# Patient Record
Sex: FEMALE | Race: WHITE | Employment: FULL TIME | URBAN - METROPOLITAN AREA
[De-identification: names, ages, dates, MRNs, and addresses within clinical notes are randomized per-mention and may not be internally consistent; named-entity substitution may affect disease eponyms.]

---

## 2022-05-20 ENCOUNTER — HOSPITAL ENCOUNTER (OUTPATIENT)
Dept: MAMMOGRAPHY | Age: 52
Discharge: HOME | End: 2022-05-20
Attending: FAMILY MEDICINE
Payer: COMMERCIAL

## 2022-05-20 DIAGNOSIS — Z12.31 ENCOUNTER FOR SCREENING MAMMOGRAM FOR BREAST CANCER: ICD-10-CM

## 2022-05-20 PROCEDURE — 77063 BREAST TOMOSYNTHESIS BI: CPT

## 2022-05-23 ENCOUNTER — NURSE TRIAGE (OUTPATIENT)
Dept: OTHER | Facility: CLINIC | Age: 52
End: 2022-05-23

## 2022-05-23 ENCOUNTER — TELEPHONE (OUTPATIENT)
Dept: FAMILY MEDICINE CLINIC | Facility: CLINIC | Age: 52
End: 2022-05-23

## 2022-05-23 NOTE — TELEPHONE ENCOUNTER
Received call from Elvira Gutierres at [facility] with [employer]. Subjective: Caller states \"Swollen lymph nodes\"     Current Symptoms:   Neck pain with swollen lymph nodes; L>R  Lymph nodes are the size of a bean  \"A little\" tender to the touch  Denies cold symptoms    Intermittent, mild left lower abdominal pain x \"months\"  Left flank pain  \"I may have a UTI or kidney stone\"  \"I think I may have seen a stone floating this morning\"  Hx kidney stones    Patient questioning if Olmesartan or Chlorthalidone is upsetting her stomach because PCP mentioned it prior at an OV. Onset: 1 week ago; worsening    Pain Severity: 2/10; dull; waxing and waning- neck pain    Temperature: Denies    Recommended disposition: See PCP within 3 Days    Care advice provided, patient verbalizes understanding; denies any other questions or concerns; instructed to call back for any new or worsening symptoms. Patient/Caller agrees with recommended disposition; writer provided warm transfer to [facility] for appointment scheduling     Attention Provider: Thank you for allowing me to participate in the care of your patient. The patient was connected to triage in response to information provided to the ECC/PSC. Please do not respond through this encounter as the response is not directed to a shared pool.       Reason for Disposition   Normal-sized lymph node (i.e., < 1 cm, < 1/2 inch)   MILD pain (i.e., scale 1-3; does not interfere with normal activities) and present > 3 days    Protocols used: LYMPH NODES - SWOLLEN-ADULT-OH, FLANK PAIN-ADULT-OH

## 2022-05-23 NOTE — TELEPHONE ENCOUNTER
Pt called states that she has had a kidney stone before and is concerned she may have another kidney stone, pt is experiencing pain in her lower left side as well as bilateral flank  lympnode in her neckpain. Pt states that she has swollen lymphoids in both sides of her neck. Pt is having burning pains in her abdomen. Instructed the patient she needs to be evaluated at a urgent care due to no availability in our schedules and needing to be seen in a timely manner. Pt states she would like to be treated over the phone and get an antibiotic, informed the patient she needed to be seen in person by medical personnel. Instructed the patient to seek treatment at urgent care and informed her if she is concerned of an infection and kidney stones she does not need to leave those symptoms untreated. Pt voiced seemed agitated but did voice understanding and disconnected the call.

## 2022-06-03 ENCOUNTER — OFFICE VISIT (OUTPATIENT)
Dept: FAMILY MEDICINE CLINIC | Facility: CLINIC | Age: 52
End: 2022-06-03
Payer: COMMERCIAL

## 2022-06-03 VITALS
BODY MASS INDEX: 28.79 KG/M2 | OXYGEN SATURATION: 99 % | DIASTOLIC BLOOD PRESSURE: 84 MMHG | HEIGHT: 64 IN | SYSTOLIC BLOOD PRESSURE: 142 MMHG | RESPIRATION RATE: 16 BRPM | WEIGHT: 168.6 LBS | TEMPERATURE: 98.6 F

## 2022-06-03 DIAGNOSIS — I10 PRIMARY HYPERTENSION: ICD-10-CM

## 2022-06-03 DIAGNOSIS — R10.32 LEFT LOWER QUADRANT ABDOMINAL PAIN: Primary | ICD-10-CM

## 2022-06-03 DIAGNOSIS — Z00.00 PHYSICAL EXAM: ICD-10-CM

## 2022-06-03 LAB
BILIRUBIN, URINE, POC: NEGATIVE
BLOOD URINE, POC: NEGATIVE
GLUCOSE URINE, POC: NEGATIVE
KETONES, URINE, POC: NEGATIVE
LEUKOCYTE ESTERASE, URINE, POC: NEGATIVE
NITRITE, URINE, POC: NEGATIVE
PH, URINE, POC: 6.5 (ref 4.6–8)
PROTEIN,URINE, POC: NORMAL
SPECIFIC GRAVITY, URINE, POC: 1.01 (ref 1–1.03)
URINALYSIS CLARITY, POC: CLEAR
URINALYSIS COLOR, POC: YELLOW
UROBILINOGEN, POC: 0.2

## 2022-06-03 PROCEDURE — 81003 URINALYSIS AUTO W/O SCOPE: CPT | Performed by: FAMILY MEDICINE

## 2022-06-03 PROCEDURE — 99212 OFFICE O/P EST SF 10 MIN: CPT | Performed by: FAMILY MEDICINE

## 2022-06-03 RX ORDER — ONDANSETRON 4 MG/1
4 TABLET, ORALLY DISINTEGRATING ORAL 3 TIMES DAILY PRN
COMMUNITY
Start: 2022-05-29 | End: 2022-06-28

## 2022-06-03 RX ORDER — TRETINOIN 1 MG/G
CREAM TOPICAL
COMMUNITY
Start: 2022-05-07 | End: 2022-08-23 | Stop reason: ALTCHOICE

## 2022-06-03 RX ORDER — DICLOFENAC POTASSIUM 50 MG/1
TABLET, FILM COATED ORAL
COMMUNITY
Start: 2022-05-24 | End: 2022-06-09

## 2022-06-03 RX ORDER — AMOXICILLIN AND CLAVULANATE POTASSIUM 875; 125 MG/1; MG/1
1 TABLET, FILM COATED ORAL 3 TIMES DAILY
COMMUNITY
Start: 2022-05-29 | End: 2022-06-03

## 2022-06-03 ASSESSMENT — PATIENT HEALTH QUESTIONNAIRE - PHQ9
1. LITTLE INTEREST OR PLEASURE IN DOING THINGS: 0
SUM OF ALL RESPONSES TO PHQ QUESTIONS 1-9: 0
SUM OF ALL RESPONSES TO PHQ9 QUESTIONS 1 & 2: 0
SUM OF ALL RESPONSES TO PHQ QUESTIONS 1-9: 0
2. FEELING DOWN, DEPRESSED OR HOPELESS: 0

## 2022-06-03 NOTE — PROGRESS NOTES
HISTORY OF PRESENT ILLNESS  Chief Complaint   Patient presents with    Abdominal Pain     lower left abdomen; was seen in the ER for this on 04/29 still will have a burning sensation     Discuss Labs     mammogram results on 05/20     lower left abdomen; was seen in the ER for this on 05/29 still will have a burning sensation     Has lessened a little but still there. Today was the last day of her abx. Tried the fodmat diet for awhile. Still feels it when she crosses her legs and feels pressure in that area with a BM. Initially was watery and now more thin/ narrow. Has tried adding back the yougurt because she thinks she needs it. Has not enjoyed the gluten free bread. Has a colonoscopy scheduled for the 26th of July. Has had that side pain for awhile. Has been dxed with h pylori in the past.     Kidney stones in 2019. tought that this could have been that because UA was all negative. Had ovaries evaluated around 2019 as well at that time. Had an U/s at that that time that was ok. Previously said, \" complaint of left lower quadrant pain. The patient initially felt some of the pain in her lower back, but over the last week or so it has been getting more \"angry\" in her left lower quadrant. She initially thought it may have been kidney stones since she has a history of this, so at the behest of her primary care physician she went to an urgent care. She states there her urine was noted as being normal no concern for kidney stone or urinary tract infection. She was given a pain medication from the urgent care and that upset her stomach somewhat and gave her some nausea though she never threw up. She has been having some diarrhea over the last couple of days with no blood in her stool. She denies any fevers but has been having intermittent hot flashes secondary to what she describes as menopause symptoms.  She acknowledges that she had diverticulitis around this time of year last year, and she also admits that she has been eating a lot of \"everything bagels\" that have a lot of seeds in them. I am concerned for diverticulitis as well, though the patient is currently afebrile and overall appears well, she is requesting conservative treatment with antibiotics. She has an appointment with her primary care physician 5 days from now. We will obtain basic laboratory studies for evaluation of an elevated white blood cell count, and I will re-evaluate the patient after her labs. \"    mammogram results on 05/20  Impression:     Left mass and calcifications. Further evaluation is recommended with   ML and compression magnification views, then ultrasound to follow. Previously said, \" Abdominal Pain  Patient complains of abdominal pain. The pain is described as dull and aching and sometimes it can be a little sharper occasionally  almost like a glowing  That gets brighter and less bright , and is 1-2 to more noticable/10 in intensity. Pain is located in the LLQ without radiation but has felt it in other places - may have some kidney or back aches. . Onset was 1 year ago. Symptoms have been unchanged since. Aggravating factors: eating and unknown. Alleviating factors: eating healthy, tried bone brothe, and salda for lunch. Associated symptoms: constipation had 2-3 weeks of constipation The patient denies anorexia, chills, diarrhea, dysuria, fever, melena, muscle pain, nausea, sweats, urinary frequency, urinary urgency and vomiting.           Notices more at night when is bed relaxing. Has at times has noticed it in the daytime. Sometimes always a little dull pain     has been happpening on and off for a year\"    Does have some lower left back lower pain. Abdominal Pain  This is a chronic problem. The pain is located in the LLQ. The abdominal pain radiates to the left flank. Associated symptoms include constipation.  Pertinent negatives include no anorexia, arthralgias, belching, diarrhea, dysuria, fever, frequency, headaches, hematochezia, hematuria, myalgias, nausea, vomiting or weight loss. The pain is aggravated by movement. The pain is relieved by certain positions. She has tried acetaminophen for the symptoms. The treatment provided no relief. Review of Systems   Constitutional: Negative for activity change, appetite change, chills, fatigue, fever and weight loss. HENT: Negative for congestion and rhinorrhea. Respiratory: Negative for cough, shortness of breath and wheezing. Gastrointestinal: Positive for abdominal pain and constipation. Negative for anorexia, diarrhea, hematochezia, nausea and vomiting. Genitourinary: Negative for dysuria, frequency and hematuria. Musculoskeletal: Negative for arthralgias and myalgias. Neurological: Negative for dizziness and headaches. Psychiatric/Behavioral: Negative for dysphoric mood. The patient is not nervous/anxious. Patient Active Problem List   Diagnosis    Hypertension    Kidney stone    Cyst, kidney, acquired         Blood pressure (!) 142/84, temperature 98.6 °F (37 °C), temperature source Temporal, resp. rate 16, height 5' 3.6\" (1.615 m), weight 168 lb 9.6 oz (76.5 kg), SpO2 99 %, not currently breastfeeding. Pain Scale: 2/10 Pain Location:   Body mass index is 29.31 kg/m². Physical Exam  Constitutional:       General: She is not in acute distress. Appearance: Normal appearance. She is normal weight. She is not toxic-appearing. HENT:      Head: Normocephalic and atraumatic. Eyes:      Extraocular Movements: Extraocular movements intact. Conjunctiva/sclera: Conjunctivae normal.      Pupils: Pupils are equal, round, and reactive to light. Cardiovascular:      Heart sounds: Normal heart sounds. No murmur heard. No friction rub. No gallop. Pulmonary:      Effort: Pulmonary effort is normal. No respiratory distress. Breath sounds: Normal breath sounds. No wheezing or rales.    Abdominal:      General: Abdomen is flat. Bowel sounds are normal. There is no distension or abdominal bruit. There are no signs of injury. Palpations: Abdomen is soft. There is no hepatomegaly or mass. Tenderness: There is no abdominal tenderness. Skin:     General: Skin is warm and dry. Neurological:      Mental Status: She is alert. Psychiatric:         Mood and Affect: Mood normal.         Behavior: Behavior normal.         Thought Content: Thought content normal.         Judgment: Judgment normal.          Outside records were obtained and reviewed, including notes and any lab or x-ray reports. ASSESSMENT and PLAN   Diagnosis Orders   1. Left lower quadrant abdominal pain     2. Physical exam  AMB POC URINALYSIS DIP STICK AUTO W/O MICRO   3. Primary hypertension         Reviewed medications and side effects in detail    Will follow up with gastroenterology and if needed we can have them schedule a CT scan. Will call for follow up of mammo. The patient's condition was discussed at length with the patient. The expected course and possible complications were reviewed. All questions were answered. Her other chronic conditions are stable at this time. She is stable on the current treatment and tolerating it well. No significant sides effects. Will not adjust therapy at this time, unless noted above. We will continue to monitor for any problems. Medications refilled and lab work has been ordered where needed. Reviewed medications are explained including any potential interactions or side effects in detail. The patient's questions were answered. She  understands the above and has no further questions. Further workup and treatment should be done if symptoms persist, worsen or new symptoms occur. She  will call to notify us of any problems, complications or worsening symptoms. There are no Patient Instructions on file for this visit.     (Some details in this note may have been created with speech-recognition software. Some errors in speech recognition may have occurred.    Most of those have been corrected but some may have been missed.)         Benjy Vu MD  [address] [facility]  Phone [phone]

## 2022-06-04 ENCOUNTER — TELEPHONE (OUTPATIENT)
Dept: FAMILY MEDICINE CLINIC | Facility: CLINIC | Age: 52
End: 2022-06-04

## 2022-06-04 DIAGNOSIS — N63.20 LEFT BREAST MASS: Primary | ICD-10-CM

## 2022-06-04 DIAGNOSIS — R92.1 CALCIFICATION OF LEFT BREAST: ICD-10-CM

## 2022-06-04 NOTE — TELEPHONE ENCOUNTER
The radiologist who read your mammogram would like some additional images. Have they called you to schedule your follow-up images? Do we need to set those up for you?   (Please send these answers to me)

## 2022-06-06 NOTE — TELEPHONE ENCOUNTER
Spoke to the patient, they have not contacted her for additional imaging.  Pt is requesting we do this for her

## 2022-06-06 NOTE — TELEPHONE ENCOUNTER
Orders Placed This Encounter   Procedures    US BREAST LIMITED LEFT     Standing Status:   Future     Standing Expiration Date:   6/6/2023     Order Specific Question:   Reason for exam:     Answer:   Left mass and calcification on mammogram from 6.2.22    LAUREN ZACHARY DIGITAL CONED DOWN UNILATERAL LEFT     Standing Status:   Future     Standing Expiration Date:   8/6/2023     Order Specific Question:   Reason for exam:     Answer:   Left mass and calcification on mammogram from 6.2.22     Order Specific Question:   Reason for exam:     Answer:   ML and compression magnification views, then ultrasound to follow.

## 2022-06-08 ENCOUNTER — TELEPHONE (OUTPATIENT)
Dept: FAMILY MEDICINE CLINIC | Facility: CLINIC | Age: 52
End: 2022-06-08

## 2022-06-08 NOTE — TELEPHONE ENCOUNTER
Date of service: 



03/01/2019



PROCEDURE:  Radiographs of the right tibia and fibula.



HISTORY:

rigt mid fibular pain



COMPARISON:

None available



TECHNIQUE:

Frontal and lateral views obtained.



FINDINGS:



BONES:

No fracture identified.



JOINT SPACES:

No dislocation seen. Bony articulations appear maintained. 



OTHER FINDINGS:

None.



IMPRESSION:

No fracture or dislocation identified. Pt called stating that she went to the ER for LLQ pain. They did a CT and everything was normal. States that she is still in a lot of pain. Asking about a MRI. Thinks that she may have a Hernia.

## 2022-06-09 ENCOUNTER — OFFICE VISIT (OUTPATIENT)
Dept: FAMILY MEDICINE CLINIC | Facility: CLINIC | Age: 52
End: 2022-06-09
Payer: COMMERCIAL

## 2022-06-09 VITALS
TEMPERATURE: 99 F | BODY MASS INDEX: 29.23 KG/M2 | DIASTOLIC BLOOD PRESSURE: 86 MMHG | HEART RATE: 97 BPM | RESPIRATION RATE: 16 BRPM | HEIGHT: 64 IN | SYSTOLIC BLOOD PRESSURE: 139 MMHG | OXYGEN SATURATION: 99 % | WEIGHT: 171.2 LBS

## 2022-06-09 DIAGNOSIS — R10.32 ABDOMINAL WALL PAIN IN LEFT LOWER QUADRANT: Primary | ICD-10-CM

## 2022-06-09 DIAGNOSIS — M79.89 LEFT AXILLARY SWELLING: ICD-10-CM

## 2022-06-09 DIAGNOSIS — I10 PRIMARY HYPERTENSION: ICD-10-CM

## 2022-06-09 DIAGNOSIS — J02.0 ACUTE STREPTOCOCCAL PHARYNGITIS: ICD-10-CM

## 2022-06-09 PROCEDURE — 99214 OFFICE O/P EST MOD 30 MIN: CPT | Performed by: FAMILY MEDICINE

## 2022-06-09 ASSESSMENT — ENCOUNTER SYMPTOMS
RHINORRHEA: 0
SHORTNESS OF BREATH: 0
VOMITING: 0
NAUSEA: 0
BELCHING: 0
WHEEZING: 0
COUGH: 0
FLATUS: 0
DIARRHEA: 0
HEMATOCHEZIA: 0
CONSTIPATION: 0
ABDOMINAL PAIN: 1

## 2022-06-09 ASSESSMENT — PATIENT HEALTH QUESTIONNAIRE - PHQ9
SUM OF ALL RESPONSES TO PHQ QUESTIONS 1-9: 0
SUM OF ALL RESPONSES TO PHQ9 QUESTIONS 1 & 2: 0
SUM OF ALL RESPONSES TO PHQ QUESTIONS 1-9: 0
1. LITTLE INTEREST OR PLEASURE IN DOING THINGS: 0
2. FEELING DOWN, DEPRESSED OR HOPELESS: 0

## 2022-06-09 NOTE — PROGRESS NOTES
HISTORY OF PRESENT ILLNESS  Chief Complaint   Patient presents with    Abdominal Pain     has been going on for a while; has worsened in the last two weeks; lower left abdomen and into groin     Nausea    Pharyngitis     feels lympnodes in her neck are causing a scratchy throat;last night felt her lymphnodes in her left arm pit is swollen; pt recently had an abnormal mammo on her left breast     Burning - almost a scrape or abrasion. It is every day - always there in the LLQ. Sometimes will have to reposition her sitting position. Walking slow helps but doing th elipitical and when she got off got the heavy feeling in the LLQ. Tired ice and tylenol      has been going on for a while; has worsened in the last two weeks; lower left abdomen and into groin     feels lympnodes in her neck are causing a scratchy throat; - comes and goes. last night felt her lymphnodes in her left arm pit is swollen and tender. pt recently had an abnormal mammo on her left breast    Has just gotten her bowels back to normal - had quit coffee and then started it back and it fixed the constipation. Has appt for additional images this week. Previously said, \" 6/6/2022  [facility]  Presents with Left lower abdominal pain, intermittent chest discomfort. Patient past medical history of diverticulitis, reflux, hypertension, kidney stone presents with complaint of left lower abdominal pain ongoing for the past several days to weeks. She states that she actually even had some discomfort over the past several months. She has an appointment in approximately 4 weeks with gastroenterology. She states that she has been seen multiple times by different providers and has been given antibiotics for questionable diverticulitis. She did indicate that she had some hard stool yesterday and subsequently did have a bowel movement. She denies any diarrhea with this. She is not having urinary complaints.  No fevers or chills. She was concerned that this may be related to something with a breast mass that was detected on mammography. He does not smoke, drinks wine, no illicit drug use  Patient seen on arrival, following detailed history and physical exam, patient's clinical findings are most consistent with abdominal pain with CT scan that showed renal cyst and some hepatic steatosis. Patient did not want to have any laboratory testing performed. \"      Abdominal Pain  The pain is located in the LLQ. The abdominal pain does not radiate. Pertinent negatives include no anorexia, arthralgias, belching, constipation, diarrhea, dysuria, fever, flatus, frequency, headaches, hematochezia, hematuria, melena, myalgias, nausea, vomiting or weight loss. Pharyngitis  Associated symptoms include abdominal pain. Pertinent negatives include no anorexia, arthralgias, chills, congestion, coughing, fatigue, fever, headaches, myalgias, nausea or vomiting. Review of Systems   Constitutional: Negative for activity change, appetite change, chills, fatigue, fever and weight loss. HENT: Negative for congestion and rhinorrhea. Respiratory: Negative for cough, shortness of breath and wheezing. Gastrointestinal: Positive for abdominal pain. Negative for anorexia, constipation, diarrhea, flatus, hematochezia, melena, nausea and vomiting. Genitourinary: Negative for dysuria, frequency and hematuria. Musculoskeletal: Negative for arthralgias and myalgias. Neurological: Negative for dizziness and headaches. Psychiatric/Behavioral: Negative for dysphoric mood. The patient is not nervous/anxious. Patient Active Problem List   Diagnosis    Hypertension    Kidney stone    Cyst, kidney, acquired         Blood pressure 139/86, pulse 97, temperature 99 °F (37.2 °C), temperature source Temporal, resp. rate 16, height 5' 3.6\" (1.615 m), weight 171 lb 3.2 oz (77.7 kg), SpO2 99 %, not currently breastfeeding.  Pain Scale: 4/10 Pain Location: Abdomen  Body mass index is 29.76 kg/m². Physical Exam  Vitals and nursing note reviewed. Constitutional:       General: She is not in acute distress. Appearance: Normal appearance. She is normal weight. She is not toxic-appearing. HENT:      Head: Normocephalic and atraumatic. Nose: No congestion or rhinorrhea. Right Turbinates: Swollen. Left Turbinates: Swollen. Mouth/Throat:      Mouth: Mucous membranes are moist. No injury or oral lesions. Tongue: No lesions. Pharynx: Oropharynx is clear. Uvula midline. No pharyngeal swelling, oropharyngeal exudate or posterior oropharyngeal erythema. Eyes:      General: Lids are normal.      Extraocular Movements: Extraocular movements intact. Conjunctiva/sclera: Conjunctivae normal.      Pupils: Pupils are equal, round, and reactive to light. Pupils are equal.   Cardiovascular:      Heart sounds: Normal heart sounds. No murmur heard. No friction rub. No gallop. Pulmonary:      Effort: Pulmonary effort is normal. No respiratory distress. Breath sounds: Normal breath sounds. No wheezing or rales. Chest:   Breasts:      Right: No axillary adenopathy or supraclavicular adenopathy. Left: No axillary adenopathy or supraclavicular adenopathy. Abdominal:      General: Abdomen is flat. Bowel sounds are normal. There is no distension or abdominal bruit. There are no signs of injury. Palpations: Abdomen is soft. There is no hepatomegaly or mass. Tenderness: There is abdominal tenderness. Hernia: No hernia is present. Comments: Muscular tenderness in the LLQ   Lymphadenopathy:      Head:      Right side of head: No submental, submandibular, tonsillar, preauricular, posterior auricular or occipital adenopathy. Left side of head: No submental, submandibular, tonsillar, preauricular, posterior auricular or occipital adenopathy. Cervical: No cervical adenopathy.       Right cervical: No superficial, deep or posterior cervical adenopathy. Left cervical: No superficial, deep or posterior cervical adenopathy. Upper Body:      Right upper body: No supraclavicular, axillary or epitrochlear adenopathy. Left upper body: No supraclavicular, axillary or epitrochlear adenopathy. Lower Body: No right inguinal adenopathy. No left inguinal adenopathy. Skin:     General: Skin is warm and dry. Neurological:      Mental Status: She is alert. Psychiatric:         Mood and Affect: Mood normal.         Behavior: Behavior normal.         Thought Content: Thought content normal.         Judgment: Judgment normal.            ASSESSMENT and PLAN   Diagnosis Orders   1. Abdominal wall pain in left lower quadrant     2. Left axillary swelling     3. Primary hypertension         Reviewed medications and side effects in detail    follow up for trigger point injections into the LLQ          The current HTN  medical regimen  is  effective. She will continue continue present plan and medications   Requested Prescriptions      No prescriptions requested or ordered in this encounter           Her other chronic conditions are stable at this time. She is stable on the current treatment and tolerating it well. No significant sides effects. Will not adjust therapy at this time, unless noted above. We will continue to monitor for any problems. Medications refilled and lab work has been ordered where needed. Reviewed medications are explained including any potential interactions or side effects in detail. The patient's questions were answered. She  understands the above and has no further questions. Further workup and treatment should be done if symptoms persist, worsen or new symptoms occur. She  will call to notify us of any problems, complications or worsening symptoms. There are no Patient Instructions on file for this visit.     (Some details in this note may have been created with speech-recognition software. Some errors in speech recognition may have occurred.    Most of those have been corrected but some may have been missed.)         René Campbell MD  [address] [facility]  Phone [phone]

## 2022-06-10 ASSESSMENT — ENCOUNTER SYMPTOMS
DIARRHEA: 0
ABDOMINAL PAIN: 1
CONSTIPATION: 1
WHEEZING: 0
RHINORRHEA: 0
HEMATOCHEZIA: 0
BELCHING: 0
NAUSEA: 0
VOMITING: 0
COUGH: 0
SHORTNESS OF BREATH: 0

## 2022-06-14 ENCOUNTER — TELEPHONE (OUTPATIENT)
Dept: FAMILY MEDICINE CLINIC | Facility: CLINIC | Age: 52
End: 2022-06-14

## 2022-06-14 ENCOUNTER — HOSPITAL ENCOUNTER (OUTPATIENT)
Dept: MAMMOGRAPHY | Age: 52
Discharge: HOME | End: 2022-06-17
Payer: COMMERCIAL

## 2022-06-14 ENCOUNTER — APPOINTMENT (OUTPATIENT)
Dept: MAMMOGRAPHY | Age: 52
End: 2022-06-14
Payer: COMMERCIAL

## 2022-06-14 VITALS — SYSTOLIC BLOOD PRESSURE: 145 MMHG | HEART RATE: 104 BPM | DIASTOLIC BLOOD PRESSURE: 93 MMHG

## 2022-06-14 DIAGNOSIS — R92.8 ABNORMAL MAMMOGRAM OF LEFT BREAST: ICD-10-CM

## 2022-06-14 DIAGNOSIS — N63.0 BREAST MASS: ICD-10-CM

## 2022-06-14 DIAGNOSIS — F41.1 GAD (GENERALIZED ANXIETY DISORDER): Primary | ICD-10-CM

## 2022-06-14 DIAGNOSIS — N63.20 LEFT BREAST MASS: ICD-10-CM

## 2022-06-14 DIAGNOSIS — R92.1 CALCIFICATION OF LEFT BREAST: ICD-10-CM

## 2022-06-14 PROCEDURE — G0279 TOMOSYNTHESIS, MAMMO: HCPCS

## 2022-06-14 PROCEDURE — 88305 TISSUE EXAM BY PATHOLOGIST: CPT

## 2022-06-14 PROCEDURE — 77065 DX MAMMO INCL CAD UNI: CPT

## 2022-06-14 PROCEDURE — 76642 ULTRASOUND BREAST LIMITED: CPT

## 2022-06-14 PROCEDURE — 38505 NEEDLE BIOPSY LYMPH NODES: CPT

## 2022-06-14 PROCEDURE — A4648 IMPLANTABLE TISSUE MARKER: HCPCS

## 2022-06-14 PROCEDURE — 2500000003 HC RX 250 WO HCPCS: Performed by: FAMILY MEDICINE

## 2022-06-14 RX ORDER — LIDOCAINE HYDROCHLORIDE 10 MG/ML
15 INJECTION, SOLUTION INFILTRATION; PERINEURAL ONCE
Status: COMPLETED | OUTPATIENT
Start: 2022-06-14 | End: 2022-06-14

## 2022-06-14 RX ADMIN — LIDOCAINE HYDROCHLORIDE 15 ML: 10 INJECTION, SOLUTION INFILTRATION; PERINEURAL at 12:22

## 2022-06-14 ASSESSMENT — PAIN SCALES - GENERAL: PAINLEVEL_OUTOF10: 4

## 2022-06-14 NOTE — TELEPHONE ENCOUNTER
Received a call from the the [facility] breast center states the patient is there for a diagnostic mammogram states the patient has a large cancerous mass in her breast that has spread to her lymphnodes . Requested verbal orders to go ahead and do a Left Breast ultrasound,Left axillary and breast biopsy and then will proceed with an MRI. Verbal given. States they will be sending an inbasket message to Dr. Anni Genao as well

## 2022-06-15 RX ORDER — CLONAZEPAM 0.5 MG/1
0.5 TABLET ORAL 3 TIMES DAILY PRN
Qty: 30 TABLET | Refills: 0 | Status: SHIPPED | OUTPATIENT
Start: 2022-06-15 | End: 2022-07-13 | Stop reason: SDUPTHER

## 2022-06-15 NOTE — TELEPHONE ENCOUNTER
Received a call from Yoshi Chi with [facility] states that she attempted to tell the patient her results of her ultrasound and biopsy by phone earlier today but the patient was too upset to receive the results. Pt requested Maame contact us and have something sent in to [facility] in [place] to help calm the patients nerves. Pt states she did not want to wait until her MRI tomorrow to receive her results. Pt informed Yoshi Chi she has previously taken Valium and it upset her stomach.

## 2022-06-15 NOTE — TELEPHONE ENCOUNTER
Prescription(s) refilled  It (they) has been escribed to the pharmacy. Requested Prescriptions     Signed Prescriptions Disp Refills    clonazePAM (KLONOPIN) 0.5 MG tablet 30 tablet 0     Sig: Take 1 tablet by mouth 3 times daily as needed for Anxiety for up to 30 doses. Authorizing Provider: ANT MELENDEZ     No orders of the defined types were placed in this encounter.

## 2022-06-16 ENCOUNTER — TELEPHONE (OUTPATIENT)
Dept: CASE MANAGEMENT | Age: 52
End: 2022-06-16

## 2022-06-16 ENCOUNTER — HOSPITAL ENCOUNTER (OUTPATIENT)
Dept: MRI IMAGING | Age: 52
Discharge: HOME | End: 2022-06-19
Payer: COMMERCIAL

## 2022-06-16 ENCOUNTER — TELEPHONE (OUTPATIENT)
Dept: MAMMOGRAPHY | Age: 52
End: 2022-06-16

## 2022-06-16 DIAGNOSIS — R92.8 ABNORMAL MAMMOGRAM OF LEFT BREAST: ICD-10-CM

## 2022-06-16 PROCEDURE — 6360000004 HC RX CONTRAST MEDICATION: Performed by: FAMILY MEDICINE

## 2022-06-16 PROCEDURE — C8908 MRI W/O FOL W/CONT, BREAST,: HCPCS

## 2022-06-16 PROCEDURE — A9579 GAD-BASE MR CONTRAST NOS,1ML: HCPCS | Performed by: FAMILY MEDICINE

## 2022-06-16 PROCEDURE — 2580000003 HC RX 258: Performed by: FAMILY MEDICINE

## 2022-06-16 RX ORDER — SODIUM CHLORIDE 0.9 % (FLUSH) 0.9 %
30 SYRINGE (ML) INJECTION AS NEEDED
Status: DISCONTINUED | OUTPATIENT
Start: 2022-06-16 | End: 2022-06-20 | Stop reason: HOSPADM

## 2022-06-16 RX ADMIN — SODIUM CHLORIDE, PRESERVATIVE FREE 30 ML: 5 INJECTION INTRAVENOUS at 14:34

## 2022-06-16 RX ADMIN — GADOTERIDOL 16 ML: 279.3 INJECTION, SOLUTION INTRAVENOUS at 14:34

## 2022-06-16 SDOH — SOCIAL STABILITY: SOCIAL INSECURITY
WITHIN THE LAST YEAR, HAVE YOU BEEN KICKED, HIT, SLAPPED, OR OTHERWISE PHYSICALLY HURT BY YOUR PARTNER OR EX-PARTNER?: NO

## 2022-06-16 SDOH — SOCIAL STABILITY: SOCIAL NETWORK: HOW OFTEN DO YOU GET TOGETHER WITH FRIENDS OR RELATIVES?: MORE THAN THREE TIMES A WEEK

## 2022-06-16 SDOH — HEALTH STABILITY: MENTAL HEALTH: HOW OFTEN DO YOU HAVE A DRINK CONTAINING ALCOHOL?: 2-4 TIMES A MONTH

## 2022-06-16 SDOH — ECONOMIC STABILITY: INCOME INSECURITY: HOW HARD IS IT FOR YOU TO PAY FOR THE VERY BASICS LIKE FOOD, HOUSING, MEDICAL CARE, AND HEATING?: NOT HARD AT ALL

## 2022-06-16 SDOH — ECONOMIC STABILITY: TRANSPORTATION INSECURITY
IN THE PAST 12 MONTHS, HAS LACK OF TRANSPORTATION KEPT YOU FROM MEETINGS, WORK, OR FROM GETTING THINGS NEEDED FOR DAILY LIVING?: NO

## 2022-06-16 SDOH — HEALTH STABILITY: MENTAL HEALTH
STRESS IS WHEN SOMEONE FEELS TENSE, NERVOUS, ANXIOUS, OR CAN'T SLEEP AT NIGHT BECAUSE THEIR MIND IS TROUBLED. HOW STRESSED ARE YOU?: TO SOME EXTENT

## 2022-06-16 SDOH — ECONOMIC STABILITY: HOUSING INSECURITY: IN THE LAST 12 MONTHS, HOW MANY PLACES HAVE YOU LIVED?: 1

## 2022-06-16 SDOH — ECONOMIC STABILITY: TRANSPORTATION INSECURITY
IN THE PAST 12 MONTHS, HAS THE LACK OF TRANSPORTATION KEPT YOU FROM MEDICAL APPOINTMENTS OR FROM GETTING MEDICATIONS?: NO

## 2022-06-16 SDOH — HEALTH STABILITY: PHYSICAL HEALTH: ON AVERAGE, HOW MANY MINUTES DO YOU ENGAGE IN EXERCISE AT THIS LEVEL?: 30 MIN

## 2022-06-16 SDOH — ECONOMIC STABILITY: HOUSING INSECURITY
IN THE LAST 12 MONTHS, WAS THERE A TIME WHEN YOU DID NOT HAVE A STEADY PLACE TO SLEEP OR SLEPT IN A SHELTER (INCLUDING NOW)?: NO

## 2022-06-16 SDOH — SOCIAL STABILITY: SOCIAL NETWORK: ARE YOU MARRIED, WIDOWED, DIVORCED, SEPARATED, NEVER MARRIED, OR LIVING WITH A PARTNER?: MARRIED

## 2022-06-16 SDOH — HEALTH STABILITY: PHYSICAL HEALTH: ON AVERAGE, HOW MANY DAYS PER WEEK DO YOU ENGAGE IN MODERATE TO STRENUOUS EXERCISE (LIKE A BRISK WALK)?: 7 DAYS

## 2022-06-16 SDOH — SOCIAL STABILITY: SOCIAL INSECURITY: WITHIN THE LAST YEAR, HAVE YOU BEEN HUMILIATED OR EMOTIONALLY ABUSED IN OTHER WAYS BY YOUR PARTNER OR EX-PARTNER?: NO

## 2022-06-16 SDOH — SOCIAL STABILITY: SOCIAL INSECURITY: WITHIN THE LAST YEAR, HAVE YOU BEEN AFRAID OF YOUR PARTNER OR EX-PARTNER?: NO

## 2022-06-16 SDOH — SOCIAL STABILITY: SOCIAL INSECURITY
WITHIN THE LAST YEAR, HAVE TO BEEN RAPED OR FORCED TO HAVE ANY KIND OF SEXUAL ACTIVITY BY YOUR PARTNER OR EX-PARTNER?: NO

## 2022-06-16 SDOH — ECONOMIC STABILITY: INCOME INSECURITY: IN THE LAST 12 MONTHS, WAS THERE A TIME WHEN YOU WERE NOT ABLE TO PAY THE MORTGAGE OR RENT ON TIME?: NO

## 2022-06-16 SDOH — ECONOMIC STABILITY: FOOD INSECURITY: WITHIN THE PAST 12 MONTHS, YOU WORRIED THAT YOUR FOOD WOULD RUN OUT BEFORE YOU GOT MONEY TO BUY MORE.: NEVER TRUE

## 2022-06-16 SDOH — SOCIAL STABILITY: SOCIAL NETWORK
IN A TYPICAL WEEK, HOW MANY TIMES DO YOU TALK ON THE PHONE WITH FAMILY, FRIENDS, OR NEIGHBORS?: MORE THAN THREE TIMES A WEEK

## 2022-06-16 ASSESSMENT — PATIENT HEALTH QUESTIONNAIRE - PHQ9
SUM OF ALL RESPONSES TO PHQ QUESTIONS 1-9: 0
SUM OF ALL RESPONSES TO PHQ QUESTIONS 1-9: 0
2. FEELING DOWN, DEPRESSED OR HOPELESS: 0
SUM OF ALL RESPONSES TO PHQ QUESTIONS 1-9: 0
SUM OF ALL RESPONSES TO PHQ QUESTIONS 1-9: 0
SUM OF ALL RESPONSES TO PHQ9 QUESTIONS 1 & 2: 0
1. LITTLE INTEREST OR PLEASURE IN DOING THINGS: 0

## 2022-06-16 NOTE — TELEPHONE ENCOUNTER
6/16/2022  Breast Navigation  intake complete for New Patient Breast Cancer. Reviewed role of navigation, gave contact information for navigators, discussed pathology report and pending  receptor status, reviewed upcoming appointments. 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 They live in [address] [address] [facility] and have relocated to the [place] a year ago from [place]. She does have good extended family support with her mom who lives in [place] and a daughter who is a senior at [employer]. Independent in self care. Barriers:  No financial, psychosocial,or transportation barriers noted. Increased anxiety noted related to diagnosis, states no prior issues. Lives with supportive spouse Spring Monae who is her primary support person and 2 dogs Lawanda Adair and Diego Resides. Verbal permission given to speak with spouse. States spouse may at times call for her when needed. 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 MRI pending for today. Social determinants of health and Depression screen complete. No prior issues with depression or anxiety, has situation anxiety at this time, managed by PCP, related to diagnosis. Confirmed PCP:  Evelin Has MD.  Added MD and Navigator to treatment team.  Appointment with Oncology: Dr. Suresh Field 6-27-22 at 1:00pm.  Breast Multidisciplinary Conference presentation date:  pending for 6-30-22. My Chart message to patient with navigation contact information and upcoming appointments. Routed note to PCP regarding intake and upcoming appointments.

## 2022-06-17 ENCOUNTER — TELEPHONE (OUTPATIENT)
Dept: CASE MANAGEMENT | Age: 52
End: 2022-06-17

## 2022-06-17 NOTE — TELEPHONE ENCOUNTER
Patient called noting concerns for left groin pain x several months with CT done at [facility] on 6-6, and is fearful her cancer has spread. Reviewed MRI results with her. She states Mellissa Dwyer navigator had mentioned a PET scan and she is tearful wanting to know if she needs additional imaging such as a PET scan. Will discuss with Dr. Joyce Perales and follow up with patient.

## 2022-06-17 NOTE — TELEPHONE ENCOUNTER
Call to patient and spouse Lima answered patients phone and states patient was resting . Patient was clear that could speak to spouse regarding all results and plan. Discussed MRI results with spouse. Receptors are pending still. Encouraged to call for questions/concerns.

## 2022-06-18 ENCOUNTER — TELEPHONE (OUTPATIENT)
Dept: CASE MANAGEMENT | Age: 52
End: 2022-06-18

## 2022-06-19 DIAGNOSIS — C77.3 BREAST CANCER METASTASIZED TO AXILLARY LYMPH NODE, LEFT (HCC): ICD-10-CM

## 2022-06-19 DIAGNOSIS — C50.912 BREAST CANCER METASTASIZED TO AXILLARY LYMPH NODE, LEFT (HCC): ICD-10-CM

## 2022-06-19 DIAGNOSIS — C50.912 MALIGNANT NEOPLASM OF LEFT FEMALE BREAST, UNSPECIFIED ESTROGEN RECEPTOR STATUS, UNSPECIFIED SITE OF BREAST (HCC): Primary | ICD-10-CM

## 2022-06-20 ENCOUNTER — PATIENT MESSAGE (OUTPATIENT)
Dept: ONCOLOGY | Age: 52
End: 2022-06-20

## 2022-06-20 ENCOUNTER — TELEPHONE (OUTPATIENT)
Dept: CASE MANAGEMENT | Age: 52
End: 2022-06-20

## 2022-06-22 ENCOUNTER — HOSPITAL ENCOUNTER (OUTPATIENT)
Dept: PET IMAGING | Age: 52
Discharge: HOME | End: 2022-06-25
Payer: COMMERCIAL

## 2022-06-22 DIAGNOSIS — C50.912 MALIGNANT NEOPLASM OF LEFT FEMALE BREAST, UNSPECIFIED ESTROGEN RECEPTOR STATUS, UNSPECIFIED SITE OF BREAST (HCC): ICD-10-CM

## 2022-06-22 DIAGNOSIS — C50.912 BREAST CANCER METASTASIZED TO AXILLARY LYMPH NODE, LEFT (HCC): ICD-10-CM

## 2022-06-22 DIAGNOSIS — C77.3 BREAST CANCER METASTASIZED TO AXILLARY LYMPH NODE, LEFT (HCC): ICD-10-CM

## 2022-06-22 LAB
GLUCOSE BLD STRIP.AUTO-MCNC: 123 MG/DL (ref 65–100)
SERVICE CMNT-IMP: ABNORMAL

## 2022-06-22 PROCEDURE — 2580000003 HC RX 258: Performed by: INTERNAL MEDICINE

## 2022-06-22 PROCEDURE — 3430000000 HC RX DIAGNOSTIC RADIOPHARMACEUTICAL: Performed by: INTERNAL MEDICINE

## 2022-06-22 PROCEDURE — 78815 PET IMAGE W/CT SKULL-THIGH: CPT

## 2022-06-22 PROCEDURE — 82962 GLUCOSE BLOOD TEST: CPT

## 2022-06-22 PROCEDURE — A9552 F18 FDG: HCPCS | Performed by: INTERNAL MEDICINE

## 2022-06-22 PROCEDURE — 6360000004 HC RX CONTRAST MEDICATION: Performed by: INTERNAL MEDICINE

## 2022-06-22 RX ORDER — FLUDEOXYGLUCOSE F 18 200 MCI/ML
13.42 INJECTION, SOLUTION INTRAVENOUS
Status: COMPLETED | OUTPATIENT
Start: 2022-06-22 | End: 2022-06-22

## 2022-06-22 RX ORDER — SODIUM CHLORIDE 0.9 % (FLUSH) 0.9 %
20 SYRINGE (ML) INJECTION AS NEEDED
Status: DISCONTINUED | OUTPATIENT
Start: 2022-06-22 | End: 2022-06-26 | Stop reason: HOSPADM

## 2022-06-22 RX ADMIN — SODIUM CHLORIDE, PRESERVATIVE FREE 20 ML: 5 INJECTION INTRAVENOUS at 10:25

## 2022-06-22 RX ADMIN — FLUDEOXYGLUCOSE F 18 13.42 MILLICURIE: 200 INJECTION, SOLUTION INTRAVENOUS at 10:25

## 2022-06-22 RX ADMIN — DIATRIZOATE MEGLUMINE AND DIATRIZOATE SODIUM 10 ML: 660; 100 LIQUID ORAL; RECTAL at 10:25

## 2022-06-22 NOTE — PATIENT INSTRUCTIONS
Patient Instructions from Today's Visit    Reason for Visit:  New Patient Visit - [facility]    Diagnosis Information:  [email or web address]-us/[email or web address]-answers-[email or web address]-education-materials/akwl-[email or web address]-kuxx-sheets  ASCO ANSWERS is a collection of oncologist-approved patient education materials developed by the [employer] of Clinical Oncology (ASCO) for people with cancer and their caregivers. Breast Cancer    What is breast cancer? Breast cancer begins when healthy breast cells change and grow out of control, usually forming a mass called a tumor. Breast cancer is the most common type of cancer diagnosed in women in the [facility] (excluding skin cancer). What are the parts of the breast?   Most of the breast is fatty tissue. However, it also contains a network of lobes that are made up of tiny, tube-like structures called lobules that contain milk glands. Tiny ducts connect the glands, lobules, and lobes, and carry milk from the lobes to the nipple. Most breast cancers begin in the cells lining the milk ducts and are called ductal carcinomas. The second most common type starts in the lobules and is called lobular carcinoma. What does stage mean? The stage is a way of describing where the cancer is located, how much the cancer has grown, and if or where it has spread. There are 5 stages for breast cancer: stage 0 (zero), which is called noninvasive cancer or ductal carcinoma in situ (DCIS), and stages I through IV (1 through 4). Descriptions and illustrations of these stages are available at www.cancer. net/breast.     How is breast cancer treated? The biology and behavior of a breast cancer affect the treatment plan, and every persons cancer is different.  Doctors consider many factors when recommending a treatment plan, including the cancers stage; the tumors human epidermal growth factor receptor 2 (HER2) status and the hormone receptor status, which includes estrogen receptors (ER) and progesterone receptors (FL); the presence of known mutations (changes) in breast cancer genes; and the womans age, general health, and whether she has experienced menopause. For earlier stages of cancer, surgery to remove the tumor and nearby lymph nodes usually is the first treatment. Additional treatment with chemotherapy, radiation therapy, hormonal therapy, or targeted therapy is usually given after surgery to lower the risk of the cancer returning. These treatments may also be given before surgery to shrink the size of the tumor. The treatment of cancer that has spread or come back after treatment depends on many factors. It can include the therapies listed above used in a different combination or at a different pace. When making treatment decisions, women may also consider a clinical trial; talk with your doctor about all treatment options. The side effects of breast cancer treatment can be reduced or managed with a variety of medications and the help of your health care team. This is called palliative care and is an important part of the overall treatment plan. How can I cope with breast cancer? Absorbing the news of a cancer diagnosis and communicating with your health care team are key parts of the coping process. Seeking support, organizing your health information, making sure all of your questions are answered, and participating in the decision-making process are other steps. Talk with your health care team about any concerns. Understanding your emotions and those of people close to you can be helpful in managing the diagnosis, treatment, and healing process. [address]. © 2004 AMERICAN SOCIETY OF CLINICAL ONCOLOGY. MADE AVAILABLE THROUGH   Questions to ask the health care team   Regular communication is important in making informed decisions about your health care.  Consider asking the following questions of your health care team:    What type of breast cancer do I have? Can you explain my pathology report (laboratory test results) to me? What stage is the breast cancer? What does this mean? What is the ER/TX status of the tumor? The HER2 status? What does this                       mean? Would you explain my treatment options? What clinical trials are available for me? Where are they located, and how do I                 find out more about them? What treatment plan do you recommend? Why? Should treatment before surgery be considered? What is the goal of each treatment? Is it to eliminate the cancer, help me feel                   better, or both? Who will be part of my treatment team, and what does each member do? How will this treatment affect my daily life? Will I be able to work, exercise, and                perform my usual activities? Will this treatment affect my ability to become pregnant or have children? What                can be done to preserve my fertility? What long-term side effects are associated with my cancer treatment? If Im worried about managing the costs of cancer care, who can help me? Where can I find emotional support for me and my family? Whom should I call with questions or problems? Is there anything else I should be asking? Find more questions to ask the health care team at [address] net/breast and www.[email or web address]. net/metastaticbreast. For a digital list of questions, download Cancer. Nets free mobile leesa at www.cancer. net/leesa. The ideas and opinions expressed here do not necessarily reflect the opinions of the American Society of Clinical Oncology (ASCO) or [employer]. The information in this fact sheet is not intended as medical or legal advice, or as a substitute for consultation with a physician or other licensed health care provider.  Patients with health care-related questions should call or see their physician or other health care provider promptly and should not disregard professional medical advice, or delay seeking it, because of information encountered here. The mention of any product, service, or treatment in this fact sheet should not be construed as an ASCO endorsement. ASCO is not responsible for any injury or damage to persons or property arising out of or related to any use of ASCOs patient education materials, or to any errors or omissions. To order more printed copies, please call [phone] or visit www.cancer. net/estore. TERMS TO KNOW     Benign: A growth that is not cancerous     Biopsy: Removal of a small tissue sample that is examined under a microscope to check for cancer cells     Chemotherapy: The use of drugs to destroy cancer cells     DCIS: Ductal carcinoma in situ; cancer that has not spread past the ducts and is not invasive     Lymph node: A tiny, bean-shaped organ that fights infection     Lumpectomy: The surgical removal of the tumor and an area of healthy tissue around the tumor     Malignant: A cancerous growth or mass     Mastectomy: Surgical removal of the entire breast     Metastasis: The spread of cancer to another part of the body, usually to another organ     Oncologist: A doctor who specializes in treating cancer     Radiation therapy: The use of high-energy x-rays to destroy cancer cells     Tumor: An abnormal growth of body tissue     [address] [facility] [place], [address] [place] [address], [address] [place] [address]  Toll Free: [phone]  Phone: [phone] www. [employer]. [employer]  [email or web address] org © 2017 American Society of Clinical Oncology. For permissions information, contact [email or web address] ([email or web address]   [email or web address]     Plan:  - Review of scan and noted bone areas in the thoracic spine and tail bone. We recommend that you have a bone biopsy of the area likely the tail bone area to confirm diagnosis.    - likely treatment is a combination treatment with hormone suppression and bone strengthening agent that are given by injection and oral treatment. - Glendia Matt and femara are the oral treatments that we are considering, and xgeva is the shot that we would plan to use to build bone health. Letrozole        (LET peng zole)    Trade Name: Ivelisse Wilhelm is the trade name for the generic drug letrozole. In some cases, health care professionals may use the trade name  Femara ® when referring to the generic drug name letrozole. Drug Type:    Letrozole is a hormone therapy. Letrozole is classified as an aromatase inhibitor. (For more detail, see \"How Letrozole Works\" section below). What Letrozole Is Used For:  Letrozole is used to treat breast cancer in postmenopausal women. Note: If a drug has been approved for one use, physicians may elect to use this same drug for other problems if they believe it may be helpful. How Letrozole Is Given:  Letrozole is a pill, taken by mouth. You should take Letrozole at about the same time each day. You may take Letrozole with or without food. If you miss a dose, do not take a double dose the next day. You should not stop taking Letrozole without discussing with your physician. The amount of Letrozole that you will receive depends on many factors, including your general health or other health problems, and the type of cancer or condition being treated. Your doctor will determine your dose and how long you will be taking Letrozole. Side Effects:  Important things to remember about the side effects of Letrozole:    Most people do not experience all of the side effects listed. Side effects are often predictable in terms of their onset and duration. Side effects are almost always reversible and will go away after treatment is complete. There are many options to help minimize or prevent side effects. There is no relationship between the presence or severity of side effects and the effectiveness of the medication.   The following side effects are common (occurring in generalized aches and pains. However, be sure to talk with your doctor before taking it. Letrozole causes little nausea. But if you should experience nausea, take anti-nausea medications as prescribed by your doctor, and eat small frequent meals. Sucking on lozenges and chewing gum may also help. Avoid sun exposure. Wear SPF 13 (or higher) sunblock and protective clothing. In general, drinking alcoholic beverages should be kept to a minimum or avoided completely. You should discuss this with your doctor. Get plenty of rest.   Maintain good nutrition. If you experience symptoms or side effects, be sure to discuss them with your health care team.  They can prescribe medications and/or offer other suggestions that are effective in managing such problems. Monitoring and Testing: You will be monitored regularly by your doctor while you are taking letrozole, but no special tests or blood tests are required. How Letrozole Works:  Hormones are chemical substances that are produced by glands in the body, which enter the bloodstream and cause effects in other tissues. For example, the hormone testosterone made in the testicles and is responsible for male characteristics such as deepening voice and increased body hair. The use of hormone therapy to treat cancer is based on the observation that receptors for specific hormones that are needed for cell growth are on the surface of some tumor cells. Hormone therapies work by stopping the production of a certain hormone, blocking hormone receptors, or substituting chemically similar agents for the active hormone, which cannot be used by the tumor cell. The different types of hormone therapies are categorized by their function and/or the type of hormone that is affected. Letrozole is an aromatase inhibitor.   This means it blocks the enzyme aromatase (found in the body's muscle, skin, breast and fat), which is used to convert androgens (hormones produced by the adrenal glands) into estrogen. In the absence of estrogen, tumors dependent on this hormone for growth will shrink. Note:  We strongly encourage you to talk with your health care professional about your specific medical condition and treatments. The information contained in this website is meant to be helpful and educational, but is not a substitute for medical advice. Update 8/29/2012      Ribociclib        (rye-perry-SYE-klib)    Trade Name: Pamela Duncan is the generic name for the trade name drug Charashley Handing. In some cases, health care professionals may use the trade name Charna Handing when referring to the generic drug name ribociclib. Drug Type: Ribociclib is an anti-cancer kinase inhibitor. This medication is classified as a \"cyclin-dependent kinase inhibitor. \" (For more detail, see \"How Ribociclib Works\" below)    What Ribociclib Is Used For  Ribociclib is used in combination with an aromatase inhibitor to treat pre/perimenopausal or postmenopausal women with breast cancer  Ribociclib is used in combination with fulvestrant to treat postmenopausal women with breast cancer  Note: If a drug has been approved for one use, physicians may elect to use this same drug for other problems if they believe it may be helpful. How Ribociclib Is Given  Ribociclib is a tablet given by mouth. Tablets should be swallowed whole. Do not crush, chew, or split tablets. It may be taken with or without food  Do not eat pomegranate, grapefruit or drink pomegranate or grapefruit juice while on ribociclib. Ribociclib should be taken at the same time each day with letrozole (or other aromatase inhibitors)  If you miss a dose of ribociclib, take it as soon as you remember that day. If you miss taking your dose for the entire day, go back to taking your regular dose the next day. Do not take 2 doses at the same time.   The amount of ribociclib that you will receive depends on many factors, including your general health or other health problems, and the type of cancer or condition you have. Your doctor will determine your exact dosage and schedule. Side Effects  Important things to remember about the side effects of ribociclib:    Most people will not experience all of the ribociclib side effects listed. Ribociclib side effects are often predictable in terms of their onset, duration, and severity. Ribociclib side effects will improve after therapy is complete. Ribociclib side effects may be quite manageable. There are many options to minimize or prevent the side effects of ribociclib. The following side effects are common (occurring in greater than 30%) for patients taking ribociclib:    Low blood counts. Your white and red blood cells and platelets may temporarily decreased. This can put you at increased risk for infection, anemia and/or bleeding. Onset: 16 days  Recovery: 15 days  Nausea and vomiting  Increased liver enzymes  Fatigue  Diarrhea  Hair loss  These are less common side effects (occurring in 10-29% for patients taking ribociclib:    Constipation  Headache  Back pain  Reduced kidney function  Loss of appetite  Rash  Fever  Itching  Swelling of the hands and feet (Edema)  Insomnia  Mouth sores  Urinary tract infection  A rare, but serious side effect of ribociclib is QT prolongation. You should seek emergency help and notify your health care provider immediately if you develop abnormal heart beats, feel faint, or have shortness of breath. Note all side effects are listed above. Side effects that are very rare - occurring in less than about 10 percent of patients - are not listed here. But you should always inform your health care provider if you experience any unusual symptoms.     When To Contact Your Doctor or [facility] Provider  Contact your health care provider immediately, day or night, if you should experience any of the following symptoms:    Fever of 100.4º F (38º C) or higher, chills (possible signs of infection)  The following symptoms require medical attention, but are not an emergency. Contact your health care provider within 24 hours of noticing any of the following:    Nausea (interferes with ability to eat and unrelieved with prescribed medication)  Vomiting (vomiting more than 4-5 times in a 24-hour period)  Diarrhea (4-6 episodes in a 24-hour period)  Unusual bleeding or bruising  Black or tarry stools, or blood in your stools  Blood in the urine  Pain or burning with urination  Extreme fatigue (unable to carry on self-care activities)  Mouth sores (painful redness, swelling or ulcers)  Always inform your health care provider if you experience any unusual symptoms. Precautions  Before starting ribociclib treatment, make sure you tell your doctor about any other medications you are taking (including prescription, over-the-counter, vitamins, herbal remedies, etc.). Do not take aspirin, products containing aspirin unless your doctor specifically permits this. Do not eat pomegranates or grapefruit and do not drink pomegranate or grapefruit juice while taking this medication. Do not receive any kind of immunization or vaccination without your doctor's approval while taking ribociclib. Inform your health care professional if you are pregnant or may be pregnant prior to starting this treatment. Ribociclib may cause fetal harm when given to a pregnant woman. This drug must not be given to a pregnant woman or a woman who intends to become pregnant. If a woman becomes pregnant while taking ribociclib, the medication must be stopped immediately and the woman given appropriate counseling. For both men and women: Use contraceptives, and do not conceive a child (get pregnant) while taking ribociclib. Barrier methods of contraception, such as condoms, are recommended. Do not breast feed while taking ribociclib.   Self-Care Tips  You may be at risk of infection so try to avoid crowds or people with colds, and report fever or any other signs of infection immediately to your health care provider. Wash your hands often. To help treat/prevent mouth sores, use a soft toothbrush, and rinse three time a day with 1 teaspoon of baking soda mixed with 8 ounces of water. Use an electric razor and a soft toothbrush to minimize bleeding. Avoid contact sports or activities that could cause injury. To reduce nausea, take anti-nausea medications as prescribed by your doctor, and eat small, frequent meals. Follow regimen of anti-diarrhea medication as prescribed by your health care professional.  Eat foods that may help diarrhea (see managing side effects - diarrhea)  In general, drinking alcoholic beverages should be kept to a minimum or avoided completely. You should discuss this with your doctor. Get plenty of rest.  Maintain good nutrition. Remain active as you are able. Gentle exercise is encouraged such as a daily walk. If you experience symptoms or side effects, be sure to discuss them with your health care team. They can prescribe medications and/or offer other suggestions that are effective in managing such problems. Monitoring and Testing While Taking Ribociclib  You will be checked regularly by your doctor while you are taking ribociclib, to monitor side effects and check your response to therapy. Periodic blood work will be obtained to monitor your complete blood count (CBC) as well as the function of other organs (such as your kidneys and liver) will also be ordered by your doctor. How Ribociclib Works  Targeted therapy is the result of about 100 years of research dedicated to understanding the differences between cancer cells and normal cells. To date, cancer treatment has focused primarily on killing rapidly dividing cells because one feature of cancer cells is that they divide rapidly. Unfortunately, some of our normal cells divide rapidly too, causing multiple side effects.     Targeted therapy is about identifying other features of cancer cells. Scientists look for specific differences in the cancer cells and the normal cells. This information is used to create a targeted therapy to attack the cancer cells without damaging the normal cells, thus leading to fewer side effects. Each type of targeted therapy works a little bit differently but all interfere with the ability of the cancer cell to grow, divide, repair and/or communicate with other cells. There are different types of targeted therapies, defined in three broad categories. Some targeted therapies focus on the internal components and function of the cancer cell. The targeted therapies use small molecules that can get into the cell and disrupt the function of the cells, causing them to die. There are several types of targeted therapy that focus on the inner parts of the cells. Other targeted therapies target receptors that are on the outside of the cell. Therapies that target receptors are also known as monoclonal antibodies. Antiangiogenesis inhibitors target the blood vessels that supply oxygen to the cells, ultimately causing the cells to starve. Researchers agree that targeted therapies are not a replacement for traditional therapies. They may best be used in combination with traditional therapies. More research is needed to identify which cancers may be best treated with targeted therapies and to identify additional targets for more types of cancer. Ribociclib (Hawa Promise) is a drug that can be used along with an aromatase inhibitor to treat women with advanced hormone receptor-positive breast cancer. Ribociclib is a small molecule cyclin-dependent kinase (CDK) inhibitor. The drug blocks proteins in the cell called cyclin-dependent kinase (CDK) 4 and CDK 6. In hormone positive breast cancer cells, blocking these proteins helps stop the cells from dividing to make new cells.  It helps prevent the cells from moving from G1 to S cell cycle phase in the division process. This slows cancer growth. The combination of ribociclib and an aromatase inhibitor (e.g. letrozole) is more effective compared with each agent alone. Note: We strongly encourage you to talk with your health care professional about your specific medical condition and treatments. The information contained in this website is meant to be helpful and educational, but is not a substitute for medical advice. Guillermo Boyd        Generic name: Denosumab    Trade names:  Angle Feeling    Denosumab is the generic name for the trade name drugs Prolia® or Laneta Mitts. In some cases, health care professionals may use the trade names Prolia® or Laneta Mitts when referring to the generic drug name denosumab. Prolia® and Xgeva® are the same generic drug (denosumab). They were given distinct trade names in order to differentiate between their unique dosing schedules and indications for use. How they are used is different, but strictly speaking, the two are the same drug. Drug type:     Guillermo Boyd is a monoclonal antibody that works as a RANK ligand (RANKL) inhibitor. This medication is classified as a \"bone-modifying agent\". (For more detail see \"How denosumab works\" section below). What Xgeva Is Used For:  Prevention of skeletal-related events (need for radiation, fracture due to cancer in the bone, surgery to the bone, or compression of the spinal cord) in patients with multiple myeloma and bone metastases from solid tumors. Treatment of giant cell tumor of the bone. Note: If a drug has been approved for one use, physicians sometimes elect to use this same drug for other problems if they believe it might be helpful. How Guillermo Boyd Is Given:  As a subcutaneous injection in the upper arm, upper thigh, or abdomen. A subcutaneous injection is a shot into the layer of skin directly below the outer skin layer. There is no pill form of Xgeva.   The amount of Guillermo Boyd you will receive depends on many factors, including your general health or other health problems, and the type of cancer or condition you have. Your doctor will determine your dose and schedule. Side Effects:  Important things to remember about the side effects of Xgeva:    Most people will not experience all of the Xgeva side effects listed. Xgeva side effects are often predictable in terms of their onset, duration and severity. Xgeva side effects will likely improve after therapy is complete. Xgeva side effects may be quite manageable. There are many options to minimize of prevent the side effects of Xgeva. The following are common (occurring in greater than 30%) side effects for patients taking Xgeva: Fatigue  Muscle weakness  Decreased levels of phosphorus in your blood (hypophosphatemia)  Nausea  These are less common side effects (occurring in 10-29%) for patients receiving Xgeva:    Diarrhea  Shortness of breath  Low levels of calcium in your blood (hypocalcemia). Cough  Joint pain  Headache  Limb pain  Back pain  Eczema (a skin condition that may include any of the following: redness, skin swelling, itching and dryness, crusting, blistering or bleeding). Rash  Osteonecrosis of the jaw has been reported rarely in patients with cancer receiving treatment regimens that include bone modifying agents. Many of the reported cases were associated with dental procedures, such as removal of a tooth. A dental examination with appropriate preventative dentistry should be considered prior to treatment with denosumab, particularly in patients with additional risk factors (ie cancer, chemotherapy, corticoseroids, poor oral hygiene). Invasive dental procedures should be avoided during treatment. Not all side effects are listed above, some that are rare (occurring in less than 10% of patients) are not listed here. However, you should always inform your health care provider if you experience any unusual symptoms.     When to contact your doctor or health care provider:  Contact your health care provider immediately, day or night, if you should experience the following:    Difficulty breathing  Chest pain  Swelling of the face/throat  Confusion  The following symptoms require medical attention, but are not an emergency. Contact your health care provider within 24 hours after noticing any of the following:     Muscle stiffness, twitching, spasms, or cramps (signs of low blood calcium)  Pain, numbness, swelling of or drainage from the jaw, mouth or teeth. Any signs or symptoms of infection, especially involving the skin (redness, drainage, pain)  Fever of 100.4° F (38° C)  Fatigue and extreme tiredness (unable to perform self-care activities)  Nausea that interferes with eating and is not relieved by medications prescribed by your doctor. Vomiting (more than 4-5 episodes within a 24-hour period). Always inform your health care provider if you experience any unusual symptoms. Precautions:  Do not use both Xgeva and Prolia at the same time. If you are receiving one, you should not be receiving the other, too. Before starting Xgeva treatment, make sure you tell your doctor about any other medications you are taking (including over-the-counter drugs, vitamins, or herbal remedies). Do not take aspirin or products containing aspirin unless your doctor permits this. Xgeva may enhance the negative effects of immuno-suppressants. Specifically, the risk for serious infections may be increased. Notify your doctor of any immuno-suppressive medication you are taking. Do not receive any kind of vaccination without your doctor's approval while taking Xgeva. Inform your health care professional if you are pregnant or may be pregnant prior to starting this treatment. Pregnancy category C (use in pregnancy only if benefit to mother outweighs risk to fetus). For both men and women: Do not conceive a child (get pregnant) while taking Xgeva.  Barrier methods of contraception, such as condoms, are recommended. It is not known whether Nicky  is excreted into human milk. Because of the potential for serious adverse reactions in nursing infants from Nicky , a decision should be made whether to discontinue nursing or discontinue the drug taking into account the importance of the drug to the mother. Self-Care Tips:  Take a calcium and vitamin D supplement as necessary to treat and/or prevent low blood calcium levels. Go for blood tests as ordered by your provider. Perform proper, thorough oral hygiene and routine dental care. Inform your dentist that you are being treated with denosumab. Avoid invasive dental procedures. Inform your physician or dentist if you experience persistent pain and/or slow healing of the mouth or jaw following invasive dental procedures. Drink at least 2 to 3 quarts of fluid every 24 hours, unless you are instructed otherwise, as it is important to avoid becoming dehydrated. Acetaminophen may help relieve discomfort from fever, headache and generalized aches and pains, however talk with your provider prior to taking it. To reduce nausea, take anti-nausea medications as prescribed by your doctor, and eat small, frequent meals. Eat foods that may help reduce diarrhea (see managing side effects - diarrhea) and if necessary, follow the regimen of anti-diarrhea medication as prescribed by your health care professional.  Use mild, unscented soaps, laundry detergents, and lotions to avoid irritating your skin. Use lotion liberally to keep skin moisturized and prevent cracking. (see managing side effects - dry skin)  Get plenty of rest.  Maintain good nutrition. If you experience symptoms or side effects, be sure to discuss them with your health care team. They can prescribe medications and/or offer other suggestions that are effective in managing such problems. Monitoring and Testing:   You will be checked regularly by your doctor while you are taking Xgeva, to monitor side effects and check your response to therapy. Periodic blood work will be obtained to monitor the function of your organs (such as your kidneys and liver), as deemed necessary by your doctor. How Светлана White Works:  Cancer cells that spread to the bone can secrete substances that can cause cells in the bone called osteoclasts to dissolve or \"eat away\" a portion of the bone. The process, during which osteoclasts break down bone and then release those minerals such as calcium from bone fluid into the blood, is called \"bone resorption\". These tumors or lesions in the bone weaken the bone and can lead to complications, referred to as \"skeletal related events. \" Some of the complications (skeletal related events) that can result from this bone breakdown are bone pain, fractures, and the need for additional procedures such as radiation therapy to reduce pain or surgery to fix or stabilize an affected bone. Bone resorption also releases growth factors that may cause growth of tumors. Receptor activator of nuclear factor kappa-B ligand (RANKL) is a type of protein that is important in bone metabolism. This natural and necessary protein is found on oteoblasts (cells that are responsible for bone formation) and serves to activate osteoclasts (cells involved in bone resorption - as described above). By inhibiting RANKL we may be able to decrease bone resorption and therefore decrease bone loss and hypercalcemia. Xgeva binds to RANKL, a protein that is essential for the formation, function and survival of osteoclasts, the cells responsible for bone resorption. Xgeva inhibits osteoclast formation, function and survival thereby, decreasing bone resorption and increasing bone mass and strength of the bone. Note: We strongly encourage you to talk with your health care professional about your specific medical condition and treatments.  The information contained in this website is meant to be helpful and educational, but is not a substitute for medical advice. Follow Up:  - 7-10 days after bone biopsy        Treatment Summary has been discussed and given to patient: n/a        -------------------------------------------------------------------------------------------------------------------  Please call our office at [phone] if you have any  of the following symptoms:   · Fever of 100.5 or greater  · Chills  · Shortness of breath  · Swelling or pain in one leg    After office hours an answering service is available and will contact a provider for emergencies or if you are experiencing any of the above symptoms.  Patient has My Chart. My Chart log in information explained on the after visit summary printout at the [facility] [address] desk.     Liseth Adams RN

## 2022-06-23 ENCOUNTER — PATIENT MESSAGE (OUTPATIENT)
Dept: FAMILY MEDICINE CLINIC | Facility: CLINIC | Age: 52
End: 2022-06-23

## 2022-06-23 ENCOUNTER — TELEPHONE (OUTPATIENT)
Dept: CASE MANAGEMENT | Age: 52
End: 2022-06-23

## 2022-06-23 NOTE — TELEPHONE ENCOUNTER
Called patient to check in with her to see how she is doing. She states she is overall doing ok, she has anxious moments but doing ok. Reminded of appt with Dr. Candie Kiser on Monday. Patient states she has not seen PET scan results and does not want to look. Explained to patient that Dr. Candie Kiser will go over the results of all of her imaging and labs with her and her  on Monday and he would have a plan for moving forward. Emotional support given, general questions answered about appt length on Monday. Encouraged to call for questions or concerns. Patient states appreciates call.

## 2022-06-27 ENCOUNTER — HOSPITAL ENCOUNTER (OUTPATIENT)
Dept: LAB | Age: 52
Discharge: HOME | End: 2022-06-30
Payer: COMMERCIAL

## 2022-06-27 ENCOUNTER — OFFICE VISIT (OUTPATIENT)
Dept: ONCOLOGY | Age: 52
End: 2022-06-27
Payer: COMMERCIAL

## 2022-06-27 VITALS
WEIGHT: 166.7 LBS | SYSTOLIC BLOOD PRESSURE: 127 MMHG | DIASTOLIC BLOOD PRESSURE: 68 MMHG | OXYGEN SATURATION: 99 % | HEIGHT: 64 IN | TEMPERATURE: 98.4 F | HEART RATE: 87 BPM | BODY MASS INDEX: 28.46 KG/M2 | RESPIRATION RATE: 16 BRPM

## 2022-06-27 DIAGNOSIS — Z17.0 MALIGNANT NEOPLASM OF LEFT BREAST IN FEMALE, ESTROGEN RECEPTOR POSITIVE, UNSPECIFIED SITE OF BREAST (HCC): Primary | ICD-10-CM

## 2022-06-27 DIAGNOSIS — Z13.9 SCREENING PROCEDURE: ICD-10-CM

## 2022-06-27 DIAGNOSIS — C50.912 MALIGNANT NEOPLASM OF LEFT BREAST IN FEMALE, ESTROGEN RECEPTOR POSITIVE, UNSPECIFIED SITE OF BREAST (HCC): ICD-10-CM

## 2022-06-27 DIAGNOSIS — R94.8 ABNORMAL POSITRON EMISSION TOMOGRAPHY (PET) SCAN: ICD-10-CM

## 2022-06-27 DIAGNOSIS — C50.912 MALIGNANT NEOPLASM OF LEFT BREAST IN FEMALE, ESTROGEN RECEPTOR POSITIVE, UNSPECIFIED SITE OF BREAST (HCC): Primary | ICD-10-CM

## 2022-06-27 DIAGNOSIS — Z17.0 MALIGNANT NEOPLASM OF LEFT BREAST IN FEMALE, ESTROGEN RECEPTOR POSITIVE, UNSPECIFIED SITE OF BREAST (HCC): ICD-10-CM

## 2022-06-27 DIAGNOSIS — Z11.59 ENCOUNTER FOR SCREENING FOR VIRAL DISEASE: ICD-10-CM

## 2022-06-27 DIAGNOSIS — C79.51 METASTASIS TO BONE (HCC): ICD-10-CM

## 2022-06-27 LAB
ALBUMIN SERPL-MCNC: 4.1 G/DL (ref 3.5–5)
ALBUMIN/GLOB SERPL: 1.1 {RATIO} (ref 1.2–3.5)
ALP SERPL-CCNC: 86 U/L (ref 50–136)
ALT SERPL-CCNC: 55 U/L (ref 12–65)
ANION GAP SERPL CALC-SCNC: 8 MMOL/L (ref 7–16)
AST SERPL-CCNC: 26 U/L (ref 15–37)
BASOPHILS # BLD: 0 K/UL (ref 0–0.2)
BASOPHILS NFR BLD: 1 % (ref 0–2)
BILIRUB SERPL-MCNC: 0.2 MG/DL (ref 0.2–1.1)
BUN SERPL-MCNC: 18 MG/DL (ref 6–23)
CALCIUM SERPL-MCNC: 9.6 MG/DL (ref 8.3–10.4)
CANCER AG15-3 SERPL-ACNC: 21 U/ML (ref 1–35)
CHLORIDE SERPL-SCNC: 106 MMOL/L (ref 98–107)
CO2 SERPL-SCNC: 29 MMOL/L (ref 21–32)
CREAT SERPL-MCNC: 1 MG/DL (ref 0.6–1)
DIFFERENTIAL METHOD BLD: NORMAL
EOSINOPHIL # BLD: 0.1 K/UL (ref 0–0.8)
EOSINOPHIL NFR BLD: 1 % (ref 0.5–7.8)
ERYTHROCYTE [DISTWIDTH] IN BLOOD BY AUTOMATED COUNT: 13.2 % (ref 11.9–14.6)
GLOBULIN SER CALC-MCNC: 3.9 G/DL (ref 2.3–3.5)
GLUCOSE SERPL-MCNC: 89 MG/DL (ref 65–100)
HAV IGM SER QL: NONREACTIVE
HBV CORE IGM SER QL: NONREACTIVE
HBV SURFACE AB SERPL IA-ACNC: >1000 MIU/ML
HBV SURFACE AG SER QL: NONREACTIVE
HCT VFR BLD AUTO: 42.1 % (ref 35.8–46.3)
HCV AB SER QL: NONREACTIVE
HGB BLD-MCNC: 14 G/DL (ref 11.7–15.4)
IMM GRANULOCYTES # BLD AUTO: 0 K/UL (ref 0–0.5)
IMM GRANULOCYTES NFR BLD AUTO: 1 % (ref 0–5)
INR PPP: 0.9
LYMPHOCYTES # BLD: 2.1 K/UL (ref 0.5–4.6)
LYMPHOCYTES NFR BLD: 24 % (ref 13–44)
MCH RBC QN AUTO: 30.1 PG (ref 26.1–32.9)
MCHC RBC AUTO-ENTMCNC: 33.3 G/DL (ref 31.4–35)
MCV RBC AUTO: 90.5 FL (ref 79.6–97.8)
MONOCYTES # BLD: 0.6 K/UL (ref 0.1–1.3)
MONOCYTES NFR BLD: 7 % (ref 4–12)
NEUTS SEG # BLD: 5.7 K/UL (ref 1.7–8.2)
NEUTS SEG NFR BLD: 66 % (ref 43–78)
NRBC # BLD: 0 K/UL (ref 0–0.2)
PLATELET # BLD AUTO: 290 K/UL (ref 150–450)
PMV BLD AUTO: 10.7 FL (ref 9.4–12.3)
POTASSIUM SERPL-SCNC: 3.2 MMOL/L (ref 3.5–5.1)
PROT SERPL-MCNC: 8 G/DL (ref 6.3–8.2)
PROTHROMBIN TIME: 12.6 SEC (ref 12.6–14.5)
RBC # BLD AUTO: 4.65 M/UL (ref 4.05–5.2)
SODIUM SERPL-SCNC: 143 MMOL/L (ref 136–145)
WBC # BLD AUTO: 8.5 K/UL (ref 4.3–11.1)

## 2022-06-27 PROCEDURE — 86706 HEP B SURFACE ANTIBODY: CPT

## 2022-06-27 PROCEDURE — 86300 IMMUNOASSAY TUMOR CA 15-3: CPT

## 2022-06-27 PROCEDURE — 80074 ACUTE HEPATITIS PANEL: CPT

## 2022-06-27 PROCEDURE — 80053 COMPREHEN METABOLIC PANEL: CPT

## 2022-06-27 PROCEDURE — 99205 OFFICE O/P NEW HI 60 MIN: CPT | Performed by: INTERNAL MEDICINE

## 2022-06-27 PROCEDURE — 86704 HEP B CORE ANTIBODY TOTAL: CPT

## 2022-06-27 PROCEDURE — 36415 COLL VENOUS BLD VENIPUNCTURE: CPT

## 2022-06-27 PROCEDURE — 85025 COMPLETE CBC W/AUTO DIFF WBC: CPT

## 2022-06-27 PROCEDURE — 85610 PROTHROMBIN TIME: CPT

## 2022-06-27 RX ORDER — DOCUSATE SODIUM 100 MG/1
100 CAPSULE, LIQUID FILLED ORAL 2 TIMES DAILY
COMMUNITY
End: 2022-08-23 | Stop reason: ALTCHOICE

## 2022-06-27 ASSESSMENT — PATIENT HEALTH QUESTIONNAIRE - PHQ9
SUM OF ALL RESPONSES TO PHQ QUESTIONS 1-9: 1
1. LITTLE INTEREST OR PLEASURE IN DOING THINGS: 0
SUM OF ALL RESPONSES TO PHQ QUESTIONS 1-9: 1
SUM OF ALL RESPONSES TO PHQ QUESTIONS 1-9: 1
SUM OF ALL RESPONSES TO PHQ9 QUESTIONS 1 & 2: 1
2. FEELING DOWN, DEPRESSED OR HOPELESS: 1
SUM OF ALL RESPONSES TO PHQ QUESTIONS 1-9: 1

## 2022-06-27 NOTE — PROGRESS NOTES
[facility] Hematology and Oncology: Office Visit New Patient H & P    Chief Complaint:    Chief Complaint   Patient presents with    New Patient         History of Present Illness:  Ms. Theo Pruitt is a 46 y.o. female who presents today for evaluation regarding breast cancer. On 6/2/22, Ms. Alcocer presented for her routine screening mammogram. The imaging reported a left breast mass with associated calcifications, worrisome for malignancy. On 6/14/22 she underwent a left breast diagnostic mammogram and ultrasound with biopsy of the breast mass and a dysmorphic left axillary lymph node. The left breast and axillary node pathology showed infiltrating ductal carcinoma with lobular features, low grade (well differentiated), ER 93%, OK 0%, and HER-2 0. On 6/16/22 she had a bilateral breast MRI that reported the known left breast cancer measuring up to 3.7 cm, with associated left axillary lymphadenopathy however no suspicious findings in the right breast. On 6/22/22 she had a PET/CT scan that showed the breast and axillary node, but also showed hypermetabolic osseous metastatic disease in the sacrum and T10 vertebral body. A referral was placed to medical oncology for next steps in plan of care for her newly diagnosed metastatic breast cancer. She has a surgical consult with Dr Sarai Hewitt on 7/7/22. She is doing OK, she has some symptoms including some rash over her neck and upper chest, some swelling in the neck, and some low back pain that is radiating to the groin. She also notes a lot of anxiety regarding her diagnosis and plan of care. Review of Systems:  Constitutional: Negative. HENT: Negative. Eyes: Negative. Respiratory: Negative. Cardiovascular: Negative. Gastrointestinal: Negative. Genitourinary: Negative. Musculoskeletal: Negative. Skin: Negative. Neurological: Negative. Endo/Heme/Allergies: Negative. Psychiatric/Behavioral: Negative. All other systems reviewed and are negative. No Known Allergies  Past Medical History:   Diagnosis Date    Cyst, kidney, acquired     found on previous scan    Hypertension     Kidney stone      Past Surgical History:   Procedure Laterality Date    APPENDECTOMY      US BREAST NEEDLE BIOPSY LEFT Left 6/14/2022    US BREAST NEEDLE BIOPSY LEFT 6/14/2022 MD ARABELLA Galvan RADIOLOGY [facility]AMMO    US LYMPH NODE BIOPSY  6/14/2022    US LYMPH NODE BIOPSY 6/14/2022 MD ARABELLA Galvan RADIOLOGY MAMMO    WISDOM TOOTH EXTRACTION       Family History   Problem Relation Age of Onset    Hypertension Mother     Alcohol Abuse Brother     Alcohol Abuse Father     Dementia Father     Hypertension Brother     Diabetes Father      Social History     Socioeconomic History    Marital status:      Spouse name: Not on file    Number of children: Not on file    Years of education: Not on file    Highest education level: Not on file   Occupational History    Not on file   Tobacco Use    Smoking status: Never Smoker    Smokeless tobacco: Never Used   Vaping Use    Vaping Use: Never used   Substance and Sexual Activity    Alcohol use: Yes     Alcohol/week: 2.0 standard drinks    Drug use: Never    Sexual activity: Not on file   Other Topics Concern    Not on file   Social History Narrative    Not on file     Social Determinants of Health     Financial Resource Strain: Low Risk     Difficulty of Paying Living Expenses: Not hard at all   Food Insecurity: Unknown    Worried About [facility] in the Last Year: Never true    Teagan of Food in the Last Year: Not on file   Transportation Needs: No Transportation Needs    Lack of Transportation (Medical): No    Lack of Transportation (Non-Medical): No   Physical Activity: Sufficiently Active    Days of Exercise per Week: 7 days    Minutes of Exercise per Session: 30 min   Stress: Stress Concern Present    Feeling of Stress :  To some extent   Social Connections: Unknown    palpable bilaterally. No axillary nodes palpable. Skin Warm and dry. No bruising noted. Some melasma-like changes over the anterior neck and upper chest.  No erythema. No pallor. Respiratory Lungs are clear to auscultation bilaterally without wheezes, rales or rhonchi, normal air exchange without accessory muscle use. CVS Normal rate, regular rhythm and normal S1 and S2. No murmurs, gallops, or rubs. Neuro Grossly nonfocal with no obvious sensory or motor deficits. MSK Normal range of motion in general.  No edema and no tenderness. Psych Appropriate mood and affect. Labs:  No results found for this or any previous visit (from the past 24 hour(s)). Imaging:  MRI BREAST BILATERAL W WO CONTRAST    Result Date: 6/16/2022  MRI of the Breasts with and without contrast CLINICAL INDICATION:  New diagnosis of left 10:00 breast cancer and left axillary metastatic lymphadenopathy undergoing evaluation for extent of disease, staging, therapy planning. No prior breast surgery or personal malignancy otherwise. COMPARISON: Mammography and ultrasound 5/20/2022, 6/14/2022 TECHNIQUE: Standard MRI sequences were obtained through the breasts in multiple planes. Images were obtained before and after intravenous infusion of 16 mL of Prohance contrast. Images were reviewed with PACS and with [employer] CAD software. FINDINGS: The breasts demonstrate moderate glandularity and mild background enhancement. Left 10:00 irregular heterogeneously enhancing malignant mass measures up to 3.3 x 2.6 x 3.0 cm. Extending anteriorly from the mass is about 1.2 cm of clumped and reticular nonmass enhancement. Overall the maximal dimension of this malignancy is 3.7 cm. Mild overlying skin thickening and edema are nonspecific and could be reactive to the biopsy unless there is clinical suspicion for malignancy involvement.  There is no other evidence of suspicious enhancing mass, and no dominant or unique nonmass enhancement, to suggest additional malignancy in either breast. Left axilla demonstrates 4 asymmetrically enlarged and dysmorphic lymph nodes, one of which underwent prior biopsy demonstrating metastasis. The largest measures up to 2.0 x 1.2 cm. There is no evidence of right axillary lymphadenopathy or internal mammary lymphadenopathy. Elsewhere, limited visualization of the partially included thorax and upper abdomen shows no acute abnormality. 1. Left 10:00 breast cancer measures up to 3.7 cm. 2. Left axillary metastatic lymphadenopathy. 3. No suspicious right breast finding. Recommend continued management as directed clinically. BI-RADS Assessment Category 6: Known Biopsy Proven Malignancy     US BREAST LIMITED LEFT    Result Date: 6/14/2022  DIAGNOSTIC MAMMOGRAM LEFT BREAST WITH TOMOSYNTHESIS, LEFT BREAST ULTRASOUND HISTORY: Further evaluation of left breast mass and calcifications. Spot compression  tomosynthesis views in the CC and MLO projections of the left breast was performed in addition to a full 90 degree mediolateral tomosynthesis view. Additional magnification views were also submitted. Review of mammographic images of the left breast prior examinations dated 05/20/2022, 07/20/2014 was performed. FINDING: Confirmed is the spiculated mass at approximately 10:00 position left breast posterior depth. The mass measures approximately 4 cm in size. There are pleomorphic calcifications extending anteriorly from the mass towards the nipple. Including the mass and calcifications, the abnormality measures approximately 7 cm in size. A left breast ultrasound is recommended. Left breast ultrasound: There is a hypoechoic mass with ill-defined angular margins at the 10:00 position left breast 12 cm from nipple. This is estimated to measure approximately 5.0 x 3.2 x 4.5 cm. There is posterior acoustic shadowing. Evaluation of left axilla reveals a dysmorphic lymph node measuring 2.6 cm in maximal dimension. IMPRESSION: 1. Spiculated left breast mass with associated microcalcifications measuring at least 7 cm in total size at the 10:00 position. Tissue sampling is recommended. 2. Dysmorphic left axillary lymph node. Tissue sampling is recommended. BI-RADS Assessment Category 5: Highly suggestive of malignancy- Appropriate action should be taken. Results were discussed with the patient prior to leaving the department. The patient is scheduled for biopsy today. This study was reviewed with the aid of the [employer]  device. MAMMOGRAM POST BX CLIP PLACEMENT LEFT    Result Date: 6/14/2022  Ultrasound-guided left breast biopsy, left axillary lymph node biopsy and postbiopsy mammogram: 06/14/2022 HISTORY: Left breast mass and dysmorphic left axillary lymph node. Ultrasound guided left breast biopsy: The patient was explained the procedure informed consent was obtained. Using sonographic guidance and sterile technique, a 14-gauge biopsy device was used to obtain 3 core samples from the hypoechoic mass at the 10:00 position. The samples were placed into a formalin container and sent to the lab for analysis. A biopsy clip was placed upon completion of sampling. The patient tolerated the procedure well. There were no immediate complications. Ultrasound guided left axillary lymph node biopsy: The patient was explained the procedure informed consent was obtained. Using sonographic guidance and sterile technique, a 14-gauge biopsy device was used to obtain 2 core samples from the dysmorphic lymph node within the left axilla. . The samples were placed into a formalin container and sent to the lab for analysis. A biopsy clip was not placed. The patient tolerated the procedure well. There were no immediate complications. Postbiopsy mammogram. CC and ML views of the left breast were obtained. The biopsy clip is confirmed within the mass at the 10:00 position.      1. Status post ultrasound guided biopsy of the hypoechoic mass at the 10:00 position left breast. 2. Status post ultrasound guided biopsy of the dysmorphic lymph node within the left axilla. Doddridge Hilt PET CT SKULL BASE TO MID THIGH    Result Date: 6/22/2022  PET/CT  INDICATION: Malignant neoplasm of unspecified site of left female breast; Malignant neoplasm of unspecified site of left female breast; Secondary and unspecified malignant neoplasm of axilla and upper limb lymph nodes RADIOPHARMACEUTICAL: 13.42 mCi F18-FDG, intravenously. TECHNIQUE: Imaging was performed from the skull through the proximal thighs using routine PET/CT acquisition protocol. Imaging was performed approximately 60 minutes post injection. Oral contrast was administered. Radiation dose reduction techniques were used for this study:  Our CT scanners use one or all of the following: Automated exposure control, adjustment of the mA and/or kVp according to patient's size, iterative reconstruction. SERUM GLUCOSE: 123 mg/dL prior to injection. COMPARISON: Breast MRI June 16, 2022 FINDINGS: HEAD/NECK: Symmetric uptake within the imaged brain parenchyma. Scattered areas of mild uptake within the occipital and cervical soft tissues without convincing CT correlate with additional areas of uptake, and along the bilateral paraspinals soft tissues. No discrete enlarged/hypermetabolic cervical lymph nodes. CHEST: FDG uptake with a known left breast malignancy with SUV of 9.0. Prominent FDG avid left axillary lymph nodes largest measuring 1.3 x 2.6 cm (SUV max 5.8, image number 66). Scattered areas of FDG uptake without corresponding CT correlate throughout the superior lateral and posterior medial chest wall. No enlarged or hypermetabolic mediastinal or hilar adenopathy. No suspicious pulmonary nodules or focal parenchymal FDG uptake. ABDOMEN/PELVIS: No enlarged or hypermetabolic adenopathy. No focal uptake within the visceral organs.  There is a 1.8 cm left hyperdense exophytic renal cyst without associated FDG uptake compatible with a hemorrhagic/proteinaceous cyst with additional punctate upper pole hyperdense cyst and indeterminate photopenic hypodense cyst. Physiologic uptake within the gastrointestinal tract with normal excretion of radiotracer within the renal collecting system and urinary bladder. MUSCULOSKELETAL: Hypermetabolic lytic lesion involving the T10 vertebral body (SUV max 14.0, image number 103) with sclerotic hypermetabolic lesion centered within the sacrum (SUV max 13.0, image number 189). No abnormal focal uptake within the soft tissues. 1.  Hypermetabolic soft tissue left breast mass with prominent FDG avid left axillary lymph nodes. 2.  Hypermetabolic osseous metastatic disease involving the sacrum and T10 vertebral body. 3.  Patchy areas of mild uptake throughout the cervical and paraspinal soft tissues, as well as, along the superior lateral and posterior medial chest wall favoring brown fat and less likely metastatic disease. US BIOPSY LYMPH NODE    Result Date: 6/14/2022  Ultrasound-guided left breast biopsy, left axillary lymph node biopsy and postbiopsy mammogram: 06/14/2022 HISTORY: Left breast mass and dysmorphic left axillary lymph node. Ultrasound guided left breast biopsy: The patient was explained the procedure informed consent was obtained. Using sonographic guidance and sterile technique, a 14-gauge biopsy device was used to obtain 3 core samples from the hypoechoic mass at the 10:00 position. The samples were placed into a formalin container and sent to the lab for analysis. A biopsy clip was placed upon completion of sampling. The patient tolerated the procedure well. There were no immediate complications. Ultrasound guided left axillary lymph node biopsy: The patient was explained the procedure informed consent was obtained. Using sonographic guidance and sterile technique, a 14-gauge biopsy device was used to obtain 2 core samples from the dysmorphic lymph node within the left axilla. . The samples were placed into a formalin container and sent to the lab for analysis. A biopsy clip was not placed. The patient tolerated the procedure well. There were no immediate complications. Postbiopsy mammogram. CC and ML views of the left breast were obtained. The biopsy clip is confirmed within the mass at the 10:00 position. 1. Status post ultrasound guided biopsy of the hypoechoic mass at the 10:00 position left breast. 2. Status post ultrasound guided biopsy of the dysmorphic lymph node within the left axilla. .     US BREAST BIOPSY W LOC DEVICE 1ST LESION LEFT    Result Date: 6/14/2022  Ultrasound-guided left breast biopsy, left axillary lymph node biopsy and postbiopsy mammogram: 06/14/2022 HISTORY: Left breast mass and dysmorphic left axillary lymph node. Ultrasound guided left breast biopsy: The patient was explained the procedure informed consent was obtained. Using sonographic guidance and sterile technique, a 14-gauge biopsy device was used to obtain 3 core samples from the hypoechoic mass at the 10:00 position. The samples were placed into a formalin container and sent to the lab for analysis. A biopsy clip was placed upon completion of sampling. The patient tolerated the procedure well. There were no immediate complications. Ultrasound guided left axillary lymph node biopsy: The patient was explained the procedure informed consent was obtained. Using sonographic guidance and sterile technique, a 14-gauge biopsy device was used to obtain 2 core samples from the dysmorphic lymph node within the left axilla. . The samples were placed into a formalin container and sent to the lab for analysis. A biopsy clip was not placed. The patient tolerated the procedure well. There were no immediate complications. Postbiopsy mammogram. CC and ML views of the left breast were obtained. The biopsy clip is confirmed within the mass at the 10:00 position.      1. Status post ultrasound guided biopsy of the hypoechoic mass at the 10:00 position left breast. 2. Status post ultrasound guided biopsy of the dysmorphic lymph node within the left axilla. Aicha AGUILAR ZACHARY DIGITAL DIAGNOSTIC UNILATERAL LEFT    Result Date: 6/14/2022  DIAGNOSTIC MAMMOGRAM LEFT BREAST WITH TOMOSYNTHESIS, LEFT BREAST ULTRASOUND HISTORY: Further evaluation of left breast mass and calcifications. Spot compression  tomosynthesis views in the CC and MLO projections of the left breast was performed in addition to a full 90 degree mediolateral tomosynthesis view. Additional magnification views were also submitted. Review of mammographic images of the left breast prior examinations dated 05/20/2022, 07/20/2014 was performed. FINDING: Confirmed is the spiculated mass at approximately 10:00 position left breast posterior depth. The mass measures approximately 4 cm in size. There are pleomorphic calcifications extending anteriorly from the mass towards the nipple. Including the mass and calcifications, the abnormality measures approximately 7 cm in size. A left breast ultrasound is recommended. Left breast ultrasound: There is a hypoechoic mass with ill-defined angular margins at the 10:00 position left breast 12 cm from nipple. This is estimated to measure approximately 5.0 x 3.2 x 4.5 cm. There is posterior acoustic shadowing. Evaluation of left axilla reveals a dysmorphic lymph node measuring 2.6 cm in maximal dimension. IMPRESSION: 1. Spiculated left breast mass with associated microcalcifications measuring at least 7 cm in total size at the 10:00 position. Tissue sampling is recommended. 2. Dysmorphic left axillary lymph node. Tissue sampling is recommended. BI-RADS Assessment Category 5: Highly suggestive of malignancy- Appropriate action should be taken. Results were discussed with the patient prior to leaving the department. The patient is scheduled for biopsy today. This study was reviewed with the aid of the [employer]  device.       Pathology:  DIAGNOSIS        A:  \"LEFT BREAST, 10:00 POSITION, 12 CM FROM NIPPLE, CORE BIOPSY\":        INFILTRATING DUCTAL CARCINOMA WITH LOBULAR FEATURES, LOW GRADE (WELL   DIFFERENTIATED). DEFINITE IN SITU COMPONENT AND LYMPHOVASCULAR INVASION   ARE NOT IDENTIFIED          B:  \"LEFT AXILLA, CORE BIOPSY\":        MACRO METASTATIC CARCINOMA SIMILAR TO A INVOLVING LYMPH NODE. WHILE   DEFINITE EXTRACAPSULAR EXTENSION IS NOT IDENTIFIED IN THIS MATERIAL, IT   COULD BE PRESENT IN UNSAMPLED LYMPH NODE         Procedures/Addenda                     STF- ER/ND/FPH0LUU BY IHC                                                                                                                                         Status:  Signed Out   Sowmya Coreas MD on 2022                                 Interpretation                       [email or web address] IHC Quantitative Breast Panel     TEST NAME:                         RESULTS:               INTERNAL   CONTROLS:     ESTROGEN RECEPTOR:               Positive (93%)               Absent   PROGESTERONE RECEPTOR:          Negative (0.0%)          Absent   HER-2/ARYA:                         Negative (0)               Percentage   of Cells with Uniform        Intense Complete Membrane   Stainin%           ASSESSMENT:   Diagnosis Orders   1. Malignant neoplasm of left breast in female, estrogen receptor positive, unspecified site of breast ([address]  CT BIOPSY SUPERFICIAL BONE PERCUTANEOUS    CBC with Auto Differential    Comprehensive Metabolic Panel    Protime-INR    Cancer Antigen 15-3    Hepatitis B Core Antibody, Total    Hepatitis B Surface Antibody    Hepatitis B Surface Antigen    Hepatitis C Antibody    Hepatitis Panel, Acute    [facility]   2. Metastasis to bone (HCC)  CT BIOPSY SUPERFICIAL BONE PERCUTANEOUS   3. Abnormal positron emission tomography (PET) scan  CT BIOPSY SUPERFICIAL BONE PERCUTANEOUS   4.  Encounter for screening for viral disease  Hepatitis B Core Antibody, Total    Hepatitis B Surface Antibody    Hepatitis B Surface Antigen    Hepatitis C Antibody    Hepatitis Panel, Acute   5. Screening procedure  Protime-INR     Patient Active Problem List   Diagnosis    Hypertension    Kidney stone    Cyst, kidney, acquired           PLAN:  Lab studies and imaging studies (PET/CT) were personally reviewed. Pertinent old records were reviewed. Breast cancer: left breast, low grade IDC with lobular features, 3.7 cm with biopsy proven axillary lymphadenopathy, two osseous lesions on PET (T10 and sacrum) worrisome for metastatic disease. ER positive, WA negative, HER2 0. I discussed the pathophysiology and natural history of breast cancer with Ms. Alcocer. Given the suspicious osseous lesions, I would recommend biopsy of the sacral lesion which is more accessible. If this is positive for metastatic breast cancer, she will not be a candidate for curative therapy and should start palliative systemic therapy, I would recommend letrozole and ribociclib. She is postmenopausal so she will not need ovarian function suppression. She will also require denosumab or zoledronic acid if bone biopsy is positive for carcinoma. All questions were asked and answered to the best of my ability. In all, I spent 60 minutes in the care of Ms. Alcocer today, over 50% of which was in direct counseling and coordination of care. We will see her back after the biopsy to discuss plan of care.             Lisa Antoine MD, MD  [facility] Hematology and Oncology  [address] [place] [address]  Office : [phone]  Fax : [phone]

## 2022-06-28 ENCOUNTER — TELEPHONE (OUTPATIENT)
Dept: CASE MANAGEMENT | Age: 52
End: 2022-06-28

## 2022-06-28 LAB — HBV CORE AB SERPL QL IA: NEGATIVE

## 2022-06-28 NOTE — TELEPHONE ENCOUNTER
Call to patient to follow up after consult with Dr. Jose Campos yesterday. Patient states doing ok. She states she will be having a bone biopsy and then will return to Dr. Jose Campos to discuss treatment plan. Discussed that the IR department will call her to go over her biopsy instructions with her. She states emotionally she is doing ok, still with some expected anxiety. States  supportive and will be with her for upcoming biopsy. Discussed will cancel surgeon appt that is scheduled as her bone biopsy results will likely not be back by that appt. Will reschedule after follow up with Dr. Jose Campos as appropriate. Support given. Encouraged to call for questions/concerns.

## 2022-06-30 ENCOUNTER — TELEMEDICINE (OUTPATIENT)
Dept: ONCOLOGY | Age: 52
End: 2022-06-30
Payer: COMMERCIAL

## 2022-06-30 DIAGNOSIS — C79.51 METASTASIS TO BONE (HCC): ICD-10-CM

## 2022-06-30 DIAGNOSIS — C50.912 MALIGNANT NEOPLASM OF LEFT BREAST IN FEMALE, ESTROGEN RECEPTOR POSITIVE, UNSPECIFIED SITE OF BREAST (HCC): ICD-10-CM

## 2022-06-30 DIAGNOSIS — F41.1 ANXIETY ASSOCIATED WITH CANCER DIAGNOSIS (HCC): Primary | ICD-10-CM

## 2022-06-30 DIAGNOSIS — C80.1 ANXIETY ASSOCIATED WITH CANCER DIAGNOSIS (HCC): Primary | ICD-10-CM

## 2022-06-30 DIAGNOSIS — Z17.0 MALIGNANT NEOPLASM OF LEFT BREAST IN FEMALE, ESTROGEN RECEPTOR POSITIVE, UNSPECIFIED SITE OF BREAST (HCC): ICD-10-CM

## 2022-06-30 PROCEDURE — 90791 PSYCH DIAGNOSTIC EVALUATION: CPT | Performed by: SOCIAL WORKER

## 2022-07-01 ENCOUNTER — HOSPITAL ENCOUNTER (OUTPATIENT)
Dept: CT IMAGING | Age: 52
Discharge: HOME | End: 2022-07-04
Payer: COMMERCIAL

## 2022-07-01 VITALS
BODY MASS INDEX: 28.34 KG/M2 | SYSTOLIC BLOOD PRESSURE: 97 MMHG | TEMPERATURE: 97.6 F | RESPIRATION RATE: 16 BRPM | DIASTOLIC BLOOD PRESSURE: 66 MMHG | WEIGHT: 166 LBS | HEIGHT: 64 IN | HEART RATE: 70 BPM | OXYGEN SATURATION: 93 %

## 2022-07-01 DIAGNOSIS — C50.912 MALIGNANT NEOPLASM OF LEFT BREAST IN FEMALE, ESTROGEN RECEPTOR POSITIVE, UNSPECIFIED SITE OF BREAST (HCC): ICD-10-CM

## 2022-07-01 DIAGNOSIS — C79.51 METASTASIS TO BONE (HCC): ICD-10-CM

## 2022-07-01 DIAGNOSIS — R94.8 ABNORMAL POSITRON EMISSION TOMOGRAPHY (PET) SCAN: ICD-10-CM

## 2022-07-01 DIAGNOSIS — Z17.0 MALIGNANT NEOPLASM OF LEFT BREAST IN FEMALE, ESTROGEN RECEPTOR POSITIVE, UNSPECIFIED SITE OF BREAST (HCC): ICD-10-CM

## 2022-07-01 PROCEDURE — 77012 CT SCAN FOR NEEDLE BIOPSY: CPT

## 2022-07-01 PROCEDURE — 6370000000 HC RX 637 (ALT 250 FOR IP): Performed by: RADIOLOGY

## 2022-07-01 PROCEDURE — 88307 TISSUE EXAM BY PATHOLOGIST: CPT

## 2022-07-01 PROCEDURE — 2500000003 HC RX 250 WO HCPCS: Performed by: RADIOLOGY

## 2022-07-01 PROCEDURE — 6360000002 HC RX W HCPCS: Performed by: RADIOLOGY

## 2022-07-01 PROCEDURE — 88311 DECALCIFY TISSUE: CPT

## 2022-07-01 RX ORDER — LIDOCAINE HYDROCHLORIDE 10 MG/ML
INJECTION, SOLUTION EPIDURAL; INFILTRATION; INTRACAUDAL; PERINEURAL
Status: COMPLETED | OUTPATIENT
Start: 2022-07-01 | End: 2022-07-01

## 2022-07-01 RX ORDER — OXYCODONE HYDROCHLORIDE 5 MG/1
10 TABLET ORAL
Status: COMPLETED | OUTPATIENT
Start: 2022-07-01 | End: 2022-07-01

## 2022-07-01 RX ORDER — MIDAZOLAM HYDROCHLORIDE 2 MG/2ML
INJECTION, SOLUTION INTRAMUSCULAR; INTRAVENOUS
Status: COMPLETED | OUTPATIENT
Start: 2022-07-01 | End: 2022-07-01

## 2022-07-01 RX ORDER — FENTANYL CITRATE 50 UG/ML
INJECTION, SOLUTION INTRAMUSCULAR; INTRAVENOUS
Status: COMPLETED | OUTPATIENT
Start: 2022-07-01 | End: 2022-07-01

## 2022-07-01 RX ORDER — ONDANSETRON 4 MG/1
4 TABLET, ORALLY DISINTEGRATING ORAL EVERY 8 HOURS PRN
Status: DISCONTINUED | OUTPATIENT
Start: 2022-07-01 | End: 2022-07-05 | Stop reason: HOSPADM

## 2022-07-01 RX ADMIN — LIDOCAINE HYDROCHLORIDE 8 ML: 10 INJECTION, SOLUTION EPIDURAL; INFILTRATION; INTRACAUDAL; PERINEURAL at 10:40

## 2022-07-01 RX ADMIN — OXYCODONE 10 MG: 5 TABLET ORAL at 11:33

## 2022-07-01 RX ADMIN — MIDAZOLAM HYDROCHLORIDE 1 MG: 1 INJECTION, SOLUTION INTRAMUSCULAR; INTRAVENOUS at 10:26

## 2022-07-01 RX ADMIN — FENTANYL CITRATE 50 MCG: 50 INJECTION INTRAMUSCULAR; INTRAVENOUS at 10:58

## 2022-07-01 RX ADMIN — ONDANSETRON 4 MG: 4 TABLET, ORALLY DISINTEGRATING ORAL at 13:20

## 2022-07-01 RX ADMIN — MIDAZOLAM HYDROCHLORIDE 1 MG: 1 INJECTION, SOLUTION INTRAMUSCULAR; INTRAVENOUS at 10:22

## 2022-07-01 RX ADMIN — FENTANYL CITRATE 25 MCG: 50 INJECTION INTRAMUSCULAR; INTRAVENOUS at 10:37

## 2022-07-01 RX ADMIN — FENTANYL CITRATE 50 MCG: 50 INJECTION INTRAMUSCULAR; INTRAVENOUS at 10:22

## 2022-07-01 RX ADMIN — FENTANYL CITRATE 25 MCG: 50 INJECTION INTRAMUSCULAR; INTRAVENOUS at 10:48

## 2022-07-01 RX ADMIN — MIDAZOLAM HYDROCHLORIDE 1 MG: 1 INJECTION, SOLUTION INTRAMUSCULAR; INTRAVENOUS at 10:48

## 2022-07-01 RX ADMIN — FENTANYL CITRATE 50 MCG: 50 INJECTION INTRAMUSCULAR; INTRAVENOUS at 10:26

## 2022-07-01 RX ADMIN — MIDAZOLAM HYDROCHLORIDE 1 MG: 1 INJECTION, SOLUTION INTRAMUSCULAR; INTRAVENOUS at 10:37

## 2022-07-01 ASSESSMENT — PAIN DESCRIPTION - LOCATION: LOCATION: BACK

## 2022-07-01 ASSESSMENT — PAIN DESCRIPTION - ORIENTATION: ORIENTATION: LEFT;UPPER

## 2022-07-01 ASSESSMENT — PAIN SCALES - GENERAL
PAINLEVEL_OUTOF10: 3
PAINLEVEL_OUTOF10: 6

## 2022-07-01 ASSESSMENT — PAIN - FUNCTIONAL ASSESSMENT: PAIN_FUNCTIONAL_ASSESSMENT: 0-10

## 2022-07-01 NOTE — PROGRESS NOTES
Recovery period without difficulty. Pt alert and oriented and denies pain. Dressing is clean, dry, and intact. Reviewed discharge instructions with patient and spouse, both verbalized understanding. Pt escorted to lobby discharge area via wheelchair. Vital signs and Marquita score completed.

## 2022-07-01 NOTE — PRE SEDATION
Sedation Pre-Procedure Note    Patient Name: Rafal Allen   YOB: 1970  Room/Bed: Room/bed info not found  Medical Record Number: [id number]  Date: 7/1/2022   Time: 9:28 AM       Indication:  Breast cancer, bone mets    Consent: I have discussed with the patient and/or the patient representative the indication, alternatives, and the possible risks and/or complications of the planned procedure and the anesthesia methods. The patient and/or patient representative appear to understand and agree to proceed. Vital Signs:   Vitals:    07/01/22 0907   BP: 125/83   Pulse: 77   Resp: 16   Temp: 97.6 °F (36.4 °C)   SpO2: 98%       Past Medical History:   has a past medical history of Cyst, kidney, acquired, Hypertension, and Kidney stone. Past Surgical History:   has a past surgical history that includes Appendectomy; [place] tooth extraction; US BREAST BIOPSY W LOC DEVICE 1ST LESION LEFT (Left, 6/14/2022); and US BIOPSY LYMPH NODE (6/14/2022). Medications:   Scheduled Meds:   Continuous Infusions:   PRN Meds:   Home Meds:   Prior to Admission medications    Medication Sig Start Date End Date Taking? Authorizing Provider   docusate sodium (COLACE) 100 MG capsule Take 100 mg by mouth 2 times daily    Historical Provider, MD   clonazePAM (KLONOPIN) 0.5 MG tablet Take 1 tablet by mouth 3 times daily as needed for Anxiety for up to 30 doses. 6/15/22 8/15/22  Placido Sanchez MD   tretinoin (RETIN-A) 0.1 % cream APPLY TO AFFECTED AREA(S) NIGHTLY TO FACE 5/7/22   Historical Provider, MD   chlorthalidone (HYGROTON) 25 MG tablet 1 po qam for bloos pressure 4/13/22   Ar Automatic Reconciliation   olmesartan (BENICAR) 40 MG tablet Take 40 mg by mouth daily 4/13/22   Ar Automatic Reconciliation         Pre-Sedation Documentation and Exam:   I have personally completed a history, physical exam & review of systems for this patient (see notes).     Mallampati Airway Assessment:  normal, dentition not prohibitive, normal neck range of motion, Mallampati Class I - (soft palate, fauces, uvula & anterior/posterior tonsillar pillars are visible)    Prior History of Anesthesia Complications:   none    ASA Classification:  Class 2 - A normal healthy patient with mild systemic disease    Sedation/ Anesthesia Plan:   intravenous sedation    Medications Planned:   midazolam (Versed)  intravenously and fentanyl intravenously    Patient is an appropriate candidate for plan of sedation: yes    Electronically signed by Teddy Kent PA-C on 7/1/2022 at 9:28 AM

## 2022-07-01 NOTE — H&P
Department of Interventional Radiology  [phone]    History and Physical    Patient:  Hira Sigala MRN:  [id number]  SSN:  [id number]    YOB: 1970  Age:  46 y.o. Sex:  female      Primary Care Provider:  Venu Rodriguez MD  Referring Physician:  Cris Horne MD    Subjective:     Chief Complaint: bone mets    History of the Present Illness: The patient is a 46 y.o. female with breast cancer, bone lesions who presents for biopsy. PET + sacral lesion which may be inaccessible as well as a T10 lesion. NPO. Past Medical History:   Diagnosis Date    Cyst, kidney, acquired     found on previous scan    Hypertension     Kidney stone      Past Surgical History:   Procedure Laterality Date    APPENDECTOMY      US BREAST NEEDLE BIOPSY LEFT Left 6/14/2022    US BREAST NEEDLE BIOPSY LEFT 6/14/2022 MD [facility]    US LYMPH NODE BIOPSY  6/14/2022    US LYMPH NODE BIOPSY 6/14/2022 MD [facility]    WISDOM TOOTH EXTRACTION          Review of Systems:    Pertinent items are noted in HPI. Prior to Admission medications    Medication Sig Start Date End Date Taking? Authorizing Provider   docusate sodium (COLACE) 100 MG capsule Take 100 mg by mouth 2 times daily    Historical Provider, MD   clonazePAM (KLONOPIN) 0.5 MG tablet Take 1 tablet by mouth 3 times daily as needed for Anxiety for up to 30 doses.  6/15/22 8/15/22  Venu Rodriguez MD   tretinoin (RETIN-A) 0.1 % cream APPLY TO AFFECTED AREA(S) NIGHTLY TO FACE 5/7/22   Historical Provider, MD   chlorthalidone (HYGROTON) 25 MG tablet 1 po qam for bloos pressure 4/13/22   Ar Automatic Reconciliation   olmesartan (BENICAR) 40 MG tablet Take 40 mg by mouth daily 4/13/22   Ar Automatic Reconciliation        No Known Allergies    Family History   Problem Relation Age of Onset    Hypertension Mother     Alcohol Abuse Brother     Alcohol Abuse Father     Dementia Father    Tasha Paniagua Hypertension Brother     Diabetes Father      Social History     Tobacco Use    Smoking status: Never Smoker    Smokeless tobacco: Never Used   Substance Use Topics    Alcohol use: Yes     Alcohol/week: 2.0 standard drinks        Not in a hospital admission. Objective:       Physical Examination:    Vitals:    07/01/22 0907   BP: 125/83   Pulse: 77   Resp: 16   Temp: 97.6 °F (36.4 °C)   TempSrc: Temporal   SpO2: 98%   Weight: 166 lb (75.3 kg)   Height: 5' 3.6\" (1.615 m)       Pain Assessment  Pain Level: 2  Patient's Stated Pain Goal: 0 - No pain  Pain Location: Flank  Pain Orientation: Left          Pain Level: 2   Patient's Stated Pain Goal: 0 - No pain   Pain Location: Flank   Pain Orientation: Left                           HEART: regular rate and rhythm  LUNG: clear to auscultation bilaterally  ABDOMEN: normal findings: soft, non-tender  EXTREMITIES: warm, no edema    Laboratory:     Lab Results   Component Value Date/Time     06/27/2022 02:28 PM     06/24/2021 11:08 AM    K 3.2 06/27/2022 02:28 PM    K 4.4 06/24/2021 11:08 AM     06/27/2022 02:28 PM     06/24/2021 11:08 AM    CO2 29 06/27/2022 02:28 PM    CO2 25 06/24/2021 11:08 AM    BUN 18 06/27/2022 02:28 PM    BUN 12 06/24/2021 11:08 AM    GFRAA >60 06/27/2022 02:28 PM    GFRAA 119 06/24/2021 11:08 AM    GLOB 3.9 06/27/2022 02:28 PM    ALT 55 06/27/2022 02:28 PM     Lab Results   Component Value Date/Time    WBC 8.5 06/27/2022 02:28 PM    HGB 14.0 06/27/2022 02:28 PM    HCT 42.1 06/27/2022 02:28 PM     06/27/2022 02:28 PM     Lab Results   Component Value Date/Time    INR 0.9 06/27/2022 02:28 PM       Assessment:     Breast cancer, bone mets. @[facility] [place]@    Plan:     Planned Procedure:  Bone lesion biopsy    Risks, benefits, and alternatives reviewed with patient and she agrees to proceed with the procedure.       Signed By: Sherlyn Alvarez PA-C     July 1, 2022

## 2022-07-01 NOTE — BRIEF OP NOTE
Brief Postoperative Note      Patient: Kavya Ragsdale  YOB: 1970  MRN: [id number]    Date of Procedure: 7/1/2022    Pre-Op Diagnosis: Thoracic spine bone lesion    Post-Op Diagnosis: Same       CT guided core biopsy of a osteolytic lesion in the T10 vertebral body. Multiple 18G core samples were obtained as well as an 11G core sample. Left extra-pedicular approach.      Anesthesia: Moderate sedation    Estimated Blood Loss (mL): Minimal    Complications: None    Specimens:   As above    Implants:  * No implants in log *      Drains: * No LDAs found *    Findings: As above    Electronically signed by Herbert Jensen MD on 7/1/2022 at 11:07 AM

## 2022-07-11 ENCOUNTER — OFFICE VISIT (OUTPATIENT)
Dept: ONCOLOGY | Age: 52
End: 2022-07-11
Payer: COMMERCIAL

## 2022-07-11 VITALS
HEART RATE: 102 BPM | DIASTOLIC BLOOD PRESSURE: 86 MMHG | RESPIRATION RATE: 16 BRPM | HEIGHT: 64 IN | TEMPERATURE: 98.1 F | BODY MASS INDEX: 29.19 KG/M2 | SYSTOLIC BLOOD PRESSURE: 131 MMHG | OXYGEN SATURATION: 99 % | WEIGHT: 171 LBS

## 2022-07-11 DIAGNOSIS — Z51.81 ENCOUNTER FOR MONITORING CARDIOTOXIC DRUG THERAPY: ICD-10-CM

## 2022-07-11 DIAGNOSIS — Z17.0 MALIGNANT NEOPLASM OF LEFT BREAST IN FEMALE, ESTROGEN RECEPTOR POSITIVE, UNSPECIFIED SITE OF BREAST (HCC): Primary | ICD-10-CM

## 2022-07-11 DIAGNOSIS — Z79.899 ENCOUNTER FOR MONITORING CARDIOTOXIC DRUG THERAPY: ICD-10-CM

## 2022-07-11 DIAGNOSIS — C50.912 MALIGNANT NEOPLASM OF LEFT BREAST IN FEMALE, ESTROGEN RECEPTOR POSITIVE, UNSPECIFIED SITE OF BREAST (HCC): Primary | ICD-10-CM

## 2022-07-11 DIAGNOSIS — C79.51 METASTASIS TO BONE (HCC): ICD-10-CM

## 2022-07-11 PROCEDURE — 99214 OFFICE O/P EST MOD 30 MIN: CPT | Performed by: INTERNAL MEDICINE

## 2022-07-11 RX ORDER — LETROZOLE 2.5 MG/1
2.5 TABLET, FILM COATED ORAL DAILY
Qty: 30 TABLET | Refills: 3 | Status: SHIPPED | OUTPATIENT
Start: 2022-07-11 | End: 2022-10-19 | Stop reason: SDUPTHER

## 2022-07-11 RX ORDER — EPINEPHRINE 1 MG/ML
0.3 INJECTION, SOLUTION, CONCENTRATE INTRAVENOUS PRN
Status: CANCELLED | OUTPATIENT
Start: 2022-07-18

## 2022-07-11 RX ORDER — SODIUM CHLORIDE 9 MG/ML
INJECTION, SOLUTION INTRAVENOUS CONTINUOUS
Status: CANCELLED | OUTPATIENT
Start: 2022-07-18

## 2022-07-11 RX ORDER — FAMOTIDINE 10 MG/ML
20 INJECTION, SOLUTION INTRAVENOUS
Status: CANCELLED | OUTPATIENT
Start: 2022-07-18

## 2022-07-11 RX ORDER — ACETAMINOPHEN 325 MG/1
650 TABLET ORAL
Status: CANCELLED | OUTPATIENT
Start: 2022-07-18

## 2022-07-11 RX ORDER — DIPHENHYDRAMINE HYDROCHLORIDE 50 MG/ML
50 INJECTION INTRAMUSCULAR; INTRAVENOUS
Status: CANCELLED | OUTPATIENT
Start: 2022-07-18

## 2022-07-11 RX ORDER — ONDANSETRON 2 MG/ML
8 INJECTION INTRAMUSCULAR; INTRAVENOUS
Status: CANCELLED | OUTPATIENT
Start: 2022-07-18

## 2022-07-11 RX ORDER — ALBUTEROL SULFATE 90 UG/1
4 AEROSOL, METERED RESPIRATORY (INHALATION) PRN
Status: CANCELLED | OUTPATIENT
Start: 2022-07-18

## 2022-07-11 NOTE — PATIENT INSTRUCTIONS
Ribociclib: Patient drug information      Brand Names: [place]  · Kisqali (200 MG Dose);  · Kisqali (400 MG Dose);  · Kisqali (600 MG Dose)    Brand Names: [place] ([place])  · Ginger Brock    What is this drug used for? It is used to treat a type of breast cancer. What do I need to tell my doctor BEFORE I take this drug? If you are allergic to this drug; any part of this drug; or any other drugs, foods, or substances. Tell your doctor about the allergy and what signs you had. If you have any of these health problems: Electrolyte problems (like calcium, magnesium, phosphorous, or potassium), heart problems like heart failure or a certain type of chest pain (unstable angina), long QT on ECG, or slow or abnormal heartbeat. If you have had a recent heart attack. If you are taking any drugs that can cause a certain type of heartbeat that is not normal (prolonged QT interval). There are many drugs that can do this. Ask your doctor or pharmacist if you are not sure. If you take any drugs (prescription or OTC, natural products, vitamins) that must not be taken with this drug, like certain drugs that are used for HIV, infections, or seizures. There are many drugs that must not be taken with this drug. If you are breast-feeding. Do not breast-feed while you take this drug and for 3 weeks after your last dose. This is not a list of all drugs or health problems that interact with this drug. Tell your doctor and pharmacist about all of your drugs (prescription or OTC, natural products, vitamins) and health problems. You must check to make sure that it is safe for you to take this drug with all of your drugs and health problems. Do not start, stop, or change the dose of any drug without checking with your doctor. What are some things I need to know or do while I take this drug? Tell all of your health care providers that you take this drug. This includes your doctors, nurses, pharmacists, and dentists.     An unsafe heartbeat that is not normal (long QT on ECG) has happened with this drug. This may raise the chance of sudden death. Talk with the doctor. You will need an ECG before starting this drug and during treatment. Talk with your doctor. If you have upset stomach, throwing up, diarrhea, or are not hungry, talk with your doctor. There may be ways to lower these side effects. Have blood work checked as you have been told by the doctor. Talk with the doctor. You may have more chance of getting an infection. Wash hands often. Stay away from people with infections, colds, or flu. Avoid grapefruit and grapefruit juice. A severe and sometimes deadly reaction has happened. Most of the time, this reaction has signs like fever, rash, or swollen glands with problems in body organs like the liver, kidney, blood, heart, muscles and joints, or lungs. If you have questions, talk with the doctor. This drug may affect being able to father a child. Talk with the doctor. This drug may cause harm to an unborn baby. A pregnancy test will be done before you start this drug to show that you are NOT pregnant. If you may become pregnant, you must use birth control while taking this drug and for some time after the last dose. Ask your doctor how long to use birth control. If you get pregnant, call your doctor right away. What are some side effects that I need to call my doctor about right away? WARNING/CAUTION: Even though it may be rare, some people may have very bad and sometimes deadly side effects when taking a drug.  Tell your doctor or get medical help right away if you have any of the following signs or symptoms that may be related to a very bad side effect:    Signs of an allergic reaction, like rash; hives; itching; red, swollen, blistered, or peeling skin with or without fever; wheezing; tightness in the chest or throat; trouble breathing, swallowing, or talking; unusual hoarseness; or swelling of the mouth, face, lips, tongue, or throat. Signs of infection like fever, chills, very bad sore throat, ear or sinus pain, cough, more sputum or change in color of sputum, pain with passing urine, mouth sores, or wound that will not heal.    Signs of a urinary tract infection (UTI) like blood in the urine, burning or pain when passing urine, feeling the need to pass urine often or right away, fever, lower stomach pain, or pelvic pain. Signs of liver problems like dark urine, feeling tired, not hungry, upset stomach or stomach pain, light-colored stools, throwing up, or yellow skin or eyes. Signs of electrolyte problems like mood changes; confusion; muscle pain, cramps, or spasms; weakness; shakiness; change in balance; an abnormal heartbeat; seizures; loss of appetite; or severe upset stomach or throwing up. Dizziness or passing out. Fast or abnormal heartbeat. Any unexplained bruising or bleeding. Swelling in the arms or legs. Flu-like signs. Some people have had lung problems with this drug. Sometimes, this has been deadly. Call your doctor right away if you have signs of lung problems like shortness of breath or other trouble breathing, cough that is new or worse, or fever. A severe skin reaction (Vidal-Leoncio syndrome/toxic epidermal necrolysis) may happen. It can cause severe health problems that may not go away, and sometimes death. Get medical help right away if you have signs like red, swollen, blistered, or peeling skin (with or without fever); red or irritated eyes; or sores in your mouth, throat, nose, or eyes. What are some other side effects of this drug? All drugs may cause side effects. However, many people have no side effects or only have minor side effects.  Call your doctor or get medical help if any of these side effects or any other side effects bother you or do not go away:    Constipation, diarrhea, stomach pain, upset stomach, throwing up, or feeling less provider. If you think there has been an overdose, call your poison control center or get medical care right away. Be ready to tell or show what was taken, how much, and when it happened. Letrozole: Patient drug information      Brand Names: [place]  · Femara    Brand Names: [place] ([place])  · ACH-Letrozole;  · AG-Letrozole;  · APO-Letrozole;  · BIO-Letrozole;  · CCP-Letrozole;  · Femara;  · JAMP-Letrozole;  · Mar-Letrozole;  · MED-Letrozole [DSC];  · MINT-Letrozole;  · GWEN-Letrozole;  · NRA-Letrozole;  · PMS-Letrozole;  · RAN-Letrozole;  · FAN-Letrozole;  · SANDOZ Letrozole;  · TEVA-Letrozole;  · VAN-Letrozole [DSC];  · Zinda-Letrozole [DSC]    What is this drug used for? It is used to treat breast cancer after menopause. If you have been given this drug for some other reason, talk with your doctor for more information. What do I need to tell my doctor BEFORE I take this drug? If you are allergic to this drug; any part of this drug; or any other drugs, foods, or substances. Tell your doctor about the allergy and what signs you had. If you are pregnant or may be pregnant. Do not take this drug if you are pregnant. If you are breast-feeding. Do not breast-feed while you take this drug and for 3 weeks after your last dose. This drug may interact with other drugs or health problems. Tell your doctor and pharmacist about all of your drugs (prescription or OTC, natural products, vitamins) and health problems. You must check to make sure that it is safe for you to take this drug with all of your drugs and health problems. Do not start, stop, or change the dose of any drug without checking with your doctor. What are some things I need to know or do while I take this drug? Tell all of your health care providers that you take this drug. This includes your doctors, nurses, pharmacists, and dentists.     Avoid driving and doing other tasks or actions that call for you to be alert until you see how this drug affects you. This drug may cause weak bones. This may happen more often if used for a long time. This may raise the chance of broken bones. Call your doctor right away if you have bone pain. Have your blood work and bone density checked as you have been told by your doctor. Take calcium and vitamin D as you were told by your doctor. High cholesterol has happened with this drug. If you have questions, talk with the doctor. If you are 72 or older, use this drug with care. You could have more side effects. This drug may affect fertility. Fertility problems may lead to not being able to get pregnant or father a child. If you plan to get pregnant or father a child, talk with your doctor before taking this drug. This drug may cause harm to an unborn baby. A pregnancy test will be done before you start this drug to show that you are NOT pregnant. If you may become pregnant, you must use birth control while taking this drug and for some time after the last dose. Ask your doctor how long to use birth control. If you get pregnant, call your doctor right away. What are some side effects that I need to call my doctor about right away? WARNING/CAUTION: Even though it may be rare, some people may have very bad and sometimes deadly side effects when taking a drug. Tell your doctor or get medical help right away if you have any of the following signs or symptoms that may be related to a very bad side effect:    Signs of an allergic reaction, like rash; hives; itching; red, swollen, blistered, or peeling skin with or without fever; wheezing; tightness in the chest or throat; trouble breathing, swallowing, or talking; unusual hoarseness; or swelling of the mouth, face, lips, tongue, or throat. Signs of high blood pressure like very bad headache or dizziness, passing out, or change in eyesight.     Signs of a urinary tract infection (UTI) like blood in the urine, burning or pain when passing urine, feeling the need to pass urine often or right away, fever, lower stomach pain, or pelvic pain. Swollen gland. Chest pain or pressure. Shortness of breath. Vaginal bleeding that is not normal.    Vaginal dryness. Low mood (depression). Swelling. Not able to pass urine or change in how much urine is passed. What are some other side effects of this drug? All drugs may cause side effects. However, many people have no side effects or only have minor side effects. Call your doctor or get medical help if any of these side effects or any other side effects bother you or do not go away:    Feeling dizzy, tired, or weak. Hot flashes. Headache. Constipation, diarrhea, stomach pain, upset stomach, throwing up, or feeling less hungry. Cough. Back, muscle, or joint pain. Night sweats. Sweating a lot. Trouble sleeping. Weight gain or loss. Hair loss. These are not all of the side effects that may occur. If you have questions about side effects, call your doctor. Call your doctor for medical advice about side effects. You may report side effects to your national health agency. How is this drug best taken? Use this drug as ordered by your doctor. Read all information given to you. Follow all instructions closely. Take with or without food. Keep taking this drug as you have been told by your doctor or other health care provider, even if you feel well. Take this drug at the same time of day. What do I do if I miss a dose? Take a missed dose as soon as you think about it. If it is close to the time for your next dose, skip the missed dose and go back to your normal time. Do not take 2 doses at the same time or extra doses. How do I store and/or throw out this drug? Store at room temperature in a dry place. Do not store in a bathroom. Keep all drugs in a safe place. Keep all drugs out of the reach of children and pets.     Throw away unused or  drugs. Do not flush down a toilet or pour down a drain unless you are told to do so. Check with your pharmacist if you have questions about the best way to throw out drugs. There may be drug take-back programs in your area. General drug facts    If your symptoms or health problems do not get better or if they become worse, call your doctor. Do not share your drugs with others and do not take anyone else's drugs. Some drugs may have another patient information leaflet. If you have any questions about this drug, please talk with your doctor, nurse, pharmacist, or other health care provider. If you think there has been an overdose, call your poison control center or get medical care right away. Be ready to tell or show what was taken, how much, and when it happened.             Chemotherapy/Agent Information:     Diagnosis: Breast Caner    Goal of Therapy: _x_ Palliative      __Curative         Name of Chemotherapy medications: Jesús Gutierres    Number of Cycles Planned: Take daily for 21 days and then 7 days off                 Length of Cycle:  21 days      Chemotherapy plan is subject to change at your provider's discretion    A copy of this plan has been discussed and given to patient by Johnny Domínguez RN      Chemo Education:   Scheduled  yes  Previously reviewed no    Consent for therapy:   Completed on - to be done at chemo education

## 2022-07-11 NOTE — PROGRESS NOTES
[employer] [facility]: Office Visit Established Patient    Chief Complaint:    Chief Complaint   Patient presents with    Follow-up         History of Present Illness:  Ms. Yannick Guzmán is a 46 y.o. female who presents today for evaluation regarding breast cancer. On 6/2/22, Ms. Alcocer presented for her routine screening mammogram. The imaging reported a left breast mass with associated calcifications, worrisome for malignancy. On 6/14/22 she underwent a left breast diagnostic mammogram and ultrasound with biopsy of the breast mass and a dysmorphic left axillary lymph node. The left breast and axillary node pathology showed infiltrating ductal carcinoma with lobular features, low grade (well differentiated), ER 93%, VA 0%, and HER-2 0. On 6/16/22 she had a bilateral breast MRI that reported the known left breast cancer measuring up to 3.7 cm, with associated left axillary lymphadenopathy however no suspicious findings in the right breast. On 6/22/22 she had a PET/CT scan that showed the breast and axillary node, but also showed hypermetabolic osseous metastatic disease in the sacrum and T10 vertebral body. A referral was placed to medical oncology for next steps in plan of care for her newly diagnosed metastatic breast cancer. We recommended biopsy of a suspicious osseous lesion to confirm mBC, with treatment recommendations to follow. She returns for results. No new complaints, she is feeling anxious and having some \"all over\" pain but this has been consistent. Review of Systems:  Constitutional: Negative. HENT: Negative. Eyes: Negative. Respiratory: Negative. Cardiovascular: Negative. Gastrointestinal: Negative. Genitourinary: Negative. Musculoskeletal: Negative. Skin: Negative. Neurological: Negative. Endo/Heme/Allergies: Negative. Psychiatric/Behavioral: Negative. All other systems reviewed and are negative.      No Known Allergies  Past Medical History: Diagnosis Date    Cyst, kidney, acquired     found on previous scan    Hypertension     Kidney stone      Past Surgical History:   Procedure Laterality Date    APPENDECTOMY      CT BIOPSY PERCUTANEOUS SUPERFICIAL BONE  7/1/2022    CT BIOPSY PERCUTANEOUS SUPERFICIAL BONE 7/1/2022 [facility] CT SCAN    US BREAST NEEDLE BIOPSY LEFT Left 6/14/2022    US BREAST NEEDLE BIOPSY LEFT 6/14/2022 Ananda Small MD [facility]    US LYMPH NODE BIOPSY  6/14/2022    US LYMPH NODE BIOPSY 6/14/2022 Ananda Small MD [facility]    WISDOM TOOTH EXTRACTION       Family History   Problem Relation Age of Onset    Hypertension Mother     Alcohol Abuse Brother     Alcohol Abuse Father     Dementia Father     Hypertension Brother     Diabetes Father      Social History     Socioeconomic History    Marital status:      Spouse name: Not on file    Number of children: Not on file    Years of education: Not on file    Highest education level: Not on file   Occupational History    Not on file   Tobacco Use    Smoking status: Never Smoker    Smokeless tobacco: Never Used   Vaping Use    Vaping Use: Never used   Substance and Sexual Activity    Alcohol use: Yes     Alcohol/week: 2.0 standard drinks    Drug use: Never    Sexual activity: Not on file   Other Topics Concern    Not on file   Social History Narrative    Not on file     Social Determinants of Health     Financial Resource Strain: Low Risk     Difficulty of Paying Living Expenses: Not hard at all   Food Insecurity: Unknown    Worried About [facility] in the Last Year: Never true    Teagan of Food in the Last Year: Not on file   Transportation Needs: No Transportation Needs    Lack of Transportation (Medical): No    Lack of Transportation (Non-Medical):  No   Physical Activity: Sufficiently Active    Days of Exercise per Week: 7 days    Minutes of Exercise per Session: 30 min   Stress: Stress Concern Present    Feeling of Stress : To some extent   Social Connections: Unknown    Frequency of Communication with Friends and Family: More than three times a week    Frequency of Social Gatherings with Friends and Family: More than three times a week    Attends Christianity Services: Not on file    Active Member of Clubs or Organizations: Not on file    Attends Club or Organization Meetings: Not on file    Marital Status:    Intimate Partner Violence: Not At Risk    Fear of Current or Ex-Partner: No    Emotionally Abused: No    Physically Abused: No    Sexually Abused: No   Housing Stability: [address] Unable to Pay for Housing in the Last Year: No    Number of [place] in the Last Year: 1    Unstable Housing in the Last Year: No     Current Outpatient Medications   Medication Sig Dispense Refill    letrozole (FEMARA) 2.5 MG tablet Take 1 tablet by mouth daily 30 tablet 3    docusate sodium (COLACE) 100 MG capsule Take 100 mg by mouth 2 times daily      clonazePAM (KLONOPIN) 0.5 MG tablet Take 1 tablet by mouth 3 times daily as needed for Anxiety for up to 30 doses. 30 tablet 0    tretinoin (RETIN-A) 0.1 % cream APPLY TO AFFECTED AREA(S) NIGHTLY TO FACE      chlorthalidone (HYGROTON) 25 MG tablet 1 po qam for bloos pressure      olmesartan (BENICAR) 40 MG tablet Take 40 mg by mouth daily       No current facility-administered medications for this visit. OBJECTIVE:  /86 Comment: standing  Pulse (!) 102   Temp 98.1 °F (36.7 °C) (Oral)   Resp 16   Ht 5' 3.6\" (1.615 m)   Wt 171 lb (77.6 kg)   SpO2 99%   BMI 29.72 kg/m²     Physical Exam:  Constitutional: Well developed, well nourished female in no acute distress, sitting comfortably on the examination table. HEENT: Normocephalic and atraumatic. Sclerae anicteric. Skin Warm and dry. No bruising and no rash noted. No erythema. No pallor. Cardiopulmonary Deferred.    Neuro Grossly nonfocal with no obvious sensory or motor deficits. MSK Normal range of motion in general.  No edema and no tenderness. Psych Appropriate mood and affect. Labs:  No results found for this or any previous visit (from the past 24 hour(s)). Imaging:  MRI BREAST BILATERAL W WO CONTRAST    Result Date: 6/16/2022  MRI of the Breasts with and without contrast CLINICAL INDICATION:  New diagnosis of left 10:00 breast cancer and left axillary metastatic lymphadenopathy undergoing evaluation for extent of disease, staging, therapy planning. No prior breast surgery or personal malignancy otherwise. COMPARISON: Mammography and ultrasound 5/20/2022, 6/14/2022 TECHNIQUE: Standard MRI sequences were obtained through the breasts in multiple planes. Images were obtained before and after intravenous infusion of 16 mL of Prohance contrast. Images were reviewed with PACS and with Eko CAD software. FINDINGS: The breasts demonstrate moderate glandularity and mild background enhancement. Left 10:00 irregular heterogeneously enhancing malignant mass measures up to 3.3 x 2.6 x 3.0 cm. Extending anteriorly from the mass is about 1.2 cm of clumped and reticular nonmass enhancement. Overall the maximal dimension of this malignancy is 3.7 cm. Mild overlying skin thickening and edema are nonspecific and could be reactive to the biopsy unless there is clinical suspicion for malignancy involvement. There is no other evidence of suspicious enhancing mass, and no dominant or unique nonmass enhancement, to suggest additional malignancy in either breast. Left axilla demonstrates 4 asymmetrically enlarged and dysmorphic lymph nodes, one of which underwent prior biopsy demonstrating metastasis. The largest measures up to 2.0 x 1.2 cm. There is no evidence of right axillary lymphadenopathy or internal mammary lymphadenopathy. Elsewhere, limited visualization of the partially included thorax and upper abdomen shows no acute abnormality.      1. Left 10:00 breast cancer measures up to 3.7 cm. 2. Left axillary metastatic lymphadenopathy. 3. No suspicious right breast finding. Recommend continued management as directed clinically. BI-RADS Assessment Category 6: Known Biopsy Proven Malignancy     US BREAST LIMITED LEFT    Result Date: 6/14/2022  DIAGNOSTIC MAMMOGRAM LEFT BREAST WITH TOMOSYNTHESIS, LEFT BREAST ULTRASOUND HISTORY: Further evaluation of left breast mass and calcifications. Spot compression  tomosynthesis views in the CC and MLO projections of the left breast was performed in addition to a full 90 degree mediolateral tomosynthesis view. Additional magnification views were also submitted. Review of mammographic images of the left breast prior examinations dated 05/20/2022, 07/20/2014 was performed. FINDING: Confirmed is the spiculated mass at approximately 10:00 position left breast posterior depth. The mass measures approximately 4 cm in size. There are pleomorphic calcifications extending anteriorly from the mass towards the nipple. Including the mass and calcifications, the abnormality measures approximately 7 cm in size. A left breast ultrasound is recommended. Left breast ultrasound: There is a hypoechoic mass with ill-defined angular margins at the 10:00 position left breast 12 cm from nipple. This is estimated to measure approximately 5.0 x 3.2 x 4.5 cm. There is posterior acoustic shadowing. Evaluation of left axilla reveals a dysmorphic lymph node measuring 2.6 cm in maximal dimension. IMPRESSION: 1. Spiculated left breast mass with associated microcalcifications measuring at least 7 cm in total size at the 10:00 position. Tissue sampling is recommended. 2. Dysmorphic left axillary lymph node. Tissue sampling is recommended. BI-RADS Assessment Category 5: Highly suggestive of malignancy- Appropriate action should be taken. Results were discussed with the patient prior to leaving the department. The patient is scheduled for biopsy today.   This study was reviewed with the aid of the iCAD  device. MAMMOGRAM POST BX CLIP PLACEMENT LEFT    Result Date: 6/14/2022  Ultrasound-guided left breast biopsy, left axillary lymph node biopsy and postbiopsy mammogram: 06/14/2022 HISTORY: Left breast mass and dysmorphic left axillary lymph node. Ultrasound guided left breast biopsy: The patient was explained the procedure informed consent was obtained. Using sonographic guidance and sterile technique, a 14-gauge biopsy device was used to obtain 3 core samples from the hypoechoic mass at the 10:00 position. The samples were placed into a formalin container and sent to the lab for analysis. A biopsy clip was placed upon completion of sampling. The patient tolerated the procedure well. There were no immediate complications. Ultrasound guided left axillary lymph node biopsy: The patient was explained the procedure informed consent was obtained. Using sonographic guidance and sterile technique, a 14-gauge biopsy device was used to obtain 2 core samples from the dysmorphic lymph node within the left axilla. . The samples were placed into a formalin container and sent to the lab for analysis. A biopsy clip was not placed. The patient tolerated the procedure well. There were no immediate complications. Postbiopsy mammogram. CC and ML views of the left breast were obtained. The biopsy clip is confirmed within the mass at the 10:00 position. 1. Status post ultrasound guided biopsy of the hypoechoic mass at the 10:00 position left breast. 2. Status post ultrasound guided biopsy of the dysmorphic lymph node within the left axilla. [facility] TO MID THIGH    Result Date: 6/22/2022  PET/CT  INDICATION: Malignant neoplasm of unspecified site of left female breast; Malignant neoplasm of unspecified site of left female breast; Secondary and unspecified malignant neoplasm of axilla and upper limb lymph nodes RADIOPHARMACEUTICAL: 13.42 mCi F18-FDG, intravenously. TECHNIQUE: Imaging was performed from the skull through the proximal thighs using routine PET/CT acquisition protocol. Imaging was performed approximately 60 minutes post injection. Oral contrast was administered. Radiation dose reduction techniques were used for this study:  Our CT scanners use one or all of the following: Automated exposure control, adjustment of the mA and/or kVp according to patient's size, iterative reconstruction. SERUM GLUCOSE: 123 mg/dL prior to injection. COMPARISON: Breast MRI June 16, 2022 FINDINGS: HEAD/NECK: Symmetric uptake within the imaged brain parenchyma. Scattered areas of mild uptake within the occipital and cervical soft tissues without convincing CT correlate with additional areas of uptake, and along the bilateral paraspinals soft tissues. No discrete enlarged/hypermetabolic cervical lymph nodes. CHEST: FDG uptake with a known left breast malignancy with SUV of 9.0. Prominent FDG avid left axillary lymph nodes largest measuring 1.3 x 2.6 cm (SUV max 5.8, image number 66). Scattered areas of FDG uptake without corresponding CT correlate throughout the superior lateral and posterior medial chest wall. No enlarged or hypermetabolic mediastinal or hilar adenopathy. No suspicious pulmonary nodules or focal parenchymal FDG uptake. ABDOMEN/PELVIS: No enlarged or hypermetabolic adenopathy. No focal uptake within the visceral organs. There is a 1.8 cm left hyperdense exophytic renal cyst without associated FDG uptake compatible with a hemorrhagic/proteinaceous cyst with additional punctate upper pole hyperdense cyst and indeterminate photopenic hypodense cyst. Physiologic uptake within the gastrointestinal tract with normal excretion of radiotracer within the renal collecting system and urinary bladder.  MUSCULOSKELETAL: Hypermetabolic lytic lesion involving the T10 vertebral body (SUV max 14.0, image number 103) with sclerotic hypermetabolic lesion centered within the sacrum (SUV max 13.0, image number 189). No abnormal focal uptake within the soft tissues. 1.  Hypermetabolic soft tissue left breast mass with prominent FDG avid left axillary lymph nodes. 2.  Hypermetabolic osseous metastatic disease involving the sacrum and T10 vertebral body. 3.  Patchy areas of mild uptake throughout the cervical and paraspinal soft tissues, as well as, along the superior lateral and posterior medial chest wall favoring brown fat and less likely metastatic disease. US BIOPSY LYMPH NODE    Result Date: 6/14/2022  Ultrasound-guided left breast biopsy, left axillary lymph node biopsy and postbiopsy mammogram: 06/14/2022 HISTORY: Left breast mass and dysmorphic left axillary lymph node. Ultrasound guided left breast biopsy: The patient was explained the procedure informed consent was obtained. Using sonographic guidance and sterile technique, a 14-gauge biopsy device was used to obtain 3 core samples from the hypoechoic mass at the 10:00 position. The samples were placed into a formalin container and sent to the lab for analysis. A biopsy clip was placed upon completion of sampling. The patient tolerated the procedure well. There were no immediate complications. Ultrasound guided left axillary lymph node biopsy: The patient was explained the procedure informed consent was obtained. Using sonographic guidance and sterile technique, a 14-gauge biopsy device was used to obtain 2 core samples from the dysmorphic lymph node within the left axilla. . The samples were placed into a formalin container and sent to the lab for analysis. A biopsy clip was not placed. The patient tolerated the procedure well. There were no immediate complications. Postbiopsy mammogram. CC and ML views of the left breast were obtained. The biopsy clip is confirmed within the mass at the 10:00 position.      1. Status post ultrasound guided biopsy of the hypoechoic mass at the 10:00 position left breast. 2. Status post ultrasound guided biopsy of the dysmorphic lymph node within the left axilla. .     US BREAST BIOPSY W LOC DEVICE 1ST LESION LEFT    Result Date: 6/14/2022  Ultrasound-guided left breast biopsy, left axillary lymph node biopsy and postbiopsy mammogram: 06/14/2022 HISTORY: Left breast mass and dysmorphic left axillary lymph node. Ultrasound guided left breast biopsy: The patient was explained the procedure informed consent was obtained. Using sonographic guidance and sterile technique, a 14-gauge biopsy device was used to obtain 3 core samples from the hypoechoic mass at the 10:00 position. The samples were placed into a formalin container and sent to the lab for analysis. A biopsy clip was placed upon completion of sampling. The patient tolerated the procedure well. There were no immediate complications. Ultrasound guided left axillary lymph node biopsy: The patient was explained the procedure informed consent was obtained. Using sonographic guidance and sterile technique, a 14-gauge biopsy device was used to obtain 2 core samples from the dysmorphic lymph node within the left axilla. . The samples were placed into a formalin container and sent to the lab for analysis. A biopsy clip was not placed. The patient tolerated the procedure well. There were no immediate complications. Postbiopsy mammogram. CC and ML views of the left breast were obtained. The biopsy clip is confirmed within the mass at the 10:00 position. 1. Status post ultrasound guided biopsy of the hypoechoic mass at the 10:00 position left breast. 2. Status post ultrasound guided biopsy of the dysmorphic lymph node within the left axilla. [facility] [employer] LAUREN ZACHARY DIGITAL DIAGNOSTIC UNILATERAL LEFT    Result Date: 6/14/2022  DIAGNOSTIC MAMMOGRAM LEFT BREAST WITH TOMOSYNTHESIS, LEFT BREAST ULTRASOUND HISTORY: Further evaluation of left breast mass and calcifications.  Spot compression  tomosynthesis views in the CC and MLO projections of the left breast was performed in addition to a full 90 degree mediolateral tomosynthesis view. Additional magnification views were also submitted. Review of mammographic images of the left breast prior examinations dated 05/20/2022, 07/20/2014 was performed. FINDING: Confirmed is the spiculated mass at approximately 10:00 position left breast posterior depth. The mass measures approximately 4 cm in size. There are pleomorphic calcifications extending anteriorly from the mass towards the nipple. Including the mass and calcifications, the abnormality measures approximately 7 cm in size. A left breast ultrasound is recommended. Left breast ultrasound: There is a hypoechoic mass with ill-defined angular margins at the 10:00 position left breast 12 cm from nipple. This is estimated to measure approximately 5.0 x 3.2 x 4.5 cm. There is posterior acoustic shadowing. Evaluation of left axilla reveals a dysmorphic lymph node measuring 2.6 cm in maximal dimension. IMPRESSION: 1. Spiculated left breast mass with associated microcalcifications measuring at least 7 cm in total size at the 10:00 position. Tissue sampling is recommended. 2. Dysmorphic left axillary lymph node. Tissue sampling is recommended. BI-RADS Assessment Category 5: Highly suggestive of malignancy- Appropriate action should be taken. Results were discussed with the patient prior to leaving the department. The patient is scheduled for biopsy today. This study was reviewed with the aid of the Plyce  device. Pathology:  DIAGNOSIS        A:  \"LEFT BREAST, 10:00 POSITION, 12 CM FROM NIPPLE, CORE BIOPSY\":        INFILTRATING DUCTAL CARCINOMA WITH LOBULAR FEATURES, LOW GRADE (WELL   DIFFERENTIATED). DEFINITE IN SITU COMPONENT AND LYMPHOVASCULAR INVASION   ARE NOT IDENTIFIED          B:  \"LEFT AXILLA, CORE BIOPSY\":        MACRO METASTATIC CARCINOMA SIMILAR TO A INVOLVING LYMPH NODE.  WHILE   DEFINITE EXTRACAPSULAR EXTENSION IS NOT IDENTIFIED IN THIS MATERIAL, IT   COULD BE PRESENT IN UNSAMPLED LYMPH NODE         Procedures/Addenda                     STF- ER/IL/[id number] BY IHC                                                                                                                                         Status:  Signed Out   Shell Gómez MD on 2022                                 Interpretation                       [facility] IHC Quantitative Breast Panel     TEST NAME:                         RESULTS:               INTERNAL   CONTROLS:     ESTROGEN RECEPTOR:               Positive (93%)               Absent   PROGESTERONE RECEPTOR:          Negative (0.0%)          Absent   HER-2/ARYA:                         Negative (0)               Percentage   of Cells with Uniform        Intense Complete Membrane   Stainin%       DIAGNOSIS        \"BONE LESION\":  METASTATIC CARCINOMA, CONSISTENT WITH BREAST ORIGIN. Procedures/Addenda                     STF-IMMUNOHISTOCHEMISTRY                                                                                                                                         Status:  Signed Out    Charbel Root MD on 2022                                 Interpretation                       Immunohistochemical Stain Panel:          Interpretation:  Immunohistochemical findings consistent with   metastatic carcinoma of breast origin. Antibody/Test               Marker For                              Result   Cytokeratin 7               Lung, breast, upper GE, serous ovary                 Positive   Cytokeratin 20               Colon, mucinous ovary, urothelium                  Negative   MINO 3               Urothelial, breast carcinoma                         Positive   GCDFP-15               Breast, salivary gland, skin, adnexa                 Positive           ASSESSMENT:   Diagnosis Orders   1. Malignant neoplasm of left breast in female, estrogen receptor positive, unspecified site of breast ([address]     2.  Metastasis to bone [facility])       Patient Active Problem List   Diagnosis    Hypertension    Kidney stone    Cyst, kidney, acquired           PLAN:  Lab studies were personally reviewed. Pertinent old records were reviewed. Breast cancer: left breast, low grade IDC with lobular features, 3.7 cm with biopsy proven axillary lymphadenopathy, two osseous lesions on PET (T10 and sacrum) worrisome for metastatic disease, T10 now biopsy proven metastatic carcinoma consistent with breast origin. ER positive, [place] negative, HER2 0. I discussed the results with Ms. Alcocer. Unfortunately, she will not be a candidate for curative therapy and should start palliative systemic therapy, I would recommend letrozole and ribociclib. Side effects discussed including risk of QTc prolongation and neutropenia, as well as endocrine therapy effects. We will arrange EKG monitoring as per the ribociclib guidelines. She is postmenopausal so she will not need ovarian function suppression. She will also require denosumab or zoledronic acid depending on insurance approval.  We will attempt to perform NGS on the metastatic specimen to screen for biomarker expression including PI3KCA, BRCA, PDL-1 and others. All questions were asked and answered to the best of my ability. In all, I spent 30 minutes in the care of Ms. Alcocer today, over 50% of which was in direct counseling and coordination of care.               Haydee Wyatt MD, MD  [address] [place] [address] Hematology and Oncology  [address] [place] [address]  Office : [phone]  Fax : [phone]

## 2022-07-12 RX ORDER — RIBOCICLIB 200 MG/1
600 TABLET, FILM COATED ORAL DAILY
Qty: 63 EACH | Refills: 5 | Status: SHIPPED | OUTPATIENT
Start: 2022-07-12 | End: 2022-07-22 | Stop reason: RX

## 2022-07-12 NOTE — PROGRESS NOTES
Per [facility], , the Carolyne Oliva prescription is to go to the following speciality pharmacy:    [address], [facility]   Temitope Queen  Pharmacist, [id number] [facility], [facility] [address] [place], [place] [id number]   [phone]

## 2022-07-13 ENCOUNTER — TELEPHONE (OUTPATIENT)
Dept: FAMILY MEDICINE CLINIC | Facility: CLINIC | Age: 52
End: 2022-07-13

## 2022-07-13 DIAGNOSIS — F41.1 GAD (GENERALIZED ANXIETY DISORDER): ICD-10-CM

## 2022-07-13 RX ORDER — CLONAZEPAM 0.5 MG/1
0.5 TABLET ORAL 3 TIMES DAILY PRN
Qty: 30 TABLET | Refills: 2 | Status: SHIPPED | OUTPATIENT
Start: 2022-07-13 | End: 2022-08-23 | Stop reason: SINTOL

## 2022-07-13 NOTE — TELEPHONE ENCOUNTER
Prescription(s) refilled  It (they) has been escribed to the pharmacy. Requested Prescriptions     Signed Prescriptions Disp Refills    clonazePAM (KLONOPIN) 0.5 MG tablet 30 tablet 2     Sig: Take 1 tablet by mouth 3 times daily as needed for Anxiety for up to 30 doses. Authorizing Provider: ANT MELENDEZ     No orders of the defined types were placed in this encounter. ICD-10-CM    1.  ANNIE (generalized anxiety disorder)  F41.1 clonazePAM (KLONOPIN) 0.5 MG tablet

## 2022-07-13 NOTE — TELEPHONE ENCOUNTER
----- Message from Edna Hardwick RN sent at 7/13/2022  3:09 PM EDT -----  Regarding: refill for patient on Klonopin  Hi Dr. Fabrice Soto,  I spoke to this patient today and she is needing a refill on her Klonopin. Would you be able to send in some refills for her to the pharmacy on file? She does have metastatic breast cancer with a proven T10 metastatic lesion unfortunately. She is following with Dr. Angie Mercedes for her treatment and will be starting letrozole and ribociclib. She is doing fairly well but does still have some anxiety and states the Klonopin does help her at night get sleep. Thanks so much!   Paloma Kim RN,BSN  Breast Navigator  Cell [phone]  [email or web address].

## 2022-07-14 ENCOUNTER — TELEPHONE (OUTPATIENT)
Dept: ONCOLOGY | Age: 52
End: 2022-07-14

## 2022-07-14 NOTE — TELEPHONE ENCOUNTER
[facility] stating: Gamaliel Martinez 1970 Raffaele Oliveira from Dr. Gera Johnson- sent this in yesterday, but it has to go through [facility],\" \"I'll go ahead and fax it over to them, but it doesn't hurt to re-send on your end either if you don't hear from them I suppose, but i'll pay attn to PA first!.\"

## 2022-07-19 ENCOUNTER — NURSE ONLY (OUTPATIENT)
Dept: CARDIOLOGY CLINIC | Age: 52
End: 2022-07-19
Payer: COMMERCIAL

## 2022-07-19 VITALS — HEART RATE: 67 BPM | DIASTOLIC BLOOD PRESSURE: 88 MMHG | SYSTOLIC BLOOD PRESSURE: 136 MMHG

## 2022-07-19 DIAGNOSIS — Z17.0 MALIGNANT NEOPLASM OF LEFT BREAST IN FEMALE, ESTROGEN RECEPTOR POSITIVE (HCC): Primary | ICD-10-CM

## 2022-07-19 DIAGNOSIS — C50.912 MALIGNANT NEOPLASM OF LEFT BREAST IN FEMALE, ESTROGEN RECEPTOR POSITIVE (HCC): Primary | ICD-10-CM

## 2022-07-19 PROCEDURE — 93000 ELECTROCARDIOGRAM COMPLETE: CPT | Performed by: INTERNAL MEDICINE

## 2022-07-20 NOTE — PROGRESS NOTES
Urgent appeal for Adeel Galo submitted to [facility] of [address] and confirmation received.

## 2022-07-21 NOTE — PROGRESS NOTES
Urgent appeal for Walt Mathews has been denied. Letter of denial scanned into media and nurse/ notified.

## 2022-07-22 DIAGNOSIS — Z17.0 MALIGNANT NEOPLASM OF LEFT BREAST IN FEMALE, ESTROGEN RECEPTOR POSITIVE, UNSPECIFIED SITE OF BREAST (HCC): Primary | ICD-10-CM

## 2022-07-22 DIAGNOSIS — Z17.0 MALIGNANT NEOPLASM OF LEFT BREAST IN FEMALE, ESTROGEN RECEPTOR POSITIVE, UNSPECIFIED SITE OF BREAST (HCC): ICD-10-CM

## 2022-07-22 DIAGNOSIS — C50.912 MALIGNANT NEOPLASM OF LEFT BREAST IN FEMALE, ESTROGEN RECEPTOR POSITIVE, UNSPECIFIED SITE OF BREAST (HCC): ICD-10-CM

## 2022-07-22 DIAGNOSIS — C50.912 MALIGNANT NEOPLASM OF LEFT BREAST IN FEMALE, ESTROGEN RECEPTOR POSITIVE, UNSPECIFIED SITE OF BREAST (HCC): Primary | ICD-10-CM

## 2022-07-22 RX ORDER — PALBOCICLIB 125 MG/1
125 TABLET, FILM COATED ORAL DAILY
Qty: 21 TABLET | Refills: 5 | Status: SHIPPED | OUTPATIENT
Start: 2022-07-22 | End: 2022-07-22 | Stop reason: SDUPTHER

## 2022-07-22 RX ORDER — PALBOCICLIB 125 MG/1
125 TABLET, FILM COATED ORAL DAILY
Qty: 21 TABLET | Refills: 5 | Status: SHIPPED | OUTPATIENT
Start: 2022-07-22 | End: 2022-08-12

## 2022-07-22 NOTE — PROGRESS NOTES
Per Jennifer Erwine needs to be sent to a different pharmacy listed below:    [address] [facility] (Danita Forman)   [address] [facility] [address]   Contact: Tulio Alva and Coleman Manning            Prescription sent and message to [facility] to process.

## 2022-07-26 ENCOUNTER — OFFICE VISIT (OUTPATIENT)
Dept: ONCOLOGY | Age: 52
End: 2022-07-26
Payer: COMMERCIAL

## 2022-07-26 ENCOUNTER — CLINICAL DOCUMENTATION (OUTPATIENT)
Dept: ONCOLOGY | Age: 52
End: 2022-07-26

## 2022-07-26 VITALS
HEIGHT: 64 IN | RESPIRATION RATE: 21 BRPM | DIASTOLIC BLOOD PRESSURE: 83 MMHG | BODY MASS INDEX: 29.37 KG/M2 | WEIGHT: 172 LBS | HEART RATE: 78 BPM | SYSTOLIC BLOOD PRESSURE: 130 MMHG | TEMPERATURE: 97.8 F | OXYGEN SATURATION: 99 %

## 2022-07-26 DIAGNOSIS — C79.51 METASTASIS TO BONE (HCC): ICD-10-CM

## 2022-07-26 DIAGNOSIS — C50.912 MALIGNANT NEOPLASM OF LEFT BREAST IN FEMALE, ESTROGEN RECEPTOR POSITIVE, UNSPECIFIED SITE OF BREAST (HCC): Primary | ICD-10-CM

## 2022-07-26 DIAGNOSIS — Z17.0 MALIGNANT NEOPLASM OF LEFT BREAST IN FEMALE, ESTROGEN RECEPTOR POSITIVE, UNSPECIFIED SITE OF BREAST (HCC): Primary | ICD-10-CM

## 2022-07-26 DIAGNOSIS — Z71.9 ENCOUNTER FOR EDUCATION: ICD-10-CM

## 2022-07-26 PROCEDURE — 99215 OFFICE O/P EST HI 40 MIN: CPT | Performed by: NURSE PRACTITIONER

## 2022-07-26 RX ORDER — ONDANSETRON HYDROCHLORIDE 8 MG/1
8 TABLET, FILM COATED ORAL EVERY 8 HOURS PRN
Qty: 90 TABLET | Refills: 2 | Status: SHIPPED | OUTPATIENT
Start: 2022-07-26

## 2022-07-26 ASSESSMENT — PATIENT HEALTH QUESTIONNAIRE - PHQ9
SUM OF ALL RESPONSES TO PHQ QUESTIONS 1-9: 0
1. LITTLE INTEREST OR PLEASURE IN DOING THINGS: 0
SUM OF ALL RESPONSES TO PHQ QUESTIONS 1-9: 0

## 2022-07-26 NOTE — PROGRESS NOTES
I spoke with Chris Ricoing via the telephone regarding her current [employer], potential oral medication authorizations, enrollment in the [employer] and the [employer], and assistance organization resource sheet. Next, I spoke with Chris Fearing regarding her insurance questions. I answered questions about medical treatments and services. Next, I spoke with Chris Fearing about deductibles, copayments, and out of pocket maximums regarding her current [employer]. Next, I spoke with Chris Fearing regarding potential oral medication authorizations. I told her that if he ever had any problems getting her oral medications filled, to give the dedicated [facility]  a call. Most of the time, it is simply an authorization that needs to be done with the insurance company. I let her know April Joaquin is her direct contact. Lastly, I spoke with Chris Washington regarding a form with various resource organizations that could assist with specific needs (example:  transportation, lodging, preparing meals, home cleaning). I will mail patient a folder with the information and phone numbers we discussed today. Chris Washington expressed understanding of the information above and all questions were answered to her satisfaction.

## 2022-07-26 NOTE — PROGRESS NOTES
Montez Franco is a 46 y.o. with metastatic breast cancer who presents for chemotherapy education for the following medications:  Ibrance  Schedule and frequency of chemotherapy was discussed with patient. The following instructions regarding the handling and administration of oral chemotherapy were discussed. ...  - Wash hands before and after chemotherapy administration  - Store away from other medications and out of the reach of children or pets  - Never crush break or chew the medication  - Never \"double up on doses\" if a dose is missed  - It is okay to take a forgotten dose if it is within 6 hours of missed dose. - Return discontinued or unused medication to the clinic for proper disposal   - Report medications including herbal supplements to all providers      Education was then given to the patient regarding the mechanism by which chemotherapy exerts its effects. It was explained that because chemotherapy affects all rapidly dividing cells, it not only affects the intended cancers cells, but can also effect cells in the GI tract, hair, mucous membranes, nails, and bone marrow. It was then communicated to the patient he/she may experience some of the following side effects. .... Time was then allowed to accept patient questions. The patient was instructed to call the office at [phone] for the following symptoms. .. FEVER >100.4 or shaking chills    Nausea (interferes with ability to eat and unrelieved with prescribed medication)  Vomiting (vomiting more than 4-5 times in a 24 hour period)   Diarrhea (4-6 episodes in a 24-hour period) despite anti-diarrhea medication and diet alterations.    Unusual bleeding or bruising   Black or tarry stools, or blood in your stools or urine   Extreme fatigue (unable to carry on self-care activities)   Mouth sores (painful redness, swelling or ulcers)  Tingling, burning, redness on the palms of hands or soles of feet  Unable to eat or drink for 24 hours or have signs of dehydration: tiredness, thirst, dry mouth, dark and decreased amount of urine, or dizziness. Severe numbness, pain in your joints or muscles (arthralgias or myalgias). Signs of infection such as redness or swelling, pain on swallowing, coughing up mucous, or painful urination        Patient denies any needs, but does desire counseling. Patient was assessed for any issues that would make adherence to the planned therapy a concern (swallowing difficulties, dexterity issues that would affect medication ingestion if without caregiver, etc. )  Patient denies any barriers to learn. Preference in learning style assessed as visual, written. Patient acknowledges readiness to learn by responding appropriately to open ended questions about chemo education, disease process, treatment plan and possible side effects etc.  Patient and/or family/caregiver feedback on learning and education. Patient acknowledges the importance of and agrees to adhering to treatment plan regimen. Patient printed education materials were given to patient. Vitals:    Vitals:    07/26/22 1057   BP: 130/83   Pulse: 78   Resp: 21   Temp: 97.8 °F (36.6 °C)   SpO2: 99%       Total time spent counseling patients was 40, 100% in direct counseling.       Diana Jansen) 1975 [address] [place] [address]  [facility]  [address] [place] [address]  Office : [phone]  Fax : [phone]

## 2022-08-09 ENCOUNTER — PATIENT MESSAGE (OUTPATIENT)
Dept: FAMILY MEDICINE CLINIC | Facility: CLINIC | Age: 52
End: 2022-08-09

## 2022-08-09 ENCOUNTER — TELEPHONE (OUTPATIENT)
Dept: CASE MANAGEMENT | Age: 52
End: 2022-08-09

## 2022-08-09 ENCOUNTER — OFFICE VISIT (OUTPATIENT)
Dept: ONCOLOGY | Age: 52
End: 2022-08-09
Payer: COMMERCIAL

## 2022-08-09 ENCOUNTER — HOSPITAL ENCOUNTER (OUTPATIENT)
Dept: LAB | Age: 52
Discharge: HOME | End: 2022-08-12
Payer: COMMERCIAL

## 2022-08-09 DIAGNOSIS — Z17.0 MALIGNANT NEOPLASM OF LEFT BREAST IN FEMALE, ESTROGEN RECEPTOR POSITIVE, UNSPECIFIED SITE OF BREAST (HCC): ICD-10-CM

## 2022-08-09 DIAGNOSIS — C80.1 ANXIETY ASSOCIATED WITH CANCER DIAGNOSIS (HCC): Primary | ICD-10-CM

## 2022-08-09 DIAGNOSIS — C50.912 MALIGNANT NEOPLASM OF LEFT BREAST IN FEMALE, ESTROGEN RECEPTOR POSITIVE, UNSPECIFIED SITE OF BREAST (HCC): ICD-10-CM

## 2022-08-09 DIAGNOSIS — C79.51 METASTASIS TO BONE (HCC): ICD-10-CM

## 2022-08-09 DIAGNOSIS — C50.912 MALIGNANT NEOPLASM OF LEFT BREAST IN FEMALE, ESTROGEN RECEPTOR POSITIVE, UNSPECIFIED SITE OF BREAST (HCC): Primary | ICD-10-CM

## 2022-08-09 DIAGNOSIS — Z17.0 MALIGNANT NEOPLASM OF LEFT BREAST IN FEMALE, ESTROGEN RECEPTOR POSITIVE, UNSPECIFIED SITE OF BREAST (HCC): Primary | ICD-10-CM

## 2022-08-09 DIAGNOSIS — F41.1 ANXIETY ASSOCIATED WITH CANCER DIAGNOSIS (HCC): Primary | ICD-10-CM

## 2022-08-09 LAB
ALBUMIN SERPL-MCNC: 4.1 G/DL (ref 3.5–5)
ALBUMIN/GLOB SERPL: 1.1 {RATIO} (ref 1.2–3.5)
ALP SERPL-CCNC: 84 U/L (ref 50–136)
ALT SERPL-CCNC: 37 U/L (ref 12–65)
ANION GAP SERPL CALC-SCNC: 8 MMOL/L (ref 7–16)
AST SERPL-CCNC: 23 U/L (ref 15–37)
BASOPHILS # BLD: 0 K/UL (ref 0–0.2)
BASOPHILS NFR BLD: 1 % (ref 0–2)
BILIRUB SERPL-MCNC: 0.4 MG/DL (ref 0.2–1.1)
BUN SERPL-MCNC: 17 MG/DL (ref 6–23)
CALCIUM SERPL-MCNC: 9.3 MG/DL (ref 8.3–10.4)
CHLORIDE SERPL-SCNC: 105 MMOL/L (ref 98–107)
CO2 SERPL-SCNC: 28 MMOL/L (ref 21–32)
CREAT SERPL-MCNC: 1 MG/DL (ref 0.6–1)
DIFFERENTIAL METHOD BLD: ABNORMAL
EOSINOPHIL # BLD: 0 K/UL (ref 0–0.8)
EOSINOPHIL NFR BLD: 1 % (ref 0.5–7.8)
ERYTHROCYTE [DISTWIDTH] IN BLOOD BY AUTOMATED COUNT: 13 % (ref 11.9–14.6)
GLOBULIN SER CALC-MCNC: 3.9 G/DL (ref 2.3–3.5)
GLUCOSE SERPL-MCNC: 91 MG/DL (ref 65–100)
HCT VFR BLD AUTO: 37.8 % (ref 35.8–46.3)
HGB BLD-MCNC: 12.8 G/DL (ref 11.7–15.4)
IMM GRANULOCYTES # BLD AUTO: 0 K/UL (ref 0–0.5)
IMM GRANULOCYTES NFR BLD AUTO: 0 % (ref 0–5)
LYMPHOCYTES # BLD: 1.7 K/UL (ref 0.5–4.6)
LYMPHOCYTES NFR BLD: 54 % (ref 13–44)
MAGNESIUM SERPL-MCNC: 2.2 MG/DL (ref 1.8–2.4)
MCH RBC QN AUTO: 30.8 PG (ref 26.1–32.9)
MCHC RBC AUTO-ENTMCNC: 33.9 G/DL (ref 31.4–35)
MCV RBC AUTO: 90.9 FL (ref 79.6–97.8)
MONOCYTES # BLD: 0.1 K/UL (ref 0.1–1.3)
MONOCYTES NFR BLD: 5 % (ref 4–12)
NEUTS SEG # BLD: 1.1 K/UL (ref 1.7–8.2)
NEUTS SEG NFR BLD: 39 % (ref 43–78)
NRBC # BLD: 0 K/UL (ref 0–0.2)
PLATELET # BLD AUTO: 245 K/UL (ref 150–450)
PLATELET COMMENT: ADEQUATE
PMV BLD AUTO: 9.6 FL (ref 9.4–12.3)
POTASSIUM SERPL-SCNC: 2.9 MMOL/L (ref 3.5–5.1)
PROT SERPL-MCNC: 8 G/DL (ref 6.3–8.2)
RBC # BLD AUTO: 4.16 M/UL (ref 4.05–5.2)
RBC MORPH BLD: ABNORMAL
RBC MORPH BLD: ABNORMAL
SODIUM SERPL-SCNC: 141 MMOL/L (ref 136–145)
WBC # BLD AUTO: 2.9 K/UL (ref 4.3–11.1)
WBC MORPH BLD: ABNORMAL

## 2022-08-09 PROCEDURE — 90837 PSYTX W PT 60 MINUTES: CPT | Performed by: SOCIAL WORKER

## 2022-08-09 PROCEDURE — 85025 COMPLETE CBC W/AUTO DIFF WBC: CPT

## 2022-08-09 PROCEDURE — 36415 COLL VENOUS BLD VENIPUNCTURE: CPT

## 2022-08-09 PROCEDURE — 83735 ASSAY OF MAGNESIUM: CPT

## 2022-08-09 PROCEDURE — 80053 COMPREHEN METABOLIC PANEL: CPT

## 2022-08-09 RX ORDER — POTASSIUM CHLORIDE 20 MEQ/1
20 TABLET, EXTENDED RELEASE ORAL DAILY
Qty: 30 TABLET | Refills: 5 | Status: SHIPPED | OUTPATIENT
Start: 2022-08-09 | End: 2022-10-07 | Stop reason: SDUPTHER

## 2022-08-09 NOTE — PROGRESS NOTES
Behavioral Health - Progress Note        Name: Tameka Lowry  : 1970  MRN: [id number]  Date of Service: 2022  Location of Service: [facility] [place] for both provider and patient        Type of Service: Individual Therapy       Reason for Visit: Follow Up     Chief Complaint: Anxiety      Subjective:  Undersigned discussed distress ans coping skills. Patient verbalized understanding. LISW-CP used Dialectical Behavioral and Cognitive Behavioral Therapy, tailored to patient's need. Patient denied any plan or intent to hurt self or others. Patient was instructed, in case of emergency, to call 65 or Crisisline [phone] in [place], [place]. Patient verbalized agreement. CBT Material discussed and reviewed in Session:    2022   Parasympathetic and Sympathetic System  Window of Tolerance  Neuroplasticity, Mindfulness, and Neurotransmitters   The Effects of Stress In The Brain and Body  Daily Mindfulness  Daily Mindfulness # 4  Daily Mindfulness # 7  Assignment  Sleep Hygiene   Half-Smile, Willing Hands, and Yazidism Imagery  Body Scan  Progressive Muscle Relaxation    NEXT  Benefits of Mindfulness  Mindfulness Exercises  Anxiety, stress, worry and depression symptoms   Gut-Brain Axis  How to Naturally Activate the Vagus Nerve  Benefits of Diaphragmatic Breathing  Assignment  Diaphragmatic Breathing Exercises - [id number]    _________________________________________________    Clinical Impression: Tameka Lowry is a 46 y.o. female . Mental Status Exam, patient was dressed properly. No abnormal psychomotor movements observed. Intellectual functioning appeared to be intact. Mood and affect were congruent. Insight was adequate. Judgment was adequate. Speech was normal, clear. Thought process was coherent. Patient did not report suicidal or homicidal ideations, intent or plans. Patient denied self-injury behaviors. Patient denied alcohol and other substance abuse.  Patient was oriented to the four spheres: self, place, time and situation. Patient response to intervention was appropriate. Patient progress was adequate. Protective factor: self-determination. __________________________________________________    Session scheduled for: 10:00  pm/am, patient was on time     Session started:  10:00    Session ended:     11:00  _________________________________________________    Patient Diagnosis:         PHQ 9:  9   - to be scanned into EMR.  0-4 Suggests the patient may not need depression treatment  5-14 Mild major depressive disorder. 15-19 Moderate-major depressive disorder. 20+ Severe major depressive disorders.      ANNIE 7:  10  - to be scanned into EMR.  0-4: minimal anxiety   5-9: mild anxiety   10-14: moderate anxiety   15-21: severe anxiety   __________________________________________________    Frequency: Weekly or bi-weekly appointments as schedule permits    Patient's Primary Goal: Decrease anxiety   __________________________________________________    Plans:  Continue to use Cognitive Behavioral Approach  Continue to work with patient on primary goal.    Continue to see patient, weekly or bi-weekly  __________________________________________________    [facility]

## 2022-08-09 NOTE — TELEPHONE ENCOUNTER
8/9/2022 Reviewed the patient's labs with Tony Haddad NP. Her WBC is low but this is expected with day 15 of Ibrance. Her potassium is 2.9 and we will add oral potassium 20mEq bid for 7 days and then go to daily. I have sent the patient this notification in my chart.

## 2022-08-09 NOTE — TELEPHONE ENCOUNTER
From: Mary Saldana  To: Dr. Muller Givens: 8/9/2022 4:52 PM EDT  Subject: Blood pressure meds and potassium levels     Hi Dr. Sundar Manjarrez,  Below is a conversation I had with the nurse navigator from the cancer center she thought you might could advise me on whether the potassium pills will be okay to take with my blood pressure meds. Thanks, Yoav Mccloud    FROM NURSE:  Gilford Saba,   Your labs resulted and I discussed them with the nurse practitioner. You do have a low potassium and we will send potassium pills into your pharmacy. We would like you to take it twice a day for a week and then go to just once a day)     MY RESPONSE:  (I know that chlorothalidone lowers your potassium levels (which I took one this morning before lab work), and that [facility] increases potassium levels (Benicar (olmesartan) can raise the amount of potassium in your body, which can lead to heart problems. . quote from web search) So because I take my olmsarten at night, will taking potassium pills be too much potassium or is there a time of day I should take them to even out the potassium levels like should I take it in the mornings?)     FROM NURSE:  Gilford Saba,   I am unsure of the timing. Your potassium was pretty low and this can be dangerous. It would not be from a morning dose of medication. You may want to have the physician that prescribes those two medications weigh in on the timing of the medications or if anything needs to change with those medications.    Ann Huntley)

## 2022-08-09 NOTE — PATIENT INSTRUCTIONS
To reschedule your appointment, please call [phone].    In case of emergency, please, call [phone] or Crisisline [phone] in [place], [place].

## 2022-08-23 ENCOUNTER — HOSPITAL ENCOUNTER (OUTPATIENT)
Dept: LAB | Age: 52
Discharge: HOME | End: 2022-08-26
Payer: COMMERCIAL

## 2022-08-23 ENCOUNTER — OFFICE VISIT (OUTPATIENT)
Dept: ONCOLOGY | Age: 52
End: 2022-08-23
Payer: COMMERCIAL

## 2022-08-23 VITALS
DIASTOLIC BLOOD PRESSURE: 84 MMHG | TEMPERATURE: 98.1 F | BODY MASS INDEX: 30.1 KG/M2 | RESPIRATION RATE: 16 BRPM | WEIGHT: 176.3 LBS | SYSTOLIC BLOOD PRESSURE: 124 MMHG | OXYGEN SATURATION: 99 % | HEART RATE: 79 BPM | HEIGHT: 64 IN

## 2022-08-23 DIAGNOSIS — K13.79 MOUTH SORES: Primary | ICD-10-CM

## 2022-08-23 DIAGNOSIS — C50.912 MALIGNANT NEOPLASM OF LEFT BREAST IN FEMALE, ESTROGEN RECEPTOR POSITIVE, UNSPECIFIED SITE OF BREAST (HCC): ICD-10-CM

## 2022-08-23 DIAGNOSIS — Z17.0 MALIGNANT NEOPLASM OF LEFT BREAST IN FEMALE, ESTROGEN RECEPTOR POSITIVE, UNSPECIFIED SITE OF BREAST (HCC): ICD-10-CM

## 2022-08-23 LAB
ALBUMIN SERPL-MCNC: 4 G/DL (ref 3.5–5)
ALBUMIN/GLOB SERPL: 1.1 {RATIO} (ref 1.2–3.5)
ALP SERPL-CCNC: 86 U/L (ref 50–136)
ALT SERPL-CCNC: 34 U/L (ref 12–65)
ANION GAP SERPL CALC-SCNC: 5 MMOL/L (ref 7–16)
AST SERPL-CCNC: 19 U/L (ref 15–37)
BASOPHILS # BLD: 0 K/UL (ref 0–0.2)
BASOPHILS NFR BLD: 1 % (ref 0–2)
BILIRUB SERPL-MCNC: 0.2 MG/DL (ref 0.2–1.1)
BUN SERPL-MCNC: 24 MG/DL (ref 6–23)
CALCIUM SERPL-MCNC: 9 MG/DL (ref 8.3–10.4)
CANCER AG15-3 SERPL-ACNC: 39.6 U/ML (ref 1–35)
CHLORIDE SERPL-SCNC: 107 MMOL/L (ref 98–107)
CO2 SERPL-SCNC: 29 MMOL/L (ref 21–32)
CREAT SERPL-MCNC: 0.8 MG/DL (ref 0.6–1)
DIFFERENTIAL METHOD BLD: ABNORMAL
EOSINOPHIL # BLD: 0 K/UL (ref 0–0.8)
EOSINOPHIL NFR BLD: 1 % (ref 0.5–7.8)
ERYTHROCYTE [DISTWIDTH] IN BLOOD BY AUTOMATED COUNT: 14.6 % (ref 11.9–14.6)
GLOBULIN SER CALC-MCNC: 3.8 G/DL (ref 2.3–3.5)
GLUCOSE SERPL-MCNC: 109 MG/DL (ref 65–100)
HCT VFR BLD AUTO: 37.1 % (ref 35.8–46.3)
HGB BLD-MCNC: 12.8 G/DL (ref 11.7–15.4)
IMM GRANULOCYTES # BLD AUTO: 0 K/UL (ref 0–0.5)
IMM GRANULOCYTES NFR BLD AUTO: 0 % (ref 0–5)
LYMPHOCYTES # BLD: 2.2 K/UL (ref 0.5–4.6)
LYMPHOCYTES NFR BLD: 56 % (ref 13–44)
MAGNESIUM SERPL-MCNC: 2.1 MG/DL (ref 1.8–2.4)
MCH RBC QN AUTO: 31.7 PG (ref 26.1–32.9)
MCHC RBC AUTO-ENTMCNC: 34.5 G/DL (ref 31.4–35)
MCV RBC AUTO: 91.8 FL (ref 79.6–97.8)
MONOCYTES # BLD: 0.4 K/UL (ref 0.1–1.3)
MONOCYTES NFR BLD: 12 % (ref 4–12)
NEUTS SEG # BLD: 1.1 K/UL (ref 1.7–8.2)
NEUTS SEG NFR BLD: 30 % (ref 43–78)
NRBC # BLD: 0 K/UL (ref 0–0.2)
PLATELET # BLD AUTO: 236 K/UL (ref 150–450)
PLATELET COMMENT: ADEQUATE
PMV BLD AUTO: 9.6 FL (ref 9.4–12.3)
POTASSIUM SERPL-SCNC: 3.6 MMOL/L (ref 3.5–5.1)
PROT SERPL-MCNC: 7.8 G/DL (ref 6.3–8.2)
RBC # BLD AUTO: 4.04 M/UL (ref 4.05–5.2)
RBC MORPH BLD: ABNORMAL
SODIUM SERPL-SCNC: 141 MMOL/L (ref 136–145)
WBC # BLD AUTO: 3.7 K/UL (ref 4.3–11.1)
WBC MORPH BLD: ABNORMAL

## 2022-08-23 PROCEDURE — 36415 COLL VENOUS BLD VENIPUNCTURE: CPT

## 2022-08-23 PROCEDURE — 85025 COMPLETE CBC W/AUTO DIFF WBC: CPT

## 2022-08-23 PROCEDURE — 80053 COMPREHEN METABOLIC PANEL: CPT

## 2022-08-23 PROCEDURE — 99214 OFFICE O/P EST MOD 30 MIN: CPT | Performed by: INTERNAL MEDICINE

## 2022-08-23 PROCEDURE — 86300 IMMUNOASSAY TUMOR CA 15-3: CPT

## 2022-08-23 PROCEDURE — 83735 ASSAY OF MAGNESIUM: CPT

## 2022-08-23 RX ORDER — PALBOCICLIB 125 MG/1
TABLET, FILM COATED ORAL
COMMUNITY
Start: 2022-08-12

## 2022-08-23 ASSESSMENT — PATIENT HEALTH QUESTIONNAIRE - PHQ9
SUM OF ALL RESPONSES TO PHQ QUESTIONS 1-9: 0
SUM OF ALL RESPONSES TO PHQ QUESTIONS 1-9: 0
1. LITTLE INTEREST OR PLEASURE IN DOING THINGS: 0
2. FEELING DOWN, DEPRESSED OR HOPELESS: 0
SUM OF ALL RESPONSES TO PHQ QUESTIONS 1-9: 0
SUM OF ALL RESPONSES TO PHQ QUESTIONS 1-9: 0
SUM OF ALL RESPONSES TO PHQ9 QUESTIONS 1 & 2: 0

## 2022-08-23 NOTE — PROGRESS NOTES
[facility] Hematology and Oncology: Office Visit Established Patient    Chief Complaint:    Chief Complaint   Patient presents with    Follow-up         History of Present Illness:  Ms. Ximena High is a 46 y.o. female who presents today for evaluation regarding breast cancer. On 6/2/22, Ms. Alcocer presented for her routine screening mammogram. The imaging reported a left breast mass with associated calcifications, worrisome for malignancy. On 6/14/22 she underwent a left breast diagnostic mammogram and ultrasound with biopsy of the breast mass and a dysmorphic left axillary lymph node. The left breast and axillary node pathology showed infiltrating ductal carcinoma with lobular features, low grade (well differentiated), ER 93%, WA 0%, and HER-2 0. On 6/16/22 she had a bilateral breast MRI that reported the known left breast cancer measuring up to 3.7 cm, with associated left axillary lymphadenopathy however no suspicious findings in the right breast. On 6/22/22 she had a PET/CT scan that showed the breast and axillary node, but also showed hypermetabolic osseous metastatic disease in the sacrum and T10 vertebral body. We recommended biopsy of a suspicious osseous lesion which confirmed mBC. Unfortunately, she will not be a candidate for curative therapy and should start palliative systemic therapy, I would recommend letrozole and ribociclib. She is postmenopausal so she will not need ovarian function suppression. She will also require denosumab or zoledronic acid depending on insurance approval.  We will attempt to perform NGS on the metastatic specimen to screen for biomarker expression including PI3KCA, BRCA, PDL-1 and others. Here for follow-up. Her insurance would not pay for ribociclib so we started palbociclib instead. She is tolerating therapy well so far. She notes some mouth sores for which she is using baking soda rinses, not affecting appetite or PO intake.   In fact, she notes some weight gain since starting treatment. No other GI symptoms. She still has some vague pain in the lower back/sacral region, but does not require any pain medication. Energy level is good, ECOG 0. No fevers or infectious symptoms. Review of Systems:  Constitutional: Negative. HENT: Negative. Eyes: Negative. Respiratory: Negative. Cardiovascular: Negative. Gastrointestinal: Negative. Genitourinary: Negative. Musculoskeletal: Negative. Skin: Negative. Neurological: Negative. Endo/Heme/Allergies: Negative. Psychiatric/Behavioral: Negative. All other systems reviewed and are negative. No Known Allergies  Past Medical History:   Diagnosis Date    Cyst, kidney, acquired     found on previous scan    Hypertension     Kidney stone      Past Surgical History:   Procedure Laterality Date    APPENDECTOMY      CT BIOPSY PERCUTANEOUS SUPERFICIAL BONE  7/1/2022    CT BIOPSY PERCUTANEOUS SUPERFICIAL BONE 7/1/2022 [facility] RADIOLOGY CT SCAN    US BREAST NEEDLE BIOPSY LEFT Left 6/14/2022    US BREAST NEEDLE BIOPSY LEFT 6/14/2022 Giana Wiley MD [facility] RADIOLOGY MAMMO    US LYMPH NODE BIOPSY  6/14/2022    US LYMPH NODE BIOPSY 6/14/2022 Giana Wiley MD [facility] RADIOLOGY MAMMO    WISDOM TOOTH EXTRACTION       Family History   Problem Relation Age of Onset    Hypertension Mother     Alcohol Abuse Brother     Alcohol Abuse Father     Dementia Father     Hypertension Brother     Diabetes Father      Social History     Socioeconomic History    Marital status:      Spouse name: Not on file    Number of children: Not on file    Years of education: Not on file    Highest education level: Not on file   Occupational History    Not on file   Tobacco Use    Smoking status: Never    Smokeless tobacco: Never   Vaping Use    Vaping Use: Never used   Substance and Sexual Activity    Alcohol use:  Yes     Alcohol/week: 2.0 standard drinks    Drug use: Never    Sexual activity: Not on file   Other Topics Concern Not on file   Social History Narrative    Not on file     Social Determinants of Health     Financial Resource Strain: Low Risk     Difficulty of Paying Living Expenses: Not hard at all   Food Insecurity: Unknown    Worried About [address] in the Last Year: Never true    [address] in the Last Year: Not on file   Transportation Needs: No Transportation Needs    Lack of Transportation (Medical): No    Lack of Transportation (Non-Medical): No   Physical Activity: Sufficiently Active    Days of Exercise per Week: 7 days    Minutes of Exercise per Session: 30 min   Stress: Stress Concern Present    Feeling of Stress : To some extent   Social Connections: Unknown    Frequency of Communication with Friends and Family: More than three times a week    Frequency of Social Gatherings with Friends and Family: More than three times a week    Attends [employer] Services: Not on file    Active Member of Clubs or Organizations: Not on file    Attends Club or Organization Meetings: Not on file    Marital Status:    Intimate Partner Violence: Not At Risk    Fear of Current or Ex-Partner: No    Emotionally Abused: No    Physically Abused: No    Sexually Abused: No   Housing Stability: Low Risk     Unable to Pay for Housing in the Last Year: No    Number of [place] in the Last Year: 1    Unstable Housing in the Last Year: No     Current Outpatient Medications   Medication Sig Dispense Refill    IBRANCE 125 MG tablet       Magic Mouthwash (MIRACLE MOUTHWASH) Swish and spit 5 mLs 4 times daily as needed for Irritation 480 mL 1    potassium chloride (KLOR-CON M) 20 MEQ extended release tablet Take 1 tablet by mouth in the morning. Take twice a day for 7 days and then decrease to one a day.  30 tablet 5    ondansetron (ZOFRAN) 8 MG tablet Take 1 tablet by mouth every 8 hours as needed for Nausea or Vomiting 90 tablet 2    letrozole (FEMARA) 2.5 MG tablet Take 1 tablet by mouth daily 30 tablet 3 chlorthalidone (HYGROTON) 25 MG tablet 1 po qam for bloos pressure      olmesartan (BENICAR) 40 MG tablet Take 40 mg by mouth daily      clonazePAM (KLONOPIN) 0.5 MG tablet Take 1 tablet by mouth 3 times daily as needed for Anxiety for up to 30 doses. (Patient not taking: Reported on 8/23/2022) 30 tablet 2    docusate sodium (COLACE) 100 MG capsule Take 100 mg by mouth 2 times daily (Patient not taking: Reported on 8/23/2022)      tretinoin (RETIN-A) 0.1 % cream APPLY TO AFFECTED AREA(S) NIGHTLY TO FACE (Patient not taking: Reported on 8/23/2022)       No current facility-administered medications for this visit. OBJECTIVE:  /84 (Site: Right Upper Arm, Position: Standing, Cuff Size: Medium Adult)   Pulse 79   Temp 98.1 °F (36.7 °C) (Oral)   Resp 16   Ht 5' 3.5\" (1.613 m)   Wt 176 lb 4.8 oz (80 kg)   SpO2 99%   BMI 30.74 kg/m²     Physical Exam:  Constitutional: Well developed, well nourished female in no acute distress, sitting comfortably in the exam room chair. HEENT: Normocephalic and atraumatic. Oropharynx is clear, mucous membranes are moist.  Sclerae anicteric. Neck supple without JVD. No thyromegaly present. Lymph node   No palpable submandibular, cervical, supraclavicular, axillary lymph nodes. Skin Warm and dry. No bruising and no rash noted. No erythema. No pallor. Respiratory Lungs are clear to auscultation bilaterally without wheezes, rales or rhonchi, normal air exchange without accessory muscle use. CVS Normal rate, regular rhythm and normal S1 and S2. No murmurs, gallops, or rubs. Abdomen Soft, nontender and nondistended, normoactive bowel sounds. No palpable mass. No hepatosplenomegaly. Neuro Grossly nonfocal with no obvious sensory or motor deficits. MSK Normal range of motion in general.  No edema and no tenderness. Psych Appropriate mood and affect.           Labs:  Recent Results (from the past 24 hour(s))   CBC with Auto Differential    Collection Time: 08/23/22  8:11 AM   Result Value Ref Range    WBC 3.7 (L) 4.3 - 11.1 K/uL    RBC 4.04 (L) 4.05 - 5.2 M/uL    Hemoglobin 12.8 11.7 - 15.4 g/dL    Hematocrit 37.1 35.8 - 46.3 %    MCV 91.8 79.6 - 97.8 FL    MCH 31.7 26.1 - 32.9 PG    MCHC 34.5 31.4 - 35.0 g/dL    RDW 14.6 11.9 - 14.6 %    Platelets 980 746 - 220 K/uL    MPV 9.6 9.4 - 12.3 FL    nRBC 0.00 0.0 - 0.2 K/uL    Seg Neutrophils 30 (L) 43 - 78 %    Lymphocytes 56 (H) 13 - 44 %    Monocytes 12 4.0 - 12.0 %    Eosinophils % 1 0.5 - 7.8 %    Basophils 1 0.0 - 2.0 %    Immature Granulocytes 0 0.0 - 5.0 %    Segs Absolute 1.1 (L) 1.7 - 8.2 K/UL    Absolute Lymph # 2.2 0.5 - 4.6 K/UL    Absolute Mono # 0.4 0.1 - 1.3 K/UL    Absolute Eos # 0.0 0.0 - 0.8 K/UL    Basophils Absolute 0.0 0.0 - 0.2 K/UL    Absolute Immature Granulocyte 0.0 0.0 - 0.5 K/UL    RBC Comment SLIGHT  ANISOCYTOSIS        WBC Comment Result Confirmed By Smear      Platelet Comment ADEQUATE      Differential Type AUTOMATED     Comprehensive Metabolic Panel    Collection Time: 08/23/22  8:11 AM   Result Value Ref Range    Sodium 141 136 - 145 mmol/L    Potassium 3.6 3.5 - 5.1 mmol/L    Chloride 107 98 - 107 mmol/L    CO2 29 21 - 32 mmol/L    Anion Gap 5 (L) 7 - 16 mmol/L    Glucose 109 (H) 65 - 100 mg/dL    BUN 24 (H) 6 - 23 MG/DL    Creatinine 0.80 0.6 - 1.0 MG/DL    GFR African American >60 >60 ml/min/1.73m2    GFR Non- >60 >60 ml/min/1.73m2    Calcium 9.0 8.3 - 10.4 MG/DL    Total Bilirubin 0.2 0.2 - 1.1 MG/DL    ALT 34 12 - 65 U/L    AST 19 15 - 37 U/L    Alk Phosphatase 86 50 - 136 U/L    Total Protein 7.8 6.3 - 8.2 g/dL    Albumin 4.0 3.5 - 5.0 g/dL    Globulin 3.8 (H) 2.3 - 3.5 g/dL    Albumin/Globulin Ratio 1.1 (L) 1.2 - 3.5     Magnesium    Collection Time: 08/23/22  8:11 AM   Result Value Ref Range    Magnesium 2.1 1.8 - 2.4 mg/dL       Imaging:  MRI BREAST BILATERAL W WO CONTRAST    Result Date: 6/16/2022  MRI of the Breasts with and without contrast CLINICAL INDICATION:  New diagnosis of left 10:00 breast cancer and left axillary metastatic lymphadenopathy undergoing evaluation for extent of disease, staging, therapy planning. No prior breast surgery or personal malignancy otherwise. COMPARISON: Mammography and ultrasound 5/20/2022, 6/14/2022 TECHNIQUE: Standard MRI sequences were obtained through the breasts in multiple planes. Images were obtained before and after intravenous infusion of 16 mL of Prohance contrast. Images were reviewed with PACS and with [employer] CAD software. FINDINGS: The breasts demonstrate moderate glandularity and mild background enhancement. Left 10:00 irregular heterogeneously enhancing malignant mass measures up to 3.3 x 2.6 x 3.0 cm. Extending anteriorly from the mass is about 1.2 cm of clumped and reticular nonmass enhancement. Overall the maximal dimension of this malignancy is 3.7 cm. Mild overlying skin thickening and edema are nonspecific and could be reactive to the biopsy unless there is clinical suspicion for malignancy involvement. There is no other evidence of suspicious enhancing mass, and no dominant or unique nonmass enhancement, to suggest additional malignancy in either breast. Left axilla demonstrates 4 asymmetrically enlarged and dysmorphic lymph nodes, one of which underwent prior biopsy demonstrating metastasis. The largest measures up to 2.0 x 1.2 cm. There is no evidence of right axillary lymphadenopathy or internal mammary lymphadenopathy. Elsewhere, limited visualization of the partially included thorax and upper abdomen shows no acute abnormality. 1. Left 10:00 breast cancer measures up to 3.7 cm. 2. Left axillary metastatic lymphadenopathy. 3. No suspicious right breast finding. Recommend continued management as directed clinically.  BI-RADS Assessment Category 6: Known Biopsy Proven Malignancy     US BREAST LIMITED LEFT    Result Date: 6/14/2022  DIAGNOSTIC MAMMOGRAM LEFT BREAST WITH TOMOSYNTHESIS, LEFT BREAST ULTRASOUND HISTORY: Further evaluation of left breast mass and calcifications. Spot compression  tomosynthesis views in the CC and MLO projections of the left breast was performed in addition to a full 90 degree mediolateral tomosynthesis view. Additional magnification views were also submitted. Review of mammographic images of the left breast prior examinations dated 05/20/2022, 07/20/2014 was performed. FINDING: Confirmed is the spiculated mass at approximately 10:00 position left breast posterior depth. The mass measures approximately 4 cm in size. There are pleomorphic calcifications extending anteriorly from the mass towards the nipple. Including the mass and calcifications, the abnormality measures approximately 7 cm in size. A left breast ultrasound is recommended. Left breast ultrasound: There is a hypoechoic mass with ill-defined angular margins at the 10:00 position left breast 12 cm from nipple. This is estimated to measure approximately 5.0 x 3.2 x 4.5 cm. There is posterior acoustic shadowing. Evaluation of left axilla reveals a dysmorphic lymph node measuring 2.6 cm in maximal dimension. IMPRESSION: 1. Spiculated left breast mass with associated microcalcifications measuring at least 7 cm in total size at the 10:00 position. Tissue sampling is recommended. 2. Dysmorphic left axillary lymph node. Tissue sampling is recommended. BI-RADS Assessment Category 5: Highly suggestive of malignancy- Appropriate action should be taken. Results were discussed with the patient prior to leaving the department. The patient is scheduled for biopsy today. This study was reviewed with the aid of the [employer]  device. MAMMOGRAM POST BX CLIP PLACEMENT LEFT    Result Date: 6/14/2022  Ultrasound-guided left breast biopsy, left axillary lymph node biopsy and postbiopsy mammogram: 06/14/2022 HISTORY: Left breast mass and dysmorphic left axillary lymph node.  Ultrasound guided left breast biopsy: The patient was explained the procedure informed consent was obtained. Using sonographic guidance and sterile technique, a 14-gauge biopsy device was used to obtain 3 core samples from the hypoechoic mass at the 10:00 position. The samples were placed into a formalin container and sent to the lab for analysis. A biopsy clip was placed upon completion of sampling. The patient tolerated the procedure well. There were no immediate complications. Ultrasound guided left axillary lymph node biopsy: The patient was explained the procedure informed consent was obtained. Using sonographic guidance and sterile technique, a 14-gauge biopsy device was used to obtain 2 core samples from the dysmorphic lymph node within the left axilla. . The samples were placed into a formalin container and sent to the lab for analysis. A biopsy clip was not placed. The patient tolerated the procedure well. There were no immediate complications. Postbiopsy mammogram. CC and ML views of the left breast were obtained. The biopsy clip is confirmed within the mass at the 10:00 position. 1. Status post ultrasound guided biopsy of the hypoechoic mass at the 10:00 position left breast. 2. Status post ultrasound guided biopsy of the dysmorphic lymph node within the left axilla. [facility] PET CT SKULL BASE TO MID THIGH    Result Date: 6/22/2022  PET/CT  INDICATION: Malignant neoplasm of unspecified site of left female breast; Malignant neoplasm of unspecified site of left female breast; Secondary and unspecified malignant neoplasm of axilla and upper limb lymph nodes RADIOPHARMACEUTICAL: 13.42 mCi F18-FDG, intravenously. TECHNIQUE: Imaging was performed from the skull through the proximal thighs using routine PET/CT acquisition protocol. Imaging was performed approximately 60 minutes post injection. Oral contrast was administered.  Radiation dose reduction techniques were used for this study:  Our CT scanners use one or all of the following: Automated exposure control, adjustment of the mA and/or kVp according to patient's size, iterative reconstruction. SERUM GLUCOSE: 123 mg/dL prior to injection. COMPARISON: Breast MRI June 16, 2022 FINDINGS: HEAD/NECK: Symmetric uptake within the imaged brain parenchyma. Scattered areas of mild uptake within the occipital and cervical soft tissues without convincing CT correlate with additional areas of uptake, and along the bilateral paraspinals soft tissues. No discrete enlarged/hypermetabolic cervical lymph nodes. CHEST: FDG uptake with a known left breast malignancy with SUV of 9.0. Prominent FDG avid left axillary lymph nodes largest measuring 1.3 x 2.6 cm (SUV max 5.8, image number 66). Scattered areas of FDG uptake without corresponding CT correlate throughout the superior lateral and posterior medial chest wall. No enlarged or hypermetabolic mediastinal or hilar adenopathy. No suspicious pulmonary nodules or focal parenchymal FDG uptake. ABDOMEN/PELVIS: No enlarged or hypermetabolic adenopathy. No focal uptake within the visceral organs. There is a 1.8 cm left hyperdense exophytic renal cyst without associated FDG uptake compatible with a hemorrhagic/proteinaceous cyst with additional punctate upper pole hyperdense cyst and indeterminate photopenic hypodense cyst. Physiologic uptake within the gastrointestinal tract with normal excretion of radiotracer within the renal collecting system and urinary bladder. MUSCULOSKELETAL: Hypermetabolic lytic lesion involving the T10 vertebral body (SUV max 14.0, image number 103) with sclerotic hypermetabolic lesion centered within the sacrum (SUV max 13.0, image number 189). No abnormal focal uptake within the soft tissues. 1.  Hypermetabolic soft tissue left breast mass with prominent FDG avid left axillary lymph nodes. 2.  Hypermetabolic osseous metastatic disease involving the sacrum and T10 vertebral body.  3.  Patchy areas of mild uptake throughout the cervical and paraspinal soft tissues, as well as, along the superior lateral and posterior medial chest wall favoring brown fat and less likely metastatic disease. US BIOPSY LYMPH NODE    Result Date: 6/14/2022  Ultrasound-guided left breast biopsy, left axillary lymph node biopsy and postbiopsy mammogram: 06/14/2022 HISTORY: Left breast mass and dysmorphic left axillary lymph node. Ultrasound guided left breast biopsy: The patient was explained the procedure informed consent was obtained. Using sonographic guidance and sterile technique, a 14-gauge biopsy device was used to obtain 3 core samples from the hypoechoic mass at the 10:00 position. The samples were placed into a formalin container and sent to the lab for analysis. A biopsy clip was placed upon completion of sampling. The patient tolerated the procedure well. There were no immediate complications. Ultrasound guided left axillary lymph node biopsy: The patient was explained the procedure informed consent was obtained. Using sonographic guidance and sterile technique, a 14-gauge biopsy device was used to obtain 2 core samples from the dysmorphic lymph node within the left axilla. . The samples were placed into a formalin container and sent to the lab for analysis. A biopsy clip was not placed. The patient tolerated the procedure well. There were no immediate complications. Postbiopsy mammogram. CC and ML views of the left breast were obtained. The biopsy clip is confirmed within the mass at the 10:00 position. 1. Status post ultrasound guided biopsy of the hypoechoic mass at the 10:00 position left breast. 2. Status post ultrasound guided biopsy of the dysmorphic lymph node within the left axilla. .     US BREAST BIOPSY W LOC DEVICE 1ST LESION LEFT    Result Date: 6/14/2022  Ultrasound-guided left breast biopsy, left axillary lymph node biopsy and postbiopsy mammogram: 06/14/2022 HISTORY: Left breast mass and dysmorphic left axillary lymph node. Ultrasound guided left breast biopsy:  The patient was explained the procedure informed consent was obtained. Using sonographic guidance and sterile technique, a 14-gauge biopsy device was used to obtain 3 core samples from the hypoechoic mass at the 10:00 position. The samples were placed into a formalin container and sent to the lab for analysis. A biopsy clip was placed upon completion of sampling. The patient tolerated the procedure well. There were no immediate complications. Ultrasound guided left axillary lymph node biopsy: The patient was explained the procedure informed consent was obtained. Using sonographic guidance and sterile technique, a 14-gauge biopsy device was used to obtain 2 core samples from the dysmorphic lymph node within the left axilla. . The samples were placed into a formalin container and sent to the lab for analysis. A biopsy clip was not placed. The patient tolerated the procedure well. There were no immediate complications. Postbiopsy mammogram. CC and ML views of the left breast were obtained. The biopsy clip is confirmed within the mass at the 10:00 position. 1. Status post ultrasound guided biopsy of the hypoechoic mass at the 10:00 position left breast. 2. Status post ultrasound guided biopsy of the dysmorphic lymph node within the left axilla. [facility] ZACHARY DIGITAL DIAGNOSTIC UNILATERAL LEFT    Result Date: 6/14/2022  DIAGNOSTIC MAMMOGRAM LEFT BREAST WITH TOMOSYNTHESIS, LEFT BREAST ULTRASOUND HISTORY: Further evaluation of left breast mass and calcifications. Spot compression  tomosynthesis views in the CC and MLO projections of the left breast was performed in addition to a full 90 degree mediolateral tomosynthesis view. Additional magnification views were also submitted. Review of mammographic images of the left breast prior examinations dated 05/20/2022, 07/20/2014 was performed. FINDING: Confirmed is the spiculated mass at approximately 10:00 position left breast posterior depth.  The mass measures approximately 4 cm in size. There are pleomorphic calcifications extending anteriorly from the mass towards the nipple. Including the mass and calcifications, the abnormality measures approximately 7 cm in size. A left breast ultrasound is recommended. Left breast ultrasound: There is a hypoechoic mass with ill-defined angular margins at the 10:00 position left breast 12 cm from nipple. This is estimated to measure approximately 5.0 x 3.2 x 4.5 cm. There is posterior acoustic shadowing. Evaluation of left axilla reveals a dysmorphic lymph node measuring 2.6 cm in maximal dimension. IMPRESSION: 1. Spiculated left breast mass with associated microcalcifications measuring at least 7 cm in total size at the 10:00 position. Tissue sampling is recommended. 2. Dysmorphic left axillary lymph node. Tissue sampling is recommended. BI-RADS Assessment Category 5: Highly suggestive of malignancy- Appropriate action should be taken. Results were discussed with the patient prior to leaving the department. The patient is scheduled for biopsy today. This study was reviewed with the aid of the [employer]  device. Pathology:  DIAGNOSIS        A:  \"LEFT BREAST, 10:00 POSITION, 12 CM FROM NIPPLE, CORE BIOPSY\":        INFILTRATING DUCTAL CARCINOMA WITH LOBULAR FEATURES, LOW GRADE (WELL   DIFFERENTIATED). DEFINITE IN SITU COMPONENT AND LYMPHOVASCULAR INVASION   ARE NOT IDENTIFIED          B:  \"LEFT AXILLA, CORE BIOPSY\":        MACRO METASTATIC CARCINOMA SIMILAR TO A INVOLVING LYMPH NODE.  WHILE   DEFINITE EXTRACAPSULAR EXTENSION IS NOT IDENTIFIED IN THIS MATERIAL, IT   COULD BE PRESENT IN UNSAMPLED LYMPH NODE         Procedures/Addenda                     [facility]- ER/WI/[id number] BY IHC                                                                                                                                         Status:  Signed Out    Irasema Canales MD on 6/20/2022                                 Interpretation [facility] IHC Quantitative Breast Panel     TEST NAME:                         RESULTS:               INTERNAL   CONTROLS:     ESTROGEN RECEPTOR:               Positive (93%)               Absent   PROGESTERONE RECEPTOR:          Negative (0.0%)          Absent   HER-2/ARYA:                         Negative (0)               Percentage   of Cells with Uniform        Intense Complete Membrane   Stainin%       DIAGNOSIS        \"BONE LESION\":  METASTATIC CARCINOMA, CONSISTENT WITH BREAST ORIGIN. Procedures/Addenda                     [facility]-IMMUNOHISTOCHEMISTRY                                                                                                                                         Status:  Signed Out    Charbel Russo MD on 2022                                 Interpretation                       Immunohistochemical Stain Panel:          Interpretation:  Immunohistochemical findings consistent with   metastatic carcinoma of breast origin. Antibody/Test               Marker For                              Result   Cytokeratin 7               Lung, breast, upper GE, serous ovary                 Positive   Cytokeratin 20               Colon, mucinous ovary, urothelium                  Negative   MINO 3               Urothelial, breast carcinoma                         Positive   [id number]               Breast, salivary gland, skin, adnexa                 Positive           ASSESSMENT:   Diagnosis Orders   1. Mouth sores  Magic Mouthwash (MIRACLE MOUTHWASH)      2.  Malignant neoplasm of left breast in female, estrogen receptor positive, unspecified site of breast ([address]  Magic Mouthwash (MIRACLE MOUTHWASH)    Cancer Antigen 15-3    PET CT SKULL BASE TO MID THIGH          Patient Active Problem List   Diagnosis    Hypertension    Kidney stone    Cyst, kidney, acquired    Malignant neoplasm of left breast in female, estrogen receptor positive ([address]    Metastasis to bone ([address]

## 2022-08-23 NOTE — PATIENT INSTRUCTIONS
Americans (GFRAA) and non- Americans (GFRNA), and normalized to 1.73m2 body surface area. The physician must decide which value applies to the patient. The MDRD study equation should only be used in individuals age 25 or older. It has not been validated for the following: pregnant women, patients with serious comorbid conditions,or on certain medications, or persons with extremes of body size, muscle mass, or nutritional status. Calcium 08/23/2022 9.0  8.3 - 10.4 MG/DL Final    Total Bilirubin 08/23/2022 0.2  0.2 - 1.1 MG/DL Final    ALT 08/23/2022 34  12 - 65 U/L Final    AST 08/23/2022 19  15 - 37 U/L Final    Alk Phosphatase 08/23/2022 86  50 - 136 U/L Final    Total Protein 08/23/2022 7.8  6.3 - 8.2 g/dL Final    Albumin 08/23/2022 4.0  3.5 - 5.0 g/dL Final    Globulin 08/23/2022 3.8 (A) 2.3 - 3.5 g/dL Final    Albumin/Globulin Ratio 08/23/2022 1.1 (A) 1.2 - 3.5   Final    Magnesium 08/23/2022 2.1  1.8 - 2.4 mg/dL Final         Treatment Summary has been discussed and given to patient: n/a        -------------------------------------------------------------------------------------------------------------------  Please call our office at [phone] if you have any  of the following symptoms:   Fever of 100.5 or greater  Chills  Shortness of breath  Swelling or pain in one leg    After office hours an answering service is available and will contact a provider for emergencies or if you are experiencing any of the above symptoms. Patient has My Chart. My Chart log in information explained on the after visit summary printout at the [facility] [address] desk.     Shikha Pabon RN

## 2022-09-06 ENCOUNTER — HOSPITAL ENCOUNTER (OUTPATIENT)
Dept: LAB | Age: 52
Discharge: HOME | End: 2022-09-09
Payer: COMMERCIAL

## 2022-09-06 ENCOUNTER — OFFICE VISIT (OUTPATIENT)
Dept: ONCOLOGY | Age: 52
End: 2022-09-06
Payer: COMMERCIAL

## 2022-09-06 DIAGNOSIS — Z17.0 MALIGNANT NEOPLASM OF LEFT BREAST IN FEMALE, ESTROGEN RECEPTOR POSITIVE, UNSPECIFIED SITE OF BREAST (HCC): ICD-10-CM

## 2022-09-06 DIAGNOSIS — C50.912 MALIGNANT NEOPLASM OF LEFT BREAST IN FEMALE, ESTROGEN RECEPTOR POSITIVE, UNSPECIFIED SITE OF BREAST (HCC): ICD-10-CM

## 2022-09-06 DIAGNOSIS — C79.51 METASTASIS TO BONE (HCC): ICD-10-CM

## 2022-09-06 DIAGNOSIS — C80.1 ANXIETY ASSOCIATED WITH CANCER DIAGNOSIS (HCC): Primary | ICD-10-CM

## 2022-09-06 DIAGNOSIS — F41.1 ANXIETY ASSOCIATED WITH CANCER DIAGNOSIS (HCC): Primary | ICD-10-CM

## 2022-09-06 LAB
ALBUMIN SERPL-MCNC: 3.9 G/DL (ref 3.5–5)
ALBUMIN/GLOB SERPL: 1 {RATIO} (ref 1.2–3.5)
ALP SERPL-CCNC: 93 U/L (ref 50–136)
ALT SERPL-CCNC: 43 U/L (ref 12–65)
ANION GAP SERPL CALC-SCNC: 5 MMOL/L (ref 4–13)
AST SERPL-CCNC: 23 U/L (ref 15–37)
BASOPHILS # BLD: 0.1 K/UL (ref 0–0.2)
BASOPHILS NFR BLD: 2 % (ref 0–2)
BILIRUB SERPL-MCNC: 0.4 MG/DL (ref 0.2–1.1)
BUN SERPL-MCNC: 16 MG/DL (ref 6–23)
CALCIUM SERPL-MCNC: 9.4 MG/DL (ref 8.3–10.4)
CHLORIDE SERPL-SCNC: 107 MMOL/L (ref 101–110)
CO2 SERPL-SCNC: 28 MMOL/L (ref 21–32)
CREAT SERPL-MCNC: 0.9 MG/DL (ref 0.6–1)
DIFFERENTIAL METHOD BLD: ABNORMAL
EOSINOPHIL # BLD: 0 K/UL (ref 0–0.8)
EOSINOPHIL NFR BLD: 1 % (ref 0.5–7.8)
ERYTHROCYTE [DISTWIDTH] IN BLOOD BY AUTOMATED COUNT: 15.7 % (ref 11.9–14.6)
GLOBULIN SER CALC-MCNC: 4.1 G/DL (ref 2.3–3.5)
GLUCOSE SERPL-MCNC: 107 MG/DL (ref 65–100)
HCT VFR BLD AUTO: 39 % (ref 35.8–46.3)
HGB BLD-MCNC: 13.5 G/DL (ref 11.7–15.4)
IMM GRANULOCYTES # BLD AUTO: 0 K/UL (ref 0–0.5)
IMM GRANULOCYTES NFR BLD AUTO: 1 % (ref 0–5)
LYMPHOCYTES # BLD: 1.5 K/UL (ref 0.5–4.6)
LYMPHOCYTES NFR BLD: 51 % (ref 13–44)
MAGNESIUM SERPL-MCNC: 2.1 MG/DL (ref 1.8–2.4)
MCH RBC QN AUTO: 32.3 PG (ref 26.1–32.9)
MCHC RBC AUTO-ENTMCNC: 34.6 G/DL (ref 31.4–35)
MCV RBC AUTO: 93.3 FL (ref 79.6–97.8)
MONOCYTES # BLD: 0.2 K/UL (ref 0.1–1.3)
MONOCYTES NFR BLD: 6 % (ref 4–12)
NEUTS SEG # BLD: 1.2 K/UL (ref 1.7–8.2)
NEUTS SEG NFR BLD: 39 % (ref 43–78)
NRBC # BLD: 0 K/UL (ref 0–0.2)
PLATELET # BLD AUTO: 337 K/UL (ref 150–450)
PLATELET COMMENT: ADEQUATE
PMV BLD AUTO: 9.5 FL (ref 9.4–12.3)
POTASSIUM SERPL-SCNC: 3.4 MMOL/L (ref 3.5–5.1)
PROT SERPL-MCNC: 8 G/DL (ref 6.3–8.2)
RBC # BLD AUTO: 4.18 M/UL (ref 4.05–5.2)
RBC MORPH BLD: ABNORMAL
RBC MORPH BLD: ABNORMAL
SODIUM SERPL-SCNC: 140 MMOL/L (ref 136–145)
WBC # BLD AUTO: 3 K/UL (ref 4.3–11.1)
WBC MORPH BLD: ABNORMAL

## 2022-09-06 PROCEDURE — 80053 COMPREHEN METABOLIC PANEL: CPT

## 2022-09-06 PROCEDURE — 90837 PSYTX W PT 60 MINUTES: CPT | Performed by: SOCIAL WORKER

## 2022-09-06 PROCEDURE — 83735 ASSAY OF MAGNESIUM: CPT

## 2022-09-06 PROCEDURE — 36415 COLL VENOUS BLD VENIPUNCTURE: CPT

## 2022-09-06 PROCEDURE — 85025 COMPLETE CBC W/AUTO DIFF WBC: CPT

## 2022-09-06 NOTE — PROGRESS NOTES
Behavioral Health - Progress Note      Name: Kavya Ragsdale  : 1970  MRN: [id number]  Date of Service: 2022  Location of Service: [address] [place] [address] for both provider and patient        Type of Service: Individual Therapy       Reason for Visit: Follow Up     Chief Complaint: Patient returns for weekly visit concerning anxiety due to distress caused by cancer diagnosis and treatment. Patient continues in need of therapeutic support and learning techniques to decrease anxiety and its comorbidity. Subjective:  Patient responded that mindfulness has helped. Undersigned discussed Ambar Martins [address]. Patient verbalized understanding. LISW-CP used Dialectical Behavioral and Cognitive Behavioral Therapy, tailored to patient's need. Patient denied any plan or intent to hurt self or others. Patient was instructed, in case of emergency, to call 911 or [facility] in [place], [place]. Patient verbalized agreement. CBT Material discussed and reviewed in Session:     2022   Parasympathetic and Sympathetic System  Window of Tolerance  Neuroplasticity, Mindfulness, and Neurotransmitters   The Effects of Stress In The Brain and Body  Daily Mindfulness  Daily Mindfulness # 4  Daily Mindfulness # 7  Assignment  Sleep Hygiene   Half-Smile, Willing Hands, and Baptism Imagery  Body Scan  Progressive Muscle Relaxation     2022   Benefits of Diaphragmatic Breathing  Assignment  Diaphragmatic Breathing Exercises - [id number]    NEXT and FEAR  Benefits of Mindfulness  Mindfulness Exercises  Anxiety, stress, worry and depression symptoms   Gut-Brain Axis  How to Naturally Activate the Vagus Nerve    _________________________________________________    Clinical Impression: Kavya Ragsdale is a 46 y.o. female . Mental Status Exam, patient was dressed properly. No abnormal psychomotor movements observed. Intellectual functioning appeared to be intact. Mood and affect were congruent. Insight was adequate.  Judgment was adequate. Speech was normal, clear. Thought process was coherent. Patient did not report suicidal or homicidal ideations, intent or plans. Patient denied self-injury behaviors. Patient denied alcohol and other substance abuse. Patient was oriented to the four spheres: self, place, time and situation. Patient response to intervention was appropriate. Patient progress was adequate. Protective factor: self-determination. __________________________________________________    Session scheduled for: 9:00  pm/am, patient was on time     Session started:  9:00    Session ended:   10:00  _________________________________________________    Patient Diagnosis:         PHQ 9:   2  - to be scanned into EMR.  0-4 Suggests the patient may not need depression treatment  5-14 Mild major depressive disorder. 15-19 Moderate-major depressive disorder. 20+ Severe major depressive disorders.      ANNIE 7:    2 - to be scanned into EMR.  0-4: minimal anxiety   5-9: mild anxiety   10-14: moderate anxiety   15-21: severe anxiety   __________________________________________________    Frequency: Weekly or bi-weekly appointments as schedule permits    Patient's Primary Goal: Decrease anxiety   __________________________________________________    Plans:  Continue to use Cognitive Behavioral Approach  Continue to work with patient on primary goal.    Continue to see patient, weekly or bi-weekly  __________________________________________________    Marion Welch,   Shane Jade Note

## 2022-09-20 ENCOUNTER — OFFICE VISIT (OUTPATIENT)
Dept: ONCOLOGY | Age: 52
End: 2022-09-20
Payer: COMMERCIAL

## 2022-09-20 ENCOUNTER — HOSPITAL ENCOUNTER (OUTPATIENT)
Dept: INFUSION THERAPY | Age: 52
Discharge: HOME | End: 2022-09-20
Payer: COMMERCIAL

## 2022-09-20 ENCOUNTER — HOSPITAL ENCOUNTER (OUTPATIENT)
Dept: LAB | Age: 52
Discharge: HOME | End: 2022-09-23
Payer: COMMERCIAL

## 2022-09-20 VITALS
HEART RATE: 73 BPM | OXYGEN SATURATION: 99 % | SYSTOLIC BLOOD PRESSURE: 145 MMHG | BODY MASS INDEX: 30.88 KG/M2 | RESPIRATION RATE: 21 BRPM | WEIGHT: 180.9 LBS | DIASTOLIC BLOOD PRESSURE: 81 MMHG | HEIGHT: 64 IN

## 2022-09-20 DIAGNOSIS — C50.912 MALIGNANT NEOPLASM OF LEFT BREAST IN FEMALE, ESTROGEN RECEPTOR POSITIVE, UNSPECIFIED SITE OF BREAST (HCC): ICD-10-CM

## 2022-09-20 DIAGNOSIS — C50.912 MALIGNANT NEOPLASM OF LEFT BREAST IN FEMALE, ESTROGEN RECEPTOR POSITIVE, UNSPECIFIED SITE OF BREAST (HCC): Primary | ICD-10-CM

## 2022-09-20 DIAGNOSIS — C79.51 METASTASIS TO BONE (HCC): ICD-10-CM

## 2022-09-20 DIAGNOSIS — Z17.0 MALIGNANT NEOPLASM OF LEFT BREAST IN FEMALE, ESTROGEN RECEPTOR POSITIVE, UNSPECIFIED SITE OF BREAST (HCC): Primary | ICD-10-CM

## 2022-09-20 DIAGNOSIS — Z17.0 MALIGNANT NEOPLASM OF LEFT BREAST IN FEMALE, ESTROGEN RECEPTOR POSITIVE, UNSPECIFIED SITE OF BREAST (HCC): ICD-10-CM

## 2022-09-20 DIAGNOSIS — C80.1 ANXIETY ASSOCIATED WITH CANCER DIAGNOSIS (HCC): Primary | ICD-10-CM

## 2022-09-20 DIAGNOSIS — F41.1 ANXIETY ASSOCIATED WITH CANCER DIAGNOSIS (HCC): Primary | ICD-10-CM

## 2022-09-20 LAB
ALBUMIN SERPL-MCNC: 3.8 G/DL (ref 3.5–5)
ALBUMIN/GLOB SERPL: 1 {RATIO} (ref 1.2–3.5)
ALP SERPL-CCNC: 102 U/L (ref 50–136)
ALT SERPL-CCNC: 67 U/L (ref 12–65)
ANION GAP SERPL CALC-SCNC: 4 MMOL/L (ref 4–13)
AST SERPL-CCNC: 34 U/L (ref 15–37)
BASOPHILS # BLD: 0.1 K/UL (ref 0–0.2)
BASOPHILS NFR BLD: 2 % (ref 0–2)
BILIRUB SERPL-MCNC: 0.2 MG/DL (ref 0.2–1.1)
BUN SERPL-MCNC: 17 MG/DL (ref 6–23)
CALCIUM SERPL-MCNC: 8.8 MG/DL (ref 8.3–10.4)
CANCER AG15-3 SERPL-ACNC: 50.4 U/ML (ref 1–35)
CHLORIDE SERPL-SCNC: 108 MMOL/L (ref 101–110)
CO2 SERPL-SCNC: 29 MMOL/L (ref 21–32)
CREAT SERPL-MCNC: 0.9 MG/DL (ref 0.6–1)
DIFFERENTIAL METHOD BLD: ABNORMAL
EOSINOPHIL # BLD: 0 K/UL (ref 0–0.8)
EOSINOPHIL NFR BLD: 1 % (ref 0.5–7.8)
ERYTHROCYTE [DISTWIDTH] IN BLOOD BY AUTOMATED COUNT: 16.7 % (ref 11.9–14.6)
GLOBULIN SER CALC-MCNC: 3.9 G/DL (ref 2.3–3.5)
GLUCOSE SERPL-MCNC: 125 MG/DL (ref 65–100)
HCT VFR BLD AUTO: 36.1 % (ref 35.8–46.3)
HGB BLD-MCNC: 12.7 G/DL (ref 11.7–15.4)
IMM GRANULOCYTES # BLD AUTO: 0 K/UL (ref 0–0.5)
IMM GRANULOCYTES NFR BLD AUTO: 1 % (ref 0–5)
LYMPHOCYTES # BLD: 1.9 K/UL (ref 0.5–4.6)
LYMPHOCYTES NFR BLD: 49 % (ref 13–44)
MAGNESIUM SERPL-MCNC: 1.8 MG/DL (ref 1.8–2.4)
MCH RBC QN AUTO: 33.2 PG (ref 26.1–32.9)
MCHC RBC AUTO-ENTMCNC: 35.2 G/DL (ref 31.4–35)
MCV RBC AUTO: 94.3 FL (ref 79.6–97.8)
MONOCYTES # BLD: 0.4 K/UL (ref 0.1–1.3)
MONOCYTES NFR BLD: 11 % (ref 4–12)
NEUTS SEG # BLD: 1.4 K/UL (ref 1.7–8.2)
NEUTS SEG NFR BLD: 36 % (ref 43–78)
NRBC # BLD: 0 K/UL (ref 0–0.2)
PHOSPHATE SERPL-MCNC: 3.2 MG/DL (ref 2.5–4.5)
PLATELET # BLD AUTO: 225 K/UL (ref 150–450)
PMV BLD AUTO: 9.5 FL (ref 9.4–12.3)
POTASSIUM SERPL-SCNC: 3.3 MMOL/L (ref 3.5–5.1)
PROT SERPL-MCNC: 7.7 G/DL (ref 6.3–8.2)
RBC # BLD AUTO: 3.83 M/UL (ref 4.05–5.2)
SODIUM SERPL-SCNC: 141 MMOL/L (ref 136–145)
WBC # BLD AUTO: 3.9 K/UL (ref 4.3–11.1)

## 2022-09-20 PROCEDURE — 90837 PSYTX W PT 60 MINUTES: CPT | Performed by: SOCIAL WORKER

## 2022-09-20 PROCEDURE — 96372 THER/PROPH/DIAG INJ SC/IM: CPT

## 2022-09-20 PROCEDURE — 84100 ASSAY OF PHOSPHORUS: CPT

## 2022-09-20 PROCEDURE — 80053 COMPREHEN METABOLIC PANEL: CPT

## 2022-09-20 PROCEDURE — 83735 ASSAY OF MAGNESIUM: CPT

## 2022-09-20 PROCEDURE — 36415 COLL VENOUS BLD VENIPUNCTURE: CPT

## 2022-09-20 PROCEDURE — 86300 IMMUNOASSAY TUMOR CA 15-3: CPT

## 2022-09-20 PROCEDURE — 85025 COMPLETE CBC W/AUTO DIFF WBC: CPT

## 2022-09-20 PROCEDURE — 6360000002 HC RX W HCPCS: Performed by: INTERNAL MEDICINE

## 2022-09-20 PROCEDURE — 99214 OFFICE O/P EST MOD 30 MIN: CPT | Performed by: NURSE PRACTITIONER

## 2022-09-20 RX ORDER — SODIUM CHLORIDE 9 MG/ML
INJECTION, SOLUTION INTRAVENOUS CONTINUOUS
Status: CANCELLED | OUTPATIENT
Start: 2022-10-16

## 2022-09-20 RX ORDER — ACETAMINOPHEN 325 MG/1
650 TABLET ORAL
Status: CANCELLED | OUTPATIENT
Start: 2022-10-16

## 2022-09-20 RX ORDER — ALBUTEROL SULFATE 90 UG/1
4 AEROSOL, METERED RESPIRATORY (INHALATION) PRN
Status: CANCELLED | OUTPATIENT
Start: 2022-10-16

## 2022-09-20 RX ORDER — EPINEPHRINE 1 MG/ML
0.3 INJECTION, SOLUTION, CONCENTRATE INTRAVENOUS PRN
Status: CANCELLED | OUTPATIENT
Start: 2022-10-16

## 2022-09-20 RX ORDER — ONDANSETRON 2 MG/ML
8 INJECTION INTRAMUSCULAR; INTRAVENOUS
Status: CANCELLED | OUTPATIENT
Start: 2022-10-16

## 2022-09-20 RX ORDER — DIPHENHYDRAMINE HYDROCHLORIDE 50 MG/ML
50 INJECTION INTRAMUSCULAR; INTRAVENOUS
Status: CANCELLED | OUTPATIENT
Start: 2022-10-16

## 2022-09-20 RX ADMIN — DENOSUMAB 120 MG: 120 INJECTION SUBCUTANEOUS at 10:30

## 2022-09-20 ASSESSMENT — PATIENT HEALTH QUESTIONNAIRE - PHQ9
2. FEELING DOWN, DEPRESSED OR HOPELESS: 0
SUM OF ALL RESPONSES TO PHQ9 QUESTIONS 1 & 2: 0
SUM OF ALL RESPONSES TO PHQ QUESTIONS 1-9: 0
1. LITTLE INTEREST OR PLEASURE IN DOING THINGS: 0
SUM OF ALL RESPONSES TO PHQ QUESTIONS 1-9: 0

## 2022-09-20 NOTE — PROGRESS NOTES
[facility] Hematology and Oncology: Office Visit Established Patient    Chief Complaint:    Chief Complaint   Patient presents with    Follow-up         History of Present Illness:  Ms. Daisy Grace is a 46 y.o. female who presents today for evaluation regarding breast cancer. On 6/2/22, Ms. Alcocer presented for her routine screening mammogram. The imaging reported a left breast mass with associated calcifications, worrisome for malignancy. On 6/14/22 she underwent a left breast diagnostic mammogram and ultrasound with biopsy of the breast mass and a dysmorphic left axillary lymph node. The left breast and axillary node pathology showed infiltrating ductal carcinoma with lobular features, low grade (well differentiated), ER 93%, MO 0%, and HER-2 0. On 6/16/22 she had a bilateral breast MRI that reported the known left breast cancer measuring up to 3.7 cm, with associated left axillary lymphadenopathy however no suspicious findings in the right breast. On 6/22/22 she had a PET/CT scan that showed the breast and axillary node, but also showed hypermetabolic osseous metastatic disease in the sacrum and T10 vertebral body. We recommended biopsy of a suspicious osseous lesion which confirmed mBC. Unfortunately, she will not be a candidate for curative therapy and should start palliative systemic therapy, I would recommend letrozole and ribociclib. She is postmenopausal so she will not need ovarian function suppression. She will also require denosumab or zoledronic acid depending on insurance approval.  We will attempt to perform NGS on the metastatic specimen to screen for biomarker expression including PI3KCA, BRCA, PDL-1 and others. Here for follow-up prior to starting next cycle of Ibrance. She will begin cycle 2 tomorrow. She has tolerated therapy well thus far. She initially had nausea but this has resolved. She has had some diarrhea but has not had to take anything.  She continues on femara and this is tolerable although she does have some hotflashes. Her appetite has been good. No pain reported today. Energy level is good, ECOG 0. She has ongoing lingering productive cough following a recent URI but no fevers or infectious symptoms. Review of Systems:  Constitutional: Negative. HENT: Negative. Eyes: Negative. Respiratory: Negative. Cardiovascular: Negative. Gastrointestinal: Negative. Genitourinary: Negative. Musculoskeletal: Negative. Skin: Negative. Neurological: Negative. Endo/Heme/Allergies: Negative. Psychiatric/Behavioral: Negative. All other systems reviewed and are negative. No Known Allergies  Past Medical History:   Diagnosis Date    Cyst, kidney, acquired     found on previous scan    Hypertension     Kidney stone      Past Surgical History:   Procedure Laterality Date    APPENDECTOMY      CT BIOPSY PERCUTANEOUS SUPERFICIAL BONE  7/1/2022    CT BIOPSY PERCUTANEOUS SUPERFICIAL BONE 7/1/2022 [facility] RADIOLOGY CT SCAN    US BREAST NEEDLE BIOPSY LEFT Left 6/14/2022    US BREAST NEEDLE BIOPSY LEFT 6/14/2022 Venessa Anderson MD [facility] RADIOLOGY MAMMO    US LYMPH NODE BIOPSY  6/14/2022    US LYMPH NODE BIOPSY 6/14/2022 Venessa Anderson MD [facility] RADIOLOGY MAMMO    WISDOM TOOTH EXTRACTION       Family History   Problem Relation Age of Onset    Hypertension Mother     Alcohol Abuse Brother     Alcohol Abuse Father     Dementia Father     Hypertension Brother     Diabetes Father      Social History     Socioeconomic History    Marital status:      Spouse name: Not on file    Number of children: Not on file    Years of education: Not on file    Highest education level: Not on file   Occupational History    Not on file   Tobacco Use    Smoking status: Never    Smokeless tobacco: Never   Vaping Use    Vaping Use: Never used   Substance and Sexual Activity    Alcohol use:  Yes     Alcohol/week: 2.0 standard drinks    Drug use: Never    Sexual activity: Not on file   Other Topics Concern    Not on file   Social History Narrative    Not on file     Social Determinants of Health     Financial Resource Strain: Low Risk     Difficulty of Paying Living Expenses: Not hard at all   Food Insecurity: Unknown    Worried About [address] in the Last Year: Never true    [address] in the Last Year: Not on file   Transportation Needs: No Transportation Needs    Lack of Transportation (Medical): No    Lack of Transportation (Non-Medical): No   Physical Activity: Sufficiently Active    Days of Exercise per Week: 7 days    Minutes of Exercise per Session: 30 min   Stress: Stress Concern Present    Feeling of Stress : To some extent   Social Connections: Unknown    Frequency of Communication with Friends and Family: More than three times a week    Frequency of Social Gatherings with Friends and Family: More than three times a week    Attends Jewish Services: Not on file    Active Member of Clubs or Organizations: Not on file    Attends Club or Organization Meetings: Not on file    Marital Status:    Intimate Partner Violence: Not At Risk    Fear of Current or Ex-Partner: No    Emotionally Abused: No    Physically Abused: No    Sexually Abused: No   Housing Stability: Low Risk     Unable to Pay for Housing in the Last Year: No    Number of [place] in the Last Year: 1    Unstable Housing in the Last Year: No     Current Outpatient Medications   Medication Sig Dispense Refill    IBRANCE 125 MG tablet       Magic Mouthwash (MIRACLE MOUTHWASH) Swish and spit 5 mLs 4 times daily as needed for Irritation 480 mL 1    potassium chloride (KLOR-CON M) 20 MEQ extended release tablet Take 1 tablet by mouth in the morning. Take twice a day for 7 days and then decrease to one a day.  30 tablet 5    ondansetron (ZOFRAN) 8 MG tablet Take 1 tablet by mouth every 8 hours as needed for Nausea or Vomiting 90 tablet 2    letrozole (FEMARA) 2.5 MG tablet Take 1 tablet by mouth daily 30 tablet 3 chlorthalidone (HYGROTON) 25 MG tablet 1 po qam for bloos pressure      olmesartan (BENICAR) 40 MG tablet Take 40 mg by mouth daily       No current facility-administered medications for this visit. OBJECTIVE:  BP (!) 145/81 (Site: Right Upper Arm, Position: Sitting, Cuff Size: Medium Adult)   Pulse 73   Resp 21   Ht 5' 3.5\" (1.613 m)   Wt 180 lb 14.4 oz (82.1 kg)   SpO2 99%   BMI 31.54 kg/m²     Physical Exam:  Constitutional: Well developed, well nourished female in no acute distress, sitting comfortably in the exam room chair. HEENT: Normocephalic and atraumatic. Oropharynx is clear, mucous membranes are moist.  Sclerae anicteric. Neck supple without JVD. No thyromegaly present. Lymph node   No palpable submandibular, cervical, supraclavicular, axillary lymph nodes. Skin Warm and dry. No bruising and no rash noted. No erythema. No pallor. Respiratory Lungs are clear to auscultation bilaterally without wheezes, rales or rhonchi, normal air exchange without accessory muscle use. CVS Normal rate, regular rhythm and normal S1 and S2. No murmurs, gallops, or rubs. Abdomen Soft, nontender and nondistended, normoactive bowel sounds. No palpable mass. No hepatosplenomegaly. Neuro Grossly nonfocal with no obvious sensory or motor deficits. MSK Normal range of motion in general.  No edema and no tenderness. Psych Appropriate mood and affect.           Labs:  Recent Results (from the past 24 hour(s))   CBC with Auto Differential    Collection Time: 09/20/22  9:08 AM   Result Value Ref Range    WBC 3.9 (L) 4.3 - 11.1 K/uL    RBC 3.83 (L) 4.05 - 5.2 M/uL    Hemoglobin 12.7 11.7 - 15.4 g/dL    Hematocrit 36.1 35.8 - 46.3 %    MCV 94.3 79.6 - 97.8 FL    MCH 33.2 (H) 26.1 - 32.9 PG    MCHC 35.2 (H) 31.4 - 35.0 g/dL    RDW 16.7 (H) 11.9 - 14.6 %    Platelets 430 505 - 197 K/uL    MPV 9.5 9.4 - 12.3 FL    nRBC 0.00 0.0 - 0.2 K/uL    Differential Type AUTOMATED      Seg Neutrophils 36 (L) 43 - 78 %    Lymphocytes 49 (H) 13 - 44 %    Monocytes 11 4.0 - 12.0 %    Eosinophils % 1 0.5 - 7.8 %    Basophils 2 0.0 - 2.0 %    Immature Granulocytes 1 0.0 - 5.0 %    Segs Absolute 1.4 (L) 1.7 - 8.2 K/UL    Absolute Lymph # 1.9 0.5 - 4.6 K/UL    Absolute Mono # 0.4 0.1 - 1.3 K/UL    Absolute Eos # 0.0 0.0 - 0.8 K/UL    Basophils Absolute 0.1 0.0 - 0.2 K/UL    Absolute Immature Granulocyte 0.0 0.0 - 0.5 K/UL   Comprehensive Metabolic Panel    Collection Time: 09/20/22  9:08 AM   Result Value Ref Range    Sodium 141 136 - 145 mmol/L    Potassium 3.3 (L) 3.5 - 5.1 mmol/L    Chloride 108 101 - 110 mmol/L    CO2 29 21 - 32 mmol/L    Anion Gap 4 4 - 13 mmol/L    Glucose 125 (H) 65 - 100 mg/dL    BUN 17 6 - 23 MG/DL    Creatinine 0.90 0.6 - 1.0 MG/DL    GFR African American >60 >60 ml/min/1.73m2    GFR Non- >60 >60 ml/min/1.73m2    Calcium 8.8 8.3 - 10.4 MG/DL    Total Bilirubin 0.2 0.2 - 1.1 MG/DL    ALT 67 (H) 12 - 65 U/L    AST 34 15 - 37 U/L    Alk Phosphatase 102 50 - 136 U/L    Total Protein 7.7 6.3 - 8.2 g/dL    Albumin 3.8 3.5 - 5.0 g/dL    Globulin 3.9 (H) 2.3 - 3.5 g/dL    Albumin/Globulin Ratio 1.0 (L) 1.2 - 3.5     Phosphorus    Collection Time: 09/20/22  9:08 AM   Result Value Ref Range    Phosphorus 3.2 2.5 - 4.5 MG/DL   Magnesium    Collection Time: 09/20/22  9:08 AM   Result Value Ref Range    Magnesium 1.8 1.8 - 2.4 mg/dL   Cancer Antigen 15-3    Collection Time: 09/20/22  9:08 AM   Result Value Ref Range    CA 15-3 50.40 (H) 1.0 - 35.0 U/mL       Imaging:  MRI BREAST BILATERAL W WO CONTRAST    Result Date: 6/16/2022  MRI of the Breasts with and without contrast CLINICAL INDICATION:  New diagnosis of left 10:00 breast cancer and left axillary metastatic lymphadenopathy undergoing evaluation for extent of disease, staging, therapy planning. No prior breast surgery or personal malignancy otherwise.  COMPARISON: Mammography and ultrasound 5/20/2022, 6/14/2022 TECHNIQUE: Standard MRI sequences were obtained through the breasts in multiple planes. Images were obtained before and after intravenous infusion of 16 mL of Prohance contrast. Images were reviewed with PACS and with [employer] CAD software. FINDINGS: The breasts demonstrate moderate glandularity and mild background enhancement. Left 10:00 irregular heterogeneously enhancing malignant mass measures up to 3.3 x 2.6 x 3.0 cm. Extending anteriorly from the mass is about 1.2 cm of clumped and reticular nonmass enhancement. Overall the maximal dimension of this malignancy is 3.7 cm. Mild overlying skin thickening and edema are nonspecific and could be reactive to the biopsy unless there is clinical suspicion for malignancy involvement. There is no other evidence of suspicious enhancing mass, and no dominant or unique nonmass enhancement, to suggest additional malignancy in either breast. Left axilla demonstrates 4 asymmetrically enlarged and dysmorphic lymph nodes, one of which underwent prior biopsy demonstrating metastasis. The largest measures up to 2.0 x 1.2 cm. There is no evidence of right axillary lymphadenopathy or internal mammary lymphadenopathy. Elsewhere, limited visualization of the partially included thorax and upper abdomen shows no acute abnormality. 1. Left 10:00 breast cancer measures up to 3.7 cm. 2. Left axillary metastatic lymphadenopathy. 3. No suspicious right breast finding. Recommend continued management as directed clinically. BI-RADS Assessment Category 6: Known Biopsy Proven Malignancy     US BREAST LIMITED LEFT    Result Date: 6/14/2022  DIAGNOSTIC MAMMOGRAM LEFT BREAST WITH TOMOSYNTHESIS, LEFT BREAST ULTRASOUND HISTORY: Further evaluation of left breast mass and calcifications. Spot compression  tomosynthesis views in the CC and MLO projections of the left breast was performed in addition to a full 90 degree mediolateral tomosynthesis view. Additional magnification views were also submitted.  Review of mammographic images of the left breast prior examinations dated 05/20/2022, 07/20/2014 was performed. FINDING: Confirmed is the spiculated mass at approximately 10:00 position left breast posterior depth. The mass measures approximately 4 cm in size. There are pleomorphic calcifications extending anteriorly from the mass towards the nipple. Including the mass and calcifications, the abnormality measures approximately 7 cm in size. A left breast ultrasound is recommended. Left breast ultrasound: There is a hypoechoic mass with ill-defined angular margins at the 10:00 position left breast 12 cm from nipple. This is estimated to measure approximately 5.0 x 3.2 x 4.5 cm. There is posterior acoustic shadowing. Evaluation of left axilla reveals a dysmorphic lymph node measuring 2.6 cm in maximal dimension. IMPRESSION: 1. Spiculated left breast mass with associated microcalcifications measuring at least 7 cm in total size at the 10:00 position. Tissue sampling is recommended. 2. Dysmorphic left axillary lymph node. Tissue sampling is recommended. BI-RADS Assessment Category 5: Highly suggestive of malignancy- Appropriate action should be taken. Results were discussed with the patient prior to leaving the department. The patient is scheduled for biopsy today. This study was reviewed with the aid of the [employer]  device. MAMMOGRAM POST BX CLIP PLACEMENT LEFT    Result Date: 6/14/2022  Ultrasound-guided left breast biopsy, left axillary lymph node biopsy and postbiopsy mammogram: 06/14/2022 HISTORY: Left breast mass and dysmorphic left axillary lymph node. Ultrasound guided left breast biopsy: The patient was explained the procedure informed consent was obtained. Using sonographic guidance and sterile technique, a 14-gauge biopsy device was used to obtain 3 core samples from the hypoechoic mass at the 10:00 position. The samples were placed into a formalin container and sent to the lab for analysis.  A biopsy clip was placed upon completion of sampling. The patient tolerated the procedure well. There were no immediate complications. Ultrasound guided left axillary lymph node biopsy: The patient was explained the procedure informed consent was obtained. Using sonographic guidance and sterile technique, a 14-gauge biopsy device was used to obtain 2 core samples from the dysmorphic lymph node within the left axilla. . The samples were placed into a formalin container and sent to the lab for analysis. A biopsy clip was not placed. The patient tolerated the procedure well. There were no immediate complications. Postbiopsy mammogram. CC and ML views of the left breast were obtained. The biopsy clip is confirmed within the mass at the 10:00 position. 1. Status post ultrasound guided biopsy of the hypoechoic mass at the 10:00 position left breast. 2. Status post ultrasound guided biopsy of the dysmorphic lymph node within the left axilla. [place] [facility] PET CT SKULL BASE TO MID THIGH    Result Date: 6/22/2022  PET/CT  INDICATION: Malignant neoplasm of unspecified site of left female breast; Malignant neoplasm of unspecified site of left female breast; Secondary and unspecified malignant neoplasm of axilla and upper limb lymph nodes RADIOPHARMACEUTICAL: 13.42 mCi F18-FDG, intravenously. TECHNIQUE: Imaging was performed from the skull through the proximal thighs using routine PET/CT acquisition protocol. Imaging was performed approximately 60 minutes post injection. Oral contrast was administered. Radiation dose reduction techniques were used for this study:  Our CT scanners use one or all of the following: Automated exposure control, adjustment of the mA and/or kVp according to patient's size, iterative reconstruction. SERUM GLUCOSE: 123 mg/dL prior to injection. COMPARISON: Breast MRI June 16, 2022 FINDINGS: HEAD/NECK: Symmetric uptake within the imaged brain parenchyma.  Scattered areas of mild uptake within the occipital and cervical soft tissues without convincing CT correlate with additional areas of uptake, and along the bilateral paraspinals soft tissues. No discrete enlarged/hypermetabolic cervical lymph nodes. CHEST: FDG uptake with a known left breast malignancy with SUV of 9.0. Prominent FDG avid left axillary lymph nodes largest measuring 1.3 x 2.6 cm (SUV max 5.8, image number 66). Scattered areas of FDG uptake without corresponding CT correlate throughout the superior lateral and posterior medial chest wall. No enlarged or hypermetabolic mediastinal or hilar adenopathy. No suspicious pulmonary nodules or focal parenchymal FDG uptake. ABDOMEN/PELVIS: No enlarged or hypermetabolic adenopathy. No focal uptake within the visceral organs. There is a 1.8 cm left hyperdense exophytic renal cyst without associated FDG uptake compatible with a hemorrhagic/proteinaceous cyst with additional punctate upper pole hyperdense cyst and indeterminate photopenic hypodense cyst. Physiologic uptake within the gastrointestinal tract with normal excretion of radiotracer within the renal collecting system and urinary bladder. MUSCULOSKELETAL: Hypermetabolic lytic lesion involving the T10 vertebral body (SUV max 14.0, image number 103) with sclerotic hypermetabolic lesion centered within the sacrum (SUV max 13.0, image number 189). No abnormal focal uptake within the soft tissues. 1.  Hypermetabolic soft tissue left breast mass with prominent FDG avid left axillary lymph nodes. 2.  Hypermetabolic osseous metastatic disease involving the sacrum and T10 vertebral body. 3.  Patchy areas of mild uptake throughout the cervical and paraspinal soft tissues, as well as, along the superior lateral and posterior medial chest wall favoring brown fat and less likely metastatic disease.     US BIOPSY LYMPH NODE    Result Date: 6/14/2022  Ultrasound-guided left breast biopsy, left axillary lymph node biopsy and postbiopsy mammogram: 06/14/2022 HISTORY: Left breast mass and dysmorphic left axillary lymph node. Ultrasound guided left breast biopsy: The patient was explained the procedure informed consent was obtained. Using sonographic guidance and sterile technique, a 14-gauge biopsy device was used to obtain 3 core samples from the hypoechoic mass at the 10:00 position. The samples were placed into a formalin container and sent to the lab for analysis. A biopsy clip was placed upon completion of sampling. The patient tolerated the procedure well. There were no immediate complications. Ultrasound guided left axillary lymph node biopsy: The patient was explained the procedure informed consent was obtained. Using sonographic guidance and sterile technique, a 14-gauge biopsy device was used to obtain 2 core samples from the dysmorphic lymph node within the left axilla. . The samples were placed into a formalin container and sent to the lab for analysis. A biopsy clip was not placed. The patient tolerated the procedure well. There were no immediate complications. Postbiopsy mammogram. CC and ML views of the left breast were obtained. The biopsy clip is confirmed within the mass at the 10:00 position. 1. Status post ultrasound guided biopsy of the hypoechoic mass at the 10:00 position left breast. 2. Status post ultrasound guided biopsy of the dysmorphic lymph node within the left axilla. .     US BREAST BIOPSY W LOC DEVICE 1ST LESION LEFT    Result Date: 6/14/2022  Ultrasound-guided left breast biopsy, left axillary lymph node biopsy and postbiopsy mammogram: 06/14/2022 HISTORY: Left breast mass and dysmorphic left axillary lymph node. Ultrasound guided left breast biopsy: The patient was explained the procedure informed consent was obtained. Using sonographic guidance and sterile technique, a 14-gauge biopsy device was used to obtain 3 core samples from the hypoechoic mass at the 10:00 position. The samples were placed into a formalin container and sent to the lab for analysis.  A biopsy clip was placed upon completion of sampling. The patient tolerated the procedure well. There were no immediate complications. Ultrasound guided left axillary lymph node biopsy: The patient was explained the procedure informed consent was obtained. Using sonographic guidance and sterile technique, a 14-gauge biopsy device was used to obtain 2 core samples from the dysmorphic lymph node within the left axilla. . The samples were placed into a formalin container and sent to the lab for analysis. A biopsy clip was not placed. The patient tolerated the procedure well. There were no immediate complications. Postbiopsy mammogram. CC and ML views of the left breast were obtained. The biopsy clip is confirmed within the mass at the 10:00 position. 1. Status post ultrasound guided biopsy of the hypoechoic mass at the 10:00 position left breast. 2. Status post ultrasound guided biopsy of the dysmorphic lymph node within the left axilla. [facility] Friend [facility]ACHARY DIGITAL DIAGNOSTIC UNILATERAL LEFT    Result Date: 6/14/2022  DIAGNOSTIC MAMMOGRAM LEFT BREAST WITH TOMOSYNTHESIS, LEFT BREAST ULTRASOUND HISTORY: Further evaluation of left breast mass and calcifications. Spot compression  tomosynthesis views in the CC and MLO projections of the left breast was performed in addition to a full 90 degree mediolateral tomosynthesis view. Additional magnification views were also submitted. Review of mammographic images of the left breast prior examinations dated 05/20/2022, 07/20/2014 was performed. FINDING: Confirmed is the spiculated mass at approximately 10:00 position left breast posterior depth. The mass measures approximately 4 cm in size. There are pleomorphic calcifications extending anteriorly from the mass towards the nipple. Including the mass and calcifications, the abnormality measures approximately 7 cm in size. A left breast ultrasound is recommended. Left breast ultrasound:  There is a hypoechoic mass with ill-defined angular margins at the 10:00 position left breast 12 cm from nipple. This is estimated to measure approximately 5.0 x 3.2 x 4.5 cm. There is posterior acoustic shadowing. Evaluation of left axilla reveals a dysmorphic lymph node measuring 2.6 cm in maximal dimension. IMPRESSION: 1. Spiculated left breast mass with associated microcalcifications measuring at least 7 cm in total size at the 10:00 position. Tissue sampling is recommended. 2. Dysmorphic left axillary lymph node. Tissue sampling is recommended. BI-RADS Assessment Category 5: Highly suggestive of malignancy- Appropriate action should be taken. Results were discussed with the patient prior to leaving the department. The patient is scheduled for biopsy today. This study was reviewed with the aid of the [employer]  device. Pathology:  DIAGNOSIS        A:  \"LEFT BREAST, 10:00 POSITION, 12 CM FROM NIPPLE, CORE BIOPSY\":        INFILTRATING DUCTAL CARCINOMA WITH LOBULAR FEATURES, LOW GRADE (WELL   DIFFERENTIATED). DEFINITE IN SITU COMPONENT AND LYMPHOVASCULAR INVASION   ARE NOT IDENTIFIED          B:  \"LEFT AXILLA, CORE BIOPSY\":        MACRO METASTATIC CARCINOMA SIMILAR TO A INVOLVING LYMPH NODE.  WHILE   DEFINITE EXTRACAPSULAR EXTENSION IS NOT IDENTIFIED IN THIS MATERIAL, IT   COULD BE PRESENT IN UNSAMPLED LYMPH NODE         Procedures/Addenda                     [facility]- ER/NC/[id number] BY IHC                                                                                                                                         Status:  Signed Out    Saumya Grayson MD on 6/20/2022                                 Interpretation                       [employer] IHC Quantitative Breast Panel     TEST NAME:                         RESULTS:               INTERNAL   CONTROLS:     ESTROGEN RECEPTOR:               Positive (93%)               Absent   PROGESTERONE RECEPTOR:          Negative (0.0%)          Absent   HER-2/ARYA:                         Negative (0)               Percentage of Cells with Uniform        Intense Complete Membrane   Stainin%       DIAGNOSIS        \"BONE LESION\":  METASTATIC CARCINOMA, CONSISTENT WITH BREAST ORIGIN. Procedures/Addenda                     [facility]-IMMUNOHISTOCHEMISTRY                                                                                                                                         Status:  Signed Out    Charbel Betancur MD on 2022                                 Interpretation                       Immunohistochemical Stain Panel:          Interpretation:  Immunohistochemical findings consistent with   metastatic carcinoma of breast origin. Antibody/Test               Marker For                              Result   Cytokeratin 7               Lung, breast, upper GE, serous ovary                 Positive   Cytokeratin 20               Colon, mucinous ovary, urothelium                  Negative   MINO 3               Urothelial, breast carcinoma                         Positive   GCDFP-15               Breast, salivary gland, skin, adnexa                 Positive           ASSESSMENT:   Diagnosis Orders   1. Malignant neoplasm of left breast in female, estrogen receptor positive, unspecified site of breast ([facility] [address]        2. Metastasis to bone [facility])            Patient Active Problem List   Diagnosis    Hypertension    Kidney stone    Cyst, kidney, acquired    Malignant neoplasm of left breast in female, estrogen receptor positive ([facility] [address]    Metastasis to bone [facility])           PLAN:  Lab studies were personally reviewed. Breast cancer: left breast, low grade IDC with lobular features, 3.7 cm with biopsy proven axillary lymphadenopathy, two osseous lesions on PET (T10 and sacrum) worrisome for metastatic disease, T10 now biopsy proven metastatic carcinoma consistent with breast origin. ER positive, ME negative, HER2 0. I discussed the results with MsSilas Leodan.   Unfortunately, she will not be a candidate for curative therapy and should start palliative systemic therapy, I would recommend letrozole and ribociclib. She is postmenopausal so she will not need ovarian function suppression. She will also require denosumab or zoledronic acid depending on insurance approval.  We will attempt to perform NGS on the metastatic specimen to screen for biomarker expression including PI3KCA, BRCA, PDL-1 and others. Here for follow-up prior to starting next cycle of Ibrance. She will begin cycle 2 tomorrow. She has tolerated therapy well thus far. She initially had nausea but this has resolved. She has had some diarrhea but has not had to take anything. She continues on femara and this is tolerable although she does have some hotflashes. Her appetite has been good. No pain reported today. Energy level is good, ECOG 0. She has ongoing lingering productive cough following a recent URI but no fevers or infectious symptoms. Labs reviewed and unremarkable, ANC 1400. She will proceed with Esther Baltimore tomorrow and continue Femara without change. She will receive Xgeva today. All questions were asked and answered to the best of my ability. F/u in 4 weeks for OV, 4 weeks for PET/CT. [facility]  [address] [place] [address]  Office : [phone]  Fax : [phone]    Oral Chemotherapy Adherence:     Current Regimen:  Drug Name: Esther Baltimore  Dose: 125mg  Frequency: daily    Barriers to care identified including (financial, physical, psychosocial) : No    Missed doses reported: No    Patient verbalizes understanding of what to do in the event of a missed dose:  Yes    Adverse reactions/toxicities reported:None, See HPI

## 2022-09-20 NOTE — PROGRESS NOTES
[facility] Assessment completed. No needs voiced at this time. Patient tolerated injection well and is aware of next appointment on 10/4/2022 @1546. Patient discharged ambulatory.

## 2022-09-20 NOTE — PROGRESS NOTES
Behavioral Health - Progress Note          Name: Asher Levine  : 1970  MRN: [id number]  Date of Service: 2022  Location of Service: [place] for both provider and patient        Type of Service: Individual Therapy    Reason for Visit: Follow Up     Chief Complaint: Anxiety and its commodities due to distress caused by cancer diagnosis and treatment. Subjective:  Patient LISW-CP used Dialectical Behavioral and Cognitive Behavioral Therapy, tailored to patient's need. Patient denied any plan or intent to hurt self or others. Patient was instructed, in case of emergency, to call 911 or [facility] in [place], [place]. Patient verbalized agreement. CBT Material discussed and reviewed in Session:     2022   Parasympathetic and Sympathetic System  Window of Tolerance  Neuroplasticity, Mindfulness, and Neurotransmitters   The Effects of Stress In The Brain and Body  Daily Mindfulness  Daily Mindfulness # 4  Daily Mindfulness # 7  Assignment  Sleep Hygiene   Half-Smile, Willing Hands, and Anabaptism Imagery  Body Scan  Progressive Muscle Relaxation     2022   Benefits of Diaphragmatic Breathing  Assignment  Diaphragmatic Breathing Exercises - [id number] (2022 Patient did not practice)    2022   Ethical Will Letter      NEXT and FEAR  Benefits of Mindfulness  Mindfulness Exercises  Anxiety, stress, worry and depression symptoms   Gut-Brain Axis  How to Naturally Activate the Vagus Nerve    _________________________________________________    Clinical Impression: Asher Levine is a 46 y.o. female . Mental Status Exam, patient was dressed properly. No abnormal psychomotor movements observed. Intellectual functioning appeared to be intact. Mood and affect were congruent. Insight was adequate. Judgment was adequate. Speech was normal, clear. Thought process was coherent. Patient did not report suicidal or homicidal ideations, intent or plans.  Patient denied self-injury behaviors. Patient denied alcohol and other substance abuse. Patient was oriented to the four spheres: self, place, time and situation. Patient response to intervention was appropriate. Patient progress was adequate. Protective factor: self-determination. __________________________________________________    Session scheduled for: 8:00  pm/am, patient was on time     Session started:  8:00    Session ended:   9:00  _________________________________________________    Patient Diagnosis:         PHQ 9:  3   - to be scanned into EMR.  0-4 Suggests the patient may not need depression treatment  5-14 Mild major depressive disorder. 15-19 Moderate-major depressive disorder. 20+ Severe major depressive disorders.      ANNIE 7:   1  - to be scanned into EMR.  0-4: minimal anxiety   5-9: mild anxiety   10-14: moderate anxiety   15-21: severe anxiety   __________________________________________________    Frequency: Weekly or bi-weekly appointments as schedule permits    Patient's Primary Goal: Decrease anxiety   __________________________________________________    Plans:  Continue to use Cognitive Behavioral Approach  Continue to work with patient on primary goal.    Continue to see patient, weekly or bi-weekly  __________________________________________________    [facility]

## 2022-09-25 ENCOUNTER — PATIENT MESSAGE (OUTPATIENT)
Dept: FAMILY MEDICINE CLINIC | Facility: CLINIC | Age: 52
End: 2022-09-25

## 2022-09-26 RX ORDER — CHLORTHALIDONE 25 MG/1
TABLET ORAL
Qty: 90 TABLET | Refills: 1 | Status: SHIPPED | OUTPATIENT
Start: 2022-09-26

## 2022-09-26 RX ORDER — OLMESARTAN MEDOXOMIL 40 MG/1
TABLET ORAL
Qty: 90 TABLET | Refills: 1 | Status: SHIPPED | OUTPATIENT
Start: 2022-09-26

## 2022-09-26 NOTE — TELEPHONE ENCOUNTER
From: Pilar Or  To: Dr. Grant King: 9/25/2022 2:04 PM EDT  Subject: Medication refill     Hi Dr. Keiry Arciniega, the pharmacist just let me know that I am out of refills for both blood pressure meds, olmsarten and chlorothalidone. If you dont mind refilling these two meds, that would be great. Thank you so much.   Pilar Or

## 2022-10-04 ENCOUNTER — HOSPITAL ENCOUNTER (OUTPATIENT)
Dept: LAB | Age: 52
Discharge: HOME | End: 2022-10-07
Payer: COMMERCIAL

## 2022-10-04 ENCOUNTER — OFFICE VISIT (OUTPATIENT)
Dept: ONCOLOGY | Age: 52
End: 2022-10-04
Payer: COMMERCIAL

## 2022-10-04 DIAGNOSIS — C80.1 ANXIETY ASSOCIATED WITH CANCER DIAGNOSIS (HCC): Primary | ICD-10-CM

## 2022-10-04 DIAGNOSIS — Z17.0 MALIGNANT NEOPLASM OF LEFT BREAST IN FEMALE, ESTROGEN RECEPTOR POSITIVE, UNSPECIFIED SITE OF BREAST (HCC): ICD-10-CM

## 2022-10-04 DIAGNOSIS — C50.912 MALIGNANT NEOPLASM OF LEFT BREAST IN FEMALE, ESTROGEN RECEPTOR POSITIVE, UNSPECIFIED SITE OF BREAST (HCC): ICD-10-CM

## 2022-10-04 DIAGNOSIS — C79.51 METASTASIS TO BONE (HCC): ICD-10-CM

## 2022-10-04 DIAGNOSIS — F41.1 ANXIETY ASSOCIATED WITH CANCER DIAGNOSIS (HCC): Primary | ICD-10-CM

## 2022-10-04 LAB
ALBUMIN SERPL-MCNC: 4 G/DL (ref 3.5–5)
ALBUMIN/GLOB SERPL: 1 {RATIO} (ref 1.2–3.5)
ALP SERPL-CCNC: 78 U/L (ref 50–136)
ALT SERPL-CCNC: 62 U/L (ref 12–65)
ANION GAP SERPL CALC-SCNC: 5 MMOL/L (ref 4–13)
AST SERPL-CCNC: 30 U/L (ref 15–37)
BASOPHILS # BLD: 0.1 K/UL (ref 0–0.2)
BASOPHILS NFR BLD: 2 % (ref 0–2)
BILIRUB SERPL-MCNC: 0.4 MG/DL (ref 0.2–1.1)
BUN SERPL-MCNC: 15 MG/DL (ref 6–23)
CALCIUM SERPL-MCNC: 9.3 MG/DL (ref 8.3–10.4)
CHLORIDE SERPL-SCNC: 105 MMOL/L (ref 101–110)
CO2 SERPL-SCNC: 28 MMOL/L (ref 21–32)
CREAT SERPL-MCNC: 1 MG/DL (ref 0.6–1)
DIFFERENTIAL METHOD BLD: ABNORMAL
EOSINOPHIL # BLD: 0 K/UL (ref 0–0.8)
EOSINOPHIL NFR BLD: 1 % (ref 0.5–7.8)
ERYTHROCYTE [DISTWIDTH] IN BLOOD BY AUTOMATED COUNT: 16.7 % (ref 11.9–14.6)
GLOBULIN SER CALC-MCNC: 3.9 G/DL (ref 2.3–3.5)
GLUCOSE SERPL-MCNC: 112 MG/DL (ref 65–100)
HCT VFR BLD AUTO: 38.5 % (ref 35.8–46.3)
HGB BLD-MCNC: 13.2 G/DL (ref 11.7–15.4)
IMM GRANULOCYTES # BLD AUTO: 0 K/UL (ref 0–0.5)
IMM GRANULOCYTES NFR BLD AUTO: 0 % (ref 0–5)
LYMPHOCYTES # BLD: 1.3 K/UL (ref 0.5–4.6)
LYMPHOCYTES NFR BLD: 46 % (ref 13–44)
MAGNESIUM SERPL-MCNC: 1.9 MG/DL (ref 1.8–2.4)
MCH RBC QN AUTO: 33.2 PG (ref 26.1–32.9)
MCHC RBC AUTO-ENTMCNC: 34.3 G/DL (ref 31.4–35)
MCV RBC AUTO: 96.7 FL (ref 79.6–97.8)
MONOCYTES # BLD: 0.1 K/UL (ref 0.1–1.3)
MONOCYTES NFR BLD: 5 % (ref 4–12)
NEUTS SEG # BLD: 1.4 K/UL (ref 1.7–8.2)
NEUTS SEG NFR BLD: 46 % (ref 43–78)
NRBC # BLD: 0 K/UL (ref 0–0.2)
PLATELET # BLD AUTO: 341 K/UL (ref 150–450)
PLATELET COMMENT: ADEQUATE
PMV BLD AUTO: 9.5 FL (ref 9.4–12.3)
POTASSIUM SERPL-SCNC: 3.4 MMOL/L (ref 3.5–5.1)
PROT SERPL-MCNC: 7.9 G/DL (ref 6.3–8.2)
RBC # BLD AUTO: 3.98 M/UL (ref 4.05–5.2)
RBC MORPH BLD: ABNORMAL
SODIUM SERPL-SCNC: 138 MMOL/L (ref 136–145)
WBC # BLD AUTO: 2.9 K/UL (ref 4.3–11.1)
WBC MORPH BLD: ABNORMAL

## 2022-10-04 PROCEDURE — 36415 COLL VENOUS BLD VENIPUNCTURE: CPT

## 2022-10-04 PROCEDURE — 80053 COMPREHEN METABOLIC PANEL: CPT

## 2022-10-04 PROCEDURE — 85025 COMPLETE CBC W/AUTO DIFF WBC: CPT

## 2022-10-04 PROCEDURE — 83735 ASSAY OF MAGNESIUM: CPT

## 2022-10-04 PROCEDURE — 90837 PSYTX W PT 60 MINUTES: CPT | Performed by: SOCIAL WORKER

## 2022-10-04 NOTE — PATIENT INSTRUCTIONS
To reschedule your appointment, please call [phone].    In case of emergency, please, call 911 or [facility] in [place].

## 2022-10-04 NOTE — PROGRESS NOTES
Behavioral Health - Progress Note          Name: Estela Balderas  : 1970  MRN: [id number]  Date of Service: 10/4/2022  Location of Service: [place] for both provider and patient        Type of Service: Individual Therapy       Reason for Visit: Follow Up     Chief Complaint: Anxiety and its commodities due to distress caused by cancer diagnosis and treatment. Subjective:  Patient verbalized understanding. LISW-CP used Dialectical Behavioral and Cognitive Behavioral Therapy, tailored to patient's need. Patient denied any plan or intent to hurt self or others. Patient was instructed, in case of emergency, to call 911 or [facility] in [place], [place]. Patient verbalized agreement. 2022   Parasympathetic and Sympathetic System  Window of Tolerance  Neuroplasticity, Mindfulness, and Neurotransmitters   The Effects of Stress In The Brain and Body  Daily Mindfulness  Daily Mindfulness # 4  Daily Mindfulness # 7  Assignment  Sleep Hygiene   Half-Smile, Willing Hands, and Islam Imagery  Body Scan  Progressive Muscle Relaxation     2022   Benefits of Diaphragmatic Breathing  Assignment  Diaphragmatic Breathing Exercises - [id number] (2022 Patient did not practice)     2022   Ethical Will Letter     10/4/2022 : Material discussed, reviewed, and reinforced in Session:   Ethical Will Letter   Benefits of Mindfulness  Mindfulness Exercises  Anxiety, stress, worry and depression symptoms   Gut-Brain Axis  Diaphragmatic Breathing Exercises  Sleep Hygiene   Half-Smile, Willing Hands, and Islam Imagery  Body Scan  Progressive Muscle Relaxation    _________________________________________________    Clinical Impression: Estela Balderas is a 46 y.o. female . Mental Status Exam, patient was dressed properly. No abnormal psychomotor movements observed. Intellectual functioning appeared to be intact. Mood and affect were congruent. Insight was adequate. Judgment was adequate.  Speech was normal, clear. Thought process was coherent. Patient did not report suicidal or homicidal ideations, intent or plans. Patient denied self-injury behaviors. Patient denied alcohol and other substance abuse. Patient was oriented to the four spheres: self, place, time and situation. Patient response to intervention was appropriate. Patient progress was adequate. Protective factor: self-determination. __________________________________________________    Session scheduled for: 10:00  pm/am, patient was on time     Session started:  10:00    Session ended:  11:00  _________________________________________________    Patient Diagnosis:         PHQ 9:  1   - to be scanned into EMR.  0-4 Suggests the patient may not need depression treatment  5-14 Mild major depressive disorder. 15-19 Moderate-major depressive disorder. 20+ Severe major depressive disorders.      ANNIE 7:   1  - to be scanned into EMR.  0-4: minimal anxiety   5-9: mild anxiety   10-14: moderate anxiety   15-21: severe anxiety   __________________________________________________    Frequency: Weekly or bi-weekly appointments as schedule permits    Patient's Primary Goal: Decrease anxiety   __________________________________________________    Plans:  Continue to use Cognitive Behavioral Approach  Continue to work with patient on primary goal.    Continue to see patient, weekly or bi-weekly  __________________________________________________    Edi Crawford

## 2022-10-07 RX ORDER — POTASSIUM CHLORIDE 20 MEQ/1
20 TABLET, EXTENDED RELEASE ORAL 2 TIMES DAILY
Qty: 60 TABLET | Refills: 1 | Status: SHIPPED | OUTPATIENT
Start: 2022-10-07

## 2022-10-12 ENCOUNTER — HOSPITAL ENCOUNTER (OUTPATIENT)
Dept: PET IMAGING | Age: 52
Discharge: HOME | End: 2022-10-15
Payer: COMMERCIAL

## 2022-10-12 DIAGNOSIS — C50.912 MALIGNANT NEOPLASM OF LEFT BREAST IN FEMALE, ESTROGEN RECEPTOR POSITIVE, UNSPECIFIED SITE OF BREAST (HCC): ICD-10-CM

## 2022-10-12 DIAGNOSIS — Z17.0 MALIGNANT NEOPLASM OF LEFT BREAST IN FEMALE, ESTROGEN RECEPTOR POSITIVE, UNSPECIFIED SITE OF BREAST (HCC): ICD-10-CM

## 2022-10-12 LAB
GLUCOSE BLD STRIP.AUTO-MCNC: 100 MG/DL (ref 65–100)
SERVICE CMNT-IMP: NORMAL

## 2022-10-12 PROCEDURE — 82962 GLUCOSE BLOOD TEST: CPT

## 2022-10-12 PROCEDURE — 78815 PET IMAGE W/CT SKULL-THIGH: CPT

## 2022-10-12 PROCEDURE — 3430000000 HC RX DIAGNOSTIC RADIOPHARMACEUTICAL: Performed by: INTERNAL MEDICINE

## 2022-10-12 PROCEDURE — 2580000003 HC RX 258: Performed by: INTERNAL MEDICINE

## 2022-10-12 PROCEDURE — A9552 F18 FDG: HCPCS | Performed by: INTERNAL MEDICINE

## 2022-10-12 PROCEDURE — 6360000004 HC RX CONTRAST MEDICATION: Performed by: INTERNAL MEDICINE

## 2022-10-12 RX ORDER — FLUDEOXYGLUCOSE F 18 200 MCI/ML
14.04 INJECTION, SOLUTION INTRAVENOUS
Status: COMPLETED | OUTPATIENT
Start: 2022-10-12 | End: 2022-10-12

## 2022-10-12 RX ORDER — SODIUM CHLORIDE 0.9 % (FLUSH) 0.9 %
20 SYRINGE (ML) INJECTION AS NEEDED
Status: DISCONTINUED | OUTPATIENT
Start: 2022-10-12 | End: 2022-10-16 | Stop reason: HOSPADM

## 2022-10-12 RX ADMIN — DIATRIZOATE MEGLUMINE AND DIATRIZOATE SODIUM 10 ML: 660; 100 LIQUID ORAL; RECTAL at 09:44

## 2022-10-12 RX ADMIN — SODIUM CHLORIDE, PRESERVATIVE FREE 20 ML: 5 INJECTION INTRAVENOUS at 09:44

## 2022-10-12 RX ADMIN — FLUDEOXYGLUCOSE F 18 14.04 MILLICURIE: 200 INJECTION, SOLUTION INTRAVENOUS at 09:44

## 2022-10-19 ENCOUNTER — OFFICE VISIT (OUTPATIENT)
Dept: ONCOLOGY | Age: 52
End: 2022-10-19
Payer: COMMERCIAL

## 2022-10-19 ENCOUNTER — HOSPITAL ENCOUNTER (OUTPATIENT)
Dept: LAB | Age: 52
Discharge: HOME | End: 2022-10-22
Payer: COMMERCIAL

## 2022-10-19 ENCOUNTER — HOSPITAL ENCOUNTER (OUTPATIENT)
Dept: INFUSION THERAPY | Age: 52
Discharge: HOME | End: 2022-10-19
Payer: COMMERCIAL

## 2022-10-19 VITALS
DIASTOLIC BLOOD PRESSURE: 83 MMHG | BODY MASS INDEX: 31.58 KG/M2 | WEIGHT: 185 LBS | SYSTOLIC BLOOD PRESSURE: 131 MMHG | OXYGEN SATURATION: 98 % | TEMPERATURE: 98 F | HEIGHT: 64 IN | RESPIRATION RATE: 16 BRPM | HEART RATE: 80 BPM

## 2022-10-19 DIAGNOSIS — Z17.0 MALIGNANT NEOPLASM OF LEFT BREAST IN FEMALE, ESTROGEN RECEPTOR POSITIVE, UNSPECIFIED SITE OF BREAST (HCC): Primary | ICD-10-CM

## 2022-10-19 DIAGNOSIS — C50.912 MALIGNANT NEOPLASM OF LEFT BREAST IN FEMALE, ESTROGEN RECEPTOR POSITIVE, UNSPECIFIED SITE OF BREAST (HCC): Primary | ICD-10-CM

## 2022-10-19 DIAGNOSIS — Z17.0 MALIGNANT NEOPLASM OF LEFT BREAST IN FEMALE, ESTROGEN RECEPTOR POSITIVE, UNSPECIFIED SITE OF BREAST (HCC): ICD-10-CM

## 2022-10-19 DIAGNOSIS — C50.912 MALIGNANT NEOPLASM OF LEFT BREAST IN FEMALE, ESTROGEN RECEPTOR POSITIVE, UNSPECIFIED SITE OF BREAST (HCC): ICD-10-CM

## 2022-10-19 DIAGNOSIS — C79.51 METASTASIS TO BONE (HCC): ICD-10-CM

## 2022-10-19 LAB
ALBUMIN SERPL-MCNC: 4.1 G/DL (ref 3.5–5)
ALBUMIN/GLOB SERPL: 1.1 {RATIO} (ref 0.4–1.6)
ALP SERPL-CCNC: 81 U/L (ref 50–136)
ALT SERPL-CCNC: 59 U/L (ref 12–65)
ANION GAP SERPL CALC-SCNC: 7 MMOL/L (ref 2–11)
AST SERPL-CCNC: 28 U/L (ref 15–37)
BASOPHILS # BLD: 0.1 K/UL (ref 0–0.2)
BASOPHILS NFR BLD: 1 % (ref 0–2)
BILIRUB SERPL-MCNC: 0.2 MG/DL (ref 0.2–1.1)
BUN SERPL-MCNC: 16 MG/DL (ref 6–23)
CALCIUM SERPL-MCNC: 9.5 MG/DL (ref 8.3–10.4)
CANCER AG15-3 SERPL-ACNC: 50.4 U/ML (ref 1–35)
CHLORIDE SERPL-SCNC: 107 MMOL/L (ref 101–110)
CO2 SERPL-SCNC: 26 MMOL/L (ref 21–32)
CREAT SERPL-MCNC: 0.8 MG/DL (ref 0.6–1)
DIFFERENTIAL METHOD BLD: ABNORMAL
EOSINOPHIL # BLD: 0 K/UL (ref 0–0.8)
EOSINOPHIL NFR BLD: 1 % (ref 0.5–7.8)
ERYTHROCYTE [DISTWIDTH] IN BLOOD BY AUTOMATED COUNT: 17.3 % (ref 11.9–14.6)
GLOBULIN SER CALC-MCNC: 3.9 G/DL (ref 2.8–4.5)
GLUCOSE SERPL-MCNC: 88 MG/DL (ref 65–100)
HCT VFR BLD AUTO: 36.2 % (ref 35.8–46.3)
HGB BLD-MCNC: 12.8 G/DL (ref 11.7–15.4)
IMM GRANULOCYTES # BLD AUTO: 0 K/UL (ref 0–0.5)
IMM GRANULOCYTES NFR BLD AUTO: 1 % (ref 0–5)
LYMPHOCYTES # BLD: 2 K/UL (ref 0.5–4.6)
LYMPHOCYTES NFR BLD: 47 % (ref 13–44)
MAGNESIUM SERPL-MCNC: 1.9 MG/DL (ref 1.8–2.4)
MCH RBC QN AUTO: 34.7 PG (ref 26.1–32.9)
MCHC RBC AUTO-ENTMCNC: 35.4 G/DL (ref 31.4–35)
MCV RBC AUTO: 98.1 FL (ref 82–102)
MONOCYTES # BLD: 0.5 K/UL (ref 0.1–1.3)
MONOCYTES NFR BLD: 12 % (ref 4–12)
NEUTS SEG # BLD: 1.6 K/UL (ref 1.7–8.2)
NEUTS SEG NFR BLD: 38 % (ref 43–78)
NRBC # BLD: 0 K/UL (ref 0–0.2)
PHOSPHATE SERPL-MCNC: 3.5 MG/DL (ref 2.5–4.5)
PLATELET # BLD AUTO: 220 K/UL (ref 150–450)
PMV BLD AUTO: 9.5 FL (ref 9.4–12.3)
POTASSIUM SERPL-SCNC: 3.5 MMOL/L (ref 3.5–5.1)
PROT SERPL-MCNC: 8 G/DL (ref 6.3–8.2)
RBC # BLD AUTO: 3.69 M/UL (ref 4.05–5.2)
SODIUM SERPL-SCNC: 140 MMOL/L (ref 133–143)
WBC # BLD AUTO: 4.2 K/UL (ref 4.3–11.1)

## 2022-10-19 PROCEDURE — 80053 COMPREHEN METABOLIC PANEL: CPT

## 2022-10-19 PROCEDURE — 85025 COMPLETE CBC W/AUTO DIFF WBC: CPT

## 2022-10-19 PROCEDURE — 96372 THER/PROPH/DIAG INJ SC/IM: CPT

## 2022-10-19 PROCEDURE — 84100 ASSAY OF PHOSPHORUS: CPT

## 2022-10-19 PROCEDURE — 36415 COLL VENOUS BLD VENIPUNCTURE: CPT

## 2022-10-19 PROCEDURE — 86300 IMMUNOASSAY TUMOR CA 15-3: CPT

## 2022-10-19 PROCEDURE — 83735 ASSAY OF MAGNESIUM: CPT

## 2022-10-19 PROCEDURE — 99214 OFFICE O/P EST MOD 30 MIN: CPT | Performed by: INTERNAL MEDICINE

## 2022-10-19 PROCEDURE — 6360000002 HC RX W HCPCS: Performed by: INTERNAL MEDICINE

## 2022-10-19 RX ORDER — ALBUTEROL SULFATE 90 UG/1
4 AEROSOL, METERED RESPIRATORY (INHALATION) PRN
OUTPATIENT
Start: 2022-11-13

## 2022-10-19 RX ORDER — ACETAMINOPHEN 325 MG/1
650 TABLET ORAL
OUTPATIENT
Start: 2022-11-13

## 2022-10-19 RX ORDER — DIPHENHYDRAMINE HYDROCHLORIDE 50 MG/ML
50 INJECTION INTRAMUSCULAR; INTRAVENOUS
OUTPATIENT
Start: 2022-11-13

## 2022-10-19 RX ORDER — SODIUM CHLORIDE 9 MG/ML
INJECTION, SOLUTION INTRAVENOUS CONTINUOUS
OUTPATIENT
Start: 2022-11-13

## 2022-10-19 RX ORDER — EPINEPHRINE 1 MG/ML
0.3 INJECTION, SOLUTION, CONCENTRATE INTRAVENOUS PRN
OUTPATIENT
Start: 2022-11-13

## 2022-10-19 RX ORDER — LETROZOLE 2.5 MG/1
2.5 TABLET, FILM COATED ORAL DAILY
Qty: 30 TABLET | Refills: 3 | Status: SHIPPED | OUTPATIENT
Start: 2022-10-19

## 2022-10-19 RX ORDER — ONDANSETRON 2 MG/ML
8 INJECTION INTRAMUSCULAR; INTRAVENOUS
OUTPATIENT
Start: 2022-11-13

## 2022-10-19 RX ADMIN — DENOSUMAB 120 MG: 120 INJECTION SUBCUTANEOUS at 10:37

## 2022-10-19 ASSESSMENT — PATIENT HEALTH QUESTIONNAIRE - PHQ9
SUM OF ALL RESPONSES TO PHQ QUESTIONS 1-9: 0
1. LITTLE INTEREST OR PLEASURE IN DOING THINGS: 0
4. FEELING TIRED OR HAVING LITTLE ENERGY: 0
3. TROUBLE FALLING OR STAYING ASLEEP: 0
SUM OF ALL RESPONSES TO PHQ9 QUESTIONS 1 & 2: 0
6. FEELING BAD ABOUT YOURSELF - OR THAT YOU ARE A FAILURE OR HAVE LET YOURSELF OR YOUR FAMILY DOWN: 0
7. TROUBLE CONCENTRATING ON THINGS, SUCH AS READING THE NEWSPAPER OR WATCHING TELEVISION: 0
8. MOVING OR SPEAKING SO SLOWLY THAT OTHER PEOPLE COULD HAVE NOTICED. OR THE OPPOSITE, BEING SO FIGETY OR RESTLESS THAT YOU HAVE BEEN MOVING AROUND A LOT MORE THAN USUAL: 0
2. FEELING DOWN, DEPRESSED OR HOPELESS: 0
SUM OF ALL RESPONSES TO PHQ QUESTIONS 1-9: 0
9. THOUGHTS THAT YOU WOULD BE BETTER OFF DEAD, OR OF HURTING YOURSELF: 0
5. POOR APPETITE OR OVEREATING: 0

## 2022-10-19 ASSESSMENT — ANXIETY QUESTIONNAIRES
1. FEELING NERVOUS, ANXIOUS, OR ON EDGE: 0
GAD7 TOTAL SCORE: 0
2. NOT BEING ABLE TO STOP OR CONTROL WORRYING: 0
6. BECOMING EASILY ANNOYED OR IRRITABLE: 0
IF YOU CHECKED OFF ANY PROBLEMS ON THIS QUESTIONNAIRE, HOW DIFFICULT HAVE THESE PROBLEMS MADE IT FOR YOU TO DO YOUR WORK, TAKE CARE OF THINGS AT HOME, OR GET ALONG WITH OTHER PEOPLE: NOT DIFFICULT AT ALL
5. BEING SO RESTLESS THAT IT IS HARD TO SIT STILL: 0
7. FEELING AFRAID AS IF SOMETHING AWFUL MIGHT HAPPEN: 0
3. WORRYING TOO MUCH ABOUT DIFFERENT THINGS: 0
4. TROUBLE RELAXING: 0

## 2022-10-19 NOTE — PATIENT INSTRUCTIONS
Patient Instructions from Today's Visit    Reason for Visit:  Follow up, breast cancer    Diagnosis Information:  [email or web address]      Plan: Your PET scan looks good! Continue taking the Ibrance and Letrozole as tolerated. Sarai Saravia today as well. Follow Up: In 3 months with a PET scan prior   -monthly xgeva injections    Recent Lab Results:  N/a    Treatment Summary has been discussed and given to patient: n/a        -------------------------------------------------------------------------------------------------------------------  Please call our office at [phone] if you have any  of the following symptoms:   Fever of 100.5 or greater  Chills  Shortness of breath  Swelling or pain in one leg    After office hours an answering service is available and will contact a provider for emergencies or if you are experiencing any of the above symptoms. Patient does express an interest in My Chart. My Chart log in information explained on the after visit summary printout at the [facility] [address] desk.     AISHA Osorio

## 2022-10-19 NOTE — PROGRESS NOTES
[facility]: Office Visit Established Patient    Chief Complaint:    Chief Complaint   Patient presents with    Follow-up         History of Present Illness:  Ms. Scott Cartagena is a 46 y.o. female who presents today for follow-up regarding metastatic breast cancer. On 6/2/22, Ms. Alcocer presented for her routine screening mammogram. The imaging reported a left breast mass with associated calcifications, worrisome for malignancy. On 6/14/22 she underwent a left breast diagnostic mammogram and ultrasound with biopsy of the breast mass and a dysmorphic left axillary lymph node. The left breast and axillary node pathology showed infiltrating ductal carcinoma with lobular features, low grade (well differentiated), ER 93%, MA 0%, and HER-2 0. On 6/16/22 she had a bilateral breast MRI that reported the known left breast cancer measuring up to 3.7 cm, with associated left axillary lymphadenopathy however no suspicious findings in the right breast. On 6/22/22 she had a PET/CT scan that showed the breast and axillary node, but also showed hypermetabolic osseous metastatic disease in the sacrum and T10 vertebral body. We recommended biopsy of a suspicious osseous lesion which confirmed mBC. Unfortunately, she will not be a candidate for curative therapy and should start palliative systemic therapy, I would recommend letrozole and ribociclib. She is postmenopausal so she will not need ovarian function suppression. She will also require denosumab or zoledronic acid depending on insurance approval.  We will attempt to perform NGS on the metastatic specimen to screen for biomarker expression including PI3KCA, BRCA, PDL-1 and others. Here for follow-up and restaging. She is doing very well overall. She is tolerating letrozole and Ibrance with no obvious side effects. She is having weight gain which may be related to endocrine therapy.   She notes occasional pains (left big toe, left flank) but sacral pain is resolved. She does have muscle cramps and has doubled her potassium replacement while waiting for this appointment. No infectious symptoms. Review of Systems:  Constitutional: Negative. HENT: Negative. Eyes: Negative. Respiratory: Negative. Cardiovascular: Negative. Gastrointestinal: Negative. Genitourinary: Negative. Musculoskeletal: Negative. Skin: Negative. Neurological: Negative. Endo/Heme/Allergies: Negative. Psychiatric/Behavioral: Negative. All other systems reviewed and are negative. No Known Allergies  Past Medical History:   Diagnosis Date    Cyst, kidney, acquired     found on previous scan    Hypertension     Kidney stone      Past Surgical History:   Procedure Laterality Date    APPENDECTOMY      CT BIOPSY PERCUTANEOUS SUPERFICIAL BONE  7/1/2022    CT BIOPSY PERCUTANEOUS SUPERFICIAL BONE 7/1/2022 [facility] CT SCAN    US BREAST NEEDLE BIOPSY LEFT Left 6/14/2022    US BREAST NEEDLE BIOPSY LEFT 6/14/2022 Dorinda Stauffer MD [facility] MAMMO    US LYMPH NODE BIOPSY  6/14/2022    US LYMPH NODE BIOPSY 6/14/2022 Dorinda Stauffer MD [facility]AM[facility]    WISDOM TOOTH EXTRACTION       Family History   Problem Relation Age of Onset    Hypertension Mother     Alcohol Abuse Brother     Alcohol Abuse Father     Dementia Father     Hypertension Brother     Diabetes Father      Social History     Socioeconomic History    Marital status:      Spouse name: Not on file    Number of children: Not on file    Years of education: Not on file    Highest education level: Not on file   Occupational History    Not on file   Tobacco Use    Smoking status: Never    Smokeless tobacco: Never   Vaping Use    Vaping Use: Never used   Substance and Sexual Activity    Alcohol use:  Yes     Alcohol/week: 2.0 standard drinks    Drug use: Never    Sexual activity: Not on file   Other Topics Concern    Not on file   Social History Narrative    Not on file     Social Determinants of Health     Financial Resource Strain: Low Risk     Difficulty of Paying Living Expenses: Not hard at all   Food Insecurity: Unknown    Worried About Running Out of Food in the Last Year: Never true    Ran Out of Food in the Last Year: Not on file   Transportation Needs: No Transportation Needs    Lack of Transportation (Medical): No    Lack of Transportation (Non-Medical): No   Physical Activity: Sufficiently Active    Days of Exercise per Week: 7 days    Minutes of Exercise per Session: 30 min   Stress: Stress Concern Present    Feeling of Stress :  To some extent   Social Connections: Unknown    Frequency of Communication with Friends and Family: More than three times a week    Frequency of Social Gatherings with Friends and Family: More than three times a week    Attends [employer] Services: Not on file    Active Member of Clubs or Organizations: Not on file    Attends Club or Organization Meetings: Not on file    Marital Status:    Intimate Partner Violence: Not At Risk    Fear of Current or Ex-Partner: No    Emotionally Abused: No    Physically Abused: No    Sexually Abused: No   Housing Stability: Low Risk     Unable to Pay for Housing in the Last Year: No    Number of [place] in the Last Year: 1    Unstable Housing in the Last Year: No     Current Outpatient Medications   Medication Sig Dispense Refill    potassium chloride (KLOR-CON M) 20 MEQ extended release tablet Take 1 tablet by mouth 2 times daily Take twice a day for 7 days and then decrease to one a day 60 tablet 1    chlorthalidone (HYGROTON) 25 MG tablet 1 po qam for bloos pressure 90 tablet 1    olmesartan (BENICAR) 40 MG tablet 1 po qam for blood pressure 90 tablet 1    IBRANCE 125 MG tablet       Magic Mouthwash (MIRACLE MOUTHWASH) Swish and spit 5 mLs 4 times daily as needed for Irritation 480 mL 1    ondansetron (ZOFRAN) 8 MG tablet Take 1 tablet by mouth every 8 hours as needed for Nausea or Vomiting 90 tablet 2 letrozole (FEMARA) 2.5 MG tablet Take 1 tablet by mouth daily 30 tablet 3     No current facility-administered medications for this visit. OBJECTIVE:  Ht 5' 3.5\" (1.613 m)   Wt 185 lb (83.9 kg)   BMI 32.26 kg/m²     Physical Exam:  Constitutional: Well developed, well nourished female in no acute distress, sitting comfortably in the exam room chair. HEENT: Normocephalic and atraumatic. Mucous membranes are moist.  Sclerae anicteric. Neck supple without JVD. No thyromegaly present. Lymph node   No palpable submandibular, cervical, supraclavicular lymph nodes. Skin Warm and dry. No bruising and no rash noted. No erythema. No pallor. Respiratory Lungs are clear to auscultation bilaterally without wheezes, rales or rhonchi, normal air exchange without accessory muscle use. CVS Normal rate, regular rhythm and normal S1 and S2. No murmurs, gallops, or rubs. Neuro Grossly nonfocal with no obvious sensory or motor deficits. MSK Normal range of motion in general.  No edema and no tenderness. Psych Appropriate mood and affect.           Labs:  Recent Results (from the past 24 hour(s))   CBC with Auto Differential    Collection Time: 10/19/22  9:27 AM   Result Value Ref Range    WBC 4.2 (L) 4.3 - 11.1 K/uL    RBC 3.69 (L) 4.05 - 5.2 M/uL    Hemoglobin 12.8 11.7 - 15.4 g/dL    Hematocrit 36.2 35.8 - 46.3 %    MCV 98.1 82.0 - 102.0 FL    MCH 34.7 (H) 26.1 - 32.9 PG    MCHC 35.4 (H) 31.4 - 35.0 g/dL    RDW 17.3 (H) 11.9 - 14.6 %    Platelets 584 296 - 988 K/uL    MPV 9.5 9.4 - 12.3 FL    nRBC 0.00 0.0 - 0.2 K/uL    Differential Type AUTOMATED      Seg Neutrophils 38 (L) 43 - 78 %    Lymphocytes 47 (H) 13 - 44 %    Monocytes 12 4.0 - 12.0 %    Eosinophils % 1 0.5 - 7.8 %    Basophils 1 0.0 - 2.0 %    Immature Granulocytes 1 0.0 - 5.0 %    Segs Absolute 1.6 (L) 1.7 - 8.2 K/UL    Absolute Lymph # 2.0 0.5 - 4.6 K/UL    Absolute Mono # 0.5 0.1 - 1.3 K/UL    Absolute Eos # 0.0 0.0 - 0.8 K/UL    Basophils Absolute 0.1 0.0 - 0.2 K/UL    Absolute Immature Granulocyte 0.0 0.0 - 0.5 K/UL         Imaging:  PET CT SKULL BASE TO MID THIGH    Result Date: 10/13/2022  PET/CT  Indication: Breast cancer restaging Radiopharmaceutical: 14.0 mCi F18-FDG, intravenously. Technique: Positron emission tomography (PET) with concurrently acquired computed tomography (CT) for attenuation correction and anatomical localization imaging performed from the skull base to mid thigh. Imaging was performed approximately 60 minutes post injection. Oral contrast was administered. Radiation dose reduction techniques were used for this study:  Our CT scanners use one or all of the following: Automated exposure control, adjustment of the mA and/or kVp according to patient's size, iterative reconstruction. Serum glucose: 100 mg/dL prior to injection. Comparison studies: 6/22/2022 PET/CT Findings: Head and Neck: No enlarged or hypermetabolic cervical lymph nodes. No worrisome focal FDG uptake in the neck soft tissues. Chest: No significant interval decrease in size and FDG uptake within the left breast mass. Left axillary lymph nodes are also smaller and elevated FDG uptake is resolved. No mediastinal, axillary, or internal mammary lymphadenopathy. Presumed posttreatment changes in the medial right lung base. Abdomen/Pelvis: The liver is severely steatotic. Hemorrhagic/proteinaceous cyst posteriorly in the left kidney. No enlarged or hypermetabolic lymph nodes of the abdomen or pelvis. Normal uterus. No adnexal mass. Bones and soft tissues: Newly sclerotic appearance and improved FDG uptake with lesions of the S1 and T10 vertebral bodies. No new bone lesions on the current study. 1.  Interval treatment response evident in the primary left breast mass, ipsilateral axillary lymph nodes and bone lesions at T10 and S1. No new sites of disease. 2.  Severe hepatic steatosis.         MRI BREAST BILATERAL W WO CONTRAST    Result Date: 6/16/2022  MRI of the Breasts with and without contrast CLINICAL INDICATION:  New diagnosis of left 10:00 breast cancer and left axillary metastatic lymphadenopathy undergoing evaluation for extent of disease, staging, therapy planning. No prior breast surgery or personal malignancy otherwise. COMPARISON: Mammography and ultrasound 5/20/2022, 6/14/2022 TECHNIQUE: Standard MRI sequences were obtained through the breasts in multiple planes. Images were obtained before and after intravenous infusion of 16 mL of Prohance contrast. Images were reviewed with PACS and with AeroFS CAD software. FINDINGS: The breasts demonstrate moderate glandularity and mild background enhancement. Left 10:00 irregular heterogeneously enhancing malignant mass measures up to 3.3 x 2.6 x 3.0 cm. Extending anteriorly from the mass is about 1.2 cm of clumped and reticular nonmass enhancement. Overall the maximal dimension of this malignancy is 3.7 cm. Mild overlying skin thickening and edema are nonspecific and could be reactive to the biopsy unless there is clinical suspicion for malignancy involvement. There is no other evidence of suspicious enhancing mass, and no dominant or unique nonmass enhancement, to suggest additional malignancy in either breast. Left axilla demonstrates 4 asymmetrically enlarged and dysmorphic lymph nodes, one of which underwent prior biopsy demonstrating metastasis. The largest measures up to 2.0 x 1.2 cm. There is no evidence of right axillary lymphadenopathy or internal mammary lymphadenopathy. Elsewhere, limited visualization of the partially included thorax and upper abdomen shows no acute abnormality. 1. Left 10:00 breast cancer measures up to 3.7 cm. 2. Left axillary metastatic lymphadenopathy. 3. No suspicious right breast finding. Recommend continued management as directed clinically.  BI-RADS Assessment Category 6: Known Biopsy Proven Malignancy     US BREAST LIMITED LEFT    Result Date: 6/14/2022  DIAGNOSTIC MAMMOGRAM LEFT BREAST WITH TOMOSYNTHESIS, LEFT BREAST ULTRASOUND HISTORY: Further evaluation of left breast mass and calcifications. Spot compression  tomosynthesis views in the CC and MLO projections of the left breast was performed in addition to a full 90 degree mediolateral tomosynthesis view. Additional magnification views were also submitted. Review of mammographic images of the left breast prior examinations dated 05/20/2022, 07/20/2014 was performed. FINDING: Confirmed is the spiculated mass at approximately 10:00 position left breast posterior depth. The mass measures approximately 4 cm in size. There are pleomorphic calcifications extending anteriorly from the mass towards the nipple. Including the mass and calcifications, the abnormality measures approximately 7 cm in size. A left breast ultrasound is recommended. Left breast ultrasound: There is a hypoechoic mass with ill-defined angular margins at the 10:00 position left breast 12 cm from nipple. This is estimated to measure approximately 5.0 x 3.2 x 4.5 cm. There is posterior acoustic shadowing. Evaluation of left axilla reveals a dysmorphic lymph node measuring 2.6 cm in maximal dimension. IMPRESSION: 1. Spiculated left breast mass with associated microcalcifications measuring at least 7 cm in total size at the 10:00 position. Tissue sampling is recommended. 2. Dysmorphic left axillary lymph node. Tissue sampling is recommended. BI-RADS Assessment Category 5: Highly suggestive of malignancy- Appropriate action should be taken. Results were discussed with the patient prior to leaving the department. The patient is scheduled for biopsy today. This study was reviewed with the aid of the [employer]  device. MAMMOGRAM POST BX CLIP PLACEMENT LEFT    Result Date: 6/14/2022  Ultrasound-guided left breast biopsy, left axillary lymph node biopsy and postbiopsy mammogram: 06/14/2022 HISTORY: Left breast mass and dysmorphic left axillary lymph node.  Ultrasound guided left breast biopsy: The patient was explained the procedure informed consent was obtained. Using sonographic guidance and sterile technique, a 14-gauge biopsy device was used to obtain 3 core samples from the hypoechoic mass at the 10:00 position. The samples were placed into a formalin container and sent to the lab for analysis. A biopsy clip was placed upon completion of sampling. The patient tolerated the procedure well. There were no immediate complications. Ultrasound guided left axillary lymph node biopsy: The patient was explained the procedure informed consent was obtained. Using sonographic guidance and sterile technique, a 14-gauge biopsy device was used to obtain 2 core samples from the dysmorphic lymph node within the left axilla. . The samples were placed into a formalin container and sent to the lab for analysis. A biopsy clip was not placed. The patient tolerated the procedure well. There were no immediate complications. Postbiopsy mammogram. CC and ML views of the left breast were obtained. The biopsy clip is confirmed within the mass at the 10:00 position. 1. Status post ultrasound guided biopsy of the hypoechoic mass at the 10:00 position left breast. 2. Status post ultrasound guided biopsy of the dysmorphic lymph node within the left axilla. [facility] PET CT SKULL BASE TO MID THIGH    Result Date: 6/22/2022  PET/CT  INDICATION: Malignant neoplasm of unspecified site of left female breast; Malignant neoplasm of unspecified site of left female breast; Secondary and unspecified malignant neoplasm of axilla and upper limb lymph nodes RADIOPHARMACEUTICAL: 13.42 mCi F18-FDG, intravenously. TECHNIQUE: Imaging was performed from the skull through the proximal thighs using routine PET/CT acquisition protocol. Imaging was performed approximately 60 minutes post injection. Oral contrast was administered.  Radiation dose reduction techniques were used for this study:  Our CT scanners use one or all of the following: Automated exposure control, adjustment of the mA and/or kVp according to patient's size, iterative reconstruction. SERUM GLUCOSE: 123 mg/dL prior to injection. COMPARISON: Breast MRI June 16, 2022 FINDINGS: HEAD/NECK: Symmetric uptake within the imaged brain parenchyma. Scattered areas of mild uptake within the occipital and cervical soft tissues without convincing CT correlate with additional areas of uptake, and along the bilateral paraspinals soft tissues. No discrete enlarged/hypermetabolic cervical lymph nodes. CHEST: FDG uptake with a known left breast malignancy with SUV of 9.0. Prominent FDG avid left axillary lymph nodes largest measuring 1.3 x 2.6 cm (SUV max 5.8, image number 66). Scattered areas of FDG uptake without corresponding CT correlate throughout the superior lateral and posterior medial chest wall. No enlarged or hypermetabolic mediastinal or hilar adenopathy. No suspicious pulmonary nodules or focal parenchymal FDG uptake. ABDOMEN/PELVIS: No enlarged or hypermetabolic adenopathy. No focal uptake within the visceral organs. There is a 1.8 cm left hyperdense exophytic renal cyst without associated FDG uptake compatible with a hemorrhagic/proteinaceous cyst with additional punctate upper pole hyperdense cyst and indeterminate photopenic hypodense cyst. Physiologic uptake within the gastrointestinal tract with normal excretion of radiotracer within the renal collecting system and urinary bladder. MUSCULOSKELETAL: Hypermetabolic lytic lesion involving the T10 vertebral body (SUV max 14.0, image number 103) with sclerotic hypermetabolic lesion centered within the sacrum (SUV max 13.0, image number 189). No abnormal focal uptake within the soft tissues. 1.  Hypermetabolic soft tissue left breast mass with prominent FDG avid left axillary lymph nodes. 2.  Hypermetabolic osseous metastatic disease involving the sacrum and T10 vertebral body.  3.  Patchy areas of mild uptake throughout the cervical and paraspinal soft tissues, as well as, along the superior lateral and posterior medial chest wall favoring brown fat and less likely metastatic disease. US BIOPSY LYMPH NODE    Result Date: 6/14/2022  Ultrasound-guided left breast biopsy, left axillary lymph node biopsy and postbiopsy mammogram: 06/14/2022 HISTORY: Left breast mass and dysmorphic left axillary lymph node. Ultrasound guided left breast biopsy: The patient was explained the procedure informed consent was obtained. Using sonographic guidance and sterile technique, a 14-gauge biopsy device was used to obtain 3 core samples from the hypoechoic mass at the 10:00 position. The samples were placed into a formalin container and sent to the lab for analysis. A biopsy clip was placed upon completion of sampling. The patient tolerated the procedure well. There were no immediate complications. Ultrasound guided left axillary lymph node biopsy: The patient was explained the procedure informed consent was obtained. Using sonographic guidance and sterile technique, a 14-gauge biopsy device was used to obtain 2 core samples from the dysmorphic lymph node within the left axilla. . The samples were placed into a formalin container and sent to the lab for analysis. A biopsy clip was not placed. The patient tolerated the procedure well. There were no immediate complications. Postbiopsy mammogram. CC and ML views of the left breast were obtained. The biopsy clip is confirmed within the mass at the 10:00 position. 1. Status post ultrasound guided biopsy of the hypoechoic mass at the 10:00 position left breast. 2. Status post ultrasound guided biopsy of the dysmorphic lymph node within the left axilla. .     US BREAST BIOPSY W LOC DEVICE 1ST LESION LEFT    Result Date: 6/14/2022  Ultrasound-guided left breast biopsy, left axillary lymph node biopsy and postbiopsy mammogram: 06/14/2022 HISTORY: Left breast mass and dysmorphic left axillary lymph node. Ultrasound guided left breast biopsy: The patient was explained the procedure informed consent was obtained. Using sonographic guidance and sterile technique, a 14-gauge biopsy device was used to obtain 3 core samples from the hypoechoic mass at the 10:00 position. The samples were placed into a formalin container and sent to the lab for analysis. A biopsy clip was placed upon completion of sampling. The patient tolerated the procedure well. There were no immediate complications. Ultrasound guided left axillary lymph node biopsy: The patient was explained the procedure informed consent was obtained. Using sonographic guidance and sterile technique, a 14-gauge biopsy device was used to obtain 2 core samples from the dysmorphic lymph node within the left axilla. . The samples were placed into a formalin container and sent to the lab for analysis. A biopsy clip was not placed. The patient tolerated the procedure well. There were no immediate complications. Postbiopsy mammogram. CC and ML views of the left breast were obtained. The biopsy clip is confirmed within the mass at the 10:00 position. 1. Status post ultrasound guided biopsy of the hypoechoic mass at the 10:00 position left breast. 2. Status post ultrasound guided biopsy of the dysmorphic lymph node within the left axilla. [facility] DIGITAL DIAGNOSTIC UNILATERAL LEFT    Result Date: 6/14/2022  DIAGNOSTIC MAMMOGRAM LEFT BREAST WITH TOMOSYNTHESIS, LEFT BREAST ULTRASOUND HISTORY: Further evaluation of left breast mass and calcifications. Spot compression  tomosynthesis views in the CC and MLO projections of the left breast was performed in addition to a full 90 degree mediolateral tomosynthesis view. Additional magnification views were also submitted. Review of mammographic images of the left breast prior examinations dated 05/20/2022, 07/20/2014 was performed.  FINDING: Confirmed is the spiculated mass at approximately 10:00 position left breast posterior depth. The mass measures approximately 4 cm in size. There are pleomorphic calcifications extending anteriorly from the mass towards the nipple. Including the mass and calcifications, the abnormality measures approximately 7 cm in size. A left breast ultrasound is recommended. Left breast ultrasound: There is a hypoechoic mass with ill-defined angular margins at the 10:00 position left breast 12 cm from nipple. This is estimated to measure approximately 5.0 x 3.2 x 4.5 cm. There is posterior acoustic shadowing. Evaluation of left axilla reveals a dysmorphic lymph node measuring 2.6 cm in maximal dimension. IMPRESSION: 1. Spiculated left breast mass with associated microcalcifications measuring at least 7 cm in total size at the 10:00 position. Tissue sampling is recommended. 2. Dysmorphic left axillary lymph node. Tissue sampling is recommended. BI-RADS Assessment Category 5: Highly suggestive of malignancy- Appropriate action should be taken. Results were discussed with the patient prior to leaving the department. The patient is scheduled for biopsy today. This study was reviewed with the aid of the [employer]  device. Pathology:  DIAGNOSIS        A:  \"LEFT BREAST, 10:00 POSITION, 12 CM FROM NIPPLE, CORE BIOPSY\":        INFILTRATING DUCTAL CARCINOMA WITH LOBULAR FEATURES, LOW GRADE (WELL   DIFFERENTIATED). DEFINITE IN SITU COMPONENT AND LYMPHOVASCULAR INVASION   ARE NOT IDENTIFIED          B:  \"LEFT AXILLA, CORE BIOPSY\":        MACRO METASTATIC CARCINOMA SIMILAR TO A INVOLVING LYMPH NODE.  WHILE   DEFINITE EXTRACAPSULAR EXTENSION IS NOT IDENTIFIED IN THIS MATERIAL, IT   COULD BE PRESENT IN UNSAMPLED LYMPH NODE         Procedures/Addenda                     [facility]- ER/MD/[id number] BY IHC                                                                                                                                         Status:  Signed Out    Derrick Hunt MD on 6/20/2022 Interpretation                       [facility] IHC Quantitative Breast Panel     TEST NAME:                         RESULTS:               INTERNAL   CONTROLS:     ESTROGEN RECEPTOR:               Positive (93%)               Absent   PROGESTERONE RECEPTOR:          Negative (0.0%)          Absent   HER-2/ARYA:                         Negative (0)               Percentage   of Cells with Uniform        Intense Complete Membrane   Stainin%       DIAGNOSIS        \"BONE LESION\":  METASTATIC CARCINOMA, CONSISTENT WITH BREAST ORIGIN. Procedures/Addenda                     [facility]-IMMUNOHISTOCHEMISTRY                                                                                                                                         Status:  Signed Out    Charbel Lezama MD on 2022                                 Interpretation                       Immunohistochemical Stain Panel:          Interpretation:  Immunohistochemical findings consistent with   metastatic carcinoma of breast origin. Antibody/Test               Marker For                              Result   Cytokeratin 7               Lung, breast, upper GE, serous ovary                 Positive   Cytokeratin 20               Colon, mucinous ovary, urothelium                  Negative   MINO 3               Urothelial, breast carcinoma                         Positive   GCDFP-15               Breast, salivary gland, skin, adnexa                 Positive           ASSESSMENT:   Diagnosis Orders   1.  Malignant neoplasm of left breast in female, estrogen receptor positive, unspecified site of breast ([address]  PET CT SKULL BASE TO MID THIGH    letrozole (FEMARA) 2.5 MG tablet            Patient Active Problem List   Diagnosis    Hypertension    Kidney stone    Cyst, kidney, acquired    Malignant neoplasm of left breast in female, estrogen receptor positive ([address]    Metastasis to bone [facility])           PLAN:  Lab studies were personally reviewed. Breast cancer: left breast, low grade IDC with lobular features, 3.7 cm with biopsy proven axillary lymphadenopathy, two osseous lesions on PET (T10 and sacrum) worrisome for metastatic disease, T10 now biopsy proven metastatic carcinoma consistent with breast origin. ER positive, MO negative, HER2 0. I discussed the results with Ms. Alcocer. Unfortunately, she will not be a candidate for curative therapy and should start palliative systemic therapy, I would recommend letrozole and ribociclib. She is postmenopausal so she will not need ovarian function suppression. She will also require denosumab or zoledronic acid depending on insurance approval.  We will attempt to perform NGS on the metastatic specimen to screen for biomarker expression including PI3KCA, BRCA, PDL-1 and others. Here for follow-up and restaging. She is doing very well overall. She is tolerating letrozole and Ibrance with no obvious side effects. She is having weight gain which may be related to endocrine therapy. She notes occasional pains (left big toe, left flank) but sacral pain is resolved. She does have muscle cramps and has doubled her potassium replacement while waiting for this appointment. No infectious symptoms. Labs reviewed and unremarkable, ANC 1600. K+ 3.5, continue replacement. Ca++ 9.5, proceed with Xgeva. PET/CT personally reviewed and shows excellent response, basically a CR, the lesions in the sacrum and T10 have regressed significantly and there is only masslike change in the breast with minimal associated hypermetabolism and resolution of axillary lashawn disease. She will proceed with Surya Arora tomorrow and continue Femara without change. She will receive Xgeva today and monthly for a planned 2 years.   I will present her to the breast [facility], I would continue at least another 3 months of endocrine therapy but after that, if she still has a CR or near CR, I would consider surgical evaluation for the primary lesion. All questions were asked and answered to the best of my ability. F/u in 4 weeks for OV, 3 months for PET/CT. Effie West MD, MD  [facility]  [address] [place] [address]  Office : [phone]  Fax : [phone]        Oral Chemotherapy Adherence:     Current Regimen:  Drug Name: Sherma Schwab  Dose: 125mg  Frequency: daily    Barriers to care identified including (financial, physical, psychosocial) : No    Missed doses reported: No    Patient verbalizes understanding of what to do in the event of a missed dose:  Yes    Adverse reactions/toxicities reported:None, See HPI

## 2022-10-19 NOTE — PROGRESS NOTES
Arrived to the [facility]. Xgeva injection completed. Provided education on [place]. Patient has had this medication before. Opportunity for questions provided. Patient instructed to report any side affects to ordering provider. Patient tolerated well. Any issues or concerns during appointment: no.  Patient aware of next infusion appointment on 11/16/2022 (date) at 9:30 am (time). Discharged ambulatory with self.

## 2022-10-27 ENCOUNTER — CLINICAL DOCUMENTATION (OUTPATIENT)
Dept: CASE MANAGEMENT | Age: 52
End: 2022-10-27

## 2022-10-28 DIAGNOSIS — Z17.0 MALIGNANT NEOPLASM OF LEFT BREAST IN FEMALE, ESTROGEN RECEPTOR POSITIVE, UNSPECIFIED SITE OF BREAST (HCC): Primary | ICD-10-CM

## 2022-10-28 DIAGNOSIS — C50.912 MALIGNANT NEOPLASM OF LEFT BREAST IN FEMALE, ESTROGEN RECEPTOR POSITIVE, UNSPECIFIED SITE OF BREAST (HCC): Primary | ICD-10-CM

## 2022-11-02 ENCOUNTER — OFFICE VISIT (OUTPATIENT)
Dept: SURGERY | Age: 52
End: 2022-11-02
Payer: COMMERCIAL

## 2022-11-02 VITALS
SYSTOLIC BLOOD PRESSURE: 129 MMHG | HEART RATE: 95 BPM | DIASTOLIC BLOOD PRESSURE: 88 MMHG | WEIGHT: 181.6 LBS | BODY MASS INDEX: 31 KG/M2 | HEIGHT: 64 IN | OXYGEN SATURATION: 98 %

## 2022-11-02 DIAGNOSIS — C50.912 MALIGNANT NEOPLASM OF LEFT BREAST IN FEMALE, ESTROGEN RECEPTOR POSITIVE, UNSPECIFIED SITE OF BREAST (HCC): Primary | ICD-10-CM

## 2022-11-02 DIAGNOSIS — Z17.0 MALIGNANT NEOPLASM OF LEFT BREAST IN FEMALE, ESTROGEN RECEPTOR POSITIVE, UNSPECIFIED SITE OF BREAST (HCC): Primary | ICD-10-CM

## 2022-11-02 DIAGNOSIS — C79.51 METASTASIS TO BONE (HCC): ICD-10-CM

## 2022-11-02 PROCEDURE — 99204 OFFICE O/P NEW MOD 45 MIN: CPT | Performed by: SURGERY

## 2022-11-02 PROCEDURE — 3078F DIAST BP <80 MM HG: CPT | Performed by: SURGERY

## 2022-11-02 PROCEDURE — 3074F SYST BP LT 130 MM HG: CPT | Performed by: SURGERY

## 2022-11-02 NOTE — PROGRESS NOTES
H&P/Consult Note/Progress Note/Office Note:   Estela Balderas  MRN: [id number]  QDK:[id number]  Age:51 y.o.    HPI: Estela Balderas is a 46 y.o. female who originally presented with an abnormal left breast screening mammogram on 5/20/22 which led to diagnostic imaging and ultrasound. She had a large mass in the upper inner left breast with left axillary adenopathy. Percutaneous biopsies identified infiltrating ductal carcinoma with lobular features which was low ER 93% ID 0% HER2 negative. Breast MRI was performed next which confirmed a 3.7 cm left breast malignancy with 4 dysmorphic and enlarged axillary nodes. PET/CT imaging identified suspicious bony lesions of T10 and the sacrum. CT-guided biopsy of T10 confirmed metastatic breast carcinoma. She was treated with neoadjuvant endocrine therapy with Dr. Jagruti Blair and had Letrozole and ribociclib   and had repeat PET/CT imaging on 10/12/22 and was presented at tumor board to discuss the possibility of surgery for local treatment          5/20/22 Bilat breast screening mammo with filomena and CAD  The breast parenchyma is heterogeneously dense, which may limit detection of small masses. Left posterior upper inner quadrant demonstrates an irregular mass with architectural distortion. There are associated calcifications within and anterior to the mass. Overall this area of concern measures at least 3 cm but is not well defined. No additional suspicious mass or calcification is seen in either breast.    There is no normal skin thickening or nipple retraction   Impression:  Left mass and calcifications. Further evaluation is recommended with ML and compression magnification views, then US to follow. 6/14/22 dx bilat mammo and left breast US  FINDING: Confirmed is the spiculated mass at approximately 10:00 position left breast posterior depth. The mass measures approximately 4 cm in size.  There are pleomorphic calcifications extending anteriorly from the mass towards the nipple. Including the mass and calcifications, the abnormality measures approximately 7 cm in size. A left breast ultrasound is recommended. Left breast ultrasound: There is a hypoechoic mass with ill-defined angular margins at the 10:00 position left breast 12 cm from nipple. This is estimated to measure approximately 5.0 x 3.2 x 4.5 cm. There is posterior acoustic shadowing. Evaluation of left axilla reveals a dysmorphic lymph node measuring 2.6 cm in maximal dimension. IMPRESSION: 1. Spiculated left breast mass with associated microcalcifications measuring at least 7 cm in total size at the 10:00 position. Tissue sampling is recommended. 2. Dysmorphic left axillary lymph node. Tissue sampling is recommended. Highly suggestive of malignancy           6/14/22 US-guided left breast and left axillary biopsy   A:  \"LEFT BREAST, 10:00 POSITION, 12 CM FROM NIPPLE, CORE BIOPSY\":   INFILTRATING DUCTAL CARCINOMA WITH LOBULAR FEATURES, LOW GRADE (WELL DIFFERENTIATED). DEFINITE IN SITU COMPONENT AND LYMPHOVASCULAR INVASION ARE NOT IDENTIFIED          B:  \"LEFT AXILLA, CORE BIOPSY\": MACRO METASTATIC CARCINOMA SIMILAR TO A INVOLVING LYMPH NODE. WHILE DEFINITE EXTRACAPSULAR EXTENSION IS NOT IDENTIFIED IN THIS MATERIAL, IT COULD BE PRESENT IN UNSAMPLED LYMPH NODE   Sign Out Date: 6/15/2022  SUSAN Bullock M.D.     ER: Positive (93%); MA: Negative (0.0%); Gsp7sco:  Negative (0)              Post-bx mammo: Biopsy clip is confirmed within the mass at the 10:00 position       6/16/22 Breast MRI  FINDINGS: The breasts demonstrate moderate glandularity and mild background enhancement. Left 10:00 irregular heterogeneously enhancing malignant mass measures up to 3.3 x 2.6 x 3.0 cm. Extending anteriorly from the mass is about 1.2 cm of clumped and reticular nonmass enhancement. Overall the maximal dimension of this malignancy is 3.7 cm.    Mild overlying skin thickening and edema are nonspecific and could be reactive to the biopsy unless there is clinical suspicion for malignancy involvement. No other evidence of suspicious enhancing mass, and no dominant or unique nonmass enhancement, to suggest additional malignancy in either breast.     Left axilla demonstrates 4 asymmetrically enlarged and dysmorphic lymph nodes, one of which underwent prior biopsy demonstrating metastasis. The largest measures up to 2.0 x 1.2 cm. No evidence of right axillary lymphadenopathy or internal mammary lymphadenopathy. Elsewhere, limited visualization of the partially included thorax and upper abdomen shows no acute abnormality. Impression:  1. Left 10:00 breast cancer measures up to 3.7 cm.   2. Left axillary metastatic lymphadenopathy. 3. No suspicious right breast finding.            6/22/22 PET/CT  HEAD/NECK: Symmetric uptake within the imaged brain parenchyma. Scattered areas of mild uptake within the occipital and cervical soft tissues without convincing CT correlate with additional areas of uptake, and along the bilateral paraspinals soft tissues. No discrete enlarged/hypermetabolic cervical lymph nodes. CHEST: FDG uptake with a known left breast malignancy with SUV of 9.0. Prominent FDG avid left axillary lymph nodes largest measuring 1.3 x 2.6 cm (SUV max 5.8, image number 66). Scattered areas of FDG uptake without corresponding CT correlate throughout the superior lateral and posterior medial chest wall. No enlarged or hypermetabolic mediastinal or hilar adenopathy. No suspicious pulmonary nodules or focal parenchymal FDG uptake. ABDOMEN/PELVIS: No enlarged or hypermetabolic adenopathy. No focal uptake within the visceral organs.    There is a 1.8 cm left hyperdense exophytic renal cyst without associated FDG uptake compatible with a hemorrhagic/proteinaceous cyst with additional punctate upper pole hyperdense cyst and indeterminate photopenic hypodense cyst.   Physiologic uptake within the gastrointestinal tract with normal excretion of radiotracer within the renal collecting system and urinary bladder. MUSCULOSKELETAL: Hypermetabolic lytic lesion involving the T10 vertebral body (SUV max 14.0, image number 103) with sclerotic hypermetabolic lesion centered within the sacrum (SUV max 13.0, image number 189). No abnormal focal uptake within the soft tissues. Impression:  1. Hypermetabolic soft tissue left breast mass with prominent FDG avid left axillary lymph nodes. 2. Hypermetabolic osseous metastatic disease involving the sacrum and T10 vertebral body. 3. Patchy areas of mild uptake throughout the cervical and paraspinal soft tissues, as well as, along the superior lateral and posterior medial chest wall favoring brown fat and less likely metastatic disease. 7/1/22 CT-guided T10 Bone biopsy  DIAGNOSIS   \"BONE LESION\":  METASTATIC CARCINOMA, CONSISTENT WITH BREAST ORIGIN. Sign Out Date: 7/7/2022 [facility]   Interpretation:  Immunohistochemical findings consistent with metastatic carcinoma of breast origin. Antibody/Test           Marker For                                Result   Cytokeratin 7             Lung, breast, upper GE, serous ovary         Positive   Cytokeratin 20          Colon, mucinous ovary, urothelium          Negative   MINO 3                Urothelial, breast carcinoma                        Positive   [id number]              Breast, salivary gland, skin, adnexa              Positive         10/12/22 PET/CT (restaging after neoadjuvant Rx)  Head and Neck: No enlarged or hypermetabolic cervical lymph nodes. No worrisome focal FDG uptake in the neck soft tissues. Chest: No significant interval decrease in size and FDG uptake within the left breast mass. Left axillary lymph nodes are also smaller and elevated FDG uptake is resolved. No mediastinal, axillary, or internal mammary lymphadenopathy.  Presumed posttreatment changes in the medial right lung base. Abdomen/Pelvis: The liver is severely steatotic. Hemorrhagic/proteinaceous cyst posteriorly in the left kidney. No enlarged or hypermetabolic lymph nodes of the abdomen or pelvis. Normal uterus. No adnexal mass. Bones and soft tissues: Newly sclerotic appearance and improved FDG uptake with lesions of the S1 and T10 vertebral bodies. No new bone lesions on the current study. Impression:  1. Interval treatment response evident in the primary left breast mass, ipsilateral axillary lymph nodes and bone lesions at T10 and S1. No new sites of disease. 2. Severe hepatic steatosis.                Past Medical History:   Diagnosis Date    Cyst, kidney, acquired     found on previous scan    Hypertension     Kidney stone      Past Surgical History:   Procedure Laterality Date    APPENDECTOMY      CT BIOPSY PERCUTANEOUS SUPERFICIAL BONE  7/1/2022    CT BIOPSY PERCUTANEOUS SUPERFICIAL BONE 7/1/2022 [facility]Y CT SCAN    US BREAST NEEDLE BIOPSY LEFT Left 6/14/2022    US BREAST NEEDLE BIOPSY LEFT 6/14/2022 Gifty Alvarado MD [facility] LYMPH NODE BIOPSY  6/14/2022    US LYMPH NODE BIOPSY 6/14/2022 Gifty Alvarado MD [facility] MAMMO    WISDOM TOOTH EXTRACTION       Current Outpatient Medications   Medication Sig    letrozole ([address] [facility]) 2.5 MG tablet Take 1 tablet by mouth daily    potassium chloride (KLOR-CON M) 20 MEQ extended release tablet Take 1 tablet by mouth 2 times daily Take twice a day for 7 days and then decrease to one a day    chlorthalidone (HYGROTON) 25 MG tablet 1 po qam for bloos pressure    olmesartan (BENICAR) 40 MG tablet 1 po qam for blood pressure    IBRANCE 125 MG tablet     Magic Mouthwash (MIRACLE MOUTHWASH) Swish and spit 5 mLs 4 times daily as needed for Irritation    ondansetron (ZOFRAN) 8 MG tablet Take 1 tablet by mouth every 8 hours as needed for Nausea or Vomiting     No current facility-administered medications for this visit. ALLERGIES:  Patient has no known allergies. Social History     Socioeconomic History    Marital status:    Tobacco Use    Smoking status: Never    Smokeless tobacco: Never   Vaping Use    Vaping Use: Never used   Substance and Sexual Activity    Alcohol use: Yes     Alcohol/week: 2.0 standard drinks    Drug use: Never     Social Determinants of Health     Financial Resource Strain: Low Risk     Difficulty of Paying Living Expenses: Not hard at all   Food Insecurity: Unknown    Worried About Running Out of Food in the Last Year: Never true   Transportation Needs: No Transportation Needs    Lack of Transportation (Medical): No    Lack of Transportation (Non-Medical): No   Physical Activity: Sufficiently Active    Days of Exercise per Week: 7 days    Minutes of Exercise per Session: 30 min   Stress: Stress Concern Present    Feeling of Stress : To some extent   Social Connections: Unknown    Frequency of Communication with Friends and Family: More than three times a week    Frequency of Social Gatherings with Friends and Family: More than three times a week    Marital Status:    Intimate Partner Violence: Not At Risk    Fear of Current or Ex-Partner: No    Emotionally Abused: No    Physically Abused: No    Sexually Abused: No   Housing Stability: Low Risk     Unable to Pay for Housing in the Last Year: No    Number of [place] in the Last Year: 1    Unstable Housing in the Last Year: No     Social History     Tobacco Use   Smoking Status Never   Smokeless Tobacco Never     Family History   Problem Relation Age of Onset    Hypertension Mother     Alcohol Abuse Brother     Alcohol Abuse Father     Dementia Father     Hypertension Brother     Diabetes Father      ROS: The patient has no difficulty with chest pain or shortness of breath. No fever or chills. Comprehensive review of systems was otherwise unremarkable except as noted above.     Physical Exam:   There were no vitals taken for this visit. There were no vitals filed for this visit. [unfilled]  [unfilled]    Constitutional: Alert, oriented, cooperative patient in no acute distress; appears stated age    Eyes:Sclera are clear. EOMs intact  ENMT: no external lesions gross hearing normal; no obvious neck masses, no ear or lip lesions, nares normal  CV: RRR. Normal perfusion  Resp: No JVD. Breathing is  non-labored; no audible wheezing. Large pendulous breast  No palpable breast masses on either side  No regional adenopathy. GI: soft and non-distended     Musculoskeletal: unremarkable with normal function. No embolic signs or cyanosis. Neuro:  Oriented; moves all 4; no focal deficits  Psychiatric: normal affect and mood, no memory impairment    Recent vitals (if inpt):  @IPVITALS(24:)@    Amount and/or Complexity of Data Reviewed and Analyzed:  I reviewed and analyzed all of the unique labs and radiologic studies that are shown below as well as any that are in the HPI, and any that are in the expanded problem list below  *Each unique test, order, or document contributes to the combination of 2 or combination of 3 in Category 1 below. For this visit I also reviewed old records and prior notes. No results for input(s): WBC, HGB, PLT, NA, K, CL, CO2, BUN, CREA, GLU, INR, APTT, ALT, AML, AML, LCAD, PCO2, PO2, HCO3 in the last 72 hours.     Invalid input(s): PTP, TBIL, TBILI, CBIL, SGOT, GPT, AP, LPSE, NH4, TROPT, TROIQ,  PH  Review of most recent CBC  Lab Results   Component Value Date    WBC 4.2 (L) 10/19/2022    HGB 12.8 10/19/2022    HCT 36.2 10/19/2022    MCV 98.1 10/19/2022     10/19/2022       Review of most recent BMP  Lab Results   Component Value Date/Time     10/19/2022 09:27 AM    K 3.5 10/19/2022 09:27 AM     10/19/2022 09:27 AM    CO2 26 10/19/2022 09:27 AM    BUN 16 10/19/2022 09:27 AM    CREATININE 0.80 10/19/2022 09:27 AM    GLUCOSE 88 10/19/2022 09:27 AM    CALCIUM 9.5 10/19/2022 09:27 AM        Review of most recent LFTs (and lipase if done)  Lab Results   Component Value Date    ALT 59 10/19/2022    AST 28 10/19/2022    ALKPHOS 81 10/19/2022    BILITOT 0.2 10/19/2022     No results found for: LIPASE    Lab Results   Component Value Date/Time    INR 0.9 06/27/2022 02:28 PM       Review of most recent HgbA1c  Hemoglobin A1C   Date Value Ref Range Status   06/24/2021 5.7 (H) 4.8 - 5.6 % Final     Comment:              Prediabetes: 5.7 - 6.4           Diabetes: >6.4           Glycemic control for adults with diabetes: <7.0         Nutritional assessment screen for wound healing issues:  Lab Results   Component Value Date    LABALBU 4.1 10/19/2022       @lastcovr@    Xray Result (most recent):  XR ABDOMEN (KUB) (SINGLE AP VIEW)     CT Result (most recent):  CT BIOPSY PERCUTANEOUS SUPERFICIAL BONE 07/01/2022    Narrative  Title: CT guided thoracic vertebral body lesion core biopsy. Indication: Malignant neoplasm of left breast in female, estrogen receptor  positive, unspecified site of breast ([facility] [address] Metastasis to bone [facility]); Abnormal  positron emission tomography (PET) scan    Consent: Informed written and oral consent was obtained from the patient after  explanation of benefits and risks (including, but not limited to: Infection,  Hemorrhage, Visceral Injury, Insufficient biopsy, Pneumothorax). The patient's  questions were answered to their satisfaction. The patient stated understanding  and requested that we proceed. Technique: All CT scans at this facility are performed using dose  reduction/dose modulation techniques, as appropriate the performed exam,  including the following:  Automated Exposure Control; Adjustment of the mA  and/or kV according to patient size (this includes techniques or standardized  protocols for targeted exams where dose is matched to indication/reason for  exam); and Use of Iterative Reconstruction Technique.     Procedure:  Sterile barrier technique (including: cap, mask, sterile gloves,  sterile sheet, hand hygiene, and cutaneous antisepsis) were used. With the  patient prone a preliminary CT scan through the upper abdomen was performed with  radio-opaque markers on the skin. Following routine prep and drape of the left flank, a local field block with  lidocaine was achieved. Using intermittent CT technique, an 11-G [employer] powered bone access  needle was advanced through the cortex of the T10 vertebral body with a left  eccentric reticular approach. A 14 [place] bone drill needle was advanced into  the lesion; however, minimal tissue was obtained as this appeared to be  relatively soft tumor. An 18-gauge [employer] biopsy needle was advanced into  the tumor through the base needle and multiple 18-gauge core samples were  obtained using the spring-loaded biopsy device. An 6 [place] core sample of the  T10 vertebral body was obtained as the needle was removed. A post-biopsy CT scan was obtained. Hemostasis was achieved with manual compression. A bandage was applied. Complications: None. Radiation Exposure Indices: Total Exam DLP = 369 mGy-cm    Contrast: None. Medications: The patient was given moderate sedation with IV Versed and  Fentanyl by dedicated nurse under my face-to-face supervision using physiologic  monitoring for a total intraservice time of 40 minutes. Specimen: To Pathology    Findings: Osteolytic lesion in the T10 vertebral body measuring approximately 19  mm in diameter. Post biopsy CT scan demonstrates no evidence of significant  hemorrhage or pneumothorax. Impression  Technically successful CT-guided core biopsy of an osteolytic lesion  in the T10 vertebral body.     US Result (most recent):  [address] BIOPSY 06/14/2022    Addendum 6/29/2022  9:34 AM  Addendum: José Luis Armstrong, accession number: [id number]  Date: 6/14/2022 Pathology was noted as A: Left breast, 10:00 position, 12 cm  from nipple, core biopsy: Infiltrating ductal carcinoma with lobular features,  low grade (well differentiated). Definite in situ component and lymphovascular  invasion are not identified. B: Left axilla, core biopsy: Macro metastatic  carcinoma similar to A involving lymph node. While definite extracapsular  extension is not identified in this material, it could be present in unsampled  lymph node. Results concordant with imaging. Pathology report was called to  Dr. Maninder Singh's office on 6/15/2022 by BIBIANA Ngo. Patient was referred to  Oncology on 6/15/2022. Narrative  Ultrasound-guided left breast biopsy, left axillary lymph node biopsy and  postbiopsy mammogram: 06/14/2022    HISTORY: Left breast mass and dysmorphic left axillary lymph node. Ultrasound guided left breast biopsy: The patient was explained the procedure  informed consent was obtained. Using sonographic guidance and sterile technique,  a 14-gauge biopsy device was used to obtain 3 core samples from the hypoechoic  mass at the 10:00 position. The samples were placed into a formalin container  and sent to the lab for analysis. A biopsy clip was placed upon completion of  sampling. The patient tolerated the procedure well. There were no immediate  complications. Ultrasound guided left axillary lymph node biopsy: The patient was explained the  procedure informed consent was obtained. Using sonographic guidance and sterile  technique, a 14-gauge biopsy device was used to obtain 2 core samples from the  dysmorphic lymph node within the left axilla. . The samples were placed into a  formalin container and sent to the lab for analysis. A biopsy clip was not  placed. The patient tolerated the procedure well. There were no immediate  complications. Postbiopsy mammogram. CC and ML views of the left breast were obtained. The  biopsy clip is confirmed within the mass at the 10:00 position. Impression  1.  Status post ultrasound guided biopsy of the hypoechoic mass at the 10:00  position left breast.  2. Status post ultrasound guided biopsy of the dysmorphic lymph node within the  left axilla. .      Admission date (for inpatients): (Not on file)   * No surgery found *  * No surgery found *        ASSESSMENT/PLAN:  [unfilled]  Active Problems:    * No active hospital problems. *  Resolved Problems:    * No resolved hospital problems. *     Patient Active Problem List    Diagnosis Date Noted    Malignant neoplasm of left breast in female, estrogen receptor positive ([facility] [address] 07/11/2022     Priority: Medium    [facility] 07/11/2022     Priority: Medium    Cyst, kidney, acquired      found on previous scan        Hypertension     Kidney stone           Number and Complexity of Problems addressed and   Risks of complications and/or morbidity of management        [id number]->[id number] left breast IDC  We discussed her case at multidisciplinary breast cancer conference on 10/27/2022. She is clearly had an excellent response to neoadjuvant treatment with significant regression of the sacral and T10 lesions. She no longer has FDG uptake in the axillae on PET/CT imaging. The conference consensus was to offer her some local treatment with lumpectomy and sentinel node excision. We discussed that surgery would likely only be palliative to local disease involvement. Procedural risks were discussed including bleeding, infection, local, regional, and systemic recurrences and progression, blood clots, lymphedema, risk of anesthesia, etc.. All her questions were answered. She is in favor proceeding. I will see her back in 3 months after additional neoadjuvant treatment with reimaging and we will consider scheduling surgery at that time.                 Level of MDM (2/3 elements below)  Number and Complexity of Problems Addressed Amount and/or Complexity of Data to be Reviewed and Analyzed  *Each unique test, order, or document contributes to the combination of 2 or combination of 3 in Category 1 below. Risk of Complications and/or Morbidity or Mortality of pt Management     89143  40955 SF Minimal  ?1self-limited or minor problem Minimal or none Minimal risk of morbidity from additional diagnostic testing or Rx   02032  15945 Low Low  ? 2or more self-limited or minor problems;    or  ? 1stable chronic illness;    or  ?1acute, uncomplicated illness or injury   Limited  (Must meet the requirements of at least 1 of the 2 categories)  Category 1: Tests and documents   ? Any combination of 2 from the following:  ?Review of prior external note(s) from each unique source*;  ?review of the result(s) of each unique test*;   ?ordering of each unique test*    or   Category 2: Assessment requiring an independent historian(s)  (For the categories of independent interpretation of tests and discussion of management or test interpretation, see moderate or high) Low risk of morbidity from additional diagnostic testing or treatment     61651  77032 Mod Moderate  ? 1or more chronic illnesses with exacerbation, progression, or side effects of treatment;    or  ?2or more stable chronic illnesses;    or  ?1undiagnosed new problem with uncertain prognosis;    or  ?1acute illness with systemic symptoms;    or  ?1acute complicated injury   Moderate  (Must meet the requirements of at least 1 out of 3 categories)  Category 1: Tests, documents, or independent historian(s)  ? Any combination of 3 from the following:   ?Review of prior external note(s) from each unique source*;  ?Review of the result(s) of each unique test*;  ?Ordering of each unique test*;  ?Assessment requiring an independent historian(s)    or  Category 2: Independent interpretation of tests   ? Independent interpretation of a test performed by another physician/other qualified health care professional (not separately reported);     or  Category 3: Discussion of management or test interpretation  ? Discussion of management or test interpretation with external physician/other qualified health care professional/appropriate source (not separately reported)   Moderate risk of morbidity from additional diagnostic testing or treatment  Examples only:  ?Prescription drug management   ? Decision regarding minor surgery with identified patient or procedure risk factors  ? Decision regarding elective major surgery without identified patient or procedure risk factors   ? Diagnosis or treatment significantly limited by social determinants of health       [id number] High High  ? 1or more chronic illnesses with severe exacerbation, progression, or side effects of treatment;    or  ?1 acute or chronic illness or injury that poses a threat to life or bodily function   Extensive  (Must meet the requirements of at least 2 out of 3 categories)  Category 1: Tests, documents, or independent historian(s)  ? Any combination of 3 from the following:   ?Review of prior external note(s) from each unique source*;  ?Review of the result(s) of each unique test*;   ?Ordering of each unique test*;   ?Assessment requiring an independent historian(s)    or   Category 2: Independent interpretation of tests   ? Independent interpretation of a test performed by another physician/other qualified health care professional (not separately reported);     or  Category 3: Discussion of management or test interpretation  ? Discussion of management or test interpretation with external physician/other qualified health care professional/appropriate source (not separately reported)   High risk of morbidity from additional diagnostic testing or treatment  Examples only:  ?Drug therapy requiring intensive monitoring for toxicity  ? Decision regarding elective major surgery with identified patient or procedure risk factors-- possibly soon  ? Decision regarding emergency major surgery  ? Decision regarding hospitalization  ? Decision not to resuscitate or to de-escalate care because of poor prognosis             I have personally performed a face-to-face diagnostic evaluation and management  service on this patient. I have independently seen the patient. I have independently obtained the above history from the patient/family. I have independently examined the patient with above findings. I have independently reviewed data/labs for this patient and developed the above plan of care (MDM).       Signed: Luis Leblanc MD  11/2/2022    6:36 AM

## 2022-11-04 DIAGNOSIS — Z17.0 MALIGNANT NEOPLASM OF LEFT BREAST IN FEMALE, ESTROGEN RECEPTOR POSITIVE, UNSPECIFIED SITE OF BREAST (HCC): ICD-10-CM

## 2022-11-04 DIAGNOSIS — C50.912 MALIGNANT NEOPLASM OF LEFT BREAST IN FEMALE, ESTROGEN RECEPTOR POSITIVE, UNSPECIFIED SITE OF BREAST (HCC): ICD-10-CM

## 2022-11-16 ENCOUNTER — HOSPITAL ENCOUNTER (OUTPATIENT)
Dept: LAB | Age: 52
Discharge: HOME | End: 2022-11-19

## 2022-11-16 ENCOUNTER — HOSPITAL ENCOUNTER (OUTPATIENT)
Dept: INFUSION THERAPY | Age: 52
Discharge: HOME | End: 2022-11-16
Payer: COMMERCIAL

## 2022-11-16 VITALS
OXYGEN SATURATION: 100 % | HEART RATE: 79 BPM | SYSTOLIC BLOOD PRESSURE: 118 MMHG | RESPIRATION RATE: 18 BRPM | TEMPERATURE: 98.5 F | DIASTOLIC BLOOD PRESSURE: 69 MMHG

## 2022-11-16 DIAGNOSIS — C79.51 METASTASIS TO BONE (HCC): Primary | ICD-10-CM

## 2022-11-16 DIAGNOSIS — Z17.0 MALIGNANT NEOPLASM OF LEFT BREAST IN FEMALE, ESTROGEN RECEPTOR POSITIVE, UNSPECIFIED SITE OF BREAST (HCC): ICD-10-CM

## 2022-11-16 DIAGNOSIS — C50.912 MALIGNANT NEOPLASM OF LEFT BREAST IN FEMALE, ESTROGEN RECEPTOR POSITIVE, UNSPECIFIED SITE OF BREAST (HCC): ICD-10-CM

## 2022-11-16 LAB
ALBUMIN SERPL-MCNC: 4 G/DL (ref 3.5–5)
ALBUMIN/GLOB SERPL: 1.1 {RATIO} (ref 0.4–1.6)
ALP SERPL-CCNC: 73 U/L (ref 50–136)
ALT SERPL-CCNC: 57 U/L (ref 12–65)
ANION GAP SERPL CALC-SCNC: 5 MMOL/L (ref 2–11)
AST SERPL-CCNC: 29 U/L (ref 15–37)
BILIRUB SERPL-MCNC: 0.3 MG/DL (ref 0.2–1.1)
BUN SERPL-MCNC: 17 MG/DL (ref 6–23)
CALCIUM SERPL-MCNC: 9.1 MG/DL (ref 8.3–10.4)
CHLORIDE SERPL-SCNC: 105 MMOL/L (ref 101–110)
CO2 SERPL-SCNC: 28 MMOL/L (ref 21–32)
CREAT SERPL-MCNC: 1 MG/DL (ref 0.6–1)
GLOBULIN SER CALC-MCNC: 3.7 G/DL (ref 2.8–4.5)
GLUCOSE SERPL-MCNC: 140 MG/DL (ref 65–100)
MAGNESIUM SERPL-MCNC: 1.9 MG/DL (ref 1.8–2.4)
PHOSPHATE SERPL-MCNC: 3.5 MG/DL (ref 2.5–4.5)
POTASSIUM SERPL-SCNC: 3.3 MMOL/L (ref 3.5–5.1)
PROT SERPL-MCNC: 7.7 G/DL (ref 6.3–8.2)
SODIUM SERPL-SCNC: 138 MMOL/L (ref 133–143)

## 2022-11-16 PROCEDURE — 84100 ASSAY OF PHOSPHORUS: CPT

## 2022-11-16 PROCEDURE — 96372 THER/PROPH/DIAG INJ SC/IM: CPT

## 2022-11-16 PROCEDURE — 6360000002 HC RX W HCPCS: Performed by: INTERNAL MEDICINE

## 2022-11-16 PROCEDURE — 80053 COMPREHEN METABOLIC PANEL: CPT

## 2022-11-16 PROCEDURE — 36415 COLL VENOUS BLD VENIPUNCTURE: CPT

## 2022-11-16 PROCEDURE — 83735 ASSAY OF MAGNESIUM: CPT

## 2022-11-16 RX ORDER — ONDANSETRON 2 MG/ML
8 INJECTION INTRAMUSCULAR; INTRAVENOUS
OUTPATIENT
Start: 2022-12-14

## 2022-11-16 RX ORDER — EPINEPHRINE 1 MG/ML
0.3 INJECTION, SOLUTION, CONCENTRATE INTRAVENOUS PRN
OUTPATIENT
Start: 2022-12-14

## 2022-11-16 RX ORDER — ACETAMINOPHEN 325 MG/1
650 TABLET ORAL
OUTPATIENT
Start: 2022-12-14

## 2022-11-16 RX ORDER — ALBUTEROL SULFATE 90 UG/1
4 AEROSOL, METERED RESPIRATORY (INHALATION) PRN
OUTPATIENT
Start: 2022-12-14

## 2022-11-16 RX ORDER — SODIUM CHLORIDE 9 MG/ML
INJECTION, SOLUTION INTRAVENOUS CONTINUOUS
OUTPATIENT
Start: 2022-12-14

## 2022-11-16 RX ORDER — DIPHENHYDRAMINE HYDROCHLORIDE 50 MG/ML
50 INJECTION INTRAMUSCULAR; INTRAVENOUS
OUTPATIENT
Start: 2022-12-14

## 2022-11-16 RX ADMIN — DENOSUMAB 120 MG: 120 INJECTION SUBCUTANEOUS at 10:57

## 2022-11-16 NOTE — PROGRESS NOTES
Arrived to the [facility]. Xgeva completed. Provided education on Xgeva    Patient instructed to report any side affects to ordering provider. Patient tolerated without complications. Any issues or concerns during appointment: NO.  Patient aware of next infusion appointment on 12/14/22 at 1000. Discharged ambulatory.

## 2022-12-07 RX ORDER — POTASSIUM CHLORIDE 20 MEQ/1
20 TABLET, EXTENDED RELEASE ORAL 2 TIMES DAILY
Qty: 60 TABLET | Refills: 2 | Status: SHIPPED | OUTPATIENT
Start: 2022-12-07

## 2022-12-09 DIAGNOSIS — C50.912 MALIGNANT NEOPLASM OF LEFT BREAST IN FEMALE, ESTROGEN RECEPTOR POSITIVE, UNSPECIFIED SITE OF BREAST (HCC): ICD-10-CM

## 2022-12-09 DIAGNOSIS — Z17.0 MALIGNANT NEOPLASM OF LEFT BREAST IN FEMALE, ESTROGEN RECEPTOR POSITIVE, UNSPECIFIED SITE OF BREAST (HCC): ICD-10-CM

## 2022-12-09 RX ORDER — PALBOCICLIB 125 MG/1
125 TABLET, FILM COATED ORAL DAILY
Qty: 21 TABLET | Refills: 1 | Status: SHIPPED | OUTPATIENT
Start: 2022-12-09 | End: 2022-12-30

## 2022-12-14 ENCOUNTER — HOSPITAL ENCOUNTER (OUTPATIENT)
Dept: INFUSION THERAPY | Age: 52
Discharge: HOME | End: 2022-12-14
Payer: COMMERCIAL

## 2022-12-14 ENCOUNTER — HOSPITAL ENCOUNTER (OUTPATIENT)
Dept: LAB | Age: 52
Discharge: HOME | End: 2022-12-17
Payer: COMMERCIAL

## 2022-12-14 VITALS
RESPIRATION RATE: 18 BRPM | OXYGEN SATURATION: 95 % | DIASTOLIC BLOOD PRESSURE: 73 MMHG | SYSTOLIC BLOOD PRESSURE: 131 MMHG | HEART RATE: 89 BPM | TEMPERATURE: 98.2 F

## 2022-12-14 DIAGNOSIS — C79.51 METASTASIS TO BONE (HCC): ICD-10-CM

## 2022-12-14 DIAGNOSIS — Z17.0 MALIGNANT NEOPLASM OF LEFT BREAST IN FEMALE, ESTROGEN RECEPTOR POSITIVE, UNSPECIFIED SITE OF BREAST (HCC): ICD-10-CM

## 2022-12-14 DIAGNOSIS — C50.912 MALIGNANT NEOPLASM OF LEFT BREAST IN FEMALE, ESTROGEN RECEPTOR POSITIVE, UNSPECIFIED SITE OF BREAST (HCC): ICD-10-CM

## 2022-12-14 DIAGNOSIS — Z17.0 MALIGNANT NEOPLASM OF LEFT BREAST IN FEMALE, ESTROGEN RECEPTOR POSITIVE, UNSPECIFIED SITE OF BREAST (HCC): Primary | ICD-10-CM

## 2022-12-14 DIAGNOSIS — C50.912 MALIGNANT NEOPLASM OF LEFT BREAST IN FEMALE, ESTROGEN RECEPTOR POSITIVE, UNSPECIFIED SITE OF BREAST (HCC): Primary | ICD-10-CM

## 2022-12-14 LAB
ALBUMIN SERPL-MCNC: 4 G/DL (ref 3.5–5)
ALBUMIN/GLOB SERPL: 1 {RATIO} (ref 0.4–1.6)
ALP SERPL-CCNC: 69 U/L (ref 50–136)
ALT SERPL-CCNC: 48 U/L (ref 12–65)
ANION GAP SERPL CALC-SCNC: 7 MMOL/L (ref 2–11)
AST SERPL-CCNC: 24 U/L (ref 15–37)
BILIRUB SERPL-MCNC: 0.4 MG/DL (ref 0.2–1.1)
BUN SERPL-MCNC: 18 MG/DL (ref 6–23)
CALCIUM SERPL-MCNC: 9.6 MG/DL (ref 8.3–10.4)
CHLORIDE SERPL-SCNC: 104 MMOL/L (ref 101–110)
CO2 SERPL-SCNC: 27 MMOL/L (ref 21–32)
CREAT SERPL-MCNC: 0.9 MG/DL (ref 0.6–1)
GLOBULIN SER CALC-MCNC: 4 G/DL (ref 2.8–4.5)
GLUCOSE SERPL-MCNC: 98 MG/DL (ref 65–100)
MAGNESIUM SERPL-MCNC: 1.8 MG/DL (ref 1.8–2.4)
PHOSPHATE SERPL-MCNC: 4.1 MG/DL (ref 2.5–4.5)
POTASSIUM SERPL-SCNC: 3.4 MMOL/L (ref 3.5–5.1)
PROT SERPL-MCNC: 8 G/DL (ref 6.3–8.2)
SODIUM SERPL-SCNC: 138 MMOL/L (ref 133–143)

## 2022-12-14 PROCEDURE — 36415 COLL VENOUS BLD VENIPUNCTURE: CPT

## 2022-12-14 PROCEDURE — 83735 ASSAY OF MAGNESIUM: CPT

## 2022-12-14 PROCEDURE — 84100 ASSAY OF PHOSPHORUS: CPT

## 2022-12-14 PROCEDURE — 96372 THER/PROPH/DIAG INJ SC/IM: CPT

## 2022-12-14 PROCEDURE — 6360000002 HC RX W HCPCS: Performed by: INTERNAL MEDICINE

## 2022-12-14 PROCEDURE — 80053 COMPREHEN METABOLIC PANEL: CPT

## 2022-12-14 RX ORDER — ALBUTEROL SULFATE 90 UG/1
4 AEROSOL, METERED RESPIRATORY (INHALATION) PRN
OUTPATIENT
Start: 2023-01-11

## 2022-12-14 RX ORDER — ONDANSETRON 2 MG/ML
8 INJECTION INTRAMUSCULAR; INTRAVENOUS
OUTPATIENT
Start: 2023-01-11

## 2022-12-14 RX ORDER — ACETAMINOPHEN 325 MG/1
650 TABLET ORAL
OUTPATIENT
Start: 2023-01-11

## 2022-12-14 RX ORDER — DIPHENHYDRAMINE HYDROCHLORIDE 50 MG/ML
50 INJECTION INTRAMUSCULAR; INTRAVENOUS
OUTPATIENT
Start: 2023-01-11

## 2022-12-14 RX ORDER — SODIUM CHLORIDE 9 MG/ML
INJECTION, SOLUTION INTRAVENOUS CONTINUOUS
OUTPATIENT
Start: 2023-01-11

## 2022-12-14 RX ORDER — EPINEPHRINE 1 MG/ML
0.3 INJECTION, SOLUTION, CONCENTRATE INTRAVENOUS PRN
OUTPATIENT
Start: 2023-01-11

## 2022-12-14 RX ADMIN — DENOSUMAB 120 MG: 120 INJECTION SUBCUTANEOUS at 10:50

## 2023-01-10 DIAGNOSIS — Z17.0 MALIGNANT NEOPLASM OF LEFT BREAST IN FEMALE, ESTROGEN RECEPTOR POSITIVE, UNSPECIFIED SITE OF BREAST (HCC): Primary | ICD-10-CM

## 2023-01-10 DIAGNOSIS — C79.51 METASTASIS TO BONE (HCC): ICD-10-CM

## 2023-01-10 DIAGNOSIS — C77.3 BREAST CANCER METASTASIZED TO AXILLARY LYMPH NODE, LEFT (HCC): ICD-10-CM

## 2023-01-10 DIAGNOSIS — C50.912 BREAST CANCER METASTASIZED TO AXILLARY LYMPH NODE, LEFT (HCC): ICD-10-CM

## 2023-01-10 DIAGNOSIS — C50.912 MALIGNANT NEOPLASM OF LEFT BREAST IN FEMALE, ESTROGEN RECEPTOR POSITIVE, UNSPECIFIED SITE OF BREAST (HCC): Primary | ICD-10-CM

## 2023-01-11 ENCOUNTER — OFFICE VISIT (OUTPATIENT)
Dept: ONCOLOGY | Age: 53
End: 2023-01-11
Payer: COMMERCIAL

## 2023-01-11 ENCOUNTER — HOSPITAL ENCOUNTER (OUTPATIENT)
Dept: INFUSION THERAPY | Age: 53
Discharge: HOME | End: 2023-01-11
Payer: COMMERCIAL

## 2023-01-11 ENCOUNTER — HOSPITAL ENCOUNTER (OUTPATIENT)
Dept: LAB | Age: 53
Discharge: HOME | End: 2023-01-14
Payer: COMMERCIAL

## 2023-01-11 VITALS
HEART RATE: 92 BPM | SYSTOLIC BLOOD PRESSURE: 116 MMHG | BODY MASS INDEX: 32.08 KG/M2 | TEMPERATURE: 98.7 F | RESPIRATION RATE: 16 BRPM | HEIGHT: 64 IN | WEIGHT: 187.9 LBS | OXYGEN SATURATION: 99 % | DIASTOLIC BLOOD PRESSURE: 76 MMHG

## 2023-01-11 DIAGNOSIS — C50.912 MALIGNANT NEOPLASM OF LEFT BREAST IN FEMALE, ESTROGEN RECEPTOR POSITIVE, UNSPECIFIED SITE OF BREAST (HCC): Primary | ICD-10-CM

## 2023-01-11 DIAGNOSIS — C79.51 METASTASIS TO BONE (HCC): ICD-10-CM

## 2023-01-11 DIAGNOSIS — C77.3 BREAST CANCER METASTASIZED TO AXILLARY LYMPH NODE, LEFT (HCC): ICD-10-CM

## 2023-01-11 DIAGNOSIS — Z17.0 MALIGNANT NEOPLASM OF LEFT BREAST IN FEMALE, ESTROGEN RECEPTOR POSITIVE, UNSPECIFIED SITE OF BREAST (HCC): ICD-10-CM

## 2023-01-11 DIAGNOSIS — Z17.0 MALIGNANT NEOPLASM OF LEFT BREAST IN FEMALE, ESTROGEN RECEPTOR POSITIVE, UNSPECIFIED SITE OF BREAST (HCC): Primary | ICD-10-CM

## 2023-01-11 DIAGNOSIS — C50.912 MALIGNANT NEOPLASM OF LEFT BREAST IN FEMALE, ESTROGEN RECEPTOR POSITIVE, UNSPECIFIED SITE OF BREAST (HCC): ICD-10-CM

## 2023-01-11 DIAGNOSIS — C50.912 MALIGNANT NEOPLASM OF LEFT FEMALE BREAST, UNSPECIFIED ESTROGEN RECEPTOR STATUS, UNSPECIFIED SITE OF BREAST (HCC): ICD-10-CM

## 2023-01-11 DIAGNOSIS — C50.912 BREAST CANCER METASTASIZED TO AXILLARY LYMPH NODE, LEFT (HCC): ICD-10-CM

## 2023-01-11 LAB
ALBUMIN SERPL-MCNC: 3.8 G/DL (ref 3.5–5)
ALBUMIN/GLOB SERPL: 1 (ref 0.4–1.6)
ALP SERPL-CCNC: 84 U/L (ref 50–136)
ALT SERPL-CCNC: 48 U/L (ref 12–65)
ANION GAP SERPL CALC-SCNC: 8 MMOL/L (ref 2–11)
AST SERPL-CCNC: 24 U/L (ref 15–37)
BASOPHILS # BLD: 0.1 K/UL (ref 0–0.2)
BASOPHILS NFR BLD: 1 % (ref 0–2)
BILIRUB SERPL-MCNC: 0.2 MG/DL (ref 0.2–1.1)
BUN SERPL-MCNC: 13 MG/DL (ref 6–23)
CALCIUM SERPL-MCNC: 9.3 MG/DL (ref 8.3–10.4)
CANCER AG15-3 SERPL-ACNC: 54.5 U/ML (ref 1–35)
CHLORIDE SERPL-SCNC: 106 MMOL/L (ref 101–110)
CO2 SERPL-SCNC: 26 MMOL/L (ref 21–32)
CREAT SERPL-MCNC: 1 MG/DL (ref 0.6–1)
DIFFERENTIAL METHOD BLD: ABNORMAL
EOSINOPHIL # BLD: 0.1 K/UL (ref 0–0.8)
EOSINOPHIL NFR BLD: 1 % (ref 0.5–7.8)
ERYTHROCYTE [DISTWIDTH] IN BLOOD BY AUTOMATED COUNT: 12.9 % (ref 11.9–14.6)
GLOBULIN SER CALC-MCNC: 4 G/DL (ref 2.8–4.5)
GLUCOSE SERPL-MCNC: 79 MG/DL (ref 65–100)
HCT VFR BLD AUTO: 35 % (ref 35.8–46.3)
HGB BLD-MCNC: 12.7 G/DL (ref 11.7–15.4)
IMM GRANULOCYTES # BLD AUTO: 0 K/UL (ref 0–0.5)
IMM GRANULOCYTES NFR BLD AUTO: 1 % (ref 0–5)
LYMPHOCYTES # BLD: 2.4 K/UL (ref 0.5–4.6)
LYMPHOCYTES NFR BLD: 50 % (ref 13–44)
MAGNESIUM SERPL-MCNC: 2 MG/DL (ref 1.8–2.4)
MCH RBC QN AUTO: 37.7 PG (ref 26.1–32.9)
MCHC RBC AUTO-ENTMCNC: 36.3 G/DL (ref 31.4–35)
MCV RBC AUTO: 103.9 FL (ref 82–102)
MONOCYTES # BLD: 0.6 K/UL (ref 0.1–1.3)
MONOCYTES NFR BLD: 13 % (ref 4–12)
NEUTS SEG # BLD: 1.7 K/UL (ref 1.7–8.2)
NEUTS SEG NFR BLD: 34 % (ref 43–78)
NRBC # BLD: 0 K/UL (ref 0–0.2)
PHOSPHATE SERPL-MCNC: 3.9 MG/DL (ref 2.5–4.5)
PLATELET # BLD AUTO: 205 K/UL (ref 150–450)
PMV BLD AUTO: 9.7 FL (ref 9.4–12.3)
POTASSIUM SERPL-SCNC: 3.3 MMOL/L (ref 3.5–5.1)
PROT SERPL-MCNC: 7.8 G/DL (ref 6.3–8.2)
RBC # BLD AUTO: 3.37 M/UL (ref 4.05–5.2)
SODIUM SERPL-SCNC: 140 MMOL/L (ref 133–143)
WBC # BLD AUTO: 4.9 K/UL (ref 4.3–11.1)

## 2023-01-11 PROCEDURE — 84100 ASSAY OF PHOSPHORUS: CPT

## 2023-01-11 PROCEDURE — 86300 IMMUNOASSAY TUMOR CA 15-3: CPT

## 2023-01-11 PROCEDURE — 99214 OFFICE O/P EST MOD 30 MIN: CPT | Performed by: INTERNAL MEDICINE

## 2023-01-11 PROCEDURE — 80053 COMPREHEN METABOLIC PANEL: CPT

## 2023-01-11 PROCEDURE — 36415 COLL VENOUS BLD VENIPUNCTURE: CPT

## 2023-01-11 PROCEDURE — 83735 ASSAY OF MAGNESIUM: CPT

## 2023-01-11 PROCEDURE — 3074F SYST BP LT 130 MM HG: CPT | Performed by: INTERNAL MEDICINE

## 2023-01-11 PROCEDURE — 85025 COMPLETE CBC W/AUTO DIFF WBC: CPT

## 2023-01-11 PROCEDURE — 96372 THER/PROPH/DIAG INJ SC/IM: CPT

## 2023-01-11 PROCEDURE — 3078F DIAST BP <80 MM HG: CPT | Performed by: INTERNAL MEDICINE

## 2023-01-11 PROCEDURE — 6360000002 HC RX W HCPCS: Performed by: INTERNAL MEDICINE

## 2023-01-11 RX ORDER — EPINEPHRINE 1 MG/ML
0.3 INJECTION, SOLUTION, CONCENTRATE INTRAVENOUS PRN
OUTPATIENT
Start: 2023-02-08

## 2023-01-11 RX ORDER — POTASSIUM CHLORIDE 20 MEQ/1
20 TABLET, EXTENDED RELEASE ORAL 2 TIMES DAILY
Qty: 60 TABLET | Refills: 11 | Status: SHIPPED | OUTPATIENT
Start: 2023-01-11

## 2023-01-11 RX ORDER — PALBOCICLIB 125 MG/1
125 TABLET, FILM COATED ORAL DAILY
Qty: 30 TABLET | Refills: 11 | Status: SHIPPED | OUTPATIENT
Start: 2023-01-11

## 2023-01-11 RX ORDER — ONDANSETRON 2 MG/ML
8 INJECTION INTRAMUSCULAR; INTRAVENOUS
OUTPATIENT
Start: 2023-02-08

## 2023-01-11 RX ORDER — SODIUM CHLORIDE 9 MG/ML
INJECTION, SOLUTION INTRAVENOUS CONTINUOUS
OUTPATIENT
Start: 2023-02-08

## 2023-01-11 RX ORDER — DOCUSATE SODIUM 100 MG/1
100 CAPSULE, LIQUID FILLED ORAL 2 TIMES DAILY
COMMUNITY

## 2023-01-11 RX ORDER — ACETAMINOPHEN 325 MG/1
650 TABLET ORAL
OUTPATIENT
Start: 2023-02-08

## 2023-01-11 RX ORDER — DIPHENHYDRAMINE HYDROCHLORIDE 50 MG/ML
50 INJECTION INTRAMUSCULAR; INTRAVENOUS
OUTPATIENT
Start: 2023-02-08

## 2023-01-11 RX ORDER — ALBUTEROL SULFATE 90 UG/1
4 AEROSOL, METERED RESPIRATORY (INHALATION) PRN
OUTPATIENT
Start: 2023-02-08

## 2023-01-11 RX ORDER — LETROZOLE 2.5 MG/1
2.5 TABLET, FILM COATED ORAL DAILY
Qty: 30 TABLET | Refills: 11 | Status: SHIPPED | OUTPATIENT
Start: 2023-01-11

## 2023-01-11 RX ADMIN — DENOSUMAB 120 MG: 120 INJECTION SUBCUTANEOUS at 15:09

## 2023-01-11 ASSESSMENT — PATIENT HEALTH QUESTIONNAIRE - PHQ9
SUM OF ALL RESPONSES TO PHQ QUESTIONS 1-9: 0
SUM OF ALL RESPONSES TO PHQ9 QUESTIONS 1 & 2: 0
1. LITTLE INTEREST OR PLEASURE IN DOING THINGS: 0
SUM OF ALL RESPONSES TO PHQ QUESTIONS 1-9: 0
SUM OF ALL RESPONSES TO PHQ QUESTIONS 1-9: 0
2. FEELING DOWN, DEPRESSED OR HOPELESS: 0
SUM OF ALL RESPONSES TO PHQ QUESTIONS 1-9: 0

## 2023-01-11 NOTE — PROGRESS NOTES
[facility]ematology and Oncology: Office Visit Established Patient    Chief Complaint:    Chief Complaint   Patient presents with    Follow-up         History of Present Illness:  Ms. Kelsy Hatfield is a 46 y.o. female who presents today for follow-up regarding metastatic breast cancer. On 6/2/22, Ms. Alcocer presented for her routine screening mammogram. The imaging reported a left breast mass with associated calcifications, worrisome for malignancy. On 6/14/22 she underwent a left breast diagnostic mammogram and ultrasound with biopsy of the breast mass and a dysmorphic left axillary lymph node. The left breast and axillary node pathology showed infiltrating ductal carcinoma with lobular features, low grade (well differentiated), ER 93%, CO 0%, and HER-2 0. On 6/16/22 she had a bilateral breast MRI that reported the known left breast cancer measuring up to 3.7 cm, with associated left axillary lymphadenopathy however no suspicious findings in the right breast. On 6/22/22 she had a PET/CT scan that showed the breast and axillary node, but also showed hypermetabolic osseous metastatic disease in the sacrum and T10 vertebral body. We recommended biopsy of a suspicious osseous lesion which confirmed mBC. Unfortunately, she will not be a candidate for curative therapy and should start palliative systemic therapy, I would recommend letrozole and ribociclib. She is postmenopausal so she will not need ovarian function suppression. She will also require denosumab or zoledronic acid depending on insurance approval.  We will attempt to perform NGS on the metastatic specimen to screen for biomarker expression including PI3KCA, BRCA, PDL-1 and others. Here for follow-up. She is doing very well overall. She is tolerating letrozole and Ibrance with no obvious side effects. She does have muscle cramps and remains on a double dose of potassium replacement. No issues with Xgeva. No fevers or  infectious symptoms. Review of Systems:  Constitutional: Negative. HENT: Negative. Eyes: Negative. Respiratory: Negative. Cardiovascular: Negative. Gastrointestinal: Negative. Genitourinary: Negative. Musculoskeletal: Negative. Skin: Negative. Neurological: Negative. Endo/Heme/Allergies: Negative. Psychiatric/Behavioral: Negative. All other systems reviewed and are negative. No Known Allergies  Past Medical History:   Diagnosis Date    Cyst, kidney, acquired     found on previous scan    Hypertension     Kidney stone      Past Surgical History:   Procedure Laterality Date    APPENDECTOMY  1990    CT BIOPSY PERCUTANEOUS SUPERFICIAL BONE  07/01/2022    CT BIOPSY PERCUTANEOUS SUPERFICIAL BONE 7/1/2022 [facility]OLOGY CT SCAN    US BREAST NEEDLE BIOPSY LEFT Left 06/14/2022    US BREAST NEEDLE BIOPSY LEFT 6/14/2022 Alysa Gaspar MD [facility] RADIOLOGY MAMMO    US LYMPH NODE BIOPSY  06/14/2022    US LYMPH NODE BIOPSY 6/14/2022 Alysa Gaspar MD [facility] RADIOLOGY MAMMO    WISDOM TOOTH EXTRACTION       Family History   Problem Relation Age of Onset    Hypertension Mother     Alcohol Abuse Brother     Alcohol Abuse Father     Dementia Father     Hypertension Brother     Diabetes Father      Social History     Socioeconomic History    Marital status:      Spouse name: Not on file    Number of children: Not on file    Years of education: Not on file    Highest education level: Not on file   Occupational History    Not on file   Tobacco Use    Smoking status: Never    Smokeless tobacco: Never   Vaping Use    Vaping Use: Never used   Substance and Sexual Activity    Alcohol use:  Yes     Alcohol/week: 2.0 standard drinks    Drug use: Never    Sexual activity: Not on file   Other Topics Concern    Not on file   Social History Narrative    Not on file     Social Determinants of Health     Financial Resource Strain: Low Risk     Difficulty of Paying Living Expenses: Not hard at all   Food Insecurity: Unknown Worried About [address] [facility] in the Last Year: Never true    [address] in the Last Year: Not on file   Transportation Needs: No Transportation Needs    Lack of Transportation (Medical): No    Lack of Transportation (Non-Medical): No   Physical Activity: Sufficiently Active    Days of Exercise per Week: 7 days    Minutes of Exercise per Session: 30 min   Stress: Stress Concern Present    Feeling of Stress :  To some extent   Social Connections: Unknown    Frequency of Communication with Friends and Family: More than three times a week    Frequency of Social Gatherings with Friends and Family: More than three times a week    Attends Sabianism Services: Not on file    Active Member of Clubs or Organizations: Not on file    Attends Club or Organization Meetings: Not on file    Marital Status:    Intimate Partner Violence: Not At Risk    Fear of Current or Ex-Partner: No    Emotionally Abused: No    Physically Abused: No    Sexually Abused: No   Housing Stability: Low Risk     Unable to Pay for Housing in the Last Year: No    Number of [place] in the Last Year: 1    Unstable Housing in the Last Year: No     Current Outpatient Medications   Medication Sig Dispense Refill    docusate sodium (COLACE) 100 MG capsule Take 100 mg by mouth 2 times daily      letrozole (FEMARA) 2.5 MG tablet Take 1 tablet by mouth daily 30 tablet 11    potassium chloride (KLOR-CON M) 20 MEQ extended release tablet Take 1 tablet by mouth 2 times daily Take 1 tablet twice a day 60 tablet 11    IBRANCE 125 MG tablet Take 125 mg by mouth daily 30 tablet 11    chlorthalidone (HYGROTON) 25 MG tablet 1 po qam for bloos pressure 90 tablet 1    olmesartan (BENICAR) 40 MG tablet 1 po qam for blood pressure 90 tablet 1    ondansetron (ZOFRAN) 8 MG tablet Take 1 tablet by mouth every 8 hours as needed for Nausea or Vomiting 90 tablet 2    Magic Mouthwash (MIRACLE MOUTHWASH) Swish and spit 5 mLs 4 times daily as needed for Irritation (Patient not taking: Reported on 1/11/2023) 480 mL 1     No current facility-administered medications for this visit. OBJECTIVE:  /76 (Site: Right Upper Arm, Position: Standing, Cuff Size: Medium Adult)   Pulse 92   Temp 98.7 °F (37.1 °C) (Oral)   Resp 16   Ht 5' 3.5\" (1.613 m)   Wt 187 lb 14.4 oz (85.2 kg)   SpO2 99%   BMI 32.76 kg/m²     Physical Exam:  Constitutional: Well developed, well nourished female in no acute distress, sitting comfortably in the exam room chair. HEENT: Normocephalic and atraumatic. Mucous membranes are moist.  Sclerae anicteric. Neck supple without JVD. No thyromegaly present. Lymph node   No palpable submandibular, cervical, supraclavicular lymph nodes. Skin Warm and dry. No bruising and no rash noted. No erythema. No pallor. Respiratory Lungs are clear to auscultation bilaterally without wheezes, rales or rhonchi, normal air exchange without accessory muscle use. CVS Normal rate, regular rhythm and normal S1 and S2. No murmurs, gallops, or rubs. Neuro Grossly nonfocal with no obvious sensory or motor deficits. MSK Normal range of motion in general.  No edema and no tenderness. Psych Appropriate mood and affect.           Labs:  Recent Results (from the past 24 hour(s))   CBC with Auto Differential    Collection Time: 01/11/23  1:50 PM   Result Value Ref Range    WBC 4.9 4.3 - 11.1 K/uL    RBC 3.37 (L) 4.05 - 5.2 M/uL    Hemoglobin 12.7 11.7 - 15.4 g/dL    Hematocrit 35.0 (L) 35.8 - 46.3 %    .9 (H) 82.0 - 102.0 FL    MCH 37.7 (H) 26.1 - 32.9 PG    MCHC 36.3 (H) 31.4 - 35.0 g/dL    RDW 12.9 11.9 - 14.6 %    Platelets 524 221 - 240 K/uL    MPV 9.7 9.4 - 12.3 FL    nRBC 0.00 0.0 - 0.2 K/uL    Differential Type AUTOMATED      Seg Neutrophils 34 (L) 43 - 78 %    Lymphocytes 50 (H) 13 - 44 %    Monocytes 13 (H) 4.0 - 12.0 %    Eosinophils % 1 0.5 - 7.8 %    Basophils 1 0.0 - 2.0 %    Immature Granulocytes 1 0.0 - 5.0 %    Segs Absolute 1.7 1.7 - 8.2 K/UL    Absolute Lymph # 2.4 0.5 - 4.6 K/UL    Absolute Mono # 0.6 0.1 - 1.3 K/UL    Absolute Eos # 0.1 0.0 - 0.8 K/UL    Basophils Absolute 0.1 0.0 - 0.2 K/UL    Absolute Immature Granulocyte 0.0 0.0 - 0.5 K/UL   Comprehensive Metabolic Panel    Collection Time: 01/11/23  1:50 PM   Result Value Ref Range    Sodium PENDING mmol/L    Potassium PENDING mmol/L    Chloride PENDING mmol/L    CO2 PENDING mmol/L    Anion Gap PENDING mmol/L    Glucose PENDING mg/dL    BUN PENDING MG/DL    Creatinine PENDING MG/DL    Est, Glom Filt Rate PENDING ml/min/1.73m2    Calcium PENDING MG/DL    Total Bilirubin PENDING MG/DL    ALT PENDING U/L    AST PENDING U/L    Alk Phosphatase PENDING U/L    Total Protein PENDING g/dL    Albumin PENDING g/dL    Globulin PENDING g/dL    Albumin/Globulin Ratio PENDING     Cancer Antigen 15-3    Collection Time: 01/11/23  1:50 PM   Result Value Ref Range    CA 15-3 54.50 (H) 1.0 - 35.0 U/mL         Imaging:  PET CT SKULL BASE TO MID THIGH    Result Date: 10/13/2022  PET/CT  Indication: Breast cancer restaging Radiopharmaceutical: 14.0 mCi F18-FDG, intravenously. Technique: Positron emission tomography (PET) with concurrently acquired computed tomography (CT) for attenuation correction and anatomical localization imaging performed from the skull base to mid thigh. Imaging was performed approximately 60 minutes post injection. Oral contrast was administered. Radiation dose reduction techniques were used for this study:  Our CT scanners use one or all of the following: Automated exposure control, adjustment of the mA and/or kVp according to patient's size, iterative reconstruction. Serum glucose: 100 mg/dL prior to injection. Comparison studies: 6/22/2022 PET/CT Findings: Head and Neck: No enlarged or hypermetabolic cervical lymph nodes. No worrisome focal FDG uptake in the neck soft tissues. Chest: No significant interval decrease in size and FDG uptake within the left breast mass.  Left axillary lymph nodes are also smaller and elevated FDG uptake is resolved. No mediastinal, axillary, or internal mammary lymphadenopathy. Presumed posttreatment changes in the medial right lung base. Abdomen/Pelvis: The liver is severely steatotic. Hemorrhagic/proteinaceous cyst posteriorly in the left kidney. No enlarged or hypermetabolic lymph nodes of the abdomen or pelvis. Normal uterus. No adnexal mass. Bones and soft tissues: Newly sclerotic appearance and improved FDG uptake with lesions of the S1 and T10 vertebral bodies. No new bone lesions on the current study. 1.  Interval treatment response evident in the primary left breast mass, ipsilateral axillary lymph nodes and bone lesions at T10 and S1. No new sites of disease. 2.  Severe hepatic steatosis. MRI BREAST BILATERAL W WO CONTRAST    Result Date: 6/16/2022  MRI of the Breasts with and without contrast CLINICAL INDICATION:  New diagnosis of left 10:00 breast cancer and left axillary metastatic lymphadenopathy undergoing evaluation for extent of disease, staging, therapy planning. No prior breast surgery or personal malignancy otherwise. COMPARISON: Mammography and ultrasound 5/20/2022, 6/14/2022 TECHNIQUE: Standard MRI sequences were obtained through the breasts in multiple planes. Images were obtained before and after intravenous infusion of 16 mL of Prohance contrast. Images were reviewed with PACS and with [employer] CAD software. FINDINGS: The breasts demonstrate moderate glandularity and mild background enhancement. Left 10:00 irregular heterogeneously enhancing malignant mass measures up to 3.3 x 2.6 x 3.0 cm. Extending anteriorly from the mass is about 1.2 cm of clumped and reticular nonmass enhancement. Overall the maximal dimension of this malignancy is 3.7 cm. Mild overlying skin thickening and edema are nonspecific and could be reactive to the biopsy unless there is clinical suspicion for malignancy involvement.  There is no other evidence of suspicious enhancing mass, and no dominant or unique nonmass enhancement, to suggest additional malignancy in either breast. Left axilla demonstrates 4 asymmetrically enlarged and dysmorphic lymph nodes, one of which underwent prior biopsy demonstrating metastasis. The largest measures up to 2.0 x 1.2 cm. There is no evidence of right axillary lymphadenopathy or internal mammary lymphadenopathy. Elsewhere, limited visualization of the partially included thorax and upper abdomen shows no acute abnormality. 1. Left 10:00 breast cancer measures up to 3.7 cm. 2. Left axillary metastatic lymphadenopathy. 3. No suspicious right breast finding. Recommend continued management as directed clinically. BI-RADS Assessment Category 6: Known Biopsy Proven Malignancy     US BREAST LIMITED LEFT    Result Date: 6/14/2022  DIAGNOSTIC MAMMOGRAM LEFT BREAST WITH TOMOSYNTHESIS, LEFT BREAST ULTRASOUND HISTORY: Further evaluation of left breast mass and calcifications. Spot compression  tomosynthesis views in the CC and MLO projections of the left breast was performed in addition to a full 90 degree mediolateral tomosynthesis view. Additional magnification views were also submitted. Review of mammographic images of the left breast prior examinations dated 05/20/2022, 07/20/2014 was performed. FINDING: Confirmed is the spiculated mass at approximately 10:00 position left breast posterior depth. The mass measures approximately 4 cm in size. There are pleomorphic calcifications extending anteriorly from the mass towards the nipple. Including the mass and calcifications, the abnormality measures approximately 7 cm in size. A left breast ultrasound is recommended. Left breast ultrasound: There is a hypoechoic mass with ill-defined angular margins at the 10:00 position left breast 12 cm from nipple. This is estimated to measure approximately 5.0 x 3.2 x 4.5 cm. There is posterior acoustic shadowing.  Evaluation of left axilla reveals a dysmorphic lymph node measuring 2.6 cm in maximal dimension. IMPRESSION: 1. Spiculated left breast mass with associated microcalcifications measuring at least 7 cm in total size at the 10:00 position. Tissue sampling is recommended. 2. Dysmorphic left axillary lymph node. Tissue sampling is recommended. BI-RADS Assessment Category 5: Highly suggestive of malignancy- Appropriate action should be taken. Results were discussed with the patient prior to leaving the department. The patient is scheduled for biopsy today. This study was reviewed with the aid of the iCAD  device. MAMMOGRAM POST BX CLIP PLACEMENT LEFT    Result Date: 6/14/2022  Ultrasound-guided left breast biopsy, left axillary lymph node biopsy and postbiopsy mammogram: 06/14/2022 HISTORY: Left breast mass and dysmorphic left axillary lymph node. Ultrasound guided left breast biopsy: The patient was explained the procedure informed consent was obtained. Using sonographic guidance and sterile technique, a 14-gauge biopsy device was used to obtain 3 core samples from the hypoechoic mass at the 10:00 position. The samples were placed into a formalin container and sent to the lab for analysis. A biopsy clip was placed upon completion of sampling. The patient tolerated the procedure well. There were no immediate complications. Ultrasound guided left axillary lymph node biopsy: The patient was explained the procedure informed consent was obtained. Using sonographic guidance and sterile technique, a 14-gauge biopsy device was used to obtain 2 core samples from the dysmorphic lymph node within the left axilla. . The samples were placed into a formalin container and sent to the lab for analysis. A biopsy clip was not placed. The patient tolerated the procedure well. There were no immediate complications. Postbiopsy mammogram. CC and ML views of the left breast were obtained. The biopsy clip is confirmed within the mass at the 10:00 position.      1. Status post ultrasound guided biopsy of the hypoechoic mass at the 10:00 position left breast. 2. Status post ultrasound guided biopsy of the dysmorphic lymph node within the left axilla. Author Marisela PET CT SKULL BASE TO MID THIGH    Result Date: 6/22/2022  PET/CT  INDICATION: Malignant neoplasm of unspecified site of left female breast; Malignant neoplasm of unspecified site of left female breast; Secondary and unspecified malignant neoplasm of axilla and upper limb lymph nodes RADIOPHARMACEUTICAL: 13.42 mCi F18-FDG, intravenously. TECHNIQUE: Imaging was performed from the skull through the proximal thighs using routine PET/CT acquisition protocol. Imaging was performed approximately 60 minutes post injection. Oral contrast was administered. Radiation dose reduction techniques were used for this study:  Our CT scanners use one or all of the following: Automated exposure control, adjustment of the mA and/or kVp according to patient's size, iterative reconstruction. SERUM GLUCOSE: 123 mg/dL prior to injection. COMPARISON: Breast MRI June 16, 2022 FINDINGS: HEAD/NECK: Symmetric uptake within the imaged brain parenchyma. Scattered areas of mild uptake within the occipital and cervical soft tissues without convincing CT correlate with additional areas of uptake, and along the bilateral paraspinals soft tissues. No discrete enlarged/hypermetabolic cervical lymph nodes. CHEST: FDG uptake with a known left breast malignancy with SUV of 9.0. Prominent FDG avid left axillary lymph nodes largest measuring 1.3 x 2.6 cm (SUV max 5.8, image number 66). Scattered areas of FDG uptake without corresponding CT correlate throughout the superior lateral and posterior medial chest wall. No enlarged or hypermetabolic mediastinal or hilar adenopathy. No suspicious pulmonary nodules or focal parenchymal FDG uptake. ABDOMEN/PELVIS: No enlarged or hypermetabolic adenopathy. No focal uptake within the visceral organs.  There is a 1.8 cm left hyperdense exophytic renal cyst without associated FDG uptake compatible with a hemorrhagic/proteinaceous cyst with additional punctate upper pole hyperdense cyst and indeterminate photopenic hypodense cyst. Physiologic uptake within the gastrointestinal tract with normal excretion of radiotracer within the renal collecting system and urinary bladder. MUSCULOSKELETAL: Hypermetabolic lytic lesion involving the T10 vertebral body (SUV max 14.0, image number 103) with sclerotic hypermetabolic lesion centered within the sacrum (SUV max 13.0, image number 189). No abnormal focal uptake within the soft tissues. 1.  Hypermetabolic soft tissue left breast mass with prominent FDG avid left axillary lymph nodes. 2.  Hypermetabolic osseous metastatic disease involving the sacrum and T10 vertebral body. 3.  Patchy areas of mild uptake throughout the cervical and paraspinal soft tissues, as well as, along the superior lateral and posterior medial chest wall favoring brown fat and less likely metastatic disease. US BIOPSY LYMPH NODE    Result Date: 6/14/2022  Ultrasound-guided left breast biopsy, left axillary lymph node biopsy and postbiopsy mammogram: 06/14/2022 HISTORY: Left breast mass and dysmorphic left axillary lymph node. Ultrasound guided left breast biopsy: The patient was explained the procedure informed consent was obtained. Using sonographic guidance and sterile technique, a 14-gauge biopsy device was used to obtain 3 core samples from the hypoechoic mass at the 10:00 position. The samples were placed into a formalin container and sent to the lab for analysis. A biopsy clip was placed upon completion of sampling. The patient tolerated the procedure well. There were no immediate complications. Ultrasound guided left axillary lymph node biopsy: The patient was explained the procedure informed consent was obtained.  Using sonographic guidance and sterile technique, a 14-gauge biopsy device was used to obtain 2 core samples from the dysmorphic lymph node within the left axilla. . The samples were placed into a formalin container and sent to the lab for analysis. A biopsy clip was not placed. The patient tolerated the procedure well. There were no immediate complications. Postbiopsy mammogram. CC and ML views of the left breast were obtained. The biopsy clip is confirmed within the mass at the 10:00 position. 1. Status post ultrasound guided biopsy of the hypoechoic mass at the 10:00 position left breast. 2. Status post ultrasound guided biopsy of the dysmorphic lymph node within the left axilla. .     US BREAST BIOPSY W LOC DEVICE 1ST LESION LEFT    Result Date: 6/14/2022  Ultrasound-guided left breast biopsy, left axillary lymph node biopsy and postbiopsy mammogram: 06/14/2022 HISTORY: Left breast mass and dysmorphic left axillary lymph node. Ultrasound guided left breast biopsy: The patient was explained the procedure informed consent was obtained. Using sonographic guidance and sterile technique, a 14-gauge biopsy device was used to obtain 3 core samples from the hypoechoic mass at the 10:00 position. The samples were placed into a formalin container and sent to the lab for analysis. A biopsy clip was placed upon completion of sampling. The patient tolerated the procedure well. There were no immediate complications. Ultrasound guided left axillary lymph node biopsy: The patient was explained the procedure informed consent was obtained. Using sonographic guidance and sterile technique, a 14-gauge biopsy device was used to obtain 2 core samples from the dysmorphic lymph node within the left axilla. . The samples were placed into a formalin container and sent to the lab for analysis. A biopsy clip was not placed. The patient tolerated the procedure well. There were no immediate complications. Postbiopsy mammogram. CC and ML views of the left breast were obtained.  The biopsy clip is confirmed within the mass at the 10:00 position. 1. Status post ultrasound guided biopsy of the hypoechoic mass at the 10:00 position left breast. 2. Status post ultrasound guided biopsy of the dysmorphic lymph node within the left axilla. Shira Castano [facility]ACH[facility] DIGITAL DIAGNOSTIC UNILATERAL LEFT    Result Date: 6/14/2022  DIAGNOSTIC MAMMOGRAM LEFT BREAST WITH TOMOSYNTHESIS, LEFT BREAST ULTRASOUND HISTORY: Further evaluation of left breast mass and calcifications. Spot compression  tomosynthesis views in the CC and MLO projections of the left breast was performed in addition to a full 90 degree mediolateral tomosynthesis view. Additional magnification views were also submitted. Review of mammographic images of the left breast prior examinations dated 05/20/2022, 07/20/2014 was performed. FINDING: Confirmed is the spiculated mass at approximately 10:00 position left breast posterior depth. The mass measures approximately 4 cm in size. There are pleomorphic calcifications extending anteriorly from the mass towards the nipple. Including the mass and calcifications, the abnormality measures approximately 7 cm in size. A left breast ultrasound is recommended. Left breast ultrasound: There is a hypoechoic mass with ill-defined angular margins at the 10:00 position left breast 12 cm from nipple. This is estimated to measure approximately 5.0 x 3.2 x 4.5 cm. There is posterior acoustic shadowing. Evaluation of left axilla reveals a dysmorphic lymph node measuring 2.6 cm in maximal dimension. IMPRESSION: 1. Spiculated left breast mass with associated microcalcifications measuring at least 7 cm in total size at the 10:00 position. Tissue sampling is recommended. 2. Dysmorphic left axillary lymph node. Tissue sampling is recommended. BI-RADS Assessment Category 5: Highly suggestive of malignancy- Appropriate action should be taken. Results were discussed with the patient prior to leaving the department. The patient is scheduled for biopsy today.   This study was reviewed with the aid of the iCAD  device. Pathology:  DIAGNOSIS        A:  \"LEFT BREAST, 10:00 POSITION, 12 CM FROM NIPPLE, CORE BIOPSY\":        INFILTRATING DUCTAL CARCINOMA WITH LOBULAR FEATURES, LOW GRADE (WELL   DIFFERENTIATED). DEFINITE IN SITU COMPONENT AND LYMPHOVASCULAR INVASION   ARE NOT IDENTIFIED          B:  \"LEFT AXILLA, CORE BIOPSY\":        MACRO METASTATIC CARCINOMA SIMILAR TO A INVOLVING LYMPH NODE. WHILE   DEFINITE EXTRACAPSULAR EXTENSION IS NOT IDENTIFIED IN THIS MATERIAL, IT   COULD BE PRESENT IN UNSAMPLED LYMPH NODE         Procedures/Addenda                     [facility]- ER/HI/[id number] BY IHC                                                                                                                                         Status:  Signed Out    Herbie Hurt MD on 2022                                 Interpretation                       [facility] IHC Quantitative Breast Panel     TEST NAME:                         RESULTS:               INTERNAL   CONTROLS:     ESTROGEN RECEPTOR:               Positive (93%)               Absent   PROGESTERONE RECEPTOR:          Negative (0.0%)          Absent   HER-2/ARYA:                         Negative (0)               Percentage   of Cells with Uniform        Intense Complete Membrane   Stainin%       DIAGNOSIS        \"BONE LESION\":  METASTATIC CARCINOMA, CONSISTENT WITH BREAST ORIGIN. Procedures/Addenda                     STF-IMMUNOHISTOCHEMISTRY                                                                                                                                         Status:  Signed Out    Charbel Nina MD on 2022                                 Interpretation                       Immunohistochemical Stain Panel:          Interpretation:  Immunohistochemical findings consistent with   metastatic carcinoma of breast origin.      Antibody/Test               Marker For Result   Cytokeratin 7               Lung, breast, upper GE, serous ovary                 Positive   Cytokeratin 20               Colon, mucinous ovary, urothelium                  Negative   MINO 3               Urothelial, breast carcinoma                         Positive   GCDFP-15               Breast, salivary gland, skin, adnexa                 Positive           ASSESSMENT:   Diagnosis Orders   1. Metastasis to bone (HCC)  letrozole (FEMARA) 2.5 MG tablet    potassium chloride (KLOR-CON M) 20 MEQ extended release tablet    IBRANCE 125 MG tablet      2. Malignant neoplasm of left breast in female, estrogen receptor positive, unspecified site of breast ([facility] [address]  letrozole (FEMARA) 2.5 MG tablet    potassium chloride (KLOR-CON M) 20 MEQ extended release tablet    IBRANCE 125 MG tablet      3. Breast cancer metastasized to axillary lymph node, left (HCC)  letrozole (FEMARA) 2.5 MG tablet    potassium chloride (KLOR-CON M) 20 MEQ extended release tablet    IBRANCE 125 MG tablet      4. Malignant neoplasm of left female breast, unspecified estrogen receptor status, unspecified site of breast (HCC)  letrozole (FEMARA) 2.5 MG tablet    potassium chloride (KLOR-CON M) 20 MEQ extended release tablet    IBRANCE 125 MG tablet              Patient Active Problem List   Diagnosis    Hypertension    Kidney stone    Cyst, kidney, acquired    Malignant neoplasm of left breast in female, estrogen receptor positive ([facility] [address]    Metastasis to bone [facility])           PLAN:  Lab studies were personally reviewed. Breast cancer: left breast, low grade IDC with lobular features, 3.7 cm with biopsy proven axillary lymphadenopathy, two osseous lesions on PET (T10 and sacrum) worrisome for metastatic disease, T10 now biopsy proven metastatic carcinoma consistent with breast origin. ER positive, UT negative, HER2 0. I discussed the results with Ms. Alcocer.   Unfortunately, she will not be a candidate for curative therapy and should start palliative systemic therapy, I would recommend letrozole and ribociclib. She is postmenopausal so she will not need ovarian function suppression. She will also require denosumab or zoledronic acid depending on insurance approval.  We will attempt to perform NGS on the metastatic specimen to screen for biomarker expression including PI3KCA, BRCA, PDL-1 and others. Here for follow-up. She is doing very well overall. She is tolerating letrozole and Ibrance with no obvious side effects. She does have muscle cramps and remains on a double dose of potassium replacement. No issues with Xgeva. No fevers or  infectious symptoms. Labs reviewed and unremarkable, ANC 1700. K+ 3.3 continue replacement. Ca++ 9.3. PET/CT is scheduled for next week, assuming this is negative I would continue letrozole/Ibrance but I would also be comfortable with proceeding with breast surgery, she has already met with Dr. Alan Pena and will contact his office to schedule if this is the case. She will receive Xgeva today and monthly for a planned 2 years. Upon progression, I would consider repeat biopsy as there was insufficient tissue for BRCA or PI3KCA mutation analysis, this will help us select next line of therapy. All questions were asked and answered to the best of my ability. F/u depending on PET/CT but likely in 3 months. Alina Yeung MD, MD  [facility]  [address] [place] [address]  Office : [phone]  Fax : [phone]        Oral Chemotherapy Adherence:     Current Regimen:  Drug Name: Arzella Narrow  Dose: 125mg  Frequency: daily    Barriers to care identified including (financial, physical, psychosocial) : No    Missed doses reported: No    Patient verbalizes understanding of what to do in the event of a missed dose:  Yes    Adverse reactions/toxicities reported:None, See HPI

## 2023-01-11 NOTE — PATIENT INSTRUCTIONS
Patient Instructions from Today's Visit    Reason for Visit:  Follow up Breast Cancer    Diagnosis Information:  [email or web address]    Plan:  Discussed questions on white board  Reviewed PET scan and lab results       Follow Up:   Follow up in with labs prior    Recent Lab Results:  Hospital Outpatient Visit on 01/11/2023   Component Date Value Ref Range Status    WBC 01/11/2023 4.9  4.3 - 11.1 K/uL Final    RESULTS CHECKED X 2    RBC 01/11/2023 3.37 (A)  4.05 - 5.2 M/uL Final    Hemoglobin 01/11/2023 12.7  11.7 - 15.4 g/dL Final    Hematocrit 01/11/2023 35.0 (A)  35.8 - 46.3 % Final    MCV 01/11/2023 103.9 (A)  82.0 - 102.0 FL Final    MCH 01/11/2023 37.7 (A)  26.1 - 32.9 PG Final    MCHC 01/11/2023 36.3 (A)  31.4 - 35.0 g/dL Final    RDW 01/11/2023 12.9  11.9 - 14.6 % Final    Platelets 87/73/0408 205  150 - 450 K/uL Final    MPV 01/11/2023 9.7  9.4 - 12.3 FL Final    nRBC 01/11/2023 0.00  0.0 - 0.2 K/uL Final    **Note: Absolute NRBC parameter is now reported with Hemogram**    Differential Type 01/11/2023 AUTOMATED    Final    Seg Neutrophils 01/11/2023 34 (A)  43 - 78 % Final    Lymphocytes 01/11/2023 50 (A)  13 - 44 % Final    Monocytes 01/11/2023 13 (A)  4.0 - 12.0 % Final    Eosinophils % 01/11/2023 1  0.5 - 7.8 % Final    Basophils 01/11/2023 1  0.0 - 2.0 % Final    Immature Granulocytes 01/11/2023 1  0.0 - 5.0 % Final    Segs Absolute 01/11/2023 1.7  1.7 - 8.2 K/UL Final    Absolute Lymph # 01/11/2023 2.4  0.5 - 4.6 K/UL Final    Absolute Mono # 01/11/2023 0.6  0.1 - 1.3 K/UL Final    Absolute Eos # 01/11/2023 0.1  0.0 - 0.8 K/UL Final    Basophils Absolute 01/11/2023 0.1  0.0 - 0.2 K/UL Final    Absolute Immature Granulocyte 01/11/2023 0.0  0.0 - 0.5 K/UL Final     Treatment Summary has been discussed and given to patient: n/a  -------------------------------------------------------------------------------------------------------------------  Please call our office at [phone] if you have any  of the following symptoms:   Fever of 100.5 or greater  Chills  Shortness of breath  Swelling or pain in one leg    After office hours an answering service is available and will contact a provider for emergencies or if you are experiencing any of the above symptoms. Patient does express an interest in My Chart. My Chart log in information explained on the after visit summary printout at the [facility] [address] desk.     Curry Warner RN

## 2023-01-11 NOTE — PROGRESS NOTES
Arrived to the [facility]. Xgeva completed. Provided education on Xgeva    Patient instructed to report any side affects to ordering provider. Patient tolerated without complications. Any issues or concerns during appointment: NO.  Patient aware of next infusion appointment on 2/8/23 at 1030. Discharged ambulatory.

## 2023-01-19 ENCOUNTER — HOSPITAL ENCOUNTER (OUTPATIENT)
Dept: PET IMAGING | Age: 53
Discharge: HOME | End: 2023-01-19
Payer: COMMERCIAL

## 2023-01-19 DIAGNOSIS — C50.912 MALIGNANT NEOPLASM OF LEFT BREAST IN FEMALE, ESTROGEN RECEPTOR POSITIVE, UNSPECIFIED SITE OF BREAST (HCC): ICD-10-CM

## 2023-01-19 DIAGNOSIS — Z17.0 MALIGNANT NEOPLASM OF LEFT BREAST IN FEMALE, ESTROGEN RECEPTOR POSITIVE, UNSPECIFIED SITE OF BREAST (HCC): ICD-10-CM

## 2023-01-19 LAB
GLUCOSE BLD STRIP.AUTO-MCNC: 114 MG/DL (ref 65–100)
SERVICE CMNT-IMP: ABNORMAL

## 2023-01-19 PROCEDURE — 6360000004 HC RX CONTRAST MEDICATION: Performed by: INTERNAL MEDICINE

## 2023-01-19 PROCEDURE — 2580000003 HC RX 258: Performed by: INTERNAL MEDICINE

## 2023-01-19 PROCEDURE — 82962 GLUCOSE BLOOD TEST: CPT

## 2023-01-19 PROCEDURE — A9552 F18 FDG: HCPCS | Performed by: INTERNAL MEDICINE

## 2023-01-19 PROCEDURE — 3430000000 HC RX DIAGNOSTIC RADIOPHARMACEUTICAL: Performed by: INTERNAL MEDICINE

## 2023-01-19 PROCEDURE — 78815 PET IMAGE W/CT SKULL-THIGH: CPT

## 2023-01-19 RX ORDER — FLUDEOXYGLUCOSE F 18 200 MCI/ML
10.2 INJECTION, SOLUTION INTRAVENOUS
Status: COMPLETED | OUTPATIENT
Start: 2023-01-19 | End: 2023-01-19

## 2023-01-19 RX ORDER — SODIUM CHLORIDE 0.9 % (FLUSH) 0.9 %
10 SYRINGE (ML) INJECTION ONCE AS NEEDED
Status: COMPLETED | OUTPATIENT
Start: 2023-01-19 | End: 2023-01-19

## 2023-01-19 RX ORDER — FLUDEOXYGLUCOSE F 18 200 MCI/ML
10.2 INJECTION, SOLUTION INTRAVENOUS
Status: DISCONTINUED | OUTPATIENT
Start: 2023-01-19 | End: 2023-01-19

## 2023-01-19 RX ORDER — SODIUM CHLORIDE 0.9 % (FLUSH) 0.9 %
10 SYRINGE (ML) INJECTION ONCE AS NEEDED
Status: DISCONTINUED | OUTPATIENT
Start: 2023-01-19 | End: 2023-01-19

## 2023-01-19 RX ADMIN — DIATRIZOATE MEGLUMINE AND DIATRIZOATE SODIUM 10 ML: 660; 100 LIQUID ORAL; RECTAL at 09:58

## 2023-01-19 RX ADMIN — FLUDEOXYGLUCOSE F 18 10.2 MILLICURIE: 200 INJECTION, SOLUTION INTRAVENOUS at 09:58

## 2023-01-19 RX ADMIN — SODIUM CHLORIDE, PRESERVATIVE FREE 10 ML: 5 INJECTION INTRAVENOUS at 09:58

## 2023-01-20 DIAGNOSIS — C50.912 MALIGNANT NEOPLASM OF LEFT BREAST IN FEMALE, ESTROGEN RECEPTOR POSITIVE, UNSPECIFIED SITE OF BREAST (HCC): Primary | ICD-10-CM

## 2023-01-20 DIAGNOSIS — Z17.0 MALIGNANT NEOPLASM OF LEFT BREAST IN FEMALE, ESTROGEN RECEPTOR POSITIVE, UNSPECIFIED SITE OF BREAST (HCC): Primary | ICD-10-CM

## 2023-01-20 DIAGNOSIS — C79.51 METASTASIS TO BONE (HCC): ICD-10-CM

## 2023-01-20 NOTE — PROGRESS NOTES
PET reviewed by Dr Desmond Torrez response  Refer back to surgery - Dr Dominique Smart to pt's [employer] message.     made aware of referral.

## 2023-01-30 ENCOUNTER — PREP FOR PROCEDURE (OUTPATIENT)
Dept: SURGERY | Age: 53
End: 2023-01-30

## 2023-01-30 ENCOUNTER — OFFICE VISIT (OUTPATIENT)
Dept: SURGERY | Age: 53
End: 2023-01-30
Payer: COMMERCIAL

## 2023-01-30 VITALS — HEIGHT: 64 IN | BODY MASS INDEX: 31.92 KG/M2 | WEIGHT: 187 LBS

## 2023-01-30 DIAGNOSIS — C50.912 MALIGNANT NEOPLASM OF LEFT BREAST IN FEMALE, ESTROGEN RECEPTOR POSITIVE, UNSPECIFIED SITE OF BREAST (HCC): Primary | ICD-10-CM

## 2023-01-30 DIAGNOSIS — Z17.0 MALIGNANT NEOPLASM OF LEFT BREAST IN FEMALE, ESTROGEN RECEPTOR POSITIVE, UNSPECIFIED SITE OF BREAST (HCC): Primary | ICD-10-CM

## 2023-01-30 DIAGNOSIS — C79.51 METASTASIS TO BONE (HCC): ICD-10-CM

## 2023-01-30 PROCEDURE — 99214 OFFICE O/P EST MOD 30 MIN: CPT | Performed by: SURGERY

## 2023-01-30 NOTE — PROGRESS NOTES
H&P/Consult Note/Progress Note/Office Note:   Horace Capellan  MRN: [id number]  :1970  Age:52 y.o.    HPI: Horace Capellan is a 46 y.o. female who originally presented with an abnormal left breast screening mammogram on 22 which led to diagnostic imaging and US. She had a large mass in the upper inner left breast with left axillary adenopathy. Percutaneous biopsies identified infiltrating ductal carcinoma with lobular features which was low ER 93% KY 0% HER2 negative. Breast MRI was performed next which confirmed a 3.7 cm left breast malignancy with 4 dysmorphic and enlarged axillary nodes. PET/CT imaging identified suspicious bony lesions of T10 and the sacrum. CT-guided biopsy of T10 confirmed metastatic breast carcinoma. She was treated with neoadjuvant endocrine therapy with Dr. Simone Meyer and had Letrozole and ribociclib   and had repeat PET/CT imaging on 10/12/22 and was presented at tumor board to discuss the possibility of surgery for local treatment      22 Bilat breast screening mammo with filomena and CAD  The breast parenchyma is heterogeneously dense, which may limit detection of small masses. Left posterior upper inner quadrant demonstrates an irregular mass with architectural distortion. There are associated calcifications within and anterior to the mass. Overall this area of concern measures at least 3 cm but is not well defined. No additional suspicious mass or calcification is seen in either breast.    There is no normal skin thickening or nipple retraction     Impression:  Left mass and calcifications. Further evaluation is recommended with ML and compression magnification views, then US to follow. 22 dx bilat mammo and left breast US  FINDING: Confirmed is the spiculated mass at approximately 10:00 position left breast posterior depth. The mass measures approximately 4 cm in size.  There are pleomorphic calcifications extending anteriorly from the mass towards the nipple. Including the mass and calcifications, the abnormality measures approximately 7 cm in size. A left breast ultrasound is recommended. Left breast ultrasound: There is a hypoechoic mass with ill-defined angular margins at the 10:00 position left breast 12 cm from nipple. This is estimated to measure approximately 5.0 x 3.2 x 4.5 cm. There is posterior acoustic shadowing. Evaluation of left axilla reveals a dysmorphic lymph node measuring 2.6 cm in maximal dimension. IMPRESSION: 1. Spiculated left breast mass with associated microcalcifications measuring at least 7 cm in total size at the 10:00 position. Tissue sampling is recommended. 2. Dysmorphic left axillary lymph node. Tissue sampling is recommended. Highly suggestive of malignancy           6/14/22 US-guided left breast and left axillary biopsy   A:  \"LEFT BREAST, 10:00 POSITION, 12 CM FROM NIPPLE, CORE BIOPSY\":   INFILTRATING DUCTAL CARCINOMA WITH LOBULAR FEATURES, LOW GRADE (WELL DIFFERENTIATED). DEFINITE IN SITU COMPONENT AND LYMPHOVASCULAR INVASION ARE NOT IDENTIFIED          B:  \"LEFT AXILLA, CORE BIOPSY\": MACRO METASTATIC CARCINOMA SIMILAR TO A INVOLVING LYMPH NODE. WHILE DEFINITE EXTRACAPSULAR EXTENSION IS NOT IDENTIFIED IN THIS MATERIAL, IT COULD BE PRESENT IN UNSAMPLED LYMPH NODE   Sign Out Date: 6/15/2022  SUSAN Gutierrez M.D.     ER: Positive (93%); KY: Negative (0.0%); [id number]:  Negative (0)              Post-bx mammo: Biopsy clip is confirmed within the mass at the 10:00 position       6/16/22 Breast MRI  FINDINGS: The breasts demonstrate moderate glandularity and mild background enhancement. Left 10:00 irregular heterogeneously enhancing malignant mass measures up to 3.3 x 2.6 x 3.0 cm. Extending anteriorly from the mass is about 1.2 cm of clumped and reticular nonmass enhancement. Overall the maximal dimension of this malignancy is 3.7 cm.    Mild overlying skin thickening and edema are nonspecific and could be reactive to the biopsy unless there is clinical suspicion for malignancy involvement. No other evidence of suspicious enhancing mass, and no dominant or unique nonmass enhancement, to suggest additional malignancy in either breast.     Left axilla demonstrates 4 asymmetrically enlarged and dysmorphic lymph nodes, one of which underwent prior biopsy demonstrating metastasis. The largest measures up to 2.0 x 1.2 cm. No evidence of right axillary lymphadenopathy or internal mammary lymphadenopathy. Elsewhere, limited visualization of the partially included thorax and upper abdomen shows no acute abnormality. Impression:  1. Left 10:00 breast cancer measures up to 3.7 cm.   2. Left axillary metastatic lymphadenopathy. 3. No suspicious right breast finding.            6/22/22 PET/CT  HEAD/NECK: Symmetric uptake within the imaged brain parenchyma. Scattered areas of mild uptake within the occipital and cervical soft tissues without convincing CT correlate with additional areas of uptake, and along the bilateral paraspinals soft tissues. No discrete enlarged/hypermetabolic cervical lymph nodes. CHEST: FDG uptake with a known left breast malignancy with SUV of 9.0. Prominent FDG avid left axillary lymph nodes largest measuring 1.3 x 2.6 cm (SUV max 5.8, image number 66). Scattered areas of FDG uptake without corresponding CT correlate throughout the superior lateral and posterior medial chest wall. No enlarged or hypermetabolic mediastinal or hilar adenopathy. No suspicious pulmonary nodules or focal parenchymal FDG uptake. ABDOMEN/PELVIS: No enlarged or hypermetabolic adenopathy. No focal uptake within the visceral organs.    There is a 1.8 cm left hyperdense exophytic renal cyst without associated FDG uptake compatible with a hemorrhagic/proteinaceous cyst with additional punctate upper pole hyperdense cyst and indeterminate photopenic hypodense cyst.   Physiologic uptake within the gastrointestinal tract with normal excretion of radiotracer within the renal collecting system and urinary bladder. MUSCULOSKELETAL: Hypermetabolic lytic lesion involving the T10 vertebral body (SUV max 14.0, image number 103) with sclerotic hypermetabolic lesion centered within the sacrum (SUV max 13.0, image number 189). No abnormal focal uptake within the soft tissues. Impression:  1. Hypermetabolic soft tissue left breast mass with prominent FDG avid left axillary lymph nodes. 2. Hypermetabolic osseous metastatic disease involving the sacrum and T10 vertebral body. 3. Patchy areas of mild uptake throughout the cervical and paraspinal soft tissues, as well as, along the superior lateral and posterior medial chest wall favoring brown fat and less likely metastatic disease. 7/1/22 CT-guided T10 Bone biopsy  DIAGNOSIS   \"BONE LESION\":  METASTATIC CARCINOMA, CONSISTENT WITH BREAST ORIGIN. Sign Out Date: 7/7/2022  Corey Glez MD   Interpretation:  Immunohistochemical findings consistent with metastatic carcinoma of breast origin. Antibody/Test           Marker For                                Result   Cytokeratin 7             Lung, breast, upper GE, serous ovary         Positive   Cytokeratin 20          Colon, mucinous ovary, urothelium          Negative   MINO 3                Urothelial, breast carcinoma                        Positive   [id number]              Breast, salivary gland, skin, adnexa              Positive         10/12/22 PET/CT (restaging after neoadjuvant Rx)  Head and Neck: No enlarged or hypermetabolic cervical lymph nodes. No worrisome focal FDG uptake in the neck soft tissues. Chest: No significant interval decrease in size and FDG uptake within the left breast mass. Left axillary lymph nodes are also smaller and elevated FDG uptake is resolved. No mediastinal, axillary, or internal mammary lymphadenopathy.  Presumed posttreatment changes in the medial right lung base. Abdomen/Pelvis: The liver is severely steatotic. Hemorrhagic/proteinaceous cyst posteriorly in the left kidney. No enlarged or hypermetabolic lymph nodes of the abdomen or pelvis. Normal uterus. No adnexal mass. Bones and soft tissues: Newly sclerotic appearance and improved FDG uptake with lesions of the S1 and T10 vertebral bodies. No new bone lesions on the current study. Impression:  1. Interval treatment response evident in the primary left breast mass, ipsilateral axillary lymph nodes and bone lesions at T10 and S1. No new sites of disease. 2. Severe hepatic steatosis. 1/19/23 PET CT  Hx: ER+ left breast carcinoma  COMPARISON: PET/CT October 12, 2022     FINDINGS:     HEAD/NECK: Symmetric uptake within the imaged brain parenchyma. No enlarged or hypermetabolic cervical lymph nodes. No focal uptake within the neck soft tissues. CHEST: Decrease uptake intensity within ill-defined spiculated left breast soft tissue nodule with SUV max of 2.1. Subcm left greater than right axillary lymph nodes without significant FDG uptake. No enlarged or hypermetabolic adenopathy. No suspicious pulmonary nodules or focal parenchymal FDG uptake. ABDOMEN/PELVIS: No enlarged or hypermetabolic adenopathy. No focal uptake within the visceral organs. Physiologic uptake within the gastrointestinal tract with normal excretion of radiotracer within the renal collecting system and urinary bladder. MUSCULOSKELETAL: Sclerotic lesion of the S1 and T10 vertebral bodies no longer demonstrate any FDG uptake. No new suspicious osseous lesions or focal uptake. Impression:  Findings of positive treatment response with resolution of previous uptake within sclerotic lesions of the S1 and T10 vertebral bodies. Continued decrease uptake within a left spiculated breast lesion without new soft tissue or adenopathy.                Past Medical History: Diagnosis Date    Cyst, kidney, acquired     found on previous scan    Hypertension     Kidney stone      Past Surgical History:   Procedure Laterality Date    APPENDECTOMY  1990    CT BIOPSY PERCUTANEOUS SUPERFICIAL BONE  07/01/2022    CT BIOPSY PERCUTANEOUS SUPERFICIAL BONE 7/1/2022 [facility] CT SCAN    US BREAST BIOPSY W LOC DEVICE 1ST LESION LEFT Left 06/14/2022    US BREAST NEEDLE BIOPSY LEFT 6/14/2022 Bobbette Cushing, MD [address] LYMPH NODE BIOPSY  06/14/2022    US LYMPH NODE BIOPSY 6/14/2022 Bobbette Cushing, MD [facility] MAMMO    WISDOM TOOTH EXTRACTION       Current Outpatient Medications   Medication Sig    docusate sodium (COLACE) 100 MG capsule Take 100 mg by mouth 2 times daily    letrozole (FEMARA) 2.5 MG tablet Take 1 tablet by mouth daily    potassium chloride (KLOR-CON M) 20 MEQ extended release tablet Take 1 tablet by mouth 2 times daily Take 1 tablet twice a day    IBRANCE 125 MG tablet Take 125 mg by mouth daily    chlorthalidone (HYGROTON) 25 MG tablet 1 po qam for bloos pressure    olmesartan (BENICAR) 40 MG tablet 1 po qam for blood pressure    Magic Mouthwash (MIRACLE MOUTHWASH) Swish and spit 5 mLs 4 times daily as needed for Irritation    ondansetron (ZOFRAN) 8 MG tablet Take 1 tablet by mouth every 8 hours as needed for Nausea or Vomiting     No current facility-administered medications for this visit. ALLERGIES:  Patient has no known allergies. Social History     Socioeconomic History    Marital status:      Spouse name: None    Number of children: None    Years of education: None    Highest education level: None   Tobacco Use    Smoking status: Never    Smokeless tobacco: Never   Vaping Use    Vaping Use: Never used   Substance and Sexual Activity    Alcohol use:  Yes     Alcohol/week: 2.0 standard drinks    Drug use: Never     Social Determinants of Health     Financial Resource Strain: Low Risk     Difficulty of Paying Living Expenses: Not hard at all   Food Insecurity: Unknown    Worried About [address] in the Last Year: Never true   Transportation Needs: No Transportation Needs    Lack of Transportation (Medical): No    Lack of Transportation (Non-Medical): No   Physical Activity: Sufficiently Active    Days of Exercise per Week: 7 days    Minutes of Exercise per Session: 30 min   Stress: Stress Concern Present    Feeling of Stress : To some extent   Social Connections: Unknown    Frequency of Communication with Friends and Family: More than three times a week    Frequency of Social Gatherings with Friends and Family: More than three times a week    Marital Status:    Intimate Partner Violence: Not At Risk    Fear of Current or Ex-Partner: No    Emotionally Abused: No    Physically Abused: No    Sexually Abused: No   Housing Stability: Low Risk     Unable to Pay for Housing in the Last Year: No    Number of [place] in the Last Year: 1    Unstable Housing in the Last Year: No     Social History     Tobacco Use   Smoking Status Never   Smokeless Tobacco Never     Family History   Problem Relation Age of Onset    Hypertension Mother     Alcohol Abuse Brother     Alcohol Abuse Father     Dementia Father     Hypertension Brother     Diabetes Father      ROS: The patient has no difficulty with chest pain or shortness of breath. No fever or chills. Comprehensive review of systems was otherwise unremarkable except as noted above. Physical Exam:   Ht 5' 3.5\" (1.613 m)   Wt 187 lb (84.8 kg)   BMI 32.61 kg/m²   Vitals:    01/30/23 1020   Weight: 187 lb (84.8 kg)   Height: 5' 3.5\" (1.613 m)     [unfilled]  [unfilled]    Constitutional: Alert, oriented, cooperative patient in no acute distress; appears stated age    Eyes:Sclera are clear. EOMs intact  ENMT: no external lesions gross hearing normal; no obvious neck masses, no ear or lip lesions, nares normal  CV: RRR. Normal perfusion  Resp: No JVD.   Breathing is  non-labored; no audible wheezing. Large pendulous breast  No palpable breast masses on either side  No regional adenopathy. GI: soft and non-distended     Musculoskeletal: unremarkable with normal function. No embolic signs or cyanosis. Neuro:  Oriented; moves all 4; no focal deficits  Psychiatric: normal affect and mood, no memory impairment    Recent vitals (if inpt):  @IPVITALS(24:)@    Amount and/or Complexity of Data Reviewed and Analyzed:  I reviewed and analyzed all of the unique labs and radiologic studies that are shown below as well as any that are in the HPI, and any that are in the expanded problem list below  *Each unique test, order, or document contributes to the combination of 2 or combination of 3 in Category 1 below. For this visit I also reviewed old records and prior notes. No results for input(s): WBC, HGB, PLT, NA, K, CL, CO2, BUN, CREA, GLU, INR, APTT, ALT, AML, AML, LCAD, PCO2, PO2, HCO3 in the last 72 hours.     Invalid input(s): PTP, TBIL, TBILI, CBIL, SGOT, GPT, AP, LPSE, NH4, TROPT, TROIQ,  PH  Review of most recent CBC  Lab Results   Component Value Date    WBC 4.9 01/11/2023    HGB 12.7 01/11/2023    HCT 35.0 (L) 01/11/2023    .9 (H) 01/11/2023     01/11/2023       Review of most recent BMP  Lab Results   Component Value Date/Time     01/11/2023 01:50 PM    K 3.3 01/11/2023 01:50 PM     01/11/2023 01:50 PM    CO2 26 01/11/2023 01:50 PM    BUN 13 01/11/2023 01:50 PM    CREATININE 1.00 01/11/2023 01:50 PM    GLUCOSE 79 01/11/2023 01:50 PM    CALCIUM 9.3 01/11/2023 01:50 PM        Review of most recent LFTs (and lipase if done)  Lab Results   Component Value Date    ALT 48 01/11/2023    AST 24 01/11/2023    ALKPHOS 84 01/11/2023    BILITOT 0.2 01/11/2023     No results found for: LIPASE    Lab Results   Component Value Date/Time    INR 0.9 06/27/2022 02:28 PM       Review of most recent HgbA1c  Hemoglobin A1C   Date Value Ref Range Status   06/24/2021 5.7 (H) 4.8 - 5.6 % Final     Comment:              Prediabetes: 5.7 - 6.4           Diabetes: >6.4           Glycemic control for adults with diabetes: <7.0         Nutritional assessment screen for wound healing issues:  Lab Results   Component Value Date    LABALBU 3.8 01/11/2023       @lastcovr@    Xray Result (most recent):  XR ABDOMEN (KUB) (SINGLE AP VIEW)     CT Result (most recent):  CT BIOPSY PERCUTANEOUS SUPERFICIAL BONE 07/01/2022    Narrative  Title: CT guided thoracic vertebral body lesion core biopsy. Indication: Malignant neoplasm of left breast in female, estrogen receptor  positive, unspecified site of breast ([facility] [address] Metastasis to bone [facility]); Abnormal  positron emission tomography (PET) scan    Consent: Informed written and oral consent was obtained from the patient after  explanation of benefits and risks (including, but not limited to: Infection,  Hemorrhage, Visceral Injury, Insufficient biopsy, Pneumothorax). The patient's  questions were answered to their satisfaction. The patient stated understanding  and requested that we proceed. Technique: All CT scans at this facility are performed using dose  reduction/dose modulation techniques, as appropriate the performed exam,  including the following:  Automated Exposure Control; Adjustment of the mA  and/or kV according to patient size (this includes techniques or standardized  protocols for targeted exams where dose is matched to indication/reason for  exam); and Use of Iterative Reconstruction Technique. Procedure:  Sterile barrier technique (including:  cap, mask, sterile gloves,  sterile sheet, hand hygiene, and cutaneous antisepsis) were used. With the  patient prone a preliminary CT scan through the upper abdomen was performed with  radio-opaque markers on the skin. Following routine prep and drape of the left flank, a local field block with  lidocaine was achieved.     Using intermittent CT technique, an 11-G [employer] powered bone access  needle was advanced through the cortex of the T10 vertebral body with a left  eccentric reticular approach. A 14 Romanian bone drill needle was advanced into  the lesion; however, minimal tissue was obtained as this appeared to be  relatively soft tumor. An 18-gauge [employer] biopsy needle was advanced into  the tumor through the base needle and multiple 18-gauge core samples were  obtained using the spring-loaded biopsy device. An 6 Romanian core sample of the  T10 vertebral body was obtained as the needle was removed. A post-biopsy CT scan was obtained. Hemostasis was achieved with manual compression. A bandage was applied. Complications: None. Radiation Exposure Indices: Total Exam DLP = 369 mGy-cm    Contrast: None. Medications: The patient was given moderate sedation with IV Versed and  Fentanyl by dedicated nurse under my face-to-face supervision using physiologic  monitoring for a total intraservice time of 40 minutes. Specimen: To Pathology    Findings: Osteolytic lesion in the T10 vertebral body measuring approximately 19  mm in diameter. Post biopsy CT scan demonstrates no evidence of significant  hemorrhage or pneumothorax. Impression  Technically successful CT-guided core biopsy of an osteolytic lesion  in the T10 vertebral body. US Result (most recent):  [address] BIOPSY 06/14/2022    Addendum 6/29/2022  9:34 AM  Addendum: Linda Reese, accession number: [id number]  Date: 6/14/2022 Pathology was noted as A: Left breast, 10:00 position, 12 cm  from nipple, core biopsy: Infiltrating ductal carcinoma with lobular features,  low grade (well differentiated). Definite in situ component and lymphovascular  invasion are not identified. B: Left axilla, core biopsy: Macro metastatic  carcinoma similar to A involving lymph node. While definite extracapsular  extension is not identified in this material, it could be present in unsampled  lymph node.     Results concordant with imaging. Pathology report was called to  Dr. Irais Singh's office on 6/15/2022 by BIBIANA Raya. Patient was referred to  Oncology on 6/15/2022. Narrative  Ultrasound-guided left breast biopsy, left axillary lymph node biopsy and  postbiopsy mammogram: 06/14/2022    HISTORY: Left breast mass and dysmorphic left axillary lymph node. Ultrasound guided left breast biopsy: The patient was explained the procedure  informed consent was obtained. Using sonographic guidance and sterile technique,  a 14-gauge biopsy device was used to obtain 3 core samples from the hypoechoic  mass at the 10:00 position. The samples were placed into a formalin container  and sent to the lab for analysis. A biopsy clip was placed upon completion of  sampling. The patient tolerated the procedure well. There were no immediate  complications. Ultrasound guided left axillary lymph node biopsy: The patient was explained the  procedure informed consent was obtained. Using sonographic guidance and sterile  technique, a 14-gauge biopsy device was used to obtain 2 core samples from the  dysmorphic lymph node within the left axilla. . The samples were placed into a  formalin container and sent to the lab for analysis. A biopsy clip was not  placed. The patient tolerated the procedure well. There were no immediate  complications. Postbiopsy mammogram. CC and ML views of the left breast were obtained. The  biopsy clip is confirmed within the mass at the 10:00 position. Impression  1. Status post ultrasound guided biopsy of the hypoechoic mass at the 10:00  position left breast.  2. Status post ultrasound guided biopsy of the dysmorphic lymph node within the  left axilla. .      Admission date (for inpatients): (Not on file)   * No surgery found *  * No surgery found *        ASSESSMENT/PLAN:  [unfilled]  Active Problems:    * No active hospital problems. *  Resolved Problems:    * No resolved hospital problems.  *     Patient Active Problem List    Diagnosis Date Noted    Malignant neoplasm of left breast in female, estrogen receptor positive ([facility] [address] 07/11/2022     Priority: Medium    Metastasis to bone ([facility] 07/11/2022     Priority: Medium    Cyst, kidney, acquired      found on previous scan        Hypertension     Kidney stone           Number and Complexity of Problems addressed and   Risks of complications and/or morbidity of management        [id number]--->[id number] left breast IDC  We discussed her case at multidisciplinary breast cancer conference on 10/27/22. She is clearly had an excellent response to neoadjuvant treatment with significant regression/resolution of the sacral and T10 lesions on PET. She no longer has FDG uptake in the axillae on PET/CT imaging. The conference consensus was to offer her some local treatment with lumpectomy and sentinel node excision. We discussed that surgery would likely only be palliative to local disease involvement. Procedural risks were discussed including bleeding, infection, local, regional, and systemic recurrences and progression, blood clots, lymphedema, risk of anesthesia, etc.. All her questions were answered. She is in favor proceeding. We will schedule left breast NL lumpectomy and sentinel node excision for her soon. Level of MDM (2/3 elements below)  Number and Complexity of Problems Addressed Amount and/or Complexity of Data to be Reviewed and Analyzed  *Each unique test, order, or document contributes to the combination of 2 or combination of 3 in Category 1 below. Risk of Complications and/or Morbidity or Mortality of pt Management     85385  68480 SF Minimal  ?1self-limited or minor problem Minimal or none Minimal risk of morbidity from additional diagnostic testing or Rx   46594  99323 Low Low  ? 2or more self-limited or minor problems;    or  ? 1stable chronic illness;    or  ?1acute, uncomplicated illness or injury   Limited  (Must meet the requirements of at least 1 of the 2 categories)  Category 1: Tests and documents   ? Any combination of 2 from the following:  ?Review of prior external note(s) from each unique source*;  ?review of the result(s) of each unique test*;   ?ordering of each unique test*    or   Category 2: Assessment requiring an independent historian(s)  (For the categories of independent interpretation of tests and discussion of management or test interpretation, see moderate or high) Low risk of morbidity from additional diagnostic testing or treatment     26411  50503 Mod Moderate  ? 1or more chronic illnesses with exacerbation, progression, or side effects of treatment;    or  ?2or more stable chronic illnesses;    or  ?1undiagnosed new problem with uncertain prognosis;    or  ?1acute illness with systemic symptoms;    or  ?1acute complicated injury   Moderate  (Must meet the requirements of at least 1 out of 3 categories)  Category 1: Tests, documents, or independent historian(s)  ? Any combination of 3 from the following:   ?Review of prior external note(s) from each unique source*;  ?Review of the result(s) of each unique test*;  ?Ordering of each unique test*;  ?Assessment requiring an independent historian(s)    or  Category 2: Independent interpretation of tests   ? Independent interpretation of a test performed by another physician/other qualified health care professional (not separately reported);     or  Category 3: Discussion of management or test interpretation  ? Discussion of management or test interpretation with external physician/other qualified health care professional/appropriate source (not separately reported)   Moderate risk of morbidity from additional diagnostic testing or treatment  Examples only:  ?Prescription drug management   ? Decision regarding minor surgery with identified patient or procedure risk factors  ? Decision regarding elective major surgery without identified patient or procedure risk factors   ?Diagnosis or treatment significantly limited by social determinants of health       [id number]  53977 High High  ?1or more chronic illnesses with severe exacerbation, progression, or side effects of treatment;    or  ?1 acute or chronic illness or injury that poses a threat to life or bodily function   Extensive  (Must meet the requirements of at least 2 out of 3 categories)  Category 1: Tests, documents, or independent historian(s)  ?Any combination of 3 from the following:   ?Review of prior external note(s) from each unique source*;  ?Review of the result(s) of each unique test*;   ?Ordering of each unique test*;   ?Assessment requiring an independent historian(s)    or   Category 2: Independent interpretation of tests   ?Independent interpretation of a test performed by another physician/other qualified health care professional (not separately reported);     or  Category 3: Discussion of management or test interpretation  ?Discussion of management or test interpretation with external physician/other qualified health care professional/appropriate source (not separately reported)   High risk of morbidity from additional diagnostic testing or treatment  Examples only:  ?Drug therapy requiring intensive monitoring for toxicity  ?Decision regarding elective major surgery with identified patient or procedure risk factors-    ?Decision regarding emergency major surgery  ?Decision regarding hospitalization  ?Decision not to resuscitate or to de-escalate care because of poor prognosis             I have personally performed a face-to-face diagnostic evaluation and management  service on this patient.      I have independently seen the patient.   I have independently obtained the above history from the patient/family.    I have independently examined the patient with above findings.  I have independently reviewed data/labs for this patient and developed the above plan of care (MDM).      Signed: MAURICIO MCGREGOR  MD  1/30/2023    10:30 AM

## 2023-02-08 ENCOUNTER — HOSPITAL ENCOUNTER (OUTPATIENT)
Dept: INFUSION THERAPY | Age: 53
Discharge: HOME | End: 2023-02-08
Payer: COMMERCIAL

## 2023-02-08 ENCOUNTER — HOSPITAL ENCOUNTER (OUTPATIENT)
Dept: LAB | Age: 53
Discharge: HOME | End: 2023-02-11

## 2023-02-08 DIAGNOSIS — C50.912 MALIGNANT NEOPLASM OF LEFT BREAST IN FEMALE, ESTROGEN RECEPTOR POSITIVE, UNSPECIFIED SITE OF BREAST (HCC): ICD-10-CM

## 2023-02-08 DIAGNOSIS — Z17.0 MALIGNANT NEOPLASM OF LEFT BREAST IN FEMALE, ESTROGEN RECEPTOR POSITIVE, UNSPECIFIED SITE OF BREAST (HCC): ICD-10-CM

## 2023-02-08 DIAGNOSIS — C79.51 METASTASIS TO BONE (HCC): Primary | ICD-10-CM

## 2023-02-08 LAB
ALBUMIN SERPL-MCNC: 4.1 G/DL (ref 3.5–5)
ALBUMIN/GLOB SERPL: 1 (ref 0.4–1.6)
ALP SERPL-CCNC: 73 U/L (ref 50–136)
ALT SERPL-CCNC: 55 U/L (ref 12–65)
ANION GAP SERPL CALC-SCNC: 3 MMOL/L (ref 2–11)
AST SERPL-CCNC: 33 U/L (ref 15–37)
BILIRUB SERPL-MCNC: 0.3 MG/DL (ref 0.2–1.1)
BUN SERPL-MCNC: 18 MG/DL (ref 6–23)
CALCIUM SERPL-MCNC: 9.3 MG/DL (ref 8.3–10.4)
CHLORIDE SERPL-SCNC: 107 MMOL/L (ref 101–110)
CO2 SERPL-SCNC: 29 MMOL/L (ref 21–32)
CREAT SERPL-MCNC: 0.9 MG/DL (ref 0.6–1)
GLOBULIN SER CALC-MCNC: 4 G/DL (ref 2.8–4.5)
GLUCOSE SERPL-MCNC: 81 MG/DL (ref 65–100)
MAGNESIUM SERPL-MCNC: 2.1 MG/DL (ref 1.8–2.4)
PHOSPHATE SERPL-MCNC: 4.2 MG/DL (ref 2.5–4.5)
POTASSIUM SERPL-SCNC: 3.4 MMOL/L (ref 3.5–5.1)
PROT SERPL-MCNC: 8.1 G/DL (ref 6.3–8.2)
SODIUM SERPL-SCNC: 139 MMOL/L (ref 133–143)

## 2023-02-08 PROCEDURE — 83735 ASSAY OF MAGNESIUM: CPT

## 2023-02-08 PROCEDURE — 6360000002 HC RX W HCPCS: Performed by: INTERNAL MEDICINE

## 2023-02-08 PROCEDURE — 84100 ASSAY OF PHOSPHORUS: CPT

## 2023-02-08 PROCEDURE — 80053 COMPREHEN METABOLIC PANEL: CPT

## 2023-02-08 PROCEDURE — 96372 THER/PROPH/DIAG INJ SC/IM: CPT

## 2023-02-08 PROCEDURE — 36415 COLL VENOUS BLD VENIPUNCTURE: CPT

## 2023-02-08 RX ORDER — ACETAMINOPHEN 325 MG/1
650 TABLET ORAL
OUTPATIENT
Start: 2023-03-08

## 2023-02-08 RX ORDER — ALBUTEROL SULFATE 90 UG/1
4 AEROSOL, METERED RESPIRATORY (INHALATION) PRN
OUTPATIENT
Start: 2023-03-08

## 2023-02-08 RX ORDER — DIPHENHYDRAMINE HYDROCHLORIDE 50 MG/ML
50 INJECTION INTRAMUSCULAR; INTRAVENOUS
OUTPATIENT
Start: 2023-03-08

## 2023-02-08 RX ORDER — EPINEPHRINE 1 MG/ML
0.3 INJECTION, SOLUTION, CONCENTRATE INTRAVENOUS PRN
OUTPATIENT
Start: 2023-03-08

## 2023-02-08 RX ORDER — ONDANSETRON 2 MG/ML
8 INJECTION INTRAMUSCULAR; INTRAVENOUS
OUTPATIENT
Start: 2023-03-08

## 2023-02-08 RX ORDER — SODIUM CHLORIDE 9 MG/ML
INJECTION, SOLUTION INTRAVENOUS CONTINUOUS
OUTPATIENT
Start: 2023-03-08

## 2023-02-08 RX ADMIN — DENOSUMAB 120 MG: 120 INJECTION SUBCUTANEOUS at 11:46

## 2023-02-13 NOTE — PROGRESS NOTES
Per [facility]  \"The prescription for IBRANCE TAB 125MG has been set up for shipment with a delivery date of 02/15 ad a co-pay of $0.   Please let me know if you have any questions/concerns.     Thanks,    ____________________    Marcos Copping \"

## 2023-02-16 DIAGNOSIS — Z17.0 MALIGNANT NEOPLASM OF LEFT BREAST IN FEMALE, ESTROGEN RECEPTOR POSITIVE, UNSPECIFIED SITE OF BREAST (HCC): ICD-10-CM

## 2023-02-16 DIAGNOSIS — C50.912 BREAST CANCER METASTASIZED TO AXILLARY LYMPH NODE, LEFT (HCC): ICD-10-CM

## 2023-02-16 DIAGNOSIS — C50.912 MALIGNANT NEOPLASM OF LEFT BREAST IN FEMALE, ESTROGEN RECEPTOR POSITIVE, UNSPECIFIED SITE OF BREAST (HCC): ICD-10-CM

## 2023-02-16 DIAGNOSIS — C79.51 METASTASIS TO BONE (HCC): ICD-10-CM

## 2023-02-16 DIAGNOSIS — C50.912 MALIGNANT NEOPLASM OF LEFT FEMALE BREAST, UNSPECIFIED ESTROGEN RECEPTOR STATUS, UNSPECIFIED SITE OF BREAST (HCC): ICD-10-CM

## 2023-02-16 DIAGNOSIS — C77.3 BREAST CANCER METASTASIZED TO AXILLARY LYMPH NODE, LEFT (HCC): ICD-10-CM

## 2023-02-16 RX ORDER — PALBOCICLIB 125 MG/1
125 TABLET, FILM COATED ORAL DAILY
Qty: 21 TABLET | Refills: 13 | Status: SHIPPED | OUTPATIENT
Start: 2023-02-16

## 2023-02-23 ENCOUNTER — OFFICE VISIT (OUTPATIENT)
Dept: FAMILY MEDICINE CLINIC | Facility: CLINIC | Age: 53
End: 2023-02-23
Payer: COMMERCIAL

## 2023-02-23 ENCOUNTER — TELEPHONE (OUTPATIENT)
Dept: FAMILY MEDICINE CLINIC | Facility: CLINIC | Age: 53
End: 2023-02-23

## 2023-02-23 VITALS
DIASTOLIC BLOOD PRESSURE: 89 MMHG | SYSTOLIC BLOOD PRESSURE: 132 MMHG | WEIGHT: 187 LBS | HEART RATE: 76 BPM | BODY MASS INDEX: 31.92 KG/M2 | HEIGHT: 64 IN | RESPIRATION RATE: 16 BRPM | OXYGEN SATURATION: 98 % | TEMPERATURE: 97.5 F

## 2023-02-23 DIAGNOSIS — J01.90 ACUTE BACTERIAL SINUSITIS: ICD-10-CM

## 2023-02-23 DIAGNOSIS — J30.1 NON-SEASONAL ALLERGIC RHINITIS DUE TO POLLEN: ICD-10-CM

## 2023-02-23 DIAGNOSIS — B96.89 ACUTE BACTERIAL SINUSITIS: ICD-10-CM

## 2023-02-23 DIAGNOSIS — K21.9 GASTROESOPHAGEAL REFLUX DISEASE WITHOUT ESOPHAGITIS: ICD-10-CM

## 2023-02-23 DIAGNOSIS — I10 PRIMARY HYPERTENSION: Primary | ICD-10-CM

## 2023-02-23 DIAGNOSIS — R05.1 ACUTE COUGH: ICD-10-CM

## 2023-02-23 PROCEDURE — 3075F SYST BP GE 130 - 139MM HG: CPT | Performed by: FAMILY MEDICINE

## 2023-02-23 PROCEDURE — 99214 OFFICE O/P EST MOD 30 MIN: CPT | Performed by: FAMILY MEDICINE

## 2023-02-23 PROCEDURE — 3079F DIAST BP 80-89 MM HG: CPT | Performed by: FAMILY MEDICINE

## 2023-02-23 RX ORDER — OLMESARTAN MEDOXOMIL 40 MG/1
TABLET ORAL
Qty: 90 TABLET | Refills: 1 | Status: SHIPPED | OUTPATIENT
Start: 2023-02-23

## 2023-02-23 RX ORDER — LORATADINE 10 MG/1
10 TABLET ORAL DAILY
Qty: 90 TABLET | Refills: 3 | Status: SHIPPED | OUTPATIENT
Start: 2023-02-23

## 2023-02-23 RX ORDER — FAMOTIDINE 20 MG/1
20 TABLET, FILM COATED ORAL 2 TIMES DAILY
Qty: 180 TABLET | Refills: 3 | Status: SHIPPED | OUTPATIENT
Start: 2023-02-23

## 2023-02-23 RX ORDER — BENZONATATE 200 MG/1
200 CAPSULE ORAL 3 TIMES DAILY PRN
Qty: 30 CAPSULE | Refills: 2 | Status: SHIPPED | OUTPATIENT
Start: 2023-02-23 | End: 2023-03-02

## 2023-02-23 RX ORDER — CHLORTHALIDONE 25 MG/1
TABLET ORAL
Qty: 90 TABLET | Refills: 1 | Status: SHIPPED | OUTPATIENT
Start: 2023-02-23

## 2023-02-23 RX ORDER — AZITHROMYCIN 250 MG/1
250 TABLET, FILM COATED ORAL SEE ADMIN INSTRUCTIONS
Qty: 6 TABLET | Refills: 0 | Status: SHIPPED | OUTPATIENT
Start: 2023-02-23 | End: 2023-02-28

## 2023-02-23 ASSESSMENT — PATIENT HEALTH QUESTIONNAIRE - PHQ9
SUM OF ALL RESPONSES TO PHQ QUESTIONS 1-9: 0
2. FEELING DOWN, DEPRESSED OR HOPELESS: 0
SUM OF ALL RESPONSES TO PHQ9 QUESTIONS 1 & 2: 0
SUM OF ALL RESPONSES TO PHQ QUESTIONS 1-9: 0
1. LITTLE INTEREST OR PLEASURE IN DOING THINGS: 0
SUM OF ALL RESPONSES TO PHQ QUESTIONS 1-9: 0
SUM OF ALL RESPONSES TO PHQ QUESTIONS 1-9: 0

## 2023-02-23 ASSESSMENT — ENCOUNTER SYMPTOMS
HEMOPTYSIS: 0
SORE THROAT: 0
CONSTIPATION: 0
ABDOMINAL PAIN: 0
COUGH: 1
WHEEZING: 0
HEARTBURN: 0
RHINORRHEA: 0
VOMITING: 0
NAUSEA: 0
DIARRHEA: 0
SHORTNESS OF BREATH: 0

## 2023-02-23 NOTE — PROGRESS NOTES
HISTORY OF PRESENT ILLNESS  Chief Complaint   Patient presents with    Cough     Chronic cough; pt states that In the morning this is colored; pt states this has been going on since last July      Chronic cough; pt states that In the morning this is colored;   pt states this has been going on since last July     Has a lumpectomy on 3/8/23 - lumpectomy    Previously said, \" . I told her that I had a cold (no fever, but a cough and slightly green congestion) and she suggested that I get in touch with you to possibly prescribe an antibiotic. \"    Since startd her therapy in  has continued with a cough. Usually clear sputum and 1.5 weeks ago started getting a little thicker. Sometimes when she swallow will have a coughing fit. Has had that problem for years. Does have heartburn all the time and takes tums a lot. Does need the calcium because the medication takes calcium out of the bones. Didn't want to take omeprazole because she was afraid it would interact with her other meds. Subjective:   Brandon Dailey is a 46 y.o. female with hypertension. Hypertension ROS: taking medications as instructed, no medication side effects noted, no TIA's, no chest pain on exertion, no dyspnea on exertion, no swelling of ankles.    Key CAD CHF Meds            chlorthalidone (HYGROTON) 25 MG tablet (Taking)    Si po qam for bloos pressure    olmesartan (BENICAR) 40 MG tablet (Taking)    Si po qam for blood pressure           Lab Results   Component Value Date/Time     2023 10:54 AM    K 3.4 2023 10:54 AM     2023 10:54 AM    CO2 29 2023 10:54 AM    BUN 18 2023 10:54 AM    CREATININE 0.90 2023 10:54 AM    GLUCOSE 81 2023 10:54 AM    CALCIUM 9.3 2023 10:54 AM       Lab Results   Component Value Date    CREATININE 0.90 2023      Lab Results   Component Value Date    CHOL 261 (H) 2021     Lab Results   Component Value Date    TRIG 217 (H) 06/24/2021     Lab Results   Component Value Date    HDL 47 06/24/2021     Lab Results   Component Value Date    LDLCALC 173 (H) 06/24/2021     Lab Results   Component Value Date    VLDL 41 (H) 06/24/2021     No results found for: CHOLHDLRATIO   No results found for: LABMICR, [id number]   BP Readings from Last 3 Encounters:   02/23/23 132/89   01/11/23 116/76   12/14/22 131/73        Worse with stress, salt. Improved with exercise, medication. She is  monitoring blood pressures. Wt Readings from Last 3 Encounters:   02/23/23 187 lb (84.8 kg)   01/30/23 187 lb (84.8 kg)   01/11/23 187 lb 14.4 oz (85.2 kg)        Cough  The current episode started more than 1 month ago. The problem has been gradually worsening. The cough is Productive of sputum. Pertinent negatives include no chest pain, chills, ear congestion, ear pain, fever, headaches, heartburn, hemoptysis, myalgias, nasal congestion, postnasal drip, rash, rhinorrhea, sore throat, shortness of breath, sweats, weight loss or wheezing. Review of Systems   Constitutional:  Negative for activity change, appetite change, chills, fatigue, fever and weight loss. HENT:  Negative for congestion, ear pain, postnasal drip, rhinorrhea and sore throat. Respiratory:  Positive for cough. Negative for hemoptysis, shortness of breath and wheezing. Cardiovascular:  Negative for chest pain. Gastrointestinal:  Negative for abdominal pain, constipation, diarrhea, heartburn, nausea and vomiting. Genitourinary:  Negative for dysuria. Musculoskeletal:  Negative for arthralgias and myalgias. Skin:  Negative for rash. Neurological:  Negative for dizziness and headaches. Psychiatric/Behavioral:  Negative for dysphoric mood. The patient is not nervous/anxious.        Patient Active Problem List   Diagnosis    Hypertension    Kidney stone    Cyst, kidney, acquired    Malignant neoplasm of left breast in female, estrogen receptor positive ([facility] [address]    Metastasis to bone ([email or web address]         Blood pressure 132/89, pulse 76, temperature 97.5 °F (36.4 °C), temperature source Temporal, resp. rate 16, height 5' 3.5\" (1.613 m), weight 187 lb (84.8 kg), SpO2 98 %, not currently breastfeeding. Pain Scale: 0 - No pain/10 Pain Location:   Body mass index is 32.61 kg/m². Physical Exam  Vitals and nursing note reviewed. Constitutional:       General: She is not in acute distress. Appearance: Normal appearance. She is normal weight. She is not toxic-appearing. HENT:      Head: Normocephalic and atraumatic. Right Ear: Tympanic membrane, ear canal and external ear normal.      Left Ear: Tympanic membrane, ear canal and external ear normal.      Nose: Congestion and rhinorrhea present. Right Turbinates: Swollen. Left Turbinates: Swollen. Mouth/Throat:      Mouth: Mucous membranes are moist. No injury or oral lesions. Tongue: No lesions. Pharynx: Oropharynx is clear. Uvula midline. No pharyngeal swelling, oropharyngeal exudate or posterior oropharyngeal erythema. Eyes:      General: Lids are normal.      Extraocular Movements: Extraocular movements intact. Conjunctiva/sclera: Conjunctivae normal.      Pupils: Pupils are equal.   Cardiovascular:      Rate and Rhythm: Normal rate and regular rhythm. Heart sounds: Normal heart sounds. No murmur heard. No friction rub. No gallop. Pulmonary:      Effort: Pulmonary effort is normal. No respiratory distress. Breath sounds: Normal breath sounds. No wheezing or rales. Skin:     General: Skin is warm and dry. Neurological:      Mental Status: She is alert. Psychiatric:         Mood and Affect: Mood normal.         Behavior: Behavior normal.         Thought Content: Thought content normal.         Judgment: Judgment normal.            ASSESSMENT and PLAN   Diagnosis Orders   1. Primary hypertension  chlorthalidone (HYGROTON) 25 MG tablet    olmesartan (BENICAR) 40 MG tablet      2.  Non-seasonal allergic rhinitis due to pollen  loratadine (CLARITIN) 10 MG tablet      3. Acute bacterial sinusitis  azithromycin (ZITHROMAX) 250 MG tablet      4. Acute cough  benzonatate (TESSALON) 200 MG capsule      5. Gastroesophageal reflux disease without esophagitis  famotidine (PEPCID) 20 MG tablet          Reviewed medications and side effects in detail    The patient's condition was discussed at length with the patient. The expected course and possible complications were reviewed. All questions were answered. Her other chronic conditions are stable at this time. She continues to be followed by her previous specialists for the above chronic issues. She is stable on the current treatment and tolerating it well. No significant sides effects. Will not adjust therapy at this time, unless noted above. We will continue to monitor for any problems. Medications refilled and lab work has been ordered where needed. Reviewed medications are explained including any potential interactions or side effects in detail. The patient's questions were answered. She  understands the above and has no further questions. Further workup and treatment should be done if symptoms persist, worsen or new symptoms occur. She  will call to notify us of any problems, complications or worsening symptoms. There are no Patient Instructions on file for this visit. (Some details in this note may have been created with speech-recognition software. Some errors in speech recognition may have occurred.    Most of those have been corrected but some may have been missed.)         Jia Dean MD  [address] [facility] [address]  Phone [phone]

## 2023-02-23 NOTE — TELEPHONE ENCOUNTER
Regarding: Medication and upcoming surgery  ----- Message from Kehinde Hay sent at 2023 11:46 AM EST -----       ----- Message sent from Nadege Johns MA to Mayela Camera at 2023 11:45 AM -----   Phillip Elliott, you probably should be seen for BP. I will send her a message for the antibiotic       ----- Message -----       From:Barbara Alcocer       Sent:2023 11:07 AM EST         To:Dr. Samuel Fish    Subject:Medication and upcoming surgery    Hi Dr. Jamila Bourgeois,  I hope you are doing well. I just had a couple of questions for you. My first question, is do I need to come in for a blood pressure check or can I just get my blood pressure medicines refilled by phone. I noticed that my blood pressure medicines are probably about to  soon because it said on my bottle I only had a partial refill left. My second question involves my upcoming surgery on . A nurse from the hospital called to do an evaluation today. I told her that I had a cold (no fever, but a cough and slightly green congestion) and she suggested that I get in touch with you to possibly prescribe an antibiotic. Would you like for me to come in to the office for a visit or could these medications be called in by phone?  Please advise me on what you think is best.  Thank you,  Chris Fearing

## 2023-02-23 NOTE — PERIOP NOTE
Patient verified name and . Order for consent is found in EHR and matches case posting; patient verifies procedure. Type 1b surgery, Phone assessment complete. Orders were received. Labs per surgeon: none  Labs per anesthesia protocol: CMP dated 23 in EMR and is within anesthesia protocols. Pt c/o chronic, dry cough x several months. Noticed productive cough starting about 2 weeks ago. Will be > 4 weeks from start of symptoms to DOS. Denies fever or other s/s. Pt instructed to call PCP and surgeon. Patient answered medical/surgical history questions at their best of ability. All prior to admission medications documented in [facility]. Patient instructed to take the following medications the day of surgery according to anesthesia guidelines with a small sip of water: ibrance, letrozole. Hold all vitamins 7 days prior to surgery and NSAIDS 5 days prior to surgery. Prescription meds to hold:none    Patient instructed on the following:    > Arrive at [facility], time of arrival to be called the day before by 1700  > NPO after midnight, unless otherwise indicated, including gum, mints, and ice chips  > Responsible adult must drive patient to the hospital, stay during surgery, and patient will need supervision 24 hours after anesthesia  > Use antibacterial soap in shower the night before surgery and on the morning of surgery  > All piercings must be removed prior to arrival.    > Leave all valuables (money and jewelry) at home but bring insurance card and ID on DOS.   > Do not wear make-up, nail polish, lotions, cologne, perfumes, powders, or oil on skin. Artificial nails are not permitted.

## 2023-03-07 ENCOUNTER — ANESTHESIA EVENT (OUTPATIENT)
Dept: SURGERY | Age: 53
End: 2023-03-07
Payer: COMMERCIAL

## 2023-03-07 NOTE — H&P
H&P/Consult Note/Progress Note/Office Note:   Yanely Hernandez  MRN: [id number]  :1970  Age:52 y.o.    HPI: Yanely Hernandez is a 46 y.o. female who is here for left breast NL Lumpectomy and sent node excision on 3/8/23        She originally presented with an abnormal left breast screening mammogram on 22 which led to diagnostic imaging and US. She had a large mass in the upper inner left breast with left axillary adenopathy. Percutaneous biopsies identified infiltrating ductal carcinoma with lobular features which was ER 93% NC 0% HER2 negative. Breast MRI was performed next which confirmed a 3.7 cm left breast malignancy with 4 dysmorphic and enlarged axillary nodes. PET/CT imaging identified suspicious bony lesions of T10 and the sacrum. CT-guided biopsy of T10 confirmed metastatic breast carcinoma. She was treated with neoadjuvant endocrine therapy with Dr. Leticia Lima and had Letrozole and ribociclib   and had repeat PET/CT imaging on 10/12/22 and was presented at tumor board to discuss the possibility of surgery for local treatment      22 Bilat breast screening mammo with filomena and CAD  The breast parenchyma is heterogeneously dense, which may limit detection of small masses. Left posterior upper inner quadrant demonstrates an irregular mass with architectural distortion. There are associated calcifications within and anterior to the mass. Overall this area of concern measures at least 3 cm but is not well defined. No additional suspicious mass or calcification is seen in either breast.    There is no normal skin thickening or nipple retraction     Impression:  Left mass and calcifications. Further evaluation is recommended with ML and compression magnification views, then US to follow. 22 dx bilat mammo and left breast US  FINDING: Confirmed is the spiculated mass at approximately 10:00 position left breast posterior depth.  The mass measures approximately 4 cm in size. There are pleomorphic calcifications extending anteriorly from the mass towards the nipple. Including the mass and calcifications, the abnormality measures approximately 7 cm in size. A left breast ultrasound is recommended. Left breast ultrasound: There is a hypoechoic mass with ill-defined angular margins at the 10:00 position left breast 12 cm from nipple. This is estimated to measure approximately 5.0 x 3.2 x 4.5 cm. There is posterior acoustic shadowing. Evaluation of left axilla reveals a dysmorphic lymph node measuring 2.6 cm in maximal dimension. IMPRESSION: 1. Spiculated left breast mass with associated microcalcifications measuring at least 7 cm in total size at the 10:00 position. Tissue sampling is recommended. 2. Dysmorphic left axillary lymph node. Tissue sampling is recommended. Highly suggestive of malignancy           6/14/22 US-guided left breast and left axillary biopsy   A:  \"LEFT BREAST, 10:00 POSITION, 12 CM FROM NIPPLE, CORE BIOPSY\":   INFILTRATING DUCTAL CARCINOMA WITH LOBULAR FEATURES, LOW GRADE (WELL DIFFERENTIATED). DEFINITE IN SITU COMPONENT AND LYMPHOVASCULAR INVASION ARE NOT IDENTIFIED          B:  \"LEFT AXILLA, CORE BIOPSY\": MACRO METASTATIC CARCINOMA SIMILAR TO A INVOLVING LYMPH NODE. WHILE DEFINITE EXTRACAPSULAR EXTENSION IS NOT IDENTIFIED IN THIS MATERIAL, IT COULD BE PRESENT IN UNSAMPLED LYMPH NODE   Sign Out Date: 6/15/2022  SUSAN Walsh M.D.     ER: Positive (93%); OR: Negative (0.0%); [id number]:  Negative (0)              Post-bx mammo: Biopsy clip is confirmed within the mass at the 10:00 position       6/16/22 Breast MRI  FINDINGS: The breasts demonstrate moderate glandularity and mild background enhancement. Left 10:00 irregular heterogeneously enhancing malignant mass measures up to 3.3 x 2.6 x 3.0 cm. Extending anteriorly from the mass is about 1.2 cm of clumped and reticular nonmass enhancement.  Overall the maximal dimension of this malignancy is 3.7 cm. Mild overlying skin thickening and edema are nonspecific and could be reactive to the biopsy unless there is clinical suspicion for malignancy involvement. No other evidence of suspicious enhancing mass, and no dominant or unique nonmass enhancement, to suggest additional malignancy in either breast.     Left axilla demonstrates 4 asymmetrically enlarged and dysmorphic lymph nodes, one of which underwent prior biopsy demonstrating metastasis. The largest measures up to 2.0 x 1.2 cm. No evidence of right axillary lymphadenopathy or internal mammary lymphadenopathy. Elsewhere, limited visualization of the partially included thorax and upper abdomen shows no acute abnormality. Impression:  1. Left 10:00 breast cancer measures up to 3.7 cm.   2. Left axillary metastatic lymphadenopathy. 3. No suspicious right breast finding.            6/22/22 PET/CT  HEAD/NECK: Symmetric uptake within the imaged brain parenchyma. Scattered areas of mild uptake within the occipital and cervical soft tissues without convincing CT correlate with additional areas of uptake, and along the bilateral paraspinals soft tissues. No discrete enlarged/hypermetabolic cervical lymph nodes. CHEST: FDG uptake with a known left breast malignancy with SUV of 9.0. Prominent FDG avid left axillary lymph nodes largest measuring 1.3 x 2.6 cm (SUV max 5.8, image number 66). Scattered areas of FDG uptake without corresponding CT correlate throughout the superior lateral and posterior medial chest wall. No enlarged or hypermetabolic mediastinal or hilar adenopathy. No suspicious pulmonary nodules or focal parenchymal FDG uptake. ABDOMEN/PELVIS: No enlarged or hypermetabolic adenopathy. No focal uptake within the visceral organs.    There is a 1.8 cm left hyperdense exophytic renal cyst without associated FDG uptake compatible with a hemorrhagic/proteinaceous cyst with additional punctate upper pole hyperdense cyst and indeterminate photopenic hypodense cyst.   Physiologic uptake within the gastrointestinal tract with normal excretion of radiotracer within the renal collecting system and urinary bladder. MUSCULOSKELETAL: Hypermetabolic lytic lesion involving the T10 vertebral body (SUV max 14.0, image number 103) with sclerotic hypermetabolic lesion centered within the sacrum (SUV max 13.0, image number 189). No abnormal focal uptake within the soft tissues. Impression:  1. Hypermetabolic soft tissue left breast mass with prominent FDG avid left axillary lymph nodes. 2. Hypermetabolic osseous metastatic disease involving the sacrum and T10 vertebral body. 3. Patchy areas of mild uptake throughout the cervical and paraspinal soft tissues, as well as, along the superior lateral and posterior medial chest wall favoring brown fat and less likely metastatic disease. 7/1/22 CT-guided T10 Bone biopsy  DIAGNOSIS   \"BONE LESION\":  METASTATIC CARCINOMA, CONSISTENT WITH BREAST ORIGIN. Sign Out Date: 7/7/2022  Chasity Haley MD   Interpretation:  Immunohistochemical findings consistent with metastatic carcinoma of breast origin. Antibody/Test           Marker For                                Result   Cytokeratin 7             Lung, breast, upper GE, serous ovary         Positive   Cytokeratin 20          Colon, mucinous ovary, urothelium          Negative   MINO 3                Urothelial, breast carcinoma                        Positive   [id number]              Breast, salivary gland, skin, adnexa              Positive         10/12/22 PET/CT (restaging after neoadjuvant Rx)  Head and Neck: No enlarged or hypermetabolic cervical lymph nodes. No worrisome focal FDG uptake in the neck soft tissues. Chest: No significant interval decrease in size and FDG uptake within the left breast mass. Left axillary lymph nodes are also smaller and elevated FDG uptake is resolved.    No mediastinal, axillary, or internal mammary lymphadenopathy. Presumed posttreatment changes in the medial right lung base. Abdomen/Pelvis: The liver is severely steatotic. Hemorrhagic/proteinaceous cyst posteriorly in the left kidney. No enlarged or hypermetabolic lymph nodes of the abdomen or pelvis. Normal uterus. No adnexal mass. Bones and soft tissues: Newly sclerotic appearance and improved FDG uptake with lesions of the S1 and T10 vertebral bodies. No new bone lesions on the current study. Impression:  1. Interval treatment response evident in the primary left breast mass, ipsilateral axillary lymph nodes and bone lesions at T10 and S1. No new sites of disease. 2. Severe hepatic steatosis. 1/19/23 PET CT  Hx: ER+ left breast carcinoma  COMPARISON: PET/CT October 12, 2022     FINDINGS:     HEAD/NECK: Symmetric uptake within the imaged brain parenchyma. No enlarged or hypermetabolic cervical lymph nodes. No focal uptake within the neck soft tissues. CHEST: Decrease uptake intensity within ill-defined spiculated left breast soft tissue nodule with SUV max of 2.1. Subcm left greater than right axillary lymph nodes without significant FDG uptake. No enlarged or hypermetabolic adenopathy. No suspicious pulmonary nodules or focal parenchymal FDG uptake. ABDOMEN/PELVIS: No enlarged or hypermetabolic adenopathy. No focal uptake within the visceral organs. Physiologic uptake within the gastrointestinal tract with normal excretion of radiotracer within the renal collecting system and urinary bladder. MUSCULOSKELETAL: Sclerotic lesion of the S1 and T10 vertebral bodies no longer demonstrate any FDG uptake. No new suspicious osseous lesions or focal uptake. Impression:  Findings of positive treatment response with resolution of previous uptake within sclerotic lesions of the S1 and T10 vertebral bodies.      Continued decrease uptake within a left spiculated breast lesion without new soft tissue or adenopathy. Past Medical History:   Diagnosis Date    Cyst, kidney, acquired     found on previous scan    GERD (gastroesophageal reflux disease)     Hypertension     Kidney stone      Past Surgical History:   Procedure Laterality Date    APPENDECTOMY  1990    CT BIOPSY PERCUTANEOUS SUPERFICIAL BONE  07/01/2022    CT BIOPSY PERCUTANEOUS SUPERFICIAL BONE 7/1/2022 [facility] CT SCAN    US BREAST BIOPSY W LOC DEVICE 1ST LESION LEFT Left 06/14/2022    US BREAST NEEDLE BIOPSY LEFT 6/14/2022 Mayco Williamson MD [address] LYMPH NODE BIOPSY  06/14/2022    US LYMPH NODE BIOPSY 6/14/2022 Mayco Williamson MD [facility]MO    WISDOM TOOTH EXTRACTION       No current facility-administered medications for this encounter. Current Outpatient Medications   Medication Sig    chlorthalidone (HYGROTON) 25 MG tablet 1 po qam for bloos pressure    olmesartan (BENICAR) 40 MG tablet 1 po qam for blood pressure    famotidine (PEPCID) 20 MG tablet Take 1 tablet by mouth 2 times daily    loratadine (CLARITIN) 10 MG tablet Take 1 tablet by mouth daily    IBRANCE 125 MG tablet Take 125 mg by mouth daily 28 day cycle. 21 days on/7 days off    docusate sodium (COLACE) 100 MG capsule Take 100 mg by mouth 2 times daily    letrozole (FEMARA) 2.5 MG tablet Take 1 tablet by mouth daily    potassium chloride (KLOR-CON M) 20 MEQ extended release tablet Take 1 tablet by mouth 2 times daily Take 1 tablet twice a day    Magic Mouthwash (MIRACLE MOUTHWASH) Swish and spit 5 mLs 4 times daily as needed for Irritation    ondansetron (ZOFRAN) 8 MG tablet Take 1 tablet by mouth every 8 hours as needed for Nausea or Vomiting     ALLERGIES:  Patient has no known allergies.     Social History     Socioeconomic History    Marital status:      Spouse name: None    Number of children: None    Years of education: None    Highest education level: None   Tobacco Use    Smoking status: Never    Smokeless tobacco: Never   Vaping Use    Vaping Use: Never used   Substance and Sexual Activity    Alcohol use: Yes     Alcohol/week: 2.0 standard drinks    Drug use: Never     Social Determinants of Health     Financial Resource Strain: Low Risk     Difficulty of Paying Living Expenses: Not hard at all   Food Insecurity: Unknown    Worried About Running Out of Food in the Last Year: Never true   Transportation Needs: No Transportation Needs    Lack of Transportation (Medical): No    Lack of Transportation (Non-Medical): No   Physical Activity: Sufficiently Active    Days of Exercise per Week: 7 days    Minutes of Exercise per Session: 30 min   Stress: Stress Concern Present    Feeling of Stress : To some extent   Social Connections: Unknown    Frequency of Communication with Friends and Family: More than three times a week    Frequency of Social Gatherings with Friends and Family: More than three times a week    Marital Status:    Intimate Partner Violence: Not At Risk    Fear of Current or Ex-Partner: No    Emotionally Abused: No    Physically Abused: No    Sexually Abused: No   Housing Stability: Low Risk     Unable to Pay for Housing in the Last Year: No    Number of [place] in the Last Year: 1    Unstable Housing in the Last Year: No     Social History     Tobacco Use   Smoking Status Never   Smokeless Tobacco Never     Family History   Problem Relation Age of Onset    Hypertension Mother     Alcohol Abuse Brother     Alcohol Abuse Father     Dementia Father     Hypertension Brother     Diabetes Father      ROS: The patient has no difficulty with chest pain or shortness of breath. No fever or chills. Comprehensive review of systems was otherwise unremarkable except as noted above.     Physical Exam:   Ht 5' 3.5\" (1.613 m)   Wt 180 lb (81.6 kg)   BMI 31.39 kg/m²   Vitals:    02/23/23 0929   Weight: 180 lb (81.6 kg)   Height: 5' 3.5\" (1.613 m) [facility]@  [unfilled]    Constitutional: Alert, oriented, cooperative patient in no acute distress; appears stated age    Eyes:Sclera are clear. EOMs intact  ENMT: no external lesions gross hearing normal; no obvious neck masses, no ear or lip lesions, nares normal  CV: RRR. Normal perfusion  Resp: No JVD. Breathing is  non-labored; no audible wheezing. Large pendulous breast  No palpable breast masses on either side  No regional adenopathy. GI: soft and non-distended     Musculoskeletal: unremarkable with normal function. No embolic signs or cyanosis. Neuro:  Oriented; moves all 4; no focal deficits  Psychiatric: normal affect and mood, no memory impairment    Recent vitals (if inpt):  @IPVITALS(24:)@    Amount and/or Complexity of Data Reviewed and Analyzed:  I reviewed and analyzed all of the unique labs and radiologic studies that are shown below as well as any that are in the HPI, and any that are in the expanded problem list below  *Each unique test, order, or document contributes to the combination of 2 or combination of 3 in Category 1 below. For this visit I also reviewed old records and prior notes. No results for input(s): WBC, HGB, PLT, NA, K, CL, CO2, BUN, CREA, GLU, INR, APTT, ALT, AML, AML, LCAD, PCO2, PO2, HCO3 in the last 72 hours.     Invalid input(s): PTP, TBIL, TBILI, CBIL, SGOT, GPT, AP, LPSE, NH4, TROPT, TROIQ,  PH  Review of most recent CBC  Lab Results   Component Value Date    WBC 4.9 01/11/2023    HGB 12.7 01/11/2023    HCT 35.0 (L) 01/11/2023    .9 (H) 01/11/2023     01/11/2023       Review of most recent BMP  Lab Results   Component Value Date/Time     02/08/2023 10:54 AM    K 3.4 02/08/2023 10:54 AM     02/08/2023 10:54 AM    CO2 29 02/08/2023 10:54 AM    BUN 18 02/08/2023 10:54 AM    CREATININE 0.90 02/08/2023 10:54 AM    GLUCOSE 81 02/08/2023 10:54 AM    CALCIUM 9.3 02/08/2023 10:54 AM        Review of most recent LFTs (and lipase if done)  Lab Results   Component Value Date    ALT 55 02/08/2023    AST 33 02/08/2023    ALKPHOS 73 02/08/2023    BILITOT 0.3 02/08/2023     No results found for: LIPASE    Lab Results   Component Value Date/Time    INR 0.9 06/27/2022 02:28 PM       Review of most recent HgbA1c  Hemoglobin A1C   Date Value Ref Range Status   06/24/2021 5.7 (H) 4.8 - 5.6 % Final     Comment:              Prediabetes: 5.7 - 6.4           Diabetes: >6.4           Glycemic control for adults with diabetes: <7.0         Nutritional assessment screen for wound healing issues:  Lab Results   Component Value Date    LABALBU 4.1 02/08/2023       @lastcovr@    Xray Result (most recent):  XR ABDOMEN (KUB) (SINGLE AP VIEW)     CT Result (most recent):  CT BIOPSY PERCUTANEOUS SUPERFICIAL BONE 07/01/2022    Narrative  Title: CT guided thoracic vertebral body lesion core biopsy. Indication: Malignant neoplasm of left breast in female, estrogen receptor  positive, unspecified site of breast ([facility] [address] Metastasis to bone [facility]); Abnormal  positron emission tomography (PET) scan    Consent: Informed written and oral consent was obtained from the patient after  explanation of benefits and risks (including, but not limited to: Infection,  Hemorrhage, Visceral Injury, Insufficient biopsy, Pneumothorax). The patient's  questions were answered to their satisfaction. The patient stated understanding  and requested that we proceed. Technique: All CT scans at this facility are performed using dose  reduction/dose modulation techniques, as appropriate the performed exam,  including the following:  Automated Exposure Control; Adjustment of the mA  and/or kV according to patient size (this includes techniques or standardized  protocols for targeted exams where dose is matched to indication/reason for  exam); and Use of Iterative Reconstruction Technique.     Procedure:  Sterile barrier technique (including:  cap, mask, sterile gloves,  sterile sheet, hand hygiene, and cutaneous antisepsis) were used. With the  patient prone a preliminary CT scan through the upper abdomen was performed with  radio-opaque markers on the skin. Following routine prep and drape of the left flank, a local field block with  lidocaine was achieved. Using intermittent CT technique, an 11-G [employer] powered bone access  needle was advanced through the cortex of the T10 vertebral body with a left  eccentric reticular approach. A 14 [place] bone drill needle was advanced into  the lesion; however, minimal tissue was obtained as this appeared to be  relatively soft tumor. An 18-gauge [employer] biopsy needle was advanced into  the tumor through the base needle and multiple 18-gauge core samples were  obtained using the spring-loaded biopsy device. An 6 [place] core sample of the  T10 vertebral body was obtained as the needle was removed. A post-biopsy CT scan was obtained. Hemostasis was achieved with manual compression. A bandage was applied. Complications: None. Radiation Exposure Indices: Total Exam DLP = 369 mGy-cm    Contrast: None. Medications: The patient was given moderate sedation with IV Versed and  Fentanyl by dedicated nurse under my face-to-face supervision using physiologic  monitoring for a total intraservice time of 40 minutes. Specimen: To Pathology    Findings: Osteolytic lesion in the T10 vertebral body measuring approximately 19  mm in diameter. Post biopsy CT scan demonstrates no evidence of significant  hemorrhage or pneumothorax. Impression  Technically successful CT-guided core biopsy of an osteolytic lesion  in the T10 vertebral body.     US Result (most recent):  [address] BIOPSY 06/14/2022    Addendum 6/29/2022  9:34 AM  Addendum: Holly Valdovinos, accession number: [id number]  Date: 6/14/2022 Pathology was noted as A: Left breast, 10:00 position, 12 cm  from nipple, core biopsy: Infiltrating ductal carcinoma with lobular features,  low grade (well differentiated). Definite in situ component and lymphovascular  invasion are not identified. B: Left axilla, core biopsy: Macro metastatic  carcinoma similar to A involving lymph node. While definite extracapsular  extension is not identified in this material, it could be present in unsampled  lymph node. Results concordant with imaging. Pathology report was called to  Dr. Anoop Singh's office on 6/15/2022 by BIBINAA Alaniz. Patient was referred to  Oncology on 6/15/2022. Narrative  Ultrasound-guided left breast biopsy, left axillary lymph node biopsy and  postbiopsy mammogram: 06/14/2022    HISTORY: Left breast mass and dysmorphic left axillary lymph node. Ultrasound guided left breast biopsy: The patient was explained the procedure  informed consent was obtained. Using sonographic guidance and sterile technique,  a 14-gauge biopsy device was used to obtain 3 core samples from the hypoechoic  mass at the 10:00 position. The samples were placed into a formalin container  and sent to the lab for analysis. A biopsy clip was placed upon completion of  sampling. The patient tolerated the procedure well. There were no immediate  complications. Ultrasound guided left axillary lymph node biopsy: The patient was explained the  procedure informed consent was obtained. Using sonographic guidance and sterile  technique, a 14-gauge biopsy device was used to obtain 2 core samples from the  dysmorphic lymph node within the left axilla. . The samples were placed into a  formalin container and sent to the lab for analysis. A biopsy clip was not  placed. The patient tolerated the procedure well. There were no immediate  complications. Postbiopsy mammogram. CC and ML views of the left breast were obtained. The  biopsy clip is confirmed within the mass at the 10:00 position. Impression  1.  Status post ultrasound guided biopsy of the hypoechoic mass at the 10:00  position left breast.  2. Status post ultrasound guided biopsy of the dysmorphic lymph node within the  left axilla. .      Admission date (for inpatients): (Not on file)   * No surgery date entered *  * No surgery found *        ASSESSMENT/PLAN:  [unfilled]  Principal Problem:    Malignant neoplasm of left breast in female, estrogen receptor positive ([facility]  Resolved Problems:    * No resolved hospital problems. *     Patient Active Problem List    Diagnosis Date Noted    Malignant neoplasm of left breast in female, estrogen receptor positive ([facility] 07/11/2022     Priority: Medium    Metastasis to bone ([facility] 07/11/2022     Priority: Medium    Cyst, kidney, acquired      found on previous scan        Hypertension     Kidney stone           Number and Complexity of Problems addressed and   Risks of complications and/or morbidity of management        [id number]--->[id number] left breast IDC  She is here for left breast NL Lumpectomy and sent node excision on 3/8/23      We discussed her case at multidisciplinary breast cancer conference on 10/27/22. She is clearly had an excellent response to neoadjuvant treatment with significant regression/resolution of the sacral and T10 lesions on PET. She also no longer has FDG uptake in the axillae on PET/CT imaging. The conference consensus was to offer her some local treatment with lumpectomy and sentinel node excision. We discussed that surgery would likely only be palliative to local disease involvement. Procedural risks were discussed including bleeding, infection, local, regional, and systemic recurrences and progression, blood clots, lymphedema, risk of anesthesia, etc.. All her questions were answered. She is in favor proceeding.                   Level of MDM (2/3 elements below)  Number and Complexity of Problems Addressed Amount and/or Complexity of Data to be Reviewed and Analyzed  *Each unique test, order, or document contributes to the combination of 2 or combination of 3 in Category 1 below. Risk of Complications and/or Morbidity or Mortality of pt Management     85368  [id number] SF Minimal  ?1self-limited or minor problem Minimal or none Minimal risk of morbidity from additional diagnostic testing or Rx   [id number]  [id number] Low Low  ? 2or more self-limited or minor problems;    or  ? 1stable chronic illness;    or  ?1acute, uncomplicated illness or injury   Limited  (Must meet the requirements of at least 1 of the 2 categories)  Category 1: Tests and documents   ? Any combination of 2 from the following:  ?Review of prior external note(s) from each unique source*;  ?review of the result(s) of each unique test*;   ?ordering of each unique test*    or   Category 2: Assessment requiring an independent historian(s)  (For the categories of independent interpretation of tests and discussion of management or test interpretation, see moderate or high) Low risk of morbidity from additional diagnostic testing or treatment     [id number]  [id number] Mod Moderate  ? 1or more chronic illnesses with exacerbation, progression, or side effects of treatment;    or  ?2or more stable chronic illnesses;    or  ?1undiagnosed new problem with uncertain prognosis;    or  ?1acute illness with systemic symptoms;    or  ?1acute complicated injury   Moderate  (Must meet the requirements of at least 1 out of 3 categories)  Category 1: Tests, documents, or independent historian(s)  ? Any combination of 3 from the following:   ?Review of prior external note(s) from each unique source*;  ?Review of the result(s) of each unique test*;  ?Ordering of each unique test*;  ?Assessment requiring an independent historian(s)    or  Category 2: Independent interpretation of tests   ? Independent interpretation of a test performed by another physician/other qualified health care professional (not separately reported);     or  Category 3: Discussion of management or test interpretation  ? Discussion of management or test interpretation with external physician/other qualified health care professional/appropriate source (not separately reported)   Moderate risk of morbidity from additional diagnostic testing or treatment  Examples only:  ?Prescription drug management   ? Decision regarding minor surgery with identified patient or procedure risk factors  ? Decision regarding elective major surgery without identified patient or procedure risk factors   ? Diagnosis or treatment significantly limited by social determinants of health       75357  51612 High High  ? 1or more chronic illnesses with severe exacerbation, progression, or side effects of treatment;    or  ?1 acute or chronic illness or injury that poses a threat to life or bodily function   Extensive  (Must meet the requirements of at least 2 out of 3 categories)  Category 1: Tests, documents, or independent historian(s)  ? Any combination of 3 from the following:   ?Review of prior external note(s) from each unique source*;  ?Review of the result(s) of each unique test*;   ?Ordering of each unique test*;   ?Assessment requiring an independent historian(s)    or   Category 2: Independent interpretation of tests   ? Independent interpretation of a test performed by another physician/other qualified health care professional (not separately reported);     or  Category 3: Discussion of management or test interpretation  ? Discussion of management or test interpretation with external physician/other qualified health care professional/appropriate source (not separately reported)   High risk of morbidity from additional diagnostic testing or treatment  Examples only:  ?Drug therapy requiring intensive monitoring for toxicity  ? Decision regarding elective major surgery with identified patient or procedure risk factors-    ? Decision regarding emergency major surgery  ? Decision regarding hospitalization  ? Decision not to resuscitate or to de-escalate care because of poor prognosis             I have personally performed a face-to-face diagnostic evaluation and management  service on this patient. I have independently seen the patient. I have independently obtained the above history from the patient/family. I have independently examined the patient with above findings. I have independently reviewed data/labs for this patient and developed the above plan of care (MDM).       Signed: Governor Funmi MD  3/7/2023    1:46 PM

## 2023-03-08 ENCOUNTER — ANESTHESIA (OUTPATIENT)
Dept: SURGERY | Age: 53
End: 2023-03-08
Payer: COMMERCIAL

## 2023-03-08 ENCOUNTER — HOSPITAL ENCOUNTER (OUTPATIENT)
Age: 53
Setting detail: OBSERVATION
Discharge: HOME | End: 2023-03-09
Attending: SURGERY | Admitting: FAMILY MEDICINE
Payer: COMMERCIAL

## 2023-03-08 ENCOUNTER — HOSPITAL ENCOUNTER (OUTPATIENT)
Dept: NUCLEAR MEDICINE | Age: 53
Discharge: HOME | End: 2023-03-11
Payer: COMMERCIAL

## 2023-03-08 ENCOUNTER — APPOINTMENT (OUTPATIENT)
Dept: MAMMOGRAPHY | Age: 53
End: 2023-03-08
Attending: SURGERY
Payer: COMMERCIAL

## 2023-03-08 ENCOUNTER — APPOINTMENT (OUTPATIENT)
Dept: GENERAL RADIOLOGY | Age: 53
End: 2023-03-08
Attending: SURGERY
Payer: COMMERCIAL

## 2023-03-08 ENCOUNTER — HOSPITAL ENCOUNTER (OUTPATIENT)
Dept: MAMMOGRAPHY | Age: 53
Discharge: HOME | End: 2023-03-11
Payer: COMMERCIAL

## 2023-03-08 ENCOUNTER — APPOINTMENT (OUTPATIENT)
Dept: CT IMAGING | Age: 53
End: 2023-03-08
Attending: SURGERY
Payer: COMMERCIAL

## 2023-03-08 DIAGNOSIS — C50.912 INFILTRATING DUCTAL CARCINOMA OF BREAST, LEFT (HCC): ICD-10-CM

## 2023-03-08 DIAGNOSIS — Z17.0 MALIGNANT NEOPLASM OF LEFT BREAST IN FEMALE, ESTROGEN RECEPTOR POSITIVE, UNSPECIFIED SITE OF BREAST (HCC): ICD-10-CM

## 2023-03-08 DIAGNOSIS — C50.912 MALIGNANT NEOPLASM OF LEFT BREAST IN FEMALE, ESTROGEN RECEPTOR POSITIVE, UNSPECIFIED SITE OF BREAST (HCC): ICD-10-CM

## 2023-03-08 DIAGNOSIS — J96.01 ACUTE RESPIRATORY FAILURE WITH HYPOXIA (HCC): Primary | ICD-10-CM

## 2023-03-08 PROBLEM — C50.212 MALIGNANT NEOPLASM OF UPPER-INNER QUADRANT OF LEFT BREAST IN FEMALE, ESTROGEN RECEPTOR POSITIVE (HCC): Status: ACTIVE | Noted: 2023-03-08

## 2023-03-08 LAB
BASOPHILS # BLD: 0 K/UL (ref 0–0.2)
BASOPHILS NFR BLD: 1 % (ref 0–2)
DIFFERENTIAL METHOD BLD: ABNORMAL
EOSINOPHIL # BLD: 0 K/UL (ref 0–0.8)
EOSINOPHIL NFR BLD: 0 % (ref 0.5–7.8)
ERYTHROCYTE [DISTWIDTH] IN BLOOD BY AUTOMATED COUNT: 13.1 % (ref 11.9–14.6)
HCG UR QL: NEGATIVE
HCT VFR BLD AUTO: 34.1 % (ref 35.8–46.3)
HGB BLD-MCNC: 12 G/DL (ref 11.7–15.4)
IMM GRANULOCYTES # BLD AUTO: 0 K/UL (ref 0–0.5)
IMM GRANULOCYTES NFR BLD AUTO: 1 % (ref 0–5)
LYMPHOCYTES # BLD: 1 K/UL (ref 0.5–4.6)
LYMPHOCYTES NFR BLD: 15 % (ref 13–44)
MCH RBC QN AUTO: 36.9 PG (ref 26.1–32.9)
MCHC RBC AUTO-ENTMCNC: 35.2 G/DL (ref 31.4–35)
MCV RBC AUTO: 104.9 FL (ref 82–102)
MONOCYTES # BLD: 0.4 K/UL (ref 0.1–1.3)
MONOCYTES NFR BLD: 6 % (ref 4–12)
NEUTS SEG # BLD: 5.1 K/UL (ref 1.7–8.2)
NEUTS SEG NFR BLD: 79 % (ref 43–78)
NRBC # BLD: 0 K/UL (ref 0–0.2)
PLATELET # BLD AUTO: 174 K/UL (ref 150–450)
PMV BLD AUTO: 9.6 FL (ref 9.4–12.3)
RBC # BLD AUTO: 3.25 M/UL (ref 4.05–5.2)
WBC # BLD AUTO: 6.5 K/UL (ref 4.3–11.1)

## 2023-03-08 PROCEDURE — 3430000000 HC RX DIAGNOSTIC RADIOPHARMACEUTICAL: Performed by: SURGERY

## 2023-03-08 PROCEDURE — G0378 HOSPITAL OBSERVATION PER HR: HCPCS

## 2023-03-08 PROCEDURE — 38900 IO MAP OF SENT LYMPH NODE: CPT | Performed by: SURGERY

## 2023-03-08 PROCEDURE — 6360000002 HC RX W HCPCS: Performed by: ANESTHESIOLOGY

## 2023-03-08 PROCEDURE — 81025 URINE PREGNANCY TEST: CPT

## 2023-03-08 PROCEDURE — 7100000001 HC PACU RECOVERY - ADDTL 15 MIN: Performed by: SURGERY

## 2023-03-08 PROCEDURE — 6360000002 HC RX W HCPCS

## 2023-03-08 PROCEDURE — 2500000003 HC RX 250 WO HCPCS: Performed by: SURGERY

## 2023-03-08 PROCEDURE — 6360000004 HC RX CONTRAST MEDICATION: Performed by: FAMILY MEDICINE

## 2023-03-08 PROCEDURE — 85025 COMPLETE CBC W/AUTO DIFF WBC: CPT

## 2023-03-08 PROCEDURE — 2500000003 HC RX 250 WO HCPCS

## 2023-03-08 PROCEDURE — 3700000001 HC ADD 15 MINUTES (ANESTHESIA): Performed by: SURGERY

## 2023-03-08 PROCEDURE — 6370000000 HC RX 637 (ALT 250 FOR IP): Performed by: ANESTHESIOLOGY

## 2023-03-08 PROCEDURE — 88307 TISSUE EXAM BY PATHOLOGIST: CPT

## 2023-03-08 PROCEDURE — 99024 POSTOP FOLLOW-UP VISIT: CPT | Performed by: SURGERY

## 2023-03-08 PROCEDURE — 3600000014 HC SURGERY LEVEL 4 ADDTL 15MIN: Performed by: SURGERY

## 2023-03-08 PROCEDURE — 71045 X-RAY EXAM CHEST 1 VIEW: CPT

## 2023-03-08 PROCEDURE — 78195 LYMPH SYSTEM IMAGING: CPT

## 2023-03-08 PROCEDURE — 38525 BIOPSY/REMOVAL LYMPH NODES: CPT | Performed by: SURGERY

## 2023-03-08 PROCEDURE — 7100000000 HC PACU RECOVERY - FIRST 15 MIN: Performed by: SURGERY

## 2023-03-08 PROCEDURE — 2580000003 HC RX 258: Performed by: ANESTHESIOLOGY

## 2023-03-08 PROCEDURE — 77065 DX MAMMO INCL CAD UNI: CPT

## 2023-03-08 PROCEDURE — 36415 COLL VENOUS BLD VENIPUNCTURE: CPT

## 2023-03-08 PROCEDURE — 2580000003 HC RX 258: Performed by: FAMILY MEDICINE

## 2023-03-08 PROCEDURE — 2709999900 HC NON-CHARGEABLE SUPPLY: Performed by: SURGERY

## 2023-03-08 PROCEDURE — 76098 X-RAY EXAM SURGICAL SPECIMEN: CPT

## 2023-03-08 PROCEDURE — 3700000000 HC ANESTHESIA ATTENDED CARE: Performed by: SURGERY

## 2023-03-08 PROCEDURE — 6360000002 HC RX W HCPCS: Performed by: SURGERY

## 2023-03-08 PROCEDURE — 71260 CT THORAX DX C+: CPT | Performed by: FAMILY MEDICINE

## 2023-03-08 PROCEDURE — 19301 PARTIAL MASTECTOMY: CPT | Performed by: SURGERY

## 2023-03-08 PROCEDURE — 3600000004 HC SURGERY LEVEL 4 BASE: Performed by: SURGERY

## 2023-03-08 PROCEDURE — 19285 PERQ DEV BREAST 1ST US IMAG: CPT

## 2023-03-08 PROCEDURE — A9541 TC99M SULFUR COLLOID: HCPCS | Performed by: SURGERY

## 2023-03-08 RX ORDER — HYDROCODONE BITARTRATE AND ACETAMINOPHEN 5; 325 MG/1; MG/1
1-2 TABLET ORAL EVERY 8 HOURS PRN
Qty: 28 TABLET | Refills: 0 | Status: SHIPPED | OUTPATIENT
Start: 2023-03-08 | End: 2023-03-15

## 2023-03-08 RX ORDER — GLYCOPYRROLATE 0.2 MG/ML
INJECTION INTRAMUSCULAR; INTRAVENOUS PRN
Status: DISCONTINUED | OUTPATIENT
Start: 2023-03-08 | End: 2023-03-08 | Stop reason: SDUPTHER

## 2023-03-08 RX ORDER — ROCURONIUM BROMIDE 10 MG/ML
INJECTION, SOLUTION INTRAVENOUS PRN
Status: DISCONTINUED | OUTPATIENT
Start: 2023-03-08 | End: 2023-03-08 | Stop reason: SDUPTHER

## 2023-03-08 RX ORDER — FENTANYL CITRATE 50 UG/ML
INJECTION, SOLUTION INTRAMUSCULAR; INTRAVENOUS PRN
Status: DISCONTINUED | OUTPATIENT
Start: 2023-03-08 | End: 2023-03-08 | Stop reason: SDUPTHER

## 2023-03-08 RX ORDER — SODIUM CHLORIDE 9 MG/ML
INJECTION, SOLUTION INTRAVENOUS PRN
Status: DISCONTINUED | OUTPATIENT
Start: 2023-03-08 | End: 2023-03-09 | Stop reason: HOSPADM

## 2023-03-08 RX ORDER — DEXAMETHASONE SODIUM PHOSPHATE 10 MG/ML
INJECTION INTRAMUSCULAR; INTRAVENOUS PRN
Status: DISCONTINUED | OUTPATIENT
Start: 2023-03-08 | End: 2023-03-08 | Stop reason: SDUPTHER

## 2023-03-08 RX ORDER — SODIUM CHLORIDE 0.9 % (FLUSH) 0.9 %
5-40 SYRINGE (ML) INJECTION PRN
Status: DISCONTINUED | OUTPATIENT
Start: 2023-03-08 | End: 2023-03-08 | Stop reason: HOSPADM

## 2023-03-08 RX ORDER — MAGNESIUM SULFATE IN WATER 40 MG/ML
2000 INJECTION, SOLUTION INTRAVENOUS PRN
Status: DISCONTINUED | OUTPATIENT
Start: 2023-03-08 | End: 2023-03-09 | Stop reason: HOSPADM

## 2023-03-08 RX ORDER — POTASSIUM CHLORIDE 29.8 MG/ML
20 INJECTION INTRAVENOUS PRN
Status: DISCONTINUED | OUTPATIENT
Start: 2023-03-08 | End: 2023-03-09 | Stop reason: HOSPADM

## 2023-03-08 RX ORDER — ACETAMINOPHEN 500 MG
1000 TABLET ORAL ONCE
Status: COMPLETED | OUTPATIENT
Start: 2023-03-08 | End: 2023-03-08

## 2023-03-08 RX ORDER — LIDOCAINE HYDROCHLORIDE 20 MG/ML
INJECTION, SOLUTION EPIDURAL; INFILTRATION; INTRACAUDAL; PERINEURAL PRN
Status: DISCONTINUED | OUTPATIENT
Start: 2023-03-08 | End: 2023-03-08 | Stop reason: SDUPTHER

## 2023-03-08 RX ORDER — SODIUM CHLORIDE 0.9 % (FLUSH) 0.9 %
5-40 SYRINGE (ML) INJECTION EVERY 12 HOURS SCHEDULED
Status: DISCONTINUED | OUTPATIENT
Start: 2023-03-08 | End: 2023-03-08 | Stop reason: HOSPADM

## 2023-03-08 RX ORDER — SODIUM CHLORIDE 9 MG/ML
INJECTION, SOLUTION INTRAVENOUS PRN
Status: DISCONTINUED | OUTPATIENT
Start: 2023-03-08 | End: 2023-03-08 | Stop reason: HOSPADM

## 2023-03-08 RX ORDER — LIDOCAINE HYDROCHLORIDE 10 MG/ML
INJECTION, SOLUTION INFILTRATION; PERINEURAL PRN
Status: DISCONTINUED | OUTPATIENT
Start: 2023-03-08 | End: 2023-03-08 | Stop reason: HOSPADM

## 2023-03-08 RX ORDER — PROMETHAZINE HYDROCHLORIDE 12.5 MG/1
12.5 TABLET ORAL ONCE
Status: COMPLETED | OUTPATIENT
Start: 2023-03-08 | End: 2023-03-08

## 2023-03-08 RX ORDER — NEOSTIGMINE METHYLSULFATE 1 MG/ML
INJECTION, SOLUTION INTRAVENOUS PRN
Status: DISCONTINUED | OUTPATIENT
Start: 2023-03-08 | End: 2023-03-08 | Stop reason: SDUPTHER

## 2023-03-08 RX ORDER — SODIUM CHLORIDE 0.9 % (FLUSH) 0.9 %
5-40 SYRINGE (ML) INJECTION EVERY 12 HOURS SCHEDULED
Status: DISCONTINUED | OUTPATIENT
Start: 2023-03-08 | End: 2023-03-09 | Stop reason: HOSPADM

## 2023-03-08 RX ORDER — SODIUM CHLORIDE 0.9 % (FLUSH) 0.9 %
5-40 SYRINGE (ML) INJECTION PRN
Status: DISCONTINUED | OUTPATIENT
Start: 2023-03-08 | End: 2023-03-09 | Stop reason: HOSPADM

## 2023-03-08 RX ORDER — BUPIVACAINE HYDROCHLORIDE 2.5 MG/ML
INJECTION, SOLUTION EPIDURAL; INFILTRATION; INTRACAUDAL PRN
Status: DISCONTINUED | OUTPATIENT
Start: 2023-03-08 | End: 2023-03-08 | Stop reason: HOSPADM

## 2023-03-08 RX ORDER — FENTANYL CITRATE 50 UG/ML
100 INJECTION, SOLUTION INTRAMUSCULAR; INTRAVENOUS
Status: DISCONTINUED | OUTPATIENT
Start: 2023-03-08 | End: 2023-03-08 | Stop reason: HOSPADM

## 2023-03-08 RX ORDER — ALBUTEROL SULFATE 2.5 MG/3ML
SOLUTION RESPIRATORY (INHALATION)
Status: COMPLETED
Start: 2023-03-08 | End: 2023-03-08

## 2023-03-08 RX ORDER — OXYCODONE HYDROCHLORIDE 5 MG/1
5 TABLET ORAL
Status: COMPLETED | OUTPATIENT
Start: 2023-03-08 | End: 2023-03-08

## 2023-03-08 RX ORDER — ONDANSETRON 2 MG/ML
INJECTION INTRAMUSCULAR; INTRAVENOUS PRN
Status: DISCONTINUED | OUTPATIENT
Start: 2023-03-08 | End: 2023-03-08 | Stop reason: SDUPTHER

## 2023-03-08 RX ORDER — HYDRALAZINE HYDROCHLORIDE 20 MG/ML
10 INJECTION INTRAMUSCULAR; INTRAVENOUS
Status: DISCONTINUED | OUTPATIENT
Start: 2023-03-08 | End: 2023-03-08 | Stop reason: HOSPADM

## 2023-03-08 RX ORDER — LIDOCAINE HYDROCHLORIDE 10 MG/ML
1 INJECTION, SOLUTION INFILTRATION; PERINEURAL
Status: DISCONTINUED | OUTPATIENT
Start: 2023-03-08 | End: 2023-03-08 | Stop reason: HOSPADM

## 2023-03-08 RX ORDER — PROCHLORPERAZINE EDISYLATE 5 MG/ML
5 INJECTION INTRAMUSCULAR; INTRAVENOUS
Status: DISCONTINUED | OUTPATIENT
Start: 2023-03-08 | End: 2023-03-08 | Stop reason: HOSPADM

## 2023-03-08 RX ORDER — PROPOFOL 10 MG/ML
INJECTION, EMULSION INTRAVENOUS PRN
Status: DISCONTINUED | OUTPATIENT
Start: 2023-03-08 | End: 2023-03-08 | Stop reason: SDUPTHER

## 2023-03-08 RX ORDER — IPRATROPIUM BROMIDE AND ALBUTEROL SULFATE 2.5; .5 MG/3ML; MG/3ML
1 SOLUTION RESPIRATORY (INHALATION)
Status: DISCONTINUED | OUTPATIENT
Start: 2023-03-08 | End: 2023-03-08

## 2023-03-08 RX ORDER — LIDOCAINE HYDROCHLORIDE 10 MG/ML
5 INJECTION, SOLUTION INFILTRATION; PERINEURAL ONCE
Status: COMPLETED | OUTPATIENT
Start: 2023-03-08 | End: 2023-03-08

## 2023-03-08 RX ORDER — ALBUTEROL SULFATE 2.5 MG/3ML
2.5 SOLUTION RESPIRATORY (INHALATION)
Status: DISCONTINUED | OUTPATIENT
Start: 2023-03-08 | End: 2023-03-09 | Stop reason: HOSPADM

## 2023-03-08 RX ORDER — ENOXAPARIN SODIUM 100 MG/ML
40 INJECTION SUBCUTANEOUS EVERY 24 HOURS
Status: DISCONTINUED | OUTPATIENT
Start: 2023-03-08 | End: 2023-03-09 | Stop reason: HOSPADM

## 2023-03-08 RX ORDER — ONDANSETRON 2 MG/ML
4 INJECTION INTRAMUSCULAR; INTRAVENOUS
Status: DISCONTINUED | OUTPATIENT
Start: 2023-03-08 | End: 2023-03-08 | Stop reason: HOSPADM

## 2023-03-08 RX ORDER — POLYETHYLENE GLYCOL 3350 17 G/17G
17 POWDER, FOR SOLUTION ORAL DAILY PRN
Status: DISCONTINUED | OUTPATIENT
Start: 2023-03-08 | End: 2023-03-09 | Stop reason: HOSPADM

## 2023-03-08 RX ORDER — POTASSIUM CHLORIDE 7.45 MG/ML
10 INJECTION INTRAVENOUS PRN
Status: DISCONTINUED | OUTPATIENT
Start: 2023-03-08 | End: 2023-03-09 | Stop reason: HOSPADM

## 2023-03-08 RX ORDER — MIDAZOLAM HYDROCHLORIDE 1 MG/ML
INJECTION INTRAMUSCULAR; INTRAVENOUS PRN
Status: DISCONTINUED | OUTPATIENT
Start: 2023-03-08 | End: 2023-03-08 | Stop reason: SDUPTHER

## 2023-03-08 RX ORDER — LABETALOL HYDROCHLORIDE 5 MG/ML
10 INJECTION, SOLUTION INTRAVENOUS
Status: DISCONTINUED | OUTPATIENT
Start: 2023-03-08 | End: 2023-03-08 | Stop reason: HOSPADM

## 2023-03-08 RX ORDER — EPHEDRINE SULFATE/0.9% NACL/PF 50 MG/5 ML
SYRINGE (ML) INTRAVENOUS PRN
Status: DISCONTINUED | OUTPATIENT
Start: 2023-03-08 | End: 2023-03-08 | Stop reason: SDUPTHER

## 2023-03-08 RX ORDER — MIDAZOLAM HYDROCHLORIDE 1 MG/ML
2 INJECTION INTRAMUSCULAR; INTRAVENOUS
Status: DISCONTINUED | OUTPATIENT
Start: 2023-03-08 | End: 2023-03-08 | Stop reason: HOSPADM

## 2023-03-08 RX ORDER — SODIUM CHLORIDE, SODIUM LACTATE, POTASSIUM CHLORIDE, CALCIUM CHLORIDE 600; 310; 30; 20 MG/100ML; MG/100ML; MG/100ML; MG/100ML
INJECTION, SOLUTION INTRAVENOUS CONTINUOUS
Status: DISCONTINUED | OUTPATIENT
Start: 2023-03-08 | End: 2023-03-08 | Stop reason: HOSPADM

## 2023-03-08 RX ADMIN — LIDOCAINE HYDROCHLORIDE 5 ML: 10 INJECTION, SOLUTION INFILTRATION; PERINEURAL at 10:43

## 2023-03-08 RX ADMIN — DEXAMETHASONE SODIUM PHOSPHATE 8 MG: 10 INJECTION INTRAMUSCULAR; INTRAVENOUS at 13:30

## 2023-03-08 RX ADMIN — ACETAMINOPHEN 1000 MG: 500 TABLET, FILM COATED ORAL at 09:07

## 2023-03-08 RX ADMIN — LIDOCAINE HYDROCHLORIDE 80 MG: 20 INJECTION, SOLUTION EPIDURAL; INFILTRATION; INTRACAUDAL; PERINEURAL at 13:19

## 2023-03-08 RX ADMIN — ROCURONIUM BROMIDE 10 MG: 50 INJECTION, SOLUTION INTRAVENOUS at 14:19

## 2023-03-08 RX ADMIN — PHENYLEPHRINE HYDROCHLORIDE 100 MCG: 0.1 INJECTION, SOLUTION INTRAVENOUS at 13:51

## 2023-03-08 RX ADMIN — Medication 2 G: at 13:23

## 2023-03-08 RX ADMIN — GLYCOPYRROLATE 0.8 MG: 0.2 INJECTION INTRAMUSCULAR; INTRAVENOUS at 14:36

## 2023-03-08 RX ADMIN — HYDROMORPHONE HYDROCHLORIDE 0.5 MG: 1 INJECTION, SOLUTION INTRAMUSCULAR; INTRAVENOUS; SUBCUTANEOUS at 15:28

## 2023-03-08 RX ADMIN — HYDROMORPHONE HYDROCHLORIDE 0.5 MG: 1 INJECTION, SOLUTION INTRAMUSCULAR; INTRAVENOUS; SUBCUTANEOUS at 15:20

## 2023-03-08 RX ADMIN — MIDAZOLAM 2 MG: 1 INJECTION INTRAMUSCULAR; INTRAVENOUS at 13:09

## 2023-03-08 RX ADMIN — PROPOFOL 200 MG: 10 INJECTION, EMULSION INTRAVENOUS at 13:19

## 2023-03-08 RX ADMIN — PROMETHAZINE HYDROCHLORIDE 12.5 MG: 12.5 TABLET ORAL at 16:33

## 2023-03-08 RX ADMIN — Medication 15 MG: at 14:08

## 2023-03-08 RX ADMIN — Medication 5 MG: at 14:36

## 2023-03-08 RX ADMIN — IOPAMIDOL 100 ML: 755 INJECTION, SOLUTION INTRAVENOUS at 18:21

## 2023-03-08 RX ADMIN — ROCURONIUM BROMIDE 40 MG: 50 INJECTION, SOLUTION INTRAVENOUS at 13:36

## 2023-03-08 RX ADMIN — SUGAMMADEX 200 MG: 100 INJECTION, SOLUTION INTRAVENOUS at 14:53

## 2023-03-08 RX ADMIN — SODIUM CHLORIDE, PRESERVATIVE FREE 5 ML: 5 INJECTION INTRAVENOUS at 19:38

## 2023-03-08 RX ADMIN — Medication 500 MICRO CURIE: at 09:15

## 2023-03-08 RX ADMIN — SODIUM CHLORIDE, SODIUM LACTATE, POTASSIUM CHLORIDE, AND CALCIUM CHLORIDE: 600; 310; 30; 20 INJECTION, SOLUTION INTRAVENOUS at 13:47

## 2023-03-08 RX ADMIN — PHENYLEPHRINE HYDROCHLORIDE 100 MCG: 0.1 INJECTION, SOLUTION INTRAVENOUS at 13:58

## 2023-03-08 RX ADMIN — FENTANYL CITRATE 25 MCG: 50 INJECTION, SOLUTION INTRAMUSCULAR; INTRAVENOUS at 13:31

## 2023-03-08 RX ADMIN — HYDROCORTISONE SODIUM SUCCINATE 100 MG: 100 INJECTION, POWDER, FOR SOLUTION INTRAMUSCULAR; INTRAVENOUS at 15:36

## 2023-03-08 RX ADMIN — PHENYLEPHRINE HYDROCHLORIDE 100 MCG: 0.1 INJECTION, SOLUTION INTRAVENOUS at 13:44

## 2023-03-08 RX ADMIN — ALBUTEROL SULFATE 2.5 MG: 2.5 SOLUTION RESPIRATORY (INHALATION) at 15:14

## 2023-03-08 RX ADMIN — FENTANYL CITRATE 50 MCG: 50 INJECTION, SOLUTION INTRAMUSCULAR; INTRAVENOUS at 13:36

## 2023-03-08 RX ADMIN — ONDANSETRON 4 MG: 2 INJECTION INTRAMUSCULAR; INTRAVENOUS at 14:00

## 2023-03-08 RX ADMIN — OXYCODONE HYDROCHLORIDE 5 MG: 5 TABLET ORAL at 15:55

## 2023-03-08 RX ADMIN — PHENYLEPHRINE HYDROCHLORIDE 100 MCG: 0.1 INJECTION, SOLUTION INTRAVENOUS at 13:47

## 2023-03-08 RX ADMIN — FENTANYL CITRATE 25 MCG: 50 INJECTION, SOLUTION INTRAMUSCULAR; INTRAVENOUS at 13:22

## 2023-03-08 RX ADMIN — SODIUM CHLORIDE, SODIUM LACTATE, POTASSIUM CHLORIDE, AND CALCIUM CHLORIDE 125 ML/HR: 600; 310; 30; 20 INJECTION, SOLUTION INTRAVENOUS at 09:04

## 2023-03-08 ASSESSMENT — PAIN SCALES - GENERAL
PAINLEVEL_OUTOF10: 5
PAINLEVEL_OUTOF10: 0
PAINLEVEL_OUTOF10: 6
PAINLEVEL_OUTOF10: 6
PAINLEVEL_OUTOF10: 4
PAINLEVEL_OUTOF10: 7
PAINLEVEL_OUTOF10: 0
PAINLEVEL_OUTOF10: 2
PAINLEVEL_OUTOF10: 8
PAINLEVEL_OUTOF10: 1
PAINLEVEL_OUTOF10: 5
PAINLEVEL_OUTOF10: 8
PAINLEVEL_OUTOF10: 6
PAINLEVEL_OUTOF10: 2
PAINLEVEL_OUTOF10: 3
PAINLEVEL_OUTOF10: 4
PAINLEVEL_OUTOF10: 2

## 2023-03-08 ASSESSMENT — PAIN DESCRIPTION - ORIENTATION: ORIENTATION: LEFT

## 2023-03-08 ASSESSMENT — PAIN DESCRIPTION - PAIN TYPE: TYPE: SURGICAL PAIN

## 2023-03-08 ASSESSMENT — PAIN DESCRIPTION - FREQUENCY: FREQUENCY: CONTINUOUS

## 2023-03-08 ASSESSMENT — PAIN DESCRIPTION - DESCRIPTORS: DESCRIPTORS: BURNING

## 2023-03-08 ASSESSMENT — PAIN DESCRIPTION - LOCATION: LOCATION: BREAST

## 2023-03-08 ASSESSMENT — PAIN - FUNCTIONAL ASSESSMENT: PAIN_FUNCTIONAL_ASSESSMENT: 0-10

## 2023-03-08 NOTE — ANESTHESIA PRE PROCEDURE
Department of Anesthesiology  Preprocedure Note       Name:  Jennifer Iraheta   Age:  46 y.o.  :  1970                                          MRN:  [id number]         Date:  3/8/2023      Surgeon: Jesus Reese):  Rebecca St MD    Procedure: Procedure(s):  LEFT LUMPECTOMY WITH NEEDLE LOC & LEFT AXILLARY SENTINEL NODE EXCISION  LEFT BREAST BIOPSY SENTINEL NODE DISSECTION/ PREOP 0730/ LYMPHO 0900/ LOC 1100    Medications prior to admission:   Prior to Admission medications    Medication Sig Start Date End Date Taking? Authorizing Provider   chlorthalidone (HYGROTON) 25 MG tablet 1 po qam for bloos pressure 23 Shown, MD   olmesartan (BENICAR) 40 MG tablet 1 po qam for blood pressure 23 Shown, MD   famotidine (PEPCID) 20 MG tablet Take 1 tablet by mouth 2 times daily 23 Shown, MD   loratadine (CLARITIN) 10 MG tablet Take 1 tablet by mouth daily 23 Shown, MD   IBRANCE 125 MG tablet Take 125 mg by mouth daily 28 day cycle.   21 days on/7 days off 23   Tasha Reese MD   docusate sodium (COLACE) 100 MG capsule Take 100 mg by mouth 2 times daily    Historical Provider, MD   letrozole [facility]) 2.5 MG tablet Take 1 tablet by mouth daily 23   Tasha Reese MD   potassium chloride (KLOR-CON M) 20 MEQ extended release tablet Take 1 tablet by mouth 2 times daily Take 1 tablet twice a day 23   Tasha Reese MD   Magic Mouthwash (MIRACLE MOUTHWASH) Swish and spit 5 mLs 4 times daily as needed for Irritation 22   Tasha Reese MD   ondansetron [facility]) 8 MG tablet Take 1 tablet by mouth every 8 hours as needed for Nausea or Vomiting 22   Paulo Aldana, APRN - CNP       Current medications:    Current Facility-Administered Medications   Medication Dose Route Frequency Provider Last Rate Last Admin    lidocaine 1 % injection 1 mL  1 mL IntraDERmal Once PRN Laura Jamison DO        fentaNYL (SUBLIMAZE) injection 100 mcg  100 mcg IntraVENous Once PRN Laury Romberg Friskey, DO        lactated ringers IV soln infusion   IntraVENous Continuous Amarilis Romberg Rubyraisa Mae,  mL/hr at 03/08/23 6963 125 mL/hr at 03/08/23 2247    sodium chloride flush 0.9 % injection 5-40 mL  5-40 mL IntraVENous 2 times per day Laury Romberg Friskey, DO        sodium chloride flush 0.9 % injection 5-40 mL  5-40 mL IntraVENous PRN Amarilis Romberg Erica Mae, DO        0.9 % sodium chloride infusion   IntraVENous PRN Laury Romberg Erica Mae, DO        midazolam (VERSED) injection 2 mg  2 mg IntraVENous Once PRN Laury Romberg Friskey, DO        sodium chloride flush 0.9 % injection 5-40 mL  5-40 mL IntraVENous 2 times per day Leila Krause MD        sodium chloride flush 0.9 % injection 5-40 mL  5-40 mL IntraVENous PRN Leila Krause MD        0.9 % sodium chloride infusion   IntraVENous PRN Leila Krause MD        ceFAZolin (ANCEF) 2000 mg in sterile water 20 mL IV syringe  2,000 mg IntraVENous Once Leila Krause MD           Allergies:  No Known Allergies    Problem List:    Patient Active Problem List   Diagnosis Code    Hypertension I10    Kidney stone N20.0    Cyst, kidney, acquired N28.1    Malignant neoplasm of left breast in female, estrogen receptor positive ([facility] [address] C50.912, Z17.0    Metastasis to bone ([facility] [address] C79.51       Past Medical History:        Diagnosis Date    Cyst, kidney, acquired     found on previous scan    GERD (gastroesophageal reflux disease)     Hypertension     Kidney stone        Past Surgical History:        Procedure Laterality Date    APPENDECTOMY  1990    CT BIOPSY PERCUTANEOUS SUPERFICIAL BONE  07/01/2022    CT BIOPSY PERCUTANEOUS SUPERFICIAL BONE 7/1/2022 [facility] CT SC[facility]    US BREAST BIOPSY W LOC DEVICE 1ST LESION LEFT Left 06/14/2022    US BREAST NEEDLE BIOPSY LEFT 6/14/2022 Miguel Leal MD [facility]    US LYMPH NODE BIOPSY  06/14/2022    US LYMPH NODE BIOPSY 6/14/2022 Maximus Mendieta MD Cece [facility]    WISDOM TOOTH EXTRACTION         Social History:    Social History     Tobacco Use    Smoking status: Never    Smokeless tobacco: Never   Substance Use Topics    Alcohol use: Yes     Alcohol/week: 2.0 standard drinks                                Counseling given: Not Answered      Vital Signs (Current):   Vitals:    02/23/23 0929 03/08/23 0833 03/08/23 0834   BP:  138/79    Pulse:  71    Resp:   18   Temp:   97.8 °F (36.6 °C)   TempSrc:   Temporal   SpO2:  100%    Weight: 180 lb (81.6 kg)  190 lb 8 oz (86.4 kg)   Height: 5' 3.5\" (1.613 m)  5' 3.5\" (1.613 m)                                              BP Readings from Last 3 Encounters:   03/08/23 138/79   02/23/23 132/89   01/11/23 116/76       NPO Status: Time of last liquid consumption: 2030                        Time of last solid consumption: 2000                        Date of last liquid consumption: 03/07/23                        Date of last solid food consumption: 03/07/23    BMI:   Wt Readings from Last 3 Encounters:   03/08/23 190 lb 8 oz (86.4 kg)   02/23/23 187 lb (84.8 kg)   01/30/23 187 lb (84.8 kg)     Body mass index is 33.22 kg/m².     CBC:   Lab Results   Component Value Date/Time    WBC 4.9 01/11/2023 01:50 PM    RBC 3.37 01/11/2023 01:50 PM    HGB 12.7 01/11/2023 01:50 PM    HCT 35.0 01/11/2023 01:50 PM    .9 01/11/2023 01:50 PM    RDW 12.9 01/11/2023 01:50 PM     01/11/2023 01:50 PM       CMP:   Lab Results   Component Value Date/Time     02/08/2023 10:54 AM    K 3.4 02/08/2023 10:54 AM     02/08/2023 10:54 AM    CO2 29 02/08/2023 10:54 AM    BUN 18 02/08/2023 10:54 AM    CREATININE 0.90 02/08/2023 10:54 AM    GFRAA >60 09/20/2022 09:08 AM    LABGLOM >60 02/08/2023 10:54 AM    GLUCOSE 81 02/08/2023 10:54 AM    PROT 8.1 02/08/2023 10:54 AM    CALCIUM 9.3 02/08/2023 10:54 AM    BILITOT 0.3 02/08/2023 10:54 AM    ALKPHOS 73 02/08/2023 10:54 AM    AST 33 02/08/2023 10:54 AM    ALT 55 02/08/2023 10:54 AM       POC Tests: No results for input(s): POCGLU, POCNA, POCK, POCCL, POCBUN, POCHEMO, POCHCT in the last 72 hours. Coags:   Lab Results   Component Value Date/Time    PROTIME 12.6 06/27/2022 02:28 PM    INR 0.9 06/27/2022 02:28 PM       HCG (If Applicable): No results found for: PREGTESTUR, PREGSERUM, HCG, HCGQUANT     ABGs: No results found for: PHART, PO2ART, ZDR5HXQ, FQH9BKB, BEART, K6NLGPCX     Type & Screen (If Applicable):  No results found for: LABABO, LABRH    Drug/Infectious Status (If Applicable):  Lab Results   Component Value Date/Time    HEPCAB NONREACTIVE 06/27/2022 02:28 PM       COVID-19 Screening (If Applicable): No results found for: COVID19        Anesthesia Evaluation  Patient summary reviewed  Airway: Mallampati: II          Dental: normal exam         Pulmonary:Negative Pulmonary ROS breath sounds clear to auscultation                             Cardiovascular:  Exercise tolerance: good (>4 METS),   (+) hypertension:,         Rhythm: regular  Rate: normal                    Neuro/Psych:   Negative Neuro/Psych ROS              GI/Hepatic/Renal:   (+) GERD:,          ROS comment: Obesity BMI 33.   Endo/Other:    (+) malignancy/cancer. Abdominal:             Vascular: negative vascular ROS. Other Findings:           Anesthesia Plan      general     ASA 2       Induction: intravenous. MIPS: Postoperative opioids intended and Prophylactic antiemetics administered. Anesthetic plan and risks discussed with patient and spouse.                         Leland Watson DO   3/8/2023

## 2023-03-08 NOTE — PERIOP NOTE
Patient arrived to PACU with continuous unproductive cough; face tent provided for patient with humidified oxygen.

## 2023-03-08 NOTE — ANESTHESIA POSTPROCEDURE EVALUATION
Department of Anesthesiology  Postprocedure Note    Patient: Abeba Pugh  MRN: [id number]  YOB: 1970  Date of evaluation: 3/8/2023      Procedure Summary     Date: 03/08/23 Room / Location: [facility] [place] MAIN OR 04 / E MAIN OR    Anesthesia Start: 1309 Anesthesia Stop: 1068    Procedures:       LEFT LUMPECTOMY WITH NEEDLE LOC & LEFT AXILLARY SENTINEL NODE EXCISION (Left: Breast)      LEFT BREAST BIOPSY SENTINEL NODE DISSECTION/ PREOP 0730/ LYMPHO 0900/ LOC 1100 (Left: Breast) Diagnosis:       Malignant neoplasm of left breast ([address]      (Malignant neoplasm of left breast ([address] [C50.912])    Surgeons: Isaiah Guerrero MD Responsible Provider: Estiven Hills MD    Anesthesia Type: general ASA Status: 2          Anesthesia Type: No value filed. Marquita Phase I: Marquita Score: 9    Marquita Phase II:        Anesthesia Post Evaluation    Patient location during evaluation: PACU  Patient participation: complete - patient participated  Level of consciousness: sleepy but conscious  Pain score: 0  Airway patency: Falls asleep frequently and desaturates rapidly. Nausea & Vomiting: no nausea and no vomiting  Complications: no  Cardiovascular status: hemodynamically stable  Respiratory status: spontaneous ventilation, nasal cannula and unstable  Hydration status: euvolemic  Comments: /76   Pulse 100   Temp 97.6 °F (36.4 °C) (Temporal)   Resp 15   Ht 5' 3.5\" (1.613 m)   Wt 190 lb 8 oz (86.4 kg)   SpO2 96%   BMI 33.22 kg/m²     I assumed patient care in PACU after emergence and extubation from Dr. Michael Beavers. Per Dr. Marilyn Segura placed initially for surgery. However, converted to GETA due to patient coughing and brief laryngospasm. No evidence of aspiration related to gastric secretions. Patient was extubated and transferred to PACU. When I assumed care, patient was on humidified oxygen via face tent. She was tachycardic and low 90's oxygen saturation.   Dr. Kerry Desai had ordered albuterol duonebulizer which patient was receiving. Patient was reversed with suggammadex with significant improvement in tidal volume and minute ventilation prior to extubation after traditional reversal was inadequate per Dr. Orne Thornton. In the pacu, patient reported couldn't take a deep breath and appeared to have low volume respirations based on decreased breath sounds and minimal chest movement. She improved significantly after the duoneb treatment. Given her exam and history of chronic cough (8 months), I treated with IV solu-cortef 100 mg as an anti-inflammatory and asked Brittni (PACU RN) to encourage IS, which patient showed improvement in her tidal volumes up to 2200 ml. Patient was doing well, O2 saturation 98-99% on room air, heart rate and blood pressure wnl. Patient was treated with IV hydromorphone and oral oxycodone, per typical pacu protocol for postoperative pain. Unfortunately, patient was unable to maintain oxygenation due to sedation from pain medications and likely no residual lung capacity for oxygenation, as evidence by rapid desaturation (lowest I oberserved at beside to 76% in PACU) which quickly resolved with stimulation and supplemental oxygen. Given the chronic cough, intraoperative laryngospasm, and difficulty breathing in pacu, followed by abnormal desaturation after treatment with pain medication, the spouse and I discussed admission overnight for safety. Both patient and spouse were happy with this plan. I notified Dr. Reid Bill. Chest xray obtained in pacu. Awaiting official read, however, I do not note any obvious pneumothorax, there is consolidation in the right lower lobe. Dr. Reid Bill felt the patient should be admitted to the [address] ICU for adequate monitoring since the floor continuous pulse oximetry is not a true telemetry monitored level. He asked for the hospitalist to consult pulmonology for her chronic cough and intrapulmonary process.       I spoke with Dr. Chaparrita Agudelo at patient beside in PACU, provided him with patient history and perioperative events/treatment. We both agreed the patient should undergo CT PE to rule out PE in this situation. I initially considered a PE early in PACU recovery, however, with the patient's improvement in oxygenation, subjective symptoms, and weaned off supplemental oxygen prior to treatment with pain medications, I felt it was unlikely a PE and more likely an intrapulmonary process (COPD/Asthma/bronchitis/aspiration pneumonitis). Patient was stable at time of handoff.     Multimodal analgesia pain management approach

## 2023-03-08 NOTE — H&P
EXAMINATION TYPE: CT angio chest

 

DATE OF EXAM: 6/19/2018

 

COMPARISON: NONE

 

HISTORY: Chest pain, hypertension, PE protocol

 

CT DLP: 405 mGycm. Automated Exposure Control for Dose Reduction was Utilized.

 

 

CONTRAST: 

CTA scan of the thorax is performed with IV Contrast, patient injected with 100 ml mL of Isovue 370, 
pulmonary embolism protocol.  MIP Images are created on CT scanner and reviewed.

 

FINDINGS:

 

LUNGS: The most inferior aspect of the lung bases are not imaged and cannot be evaluated. Minimal bib
asilar subsegmental dependent atelectasis is present. The lungs are grossly clear, there is no concer
nikolai parenchymal mass or nodule identified.   There is no pleural effusion or pneumothorax seen.  The
 tracheobronchial tree is patent.

 

MEDIASTINUM: There is satisfactory enhancement of the pulmonary artery and its branches, there is no 
CT evidence for pulmonary embolism.  There are no greater than 1 cm hilar or mediastinal lymph nodes.
 Mild ascending thoracic aortic aneurysm is seen measuring 4.1 cm. Aortic root is within normal limit
s measuring 3.4 cm. Descending thoracic aorta is slightly tortuous but within normal limits measuring
 up to 2.8 cm.  No cardiomegaly or pericardial effusion is seen. Mild coronary artery calcifications 
are present. Trace pericardial fluid is seen.

 

OTHER: There are multiple fluid attenuated hepatic lesions that likely represent hepatic cysts althou
gh are incompletely visualized. Mild multilevel degenerative changes of thoracic spine are noted.

 

IMPRESSION: 

1. No evidence of pulmonary neoplasm.

2. Mild ascending thoracic aortic aneurysm measuring up to 4.1 cm.

3. Partial visualization of probable multiple hepatic cysts. Hospitalist History and Physical   Admit Date:  3/8/2023  8:20 AM   Name:  Arpita Feldman   Age:  46 y.o. Sex:  female  :  1970   MRN:  [id number]   [place]  [facility] [place]    Presenting Complaint: Postoperative hypoxia  Reason(s) for Admission: Malignant neoplasm of left breast ([facility] [C50.912]  Acute respiratory failure with hypoxia ([facility] [J96.01]     History of Present Illness:   Arpita Feldman is a 46 y.o. female with medical history of breast cancer, obesity who presented with postoperative hypoxia. She had mild complications during the procedure but initially was satting well and postop. She was given pain medication and was noted to desaturate very quickly into the 70s. With arousal able to regain her saturation on 2 to 4 L. Has a history of chronic cough for the prior 6 months. She is admitted for further evaluation and management. Assessment & Plan:   Acute respiratory failure with hypoxia  Postop desaturation into the 70s after administration of pain medication.  -Maintain O2 sat greater than 92  -CT pulmonary embolism    Malignant neoplasm of left breast in female, estrogen receptor positive  Status post lumpectomy  -Dilaudid as needed      PT/OT evals and PPD needed/ordered? Yes  Diet: ADULT DIET; Regular  VTE prophylaxis: SCD's  and ambulatory  Code status: Full Code    Hospital Problems:  Principal Problem:    Malignant neoplasm of left breast in female, estrogen receptor positive ([facility]  Active Problems:    Malignant neoplasm of upper-inner quadrant of left breast in female, estrogen receptor positive ([facility]    Infiltrating ductal carcinoma of breast, left (HCC)    Acute respiratory failure with hypoxia (HCC)  Resolved Problems:    * No resolved hospital problems.  *       Past History:     Past Medical History:   Diagnosis Date    Cyst, kidney, acquired     found on previous scan    GERD (gastroesophageal reflux disease)     Hypertension     Kidney stone        Past Surgical History: Procedure Laterality Date    APPENDECTOMY  1990    CT BIOPSY PERCUTANEOUS SUPERFICIAL BONE  07/01/2022    CT BIOPSY PERCUTANEOUS SUPERFICIAL BONE 7/1/2022 [facility] CT SCAN    US BREAST BIOPSY W LOC DEVICE 1ST LESION LEFT Left 06/14/2022    US BREAST NEEDLE BIOPSY LEFT 6/14/2022 Juice Grayson MD [facility]    US GUIDED NEEDLE LOC OF LEFT BREAST Left 3/8/2023    US GUIDED NEEDLE LOC OF LEFT BREAST 3/8/2023 Corinna hSaikh MD [facility]    US LYMPH NODE BIOPSY  06/14/2022    US LYMPH NODE BIOPSY 6/14/2022 Juice Grayson MD [facility]    WISDOM TOOTH EXTRACTION          Social History     Tobacco Use    Smoking status: Never    Smokeless tobacco: Never   Substance Use Topics    Alcohol use: Yes     Alcohol/week: 2.0 standard drinks      Social History     Substance and Sexual Activity   Drug Use Never       Family History   Problem Relation Age of Onset    Hypertension Mother     Alcohol Abuse Brother     Alcohol Abuse Father     Dementia Father     Hypertension Brother     Diabetes Father         Immunization History   Administered Date(s) Administered    COVID-19, MODERNA BLUE border, Primary or Immunocompromised, (age 12y+), IM, 100 mcg/0.5mL 08/20/2021, 09/18/2021     No Known Allergies  Prior to Admit Medications:  Current Outpatient Medications   Medication Instructions    chlorthalidone (HYGROTON) 25 MG tablet 1 po qam for bloos pressure    docusate sodium (COLACE) 100 mg, Oral, 2 TIMES DAILY    famotidine (PEPCID) 20 mg, Oral, 2 TIMES DAILY    HYDROcodone-acetaminophen (NORCO) 5-325 MG per tablet 1-2 tablets, Oral, EVERY 8 HOURS PRN    Ibrance 125 mg, Oral, DAILY, 28 day cycle.   21 days on/7 days off    letrozole (FEMARA) 2.5 mg, Oral, DAILY    loratadine (CLARITIN) 10 mg, Oral, DAILY    Magic Mouthwash (MIRACLE MOUTHWASH) 5 mLs, Swish & Spit, 4 TIMES DAILY PRN    olmesartan (BENICAR) 40 MG tablet 1 po qam for blood pressure    ondansetron (ZOFRAN) 8 mg, Oral, EVERY 8 HOURS PRN    potassium chloride (KLOR-CON M) 20 MEQ extended release tablet 20 mEq, Oral, 2 TIMES DAILY, Take 1 tablet twice a day         Objective:   Patient Vitals for the past 24 hrs:   Temp Pulse Resp BP SpO2   03/08/23 1740 -- (!) 104 -- -- 98 %   03/08/23 1735 -- (!) 101 16 121/63 97 %   03/08/23 1730 -- 97 16 110/69 94 %   03/08/23 1725 -- 96 16 116/70 95 %   03/08/23 1720 -- 95 18 127/73 94 %   03/08/23 1716 -- -- -- -- (!) 85 %   03/08/23 1715 -- 93 18 125/71 (!) 86 %   03/08/23 1710 -- 87 -- 126/69 (!) 88 %   03/08/23 1705 -- 100 16 125/62 92 %   03/08/23 1700 -- -- 16 -- --   03/08/23 1650 -- -- 20 -- --   03/08/23 1645 -- -- 20 -- --   03/08/23 1640 -- -- 16 -- --   03/08/23 1635 -- -- 16 -- --   03/08/23 1630 -- -- 16 -- --   03/08/23 1625 -- -- 16 -- --   03/08/23 1620 -- -- 15 -- --   03/08/23 1615 -- -- 15 -- --   03/08/23 1610 -- -- 16 -- --   03/08/23 1605 -- 77 18 (!) 115/59 97 %   03/08/23 1600 -- 71 18 (!) 113/58 91 %   03/08/23 1555 -- 88 20 (!) 120/58 99 %   03/08/23 1550 -- 94 18 (!) 129/58 92 %   03/08/23 1545 -- 93 16 128/76 95 %   03/08/23 1540 -- 97 16 121/74 96 %   03/08/23 1535 -- (!) 103 16 120/69 96 %   03/08/23 1530 -- (!) 111 18 126/81 98 %   03/08/23 1525 -- (!) 114 22 (!) 145/83 99 %   03/08/23 1520 -- (!) 106 22 (!) 126/57 100 %   03/08/23 1515 -- 99 22 132/63 100 %   03/08/23 1510 -- (!) 108 20 131/63 100 %   03/08/23 1505 -- 89 20 133/62 100 %   03/08/23 1500 -- 88 20 (!) 147/67 100 %   03/08/23 1455 -- (!) 106 22 (!) 159/78 95 %   03/08/23 1454 -- -- 22 (!) 159/78 --   03/08/23 1452 97.6 °F (36.4 °C) (!) 117 16 -- 100 %   03/08/23 0834 97.8 °F (36.6 °C) -- 18 -- --   03/08/23 0833 -- 71 -- 138/79 100 %       Oxygen Therapy  SpO2: 98 %  Pulse via Oximetry: 103 beats per minute  Pulse Oximeter Device Mode: Continuous  Pulse Oximeter Device Location: Left  O2 Device: Nasal cannula  O2 Flow Rate (L/min): 2 L/min    Estimated body mass index is 33.22 kg/m² as calculated from the following:    Height as of this encounter: 5' 3.5\" (1.613 m). Weight as of this encounter: 190 lb 8 oz (86.4 kg). Intake/Output Summary (Last 24 hours) at 3/8/2023 1755  Last data filed at 3/8/2023 1454  Gross per 24 hour   Intake 1900 ml   Output 15 ml   Net 1885 ml       Blood pressure 121/63, pulse (!) 104, temperature 97.6 °F (36.4 °C), temperature source Temporal, resp. rate 16, height 5' 3.5\" (1.613 m), weight 190 lb 8 oz (86.4 kg), SpO2 98 %, not currently breastfeeding. Physical Exam  Vitals and nursing note reviewed. Constitutional:       General: She is not in acute distress. Appearance: She is obese. She is ill-appearing. She is not diaphoretic. Interventions: Nasal cannula in place. Eyes:      Extraocular Movements: Extraocular movements intact. Cardiovascular:      Rate and Rhythm: Normal rate. Pulmonary:      Effort: Accessory muscle usage present. No respiratory distress. Breath sounds: No wheezing or rhonchi. Abdominal:      General: There is no distension. Musculoskeletal:         General: No deformity. Skin:     Coloration: Skin is not jaundiced or pale. Neurological:      General: No focal deficit present. Mental Status: She is alert and oriented to person, place, and time. Motor: Weakness present.    Psychiatric:         Mood and Affect: Mood normal.         Behavior: Behavior normal.      Comments: Pleasant         I have personally reviewed labs and tests:  Recent Labs:  Recent Results (from the past 24 hour(s))   POC Pregnancy Urine Qual    Collection Time: 03/08/23  1:09 PM   Result Value Ref Range    Preg Test, Ur Negative NEG         I have personally reviewed imaging studies:  NM LYMPHOSCINTIGRAM    Result Date: 3/8/2023  EXAMINATION: NM LYMPHOSCINTIGRAM 3/8/2023 9:47 AM ACCESSION NUMBER: [id number] COMPARISON: None available INDICATION:  Preoperative sentinel node evaluation: RADIOPHARMACEUTICAL: 500 uCi Tc99m Tilmanocept injected subcutaneous injection at the 11:00 and 9:00 position within the left breast. FINDINGS:  Following tracer injection the area was gently massaged for several minutes and then carefully cleansed. Static planar imaging performed at 15 minutes demonstrates radiotracer extension to a left axillary lymph node. Radiotracer extension to a sentinel left axillary lymph node. LAUREN BREAST SPECIMEN    Result Date: 3/8/2023  LEFT BREAST WIRE LOCALIZATION WITH ULTRASOUND GUIDANCE,   LEFT DIAGNOSTIC DIGITAL MAMMOGRAPHY, LEFT BREAST SURGICAL SPECIMEN DIGITAL RADIOGRAPHY TIMES THREE:            CLINICAL HISTORY:  Left breast IDC with lobular features status post neoadjuvant chemotherapy for pre-operative localization. WIRE LOCALIZATION:  Written informed consent was obtained. Using standard aseptic technique and 1% Xylocaine local anesthesia, a 20-gauge Kopans needle was passed through the residual irregular mass with biopsy marker clip placed by ultrasound on June 4, 2022. When the needle was removed, the hook wire tip lay immediately distal to the mass. LEFT MAMMOGRAM:  Craniocaudal and straight lateral views demonstrate the hook wire tip just distal to the mass. A copy of the localization images was delivered with the patient to the operating suite. SPECIMEN RADIOGRAPHS:  Two magnification digital images of the surgical specimen in orthogonal planes, as well as a third image with the specimen in a grid demonstrate the wire tip, mass, and marker clip centrally in the specimen. Associated microcalcifications extending near the anterior margin. These considerations were discussed by telephone contemporaneously with Dr. Mary Jo Rubio in the OR, who reported taking additional margins. A straight pin was placed at specimen grid position E-9 for localization of the mass before the container was delivered to the laboratory for histology.      SUCCESSFUL WIRE LOCALIZATION AND EXCISION OF THE WIRE TIP, MASS, AND MARKER CLIP, WITH MARGIN CONSIDERATIONS DISCUSSED ABOVE.    US PLACE BREAST LOC DEVICE 1ST LESION LEFT    Result Date: 3/8/2023  LEFT BREAST WIRE LOCALIZATION WITH ULTRASOUND GUIDANCE,   LEFT DIAGNOSTIC DIGITAL MAMMOGRAPHY, LEFT BREAST SURGICAL SPECIMEN DIGITAL RADIOGRAPHY TIMES THREE:            CLINICAL HISTORY:  Left breast IDC with lobular features status post neoadjuvant chemotherapy for pre-operative localization. WIRE LOCALIZATION:  Written informed consent was obtained. Using standard aseptic technique and 1% Xylocaine local anesthesia, a 20-gauge Kopans needle was passed through the residual irregular mass with biopsy marker clip placed by ultrasound on June 4, 2022. When the needle was removed, the hook wire tip lay immediately distal to the mass. LEFT MAMMOGRAM:  Craniocaudal and straight lateral views demonstrate the hook wire tip just distal to the mass. A copy of the localization images was delivered with the patient to the operating suite. SPECIMEN RADIOGRAPHS:  Two magnification digital images of the surgical specimen in orthogonal planes, as well as a third image with the specimen in a grid demonstrate the wire tip, mass, and marker clip centrally in the specimen. Associated microcalcifications extending near the anterior margin. These considerations were discussed by telephone contemporaneously with Dr. Tariq Levin in the OR, who reported taking additional margins. A straight pin was placed at specimen grid position E-9 for localization of the mass before the container was delivered to the laboratory for histology. SUCCESSFUL WIRE LOCALIZATION AND EXCISION OF THE WIRE TIP, MASS, AND MARKER CLIP, WITH MARGIN CONSIDERATIONS DISCUSSED ABOVE.     MAMMOGRAM POST BX CLIP PLACEMENT LEFT    Result Date: 3/8/2023  LEFT BREAST WIRE LOCALIZATION WITH ULTRASOUND GUIDANCE,   LEFT DIAGNOSTIC DIGITAL MAMMOGRAPHY, LEFT BREAST SURGICAL SPECIMEN DIGITAL RADIOGRAPHY TIMES THREE:            CLINICAL HISTORY:  Left breast IDC with lobular features status post neoadjuvant chemotherapy for pre-operative localization. WIRE LOCALIZATION:  Written informed consent was obtained. Using standard aseptic technique and 1% Xylocaine local anesthesia, a 20-gauge Kopans needle was passed through the residual irregular mass with biopsy marker clip placed by ultrasound on June 4, 2022. When the needle was removed, the hook wire tip lay immediately distal to the mass. LEFT MAMMOGRAM:  Craniocaudal and straight lateral views demonstrate the hook wire tip just distal to the mass. A copy of the localization images was delivered with the patient to the operating suite. SPECIMEN RADIOGRAPHS:  Two magnification digital images of the surgical specimen in orthogonal planes, as well as a third image with the specimen in a grid demonstrate the wire tip, mass, and marker clip centrally in the specimen. Associated microcalcifications extending near the anterior margin. These considerations were discussed by telephone contemporaneously with Dr. Mary Jo Rubio in the OR, who reported taking additional margins. A straight pin was placed at specimen grid position E-9 for localization of the mass before the container was delivered to the laboratory for histology. SUCCESSFUL WIRE LOCALIZATION AND EXCISION OF THE WIRE TIP, MASS, AND MARKER CLIP, WITH MARGIN CONSIDERATIONS DISCUSSED ABOVE. Echocardiogram:  No results found for this or any previous visit.         Orders Placed This Encounter   Medications    HYDROmorphone (DILAUDID) injection 0.25 mg    HYDROmorphone (DILAUDID) injection 0.5 mg    oxyCODONE (ROXICODONE) immediate release tablet 5 mg    ondansetron (ZOFRAN) injection 4 mg    prochlorperazine (COMPAZINE) injection 5 mg    OR Linked Order Group     labetalol (NORMODYNE;TRANDATE) injection 10 mg     hydrALAZINE (APRESOLINE) injection 10 mg    lidocaine 1 % injection 1 mL    acetaminophen (TYLENOL) tablet 1,000 mg    fentaNYL (SUBLIMAZE) injection 100 mcg lactated ringers IV soln infusion    sodium chloride flush 0.9 % injection 5-40 mL    sodium chloride flush 0.9 % injection 5-40 mL    0.9 % sodium chloride infusion    midazolam (VERSED) injection 2 mg    sodium chloride flush 0.9 % injection 5-40 mL    sodium chloride flush 0.9 % injection 5-40 mL    0.9 % sodium chloride infusion    ceFAZolin (ANCEF) 2000 mg in sterile water 20 mL IV syringe     Order Specific Question:   Antimicrobial Indications     Answer:   Surgical Prophylaxis    HYDROcodone-acetaminophen (NORCO) 5-325 MG per tablet     Sig: Take 1-2 tablets by mouth every 8 hours as needed for Pain for up to 7 days.  Max Daily Amount: 6 tablets     Dispense:  28 tablet     Refill:  0     Reduce doses taken as pain becomes manageable    bupivacaine (PF) (MARCAINE) 0.25 % injection    lidocaine 1 % injection    ipratropium-albuterol (DUONEB) nebulizer solution 1 ampule     Order Specific Question:   Initiate RT Bronchodilator Protocol     Answer:   No    albuterol (PROVENTIL) (2.5 MG/3ML) 0.083% nebulizer solution     Chani Pratt: tree override    promethazine (PHENERGAN) tablet 12.5 mg    sodium chloride flush 0.9 % injection 5-40 mL    sodium chloride flush 0.9 % injection 5-40 mL    0.9 % sodium chloride infusion    enoxaparin (LOVENOX) injection 40 mg     Order Specific Question:   Indication of Use     Answer:   Prophylaxis-DVT/PE    polyethylene glycol (GLYCOLAX) packet 17 g    albuterol (PROVENTIL) nebulizer solution 2.5 mg     Order Specific Question:   Initiate RT Bronchodilator Protocol     Answer:   Yes - Inpatient Protocol    OR Linked Order Group     potassium chloride 20 mEq/50 mL IVPB (Central Line)     potassium chloride 10 mEq/100 mL IVPB (Peripheral Line)    OR Linked Order Group     sodium phosphate 10 mmol in sodium chloride 0.9 % 250 mL IVPB     sodium phosphate 15 mmol in sodium chloride 0.9 % 250 mL IVPB     sodium phosphate 20 mmol in sodium chloride 0.9 % 500 mL IVPB magnesium sulfate 2000 mg in 50 mL IVPB premix         Signed:  Adele Ware MD

## 2023-03-08 NOTE — PERIOP NOTE
MD notified that patient now has nausea. New orders received and pharmacy called to request release of the phenergan.

## 2023-03-08 NOTE — PERIOP NOTE
Patient continues to need oxygen supplementation at 2 lpm; she dips to 88% when she dozes off to sleep.

## 2023-03-08 NOTE — PERIOP NOTE
Dr. Alvarez Bailee to bedside; patient becomes tearful and anxious; states she feels as she has a block on her chest, states it doesn't feel like \"chest pain\" but it does feel like has \"something\" in her airway that she needs to cough up.

## 2023-03-08 NOTE — PERIOP NOTE
Patient continues to desat with the 2 lpm when she dozes off to sleep; MD notified and to bedside at this time.

## 2023-03-08 NOTE — DISCHARGE INSTRUCTIONS
Discharge instructions:    Dressings/Wound Care  Leave dressings alone until follow-up  Try to keep incisions as dry as possible to lower risk of infection. Activity  No heavy lifting (>5lbs) for 6 weeks to reduce risk of swelling. No driving until you are off pain meds for 24hrs and have no pain with movements associated with driving. Pain prescription (Norco) electronically sent to your pharmacy    Follow-up with Dr Arpit Araiza in 12 days on a Monday in the office at:  [address], [address]  ([phone])    Diet  Soups, Juice, and Liquids for 1 day   Then Soft diet until follow-up       Call your doctor if   Excessive bleeding that does not stop after holding mild pressure over the area   Temperature of 101 degrees F or above   Redness,excessive swelling or bruising, and/or green or yellow, smelly discharge from incision    After general anesthesia or intravenous sedation, for 24 hours or while taking prescription Narcotics:  Limit your activities  A responsible adult needs to be with you for the next 24 hours  Do not drive and operate hazardous machinery  Do not make important personal or business decisions  Do not drink alcoholic beverages  If you have not urinated within 8 hours after discharge, and you are experiencing discomfort from urinary retention, please go to the nearest ED. If you have sleep apnea and have a CPAP machine, please use it for all naps and sleeping. Please use caution when taking narcotics and any of your home medications that may cause drowsiness. *  Please give a list of your current medications to your Primary Care Provider. *  Please update this list whenever your medications are discontinued, doses are      changed, or new medications (including over-the-counter products) are added. *  Please carry medication information at all times in case of emergency situations.     These are general instructions for a healthy lifestyle:  No smoking/ No tobacco products/ Avoid exposure to second hand smoke  Surgeon General's Warning:  Quitting smoking now greatly reduces serious risk to your health. Obesity, smoking, and sedentary lifestyle greatly increases your risk for illness  A healthy diet, regular physical exercise & weight monitoring are important for maintaining a healthy lifestyle    You may be retaining fluid if you have a history of heart failure or if you experience any of the following symptoms:  Weight gain of 3 pounds or more overnight or 5 pounds in a week, increased swelling in our hands or feet or shortness of breath while lying flat in bed. Please call your doctor as soon as you notice any of these symptoms; do not wait until your next office visit. Patient last received narcotics in the recovery room at 4 pm; she had oxycodone 5 mg PO. Recommendations of anesthesiologist are for patient to see PCP and request pulmonology consult for a PFT.

## 2023-03-08 NOTE — PERIOP NOTE
Patient continues to present with nausea, she dry heaves three times but there is not emesis production.

## 2023-03-08 NOTE — PERIOP NOTE
Patient continues to desat frequently when she dozes off to sleep or is not conversing with staff or . MD notified; each time RN sits patient's hob up and encourages deep breathing and use of IS. Patient reaches 1500 mg on IS.

## 2023-03-08 NOTE — PERIOP NOTE
Patient's cough has decreased significantly; she states that her chest discomfort is much improved and that the \"lump\" she felt earlier is going away. Patient is now calm and is on room air.

## 2023-03-08 NOTE — PERIOP NOTE
MD notified of continued cough and patient states she feels like she has \"something in her chest and she feels gurgling\". RN notes rhonci throughout lung fields, greater posteriorly. New orders received.

## 2023-03-08 NOTE — OP NOTE
[address] [place] [address], [address] [place] [address]  [phone]    OPERATVE REPORT    Name: Chuck Jarquin     Date of Surgery: 3/8/2023  Med Record Number: [id number]   Age: 46 y.o. Sex: female   Pre-operative Diagnosis:   Left Breast Carcinoma, [id number]--->[id number]  S/p neoadjuvant Rx    Post-operative Diagnosis: same    Procedure:   1. Partial mastectomy (Left Breast NL lumpectomy for breast carcinoma) (CPT 05096). 2.  Axillary sentinel node excision following intraoperative mapping and identification (CPT 58897-79, 40710+). Surgeon: Pelon Shore MD  Asistants: none  Anesthesia:  General  Complications: none  Anesthesia: General  Estimated Blood Loss: <30cc        Specimens:   ID Type Source Tests Collected by Time Destination   A : Left Lumpectomy Following Neoadjuvant Chemotherapy for Breast Carcinoma Tissue Breast SURGICAL PATHOLOGY Susan Wheeler MD 3/8/2023 1402    B : Final Superior Margin Left Breast Tissue Breast SURGICAL PATHOLOGY Susan Wheeler MD 3/8/2023 1407    C : Final Inferior Margin Left Breast Tissue Breast SURGICAL PATHOLOGY Susan Wheeler MD 3/8/2023 1408    D : Final Medial Margin Left Breast Tissue Breast SURGICAL PATHOLOGY Susan Wheeler MD 3/8/2023 1408    E : Final Lateral Margin Left Breast Tissue Breast SURGICAL PATHOLOGY Susan Wheeler MD 3/8/2023 1408    F : Final Anterior Margin Left Breast Tissue Breast SURGICAL PATHOLOGY Susan Wheeler MD 3/8/2023 1409    G : Final Deep Margin Left Breast Tissue Breast SURGICAL PATHOLOGY Susan Wheeler MD 3/8/2023 1409    H : Left Axillary [place] Node Cluster Tissue Lymph Node SURGICAL PATHOLOGY Susan Wheeler MD 3/8/2023 1425      Findings: see op note   Complications: none  Implants: none      Procedure Description:     The risks, benefits, potential complications, treatment options, and expected outcomes were discussed with the patient pre-operatively.   The patient voiced understanding and gave informed consent preoperatively. The patient was taken to the Operating Room, and the [employer] time-out protocol checklist was followed. After the induction of adequate anesthesia, the breast and axillae were prepped and draped in the usual sterile fashion. Preoperative antibiotics were given. A breast incision was made around the exit site of the guidewire. We performed a partial mastectomy dissecting the tissue around the distal aspect of the guidewire. We excised a generous lumpectomy from the upper left breast.  We marked the specimen with a short suture at the superior margins and a long suture at the lateral margin  It was imaged in 2 views, contained the guidewire, the biopsy clip, the radiographic target, reviewed by radiology, and sent to pathology for review. Irrigation was used. Hemostasis was confirmed. The partial mastectomy site was inspected. There was no evidence of gross or palpable disease remaining. The shaved final margins were obtained from the medial, lateral, superior, inferior, anterior, and deep margins. Each final shaved margins were sent separately marked in formalin to Pathology for review. The lumpectomy site was irrigated once again. Hemostasis was confirmed once again. Local anesthetic was given. The incision was closed with interrupted deep dermal 3-0 Vicryl sutures. The skin was closed with clips. Sterile dressing was placed at the end of the case. Attention was then turned towards the axilla. The patient was injected with technetium labelled sulfur colloid for lymphoscintigraphy in nuclear medicine the day of surgery. We used the Neoprobe map and identify the axillary sent node(s) and also initially to pan our incision. We made an oblique incision in the axilla and then dissected into the deep left axillary space using the sentinel Neoprobe to guide our dissection and intraoperatively map and identify the axillary sentinel node.   An army-navy retractor and a dullSteffentlander retractors were used for exposure. Once we identified the anterior aspect of the deep axillary sentinel node, we used the clip applier to clip lymphatic and venous tributaries around the node. The node was removed intact. It's high count was confirmed on the back table of 350. It was sent to Pathology for review marked left axillary sentinel node #1. The background in the axilla following sent node removal was <5. Irrigation was used. Hemostasis was confirmed. There was no evidence of gross or palpable disease remaining. Interrupted 3-0 Vicryl sutures were placed in the deep dermis. The skin was closed with clips. Sterile dressing was placed consisting of 4 x 4s and Tegaderms. The patient was taken to the recovery room in good condition. She tolerated the procedure well.                    Collette Luong MD, FACS

## 2023-03-09 ENCOUNTER — TELEPHONE (OUTPATIENT)
Dept: FAMILY MEDICINE CLINIC | Facility: CLINIC | Age: 53
End: 2023-03-09

## 2023-03-09 VITALS
HEIGHT: 64 IN | HEART RATE: 81 BPM | SYSTOLIC BLOOD PRESSURE: 115 MMHG | OXYGEN SATURATION: 100 % | RESPIRATION RATE: 12 BRPM | TEMPERATURE: 98 F | WEIGHT: 193.8 LBS | BODY MASS INDEX: 33.09 KG/M2 | DIASTOLIC BLOOD PRESSURE: 69 MMHG

## 2023-03-09 DIAGNOSIS — C50.912 MALIGNANT NEOPLASM OF LEFT FEMALE BREAST, UNSPECIFIED ESTROGEN RECEPTOR STATUS, UNSPECIFIED SITE OF BREAST (HCC): ICD-10-CM

## 2023-03-09 DIAGNOSIS — C79.51 METASTASIS TO BONE (HCC): ICD-10-CM

## 2023-03-09 DIAGNOSIS — Z17.0 MALIGNANT NEOPLASM OF LEFT BREAST IN FEMALE, ESTROGEN RECEPTOR POSITIVE, UNSPECIFIED SITE OF BREAST (HCC): ICD-10-CM

## 2023-03-09 DIAGNOSIS — C77.3 BREAST CANCER METASTASIZED TO AXILLARY LYMPH NODE, LEFT (HCC): ICD-10-CM

## 2023-03-09 DIAGNOSIS — C50.912 BREAST CANCER METASTASIZED TO AXILLARY LYMPH NODE, LEFT (HCC): ICD-10-CM

## 2023-03-09 DIAGNOSIS — C50.912 MALIGNANT NEOPLASM OF LEFT BREAST IN FEMALE, ESTROGEN RECEPTOR POSITIVE, UNSPECIFIED SITE OF BREAST (HCC): ICD-10-CM

## 2023-03-09 PROBLEM — J96.01 ACUTE RESPIRATORY FAILURE WITH HYPOXIA (HCC): Status: RESOLVED | Noted: 2023-03-08 | Resolved: 2023-03-09

## 2023-03-09 PROBLEM — K21.9 GASTROESOPHAGEAL REFLUX DISEASE: Chronic | Status: ACTIVE | Noted: 2023-03-09

## 2023-03-09 LAB
ALBUMIN SERPL-MCNC: 3.3 G/DL (ref 3.5–5)
ANION GAP SERPL CALC-SCNC: 7 MMOL/L (ref 2–11)
BUN SERPL-MCNC: 13 MG/DL (ref 6–23)
CALCIUM SERPL-MCNC: 8.1 MG/DL (ref 8.3–10.4)
CHLORIDE SERPL-SCNC: 105 MMOL/L (ref 101–110)
CHOLEST SERPL-MCNC: 193 MG/DL
CO2 SERPL-SCNC: 26 MMOL/L (ref 21–32)
CREAT SERPL-MCNC: 1.01 MG/DL (ref 0.6–1)
ERYTHROCYTE [DISTWIDTH] IN BLOOD BY AUTOMATED COUNT: 13 % (ref 11.9–14.6)
EST. AVERAGE GLUCOSE BLD GHB EST-MCNC: 108 MG/DL
GLUCOSE SERPL-MCNC: 174 MG/DL (ref 65–100)
HBA1C MFR BLD: 5.4 % (ref 4.8–5.6)
HCT VFR BLD AUTO: 31.4 % (ref 35.8–46.3)
HDLC SERPL-MCNC: 45 MG/DL (ref 40–60)
HDLC SERPL: 4.3
HGB BLD-MCNC: 11 G/DL (ref 11.7–15.4)
LDLC SERPL CALC-MCNC: 125.2 MG/DL
MAGNESIUM SERPL-MCNC: 1.7 MG/DL (ref 1.8–2.4)
MCH RBC QN AUTO: 36.4 PG (ref 26.1–32.9)
MCHC RBC AUTO-ENTMCNC: 35 G/DL (ref 31.4–35)
MCV RBC AUTO: 104 FL (ref 82–102)
NRBC # BLD: 0 K/UL (ref 0–0.2)
PHOSPHATE SERPL-MCNC: 2.7 MG/DL (ref 2.5–4.5)
PLATELET # BLD AUTO: 167 K/UL (ref 150–450)
PMV BLD AUTO: 9.7 FL (ref 9.4–12.3)
POTASSIUM SERPL-SCNC: 3.5 MMOL/L (ref 3.5–5.1)
PROCALCITONIN SERPL-MCNC: 0.36 NG/ML (ref 0–0.49)
RBC # BLD AUTO: 3.02 M/UL (ref 4.05–5.2)
SODIUM SERPL-SCNC: 138 MMOL/L (ref 133–143)
TRIGL SERPL-MCNC: 114 MG/DL (ref 35–150)
TSH W FREE THYROID IF ABNORMAL: 1.23 UIU/ML (ref 0.36–3.74)
VLDLC SERPL CALC-MCNC: 22.8 MG/DL (ref 6–23)
WBC # BLD AUTO: 7.2 K/UL (ref 4.3–11.1)

## 2023-03-09 PROCEDURE — 84145 PROCALCITONIN (PCT): CPT

## 2023-03-09 PROCEDURE — 2580000003 HC RX 258: Performed by: FAMILY MEDICINE

## 2023-03-09 PROCEDURE — 85027 COMPLETE CBC AUTOMATED: CPT

## 2023-03-09 PROCEDURE — 84443 ASSAY THYROID STIM HORMONE: CPT

## 2023-03-09 PROCEDURE — 84100 ASSAY OF PHOSPHORUS: CPT

## 2023-03-09 PROCEDURE — 83735 ASSAY OF MAGNESIUM: CPT

## 2023-03-09 PROCEDURE — 83036 HEMOGLOBIN GLYCOSYLATED A1C: CPT

## 2023-03-09 PROCEDURE — 6370000000 HC RX 637 (ALT 250 FOR IP): Performed by: SURGERY

## 2023-03-09 PROCEDURE — 99024 POSTOP FOLLOW-UP VISIT: CPT | Performed by: SURGERY

## 2023-03-09 PROCEDURE — 36415 COLL VENOUS BLD VENIPUNCTURE: CPT

## 2023-03-09 PROCEDURE — 82040 ASSAY OF SERUM ALBUMIN: CPT

## 2023-03-09 PROCEDURE — 80061 LIPID PANEL: CPT

## 2023-03-09 PROCEDURE — G0378 HOSPITAL OBSERVATION PER HR: HCPCS

## 2023-03-09 PROCEDURE — 80048 BASIC METABOLIC PNL TOTAL CA: CPT

## 2023-03-09 RX ORDER — FAMOTIDINE 20 MG/1
20 TABLET, FILM COATED ORAL 2 TIMES DAILY
Status: DISCONTINUED | OUTPATIENT
Start: 2023-03-09 | End: 2023-03-09 | Stop reason: HOSPADM

## 2023-03-09 RX ADMIN — SODIUM CHLORIDE, PRESERVATIVE FREE 10 ML: 5 INJECTION INTRAVENOUS at 08:14

## 2023-03-09 RX ADMIN — FAMOTIDINE 20 MG: 20 TABLET, FILM COATED ORAL at 08:14

## 2023-03-09 ASSESSMENT — PAIN SCALES - GENERAL
PAINLEVEL_OUTOF10: 0

## 2023-03-09 NOTE — PROGRESS NOTES
AVS provided to patient, education, discharge meds, follow up appointments and online chart discussed. PIV d/cd, transported to [facility] via [address] by staff and accompanied by Spouse.

## 2023-03-09 NOTE — DISCHARGE SUMMARY
Hospitalist Discharge Summary   Admit Date:  3/8/2023  8:20 AM   DC Note date: 3/9/2023  Name:  oLrne Reyna   Age:  46 y.o. Sex:  female  :  1970   MRN:  [id number]   Room:  [facility]  PCP:  Armando Moncada MD    Presenting Complaint: No chief complaint on file. Initial Admission Diagnosis: Malignant neoplasm of left breast (HCC) [C50.912]  Acute respiratory failure with hypoxia (HCC) [J96.01]     Problem List for this Hospitalization (present on admission):    Principal Problem (Resolved):    Acute respiratory failure with hypoxia ([address]  Active Problems:    Malignant neoplasm of left breast in female, estrogen receptor positive ([address]    Malignant neoplasm of upper-inner quadrant of left breast in female, estrogen receptor positive ([address]    Infiltrating ductal carcinoma of breast, left ([address]    Gastroesophageal reflux disease      Hospital Course:  52F PMHx breast cancer, obesity who presented with postoperative hypoxia. She had mild complications during the procedure but initially was satting well and postop. She was given pain medication and was noted to desaturate very quickly into the 70s. With arousal able to regain her saturation on 2 to 4 L. Has a history of chronic cough for the prior 6 months. She was admitted for further evaluation and management. A CT PE was performed with findings detailed below. She was weaned to room air. She was determined to be appropriate for discharge 3/9. Disposition: home with outpatient follow up  Diet: ADULT DIET; Regular  Code Status: Prior    Follow Ups:   Any Duran MD. Go on 3/20/2023. Specialty: General Surgery  Why: For wound re-check:  2:15 PM on Monday, 2023  Contact information:  [address] [place] Isaias Moncada MD. Schedule an appointment as soon as possible for a visit   in 1 week(s).     Specialty: Family Medicine  Why: Transition of Care Management  Contact information:  Esteban Tanner  [phone]                       Time spent in patient discharge and coordination 33 minutes. Follow up labs/diagnostics (ultimately defer to outpatient provider):  Chronic cough  Acid suppression, GERD  Consider GI referral for globus sensation  Renal hypodensities noted on CT    Plan was discussed with patient, nurse, . All questions answered. Patient was stable at time of discharge. Instructions given to call a physician or return if any concerns. Current Discharge Medication List        START taking these medications    Details   HYDROcodone-acetaminophen (NORCO) 5-325 MG per tablet Take 1-2 tablets by mouth every 8 hours as needed for Pain for up to 7 days. Max Daily Amount: 6 tablets  Qty: 28 tablet, Refills: 0    Comments: Reduce doses taken as pain becomes manageable  Associated Diagnoses: Malignant neoplasm of left breast in female, estrogen receptor positive, unspecified site of breast ([facility] Infiltrating ductal carcinoma of breast, left (HCC)           CONTINUE these medications which have NOT CHANGED    Details   chlorthalidone (HYGROTON) 25 MG tablet 1 po qam for bloos pressure  Qty: 90 tablet, Refills: 1    Associated Diagnoses: Primary hypertension      olmesartan (BENICAR) 40 MG tablet 1 po qam for blood pressure  Qty: 90 tablet, Refills: 1    Associated Diagnoses: Primary hypertension      famotidine (PEPCID) 20 MG tablet Take 1 tablet by mouth 2 times daily  Qty: 180 tablet, Refills: 3    Associated Diagnoses: Gastroesophageal reflux disease without esophagitis      loratadine (CLARITIN) 10 MG tablet Take 1 tablet by mouth daily  Qty: 90 tablet, Refills: 3    Associated Diagnoses: Non-seasonal allergic rhinitis due to pollen      IBRANCE 125 MG tablet Take 125 mg by mouth daily 28 day cycle.   21 days on/7 days off  Qty: 21 tablet, Refills: 13    Associated Diagnoses: Malignant neoplasm of left breast in female, estrogen receptor positive, unspecified site of breast ([facility] Metastasis to bone [facility]); Breast cancer metastasized to axillary lymph node, left ([facility] Malignant neoplasm of left female breast, unspecified estrogen receptor status, unspecified site of breast (HCC)      docusate sodium (COLACE) 100 MG capsule Take 100 mg by mouth 2 times daily      letrozole (FEMARA) 2.5 MG tablet Take 1 tablet by mouth daily  Qty: 30 tablet, Refills: 11    Associated Diagnoses: Malignant neoplasm of left breast in female, estrogen receptor positive, unspecified site of breast ([facility] Metastasis to bone [facility]); Breast cancer metastasized to axillary lymph node, left ([facility] Malignant neoplasm of left female breast, unspecified estrogen receptor status, unspecified site of breast ([facility]      potassium chloride (KLOR-CON M) 20 MEQ extended release tablet Take 1 tablet by mouth 2 times daily Take 1 tablet twice a day  Qty: 60 tablet, Refills: 11    Associated Diagnoses: Malignant neoplasm of left breast in female, estrogen receptor positive, unspecified site of breast ([facility] Metastasis to bone [facility]); Breast cancer metastasized to axillary lymph node, left ([facility] Malignant neoplasm of left female breast, unspecified estrogen receptor status, unspecified site of breast (HCC)      Magic Mouthwash (MIRACLE MOUTHWASH) Swish and spit 5 mLs 4 times daily as needed for Irritation  Qty: 480 mL, Refills: 1    Associated Diagnoses: Mouth sores; Malignant neoplasm of left breast in female, estrogen receptor positive, unspecified site of breast (HCC)      ondansetron (ZOFRAN) 8 MG tablet Take 1 tablet by mouth every 8 hours as needed for Nausea or Vomiting  Qty: 90 tablet, Refills: 2             Some medications may have been reported old/obsolete and marked \"stop taking\" by the system; in reality pt was already off these meds; defer to outpatient or prescribing providers.     Procedures done this admission:  Procedure(s):  LEFT LUMPECTOMY WITH NEEDLE LOC & LEFT AXILLARY SENTINEL NODE EXCISION  LEFT BREAST BIOPSY SENTINEL NODE DISSECTION/ PREOP 0730/ LYMPHO 0900/ LOC 1100    Consults this admission:  None    Echocardiogram results:  No results found for this or any previous visit. Diagnostic Imaging/Tests:   NM LYMPHOSCINTIGRAM    Result Date: 3/8/2023  Radiotracer extension to a sentinel left axillary lymph node. LAUREN BREAST SPECIMEN    Result Date: 3/8/2023  SUCCESSFUL WIRE LOCALIZATION AND EXCISION OF THE WIRE TIP, MASS, AND MARKER CLIP, WITH MARGIN CONSIDERATIONS DISCUSSED ABOVE. XR CHEST 1 VIEW    Result Date: 3/8/2023  Impression: 1. Normal exam. Slot # [id number] Thank you for the referral of this patient. This exam was interpreted by an American Board of Radiology certified Diversified radiologist with subspecialty fellowship in [place] [address]. If there are any questions regarding this exam please feel free to contact a body radiologist directly at [phone]. Or you  can reach me personally at [email or web address].   Mj Bradley M.D., [facility] 3/8/2023 6:54:00 PM    CT CHEST PULMONARY EMBOLISM W CONTRAST    Result Date: 3/8/2023  1. No pulmonary embolus. 2.  Hazy peribronchial vascular centrilobular groundglass opacities probably mild edema. Atypical pneumonia less likely but not excluded. 3.  Two sclerotic bony lesions. If patient has known breast cancer this could be  concerning for bony metastases. Consider further assessment with PET/CT or bone  scan. 4.  Indeterminate renal hypodensities left kidney. Likely protein-containing cyst. Can't exclude solid lesions. 5.  Severe hepatic steatosis. 6.  Soft tissue defect left breast containing air and fluid consistent with postoperative change. Thank you for the referral of this patient. This exam was interpreted by an American Board of Radiology certified radiologist with subspecialty fellowship in [place] [address].  If there are questions about this or other reports you can contact a radiologist directly at [phone]   You can also reach me directly at [email or web address]  Esteban Banegas M.D., [facility] 3/8/2023 6:59:00 PM    US PLACE BREAST LOC DEVICE 1ST LESION LEFT    Result Date: 3/8/2023  SUCCESSFUL WIRE LOCALIZATION AND EXCISION OF THE WIRE TIP, MASS, AND MARKER CLIP, WITH MARGIN CONSIDERATIONS DISCUSSED ABOVE. MAMMOGRAM POST BX CLIP PLACEMENT LEFT    Result Date: 3/8/2023  SUCCESSFUL WIRE LOCALIZATION AND EXCISION OF THE WIRE TIP, MASS, AND MARKER CLIP, WITH MARGIN CONSIDERATIONS DISCUSSED ABOVE.        Labs: Results:       BMP, Mg, Phos Recent Labs     03/09/23 0333      K 3.5      CO2 26   ANIONGAP 7   BUN 13   CREATININE 1.01*   LABGLOM >60   CALCIUM 8.1*   GLUCOSE 174*   MG 1.7*   PHOS 2.7      CBC Recent Labs     03/08/23  1851 03/09/23 0333   WBC 6.5 7.2   RBC 3.25* 3.02*   HGB 12.0 11.0*   HCT 34.1* 31.4*   .9* 104.0*   MCH 36.9* 36.4*   MCHC 35.2* 35.0   RDW 13.1 13.0    167   MPV 9.6 9.7   NRBC 0.00 0.00   SEGS 79*  --    LYMPHOPCT 15  --    EOSRELPCT 0*  --    MONOPCT 6  --    BASOPCT 1  --    IMMGRAN 1  --    SEGSABS 5.1  --    LYMPHSABS 1.0  --    EOSABS 0.0  --    MONOSABS 0.4  --    BASOSABS 0.0  --    ABSIMMGRAN 0.0  --       LFT Recent Labs     03/09/23 0333   LABALBU 3.3*      Cardiac  No results found for: NTPROBNP, TROPHS   Coags Lab Results   Component Value Date/Time    PROTIME 12.6 06/27/2022 02:28 PM    INR 0.9 06/27/2022 02:28 PM      A1c Lab Results   Component Value Date/Time    LABA1C 5.4 03/09/2023 03:33 AM    LABA1C 5.7 06/24/2021 11:08 AM     03/09/2023 03:33 AM     06/24/2021 11:08 AM      Lipids Lab Results   Component Value Date/Time    CHOL 193 03/09/2023 03:33 AM    LDLCALC 125.2 03/09/2023 03:33 AM    LABVLDL 22.8 03/09/2023 03:33 AM    HDL 45 03/09/2023 03:33 AM    CHOLHDLRATIO 4.3 03/09/2023 03:33 AM    TRIG 114 03/09/2023 03:33 AM      Thyroid  Lab Results   Component Value Date/Time    TSHELE 1.23 03/09/2023 03:33 AM        Most Recent UA No results found for: Coralee Anchors, SPECGRAV, LABPH, PROTEINU, GLUCOSEU, KETUA, BILIRUBINUR, BLOODU, UROBILINOGEN, NITRU, LEUKOCYTESUR, WBCUA, RBCUA, EPITHUA, BACTERIA, LABCAST, MUCUS     No results for input(s): CULTURE in the last 720 hours.     All Labs from Last 24 Hrs:  Recent Results (from the past 24 hour(s))   POC Pregnancy Urine Qual    Collection Time: 03/08/23  1:09 PM   Result Value Ref Range    Preg Test, Ur Negative NEG     CBC with Auto Differential    Collection Time: 03/08/23  6:51 PM   Result Value Ref Range    WBC 6.5 4.3 - 11.1 K/uL    RBC 3.25 (L) 4.05 - 5.2 M/uL    Hemoglobin 12.0 11.7 - 15.4 g/dL    Hematocrit 34.1 (L) 35.8 - 46.3 %    .9 (H) 82.0 - 102.0 FL    MCH 36.9 (H) 26.1 - 32.9 PG    MCHC 35.2 (H) 31.4 - 35.0 g/dL    RDW 13.1 11.9 - 14.6 %    Platelets 867 147 - 205 K/uL    MPV 9.6 9.4 - 12.3 FL    nRBC 0.00 0.0 - 0.2 K/uL    Differential Type AUTOMATED      Seg Neutrophils 79 (H) 43 - 78 %    Lymphocytes 15 13 - 44 %    Monocytes 6 4.0 - 12.0 %    Eosinophils % 0 (L) 0.5 - 7.8 %    Basophils 1 0.0 - 2.0 %    Immature Granulocytes 1 0.0 - 5.0 %    Segs Absolute 5.1 1.7 - 8.2 K/UL    Absolute Lymph # 1.0 0.5 - 4.6 K/UL    Absolute Mono # 0.4 0.1 - 1.3 K/UL    Absolute Eos # 0.0 0.0 - 0.8 K/UL    Basophils Absolute 0.0 0.0 - 0.2 K/UL    Absolute Immature Granulocyte 0.0 0.0 - 0.5 K/UL   Phosphorus    Collection Time: 03/09/23  3:33 AM   Result Value Ref Range    Phosphorus 2.7 2.5 - 4.5 MG/DL   Albumin    Collection Time: 03/09/23  3:33 AM   Result Value Ref Range    Albumin 3.3 (L) 3.5 - 5.0 g/dL   Basic Metabolic Panel w/ Reflex to MG    Collection Time: 03/09/23  3:33 AM   Result Value Ref Range    Sodium 138 133 - 143 mmol/L    Potassium 3.5 3.5 - 5.1 mmol/L    Chloride 105 101 - 110 mmol/L    CO2 26 21 - 32 mmol/L    Anion Gap 7 2 - 11 mmol/L    Glucose 174 (H) 65 - 100 mg/dL    BUN 13 6 - 23 MG/DL    Creatinine 1.01 (H) 0.6 - 1.0 MG/DL Est, Glom Filt Rate >60 >60 ml/min/1.73m2    Calcium 8.1 (L) 8.3 - 10.4 MG/DL   CBC    Collection Time: 03/09/23  3:33 AM   Result Value Ref Range    WBC 7.2 4.3 - 11.1 K/uL    RBC 3.02 (L) 4.05 - 5.2 M/uL    Hemoglobin 11.0 (L) 11.7 - 15.4 g/dL    Hematocrit 31.4 (L) 35.8 - 46.3 %    .0 (H) 82.0 - 102.0 FL    MCH 36.4 (H) 26.1 - 32.9 PG    MCHC 35.0 31.4 - 35.0 g/dL    RDW 13.0 11.9 - 14.6 %    Platelets 935 379 - 132 K/uL    MPV 9.7 9.4 - 12.3 FL    nRBC 0.00 0.0 - 0.2 K/uL   Procalcitonin    Collection Time: 03/09/23  3:33 AM   Result Value Ref Range    Procalcitonin 0.36 0.00 - 0.49 ng/mL   Lipid Panel    Collection Time: 03/09/23  3:33 AM   Result Value Ref Range    Cholesterol, Total 193 MG/DL    Triglycerides 114 35 - 150 MG/DL    HDL 45 40 - 60 MG/DL    LDL Calculated 125.2 (H) <100 MG/DL    VLDL Cholesterol Calculated 22.8 6.0 - 23.0 MG/DL    Chol/HDL Ratio 4.3 <200     TSH with Reflex    Collection Time: 03/09/23  3:33 AM   Result Value Ref Range    TSH w Free Thyroid if Abnormal 1.23 0.358 - 3.740 UIU/ML   Hemoglobin A1C    Collection Time: 03/09/23  3:33 AM   Result Value Ref Range    Hemoglobin A1C 5.4 4.8 - 5.6 %    eAG 108 mg/dL   Magnesium    Collection Time: 03/09/23  3:33 AM   Result Value Ref Range    Magnesium 1.7 (L) 1.8 - 2.4 mg/dL       No Known Allergies  Immunization History   Administered Date(s) Administered    COVID-19, MODERNA BLUE border, Primary or Immunocompromised, (age 12y+), IM, 100 mcg/0.5mL 08/20/2021, 09/18/2021       Recent Vital Data:  Patient Vitals for the past 24 hrs:   Temp Pulse Resp BP SpO2   03/09/23 0701 98 °F (36.7 °C) -- -- 111/61 --   03/09/23 0600 -- 89 21 115/78 94 %   03/09/23 0515 -- 68 11 -- 92 %   03/09/23 0500 -- 67 11 100/61 92 %   03/09/23 0400 -- 88 16 113/66 97 %   03/09/23 0330 -- 93 19 -- 99 %   03/09/23 0300 98.1 °F (36.7 °C) 67 11 92/65 95 %   03/09/23 0200 -- 74 12 95/72 96 %   03/09/23 0100 -- 78 11 98/69 91 %   03/09/23 0000 97.9 °F (36.6 °C) 97 14 106/71 93 %   03/08/23 2310 -- (!) 101 20 (!) 128/97 98 %   03/08/23 2255 -- (!) 106 20 -- 99 %   03/08/23 2200 -- 80 10 117/67 94 %   03/08/23 2100 -- 95 11 126/65 93 %   03/08/23 2000 -- (!) 108 19 121/88 100 %   03/08/23 1901 98.2 °F (36.8 °C) (!) 110 20 (!) 131/91 98 %   03/08/23 1837 -- (!) 115 18 -- 92 %   03/08/23 1800 -- 100 -- -- --   03/08/23 1755 -- (!) 111 -- 123/76 96 %   03/08/23 1750 -- (!) 104 15 120/67 96 %   03/08/23 1745 -- (!) 104 15 120/67 94 %   03/08/23 1740 -- (!) 104 -- 122/66 98 %   03/08/23 1735 -- (!) 101 16 121/63 97 %   03/08/23 1730 -- 97 16 110/69 94 %   03/08/23 1725 -- 96 16 116/70 95 %   03/08/23 1720 -- 95 18 127/73 94 %   03/08/23 1717 -- 100 -- -- 97 %   03/08/23 1716 -- 97 -- -- (!) 85 %   03/08/23 1715 -- 93 18 125/71 (!) 86 %   03/08/23 1714 -- 65 -- -- (!) 87 %   03/08/23 1713 -- 92 -- -- 95 %   03/08/23 1712 -- (!) 102 -- -- 96 %   03/08/23 1711 -- (!) 101 -- -- 92 %   03/08/23 1710 -- 87 -- 126/69 (!) 88 %   03/08/23 1709 -- 97 -- -- 90 %   03/08/23 1708 -- 89 -- -- 92 %   03/08/23 1707 -- 98 -- -- 93 %   03/08/23 1706 -- 93 -- -- 91 %   03/08/23 1705 -- 100 16 125/62 92 %   03/08/23 1700 -- -- 16 -- --   03/08/23 1650 -- -- 20 -- --   03/08/23 1645 -- -- 20 -- --   03/08/23 1640 -- -- 16 -- --   03/08/23 1635 -- -- 16 -- --   03/08/23 1630 -- -- 16 -- --   03/08/23 1625 -- -- 16 -- --   03/08/23 1620 -- -- 15 -- --   03/08/23 1615 -- -- 15 -- --   03/08/23 1610 -- -- 16 -- --   03/08/23 1605 -- 77 18 (!) 115/59 97 %   03/08/23 1602 -- -- -- -- (!) 82 %   03/08/23 1600 -- 71 18 (!) 113/58 91 %   03/08/23 1555 -- 88 20 (!) 120/58 99 %   03/08/23 1550 -- 94 18 (!) 129/58 92 %   03/08/23 1545 -- 93 16 128/76 95 %   03/08/23 1540 -- 97 16 121/74 96 %   03/08/23 1535 -- (!) 103 16 120/69 96 %   03/08/23 1530 -- (!) 111 18 126/81 98 %   03/08/23 1525 -- (!) 114 22 (!) 145/83 99 %   03/08/23 1520 -- (!) 106 22 (!) 126/57 100 %   03/08/23 1515 -- 99 22 132/63 100 % 03/08/23 1510 -- (!) 108 20 131/63 100 %   03/08/23 1505 -- 89 20 133/62 100 %   03/08/23 1500 -- 88 20 (!) 147/67 100 %   03/08/23 1455 -- (!) 106 22 (!) 159/78 95 %   03/08/23 1454 -- -- 22 (!) 159/78 --   03/08/23 1452 97.6 °F (36.4 °C) (!) 117 16 -- 100 %   03/08/23 0834 97.8 °F (36.6 °C) -- 18 -- --       Oxygen Therapy  SpO2: 94 %  Pulse Oximetry Type: Continuous  Pulse via Oximetry: 88 beats per minute  SPO2 High Alarm Limit: 100  SPO2 Low Alarm Limit POX: 90  Pulse Oximeter Device Mode: Continuous  Pulse Oximeter Device Location: Left, Hand, Finger  O2 Device: None (Room air)  Oximetry Probe Site Changed: No  Skin Assessment: Clean, dry, & intact  Skin Protection for O2 Device: No  O2 Flow Rate (L/min): 0 L/min  Oxygen Therapy: None (Room air)  O2 Delivery Method: Nasal cannula    Estimated body mass index is 33.79 kg/m² as calculated from the following:    Height as of this encounter: 5' 3.5\" (1.613 m). Weight as of this encounter: 193 lb 12.8 oz (87.9 kg). Intake/Output Summary (Last 24 hours) at 3/9/2023 0833  Last data filed at 3/9/2023 0701  Gross per 24 hour   Intake 2492 ml   Output 1165 ml   Net 1327 ml       Physical Exam  Vitals and nursing note reviewed. Constitutional:       General: She is not in acute distress. Appearance: She is obese. She is not diaphoretic. Eyes:      Extraocular Movements: Extraocular movements intact. Cardiovascular:      Rate and Rhythm: Normal rate. Pulmonary:      Effort: Pulmonary effort is normal. No respiratory distress. Abdominal:      General: There is no distension. Musculoskeletal:         General: No deformity. Skin:     Coloration: Skin is not jaundiced or pale. Neurological:      General: No focal deficit present. Mental Status: She is alert and oriented to person, place, and time.    Psychiatric:         Mood and Affect: Mood normal.         Behavior: Behavior normal.         Signed:  Gage Jenkins MD

## 2023-03-09 NOTE — PROGRESS NOTES
H&P/Consult Note/Progress Note/Office Note:   Angelique Johnson  MRN: [id number]  :1970  Age:52 y.o.    HPI: Angelique Johnson is a 46 y.o. female who is s/p left breast NL Lumpectomy and sent node excision on 3/8/23    She had O2 desaturation issues in recovery room and was admitted by the hospitalists  She was having a cough since approx 2022  WBC normal  CXR no infiltrate        She originally presented with an abnormal left breast screening mammogram on 22 which led to diagnostic imaging and US. She had a large mass in the upper inner left breast with left axillary adenopathy. Percutaneous biopsies identified infiltrating ductal carcinoma with lobular features which was ER 93% TX 0% HER2 negative. Breast MRI was performed next which confirmed a 3.7 cm left breast malignancy with 4 dysmorphic and enlarged axillary nodes. PET/CT imaging identified suspicious bony lesions of T10 and the sacrum. CT-guided biopsy of T10 confirmed metastatic breast carcinoma. She was treated with neoadjuvant endocrine therapy with Dr. Lupe Nathan and had Letrozole and ribociclib   and had repeat PET/CT imaging on 10/12/22 and was presented at tumor board to discuss the possibility of surgery for local treatment      22 Bilat breast screening mammo with filomena and CAD  The breast parenchyma is heterogeneously dense, which may limit detection of small masses. Left posterior upper inner quadrant demonstrates an irregular mass with architectural distortion. There are associated calcifications within and anterior to the mass. Overall this area of concern measures at least 3 cm but is not well defined. No additional suspicious mass or calcification is seen in either breast.    There is no normal skin thickening or nipple retraction     Impression:  Left mass and calcifications. Further evaluation is recommended with ML and compression magnification views, then US to follow.        22 dx bilat mammo and left breast US  FINDING: Confirmed is the spiculated mass at approximately 10:00 position left breast posterior depth. The mass measures approximately 4 cm in size. There are pleomorphic calcifications extending anteriorly from the mass towards the nipple. Including the mass and calcifications, the abnormality measures approximately 7 cm in size. A left breast ultrasound is recommended. Left breast ultrasound: There is a hypoechoic mass with ill-defined angular margins at the 10:00 position left breast 12 cm from nipple. This is estimated to measure approximately 5.0 x 3.2 x 4.5 cm. There is posterior acoustic shadowing. Evaluation of left axilla reveals a dysmorphic lymph node measuring 2.6 cm in maximal dimension. IMPRESSION: 1. Spiculated left breast mass with associated microcalcifications measuring at least 7 cm in total size at the 10:00 position. Tissue sampling is recommended. 2. Dysmorphic left axillary lymph node. Tissue sampling is recommended. Highly suggestive of malignancy           6/14/22 US-guided left breast and left axillary biopsy   A:  \"LEFT BREAST, 10:00 POSITION, 12 CM FROM NIPPLE, CORE BIOPSY\":   INFILTRATING DUCTAL CARCINOMA WITH LOBULAR FEATURES, LOW GRADE (WELL DIFFERENTIATED). DEFINITE IN SITU COMPONENT AND LYMPHOVASCULAR INVASION ARE NOT IDENTIFIED          B:  \"LEFT AXILLA, CORE BIOPSY\": MACRO METASTATIC CARCINOMA SIMILAR TO A INVOLVING LYMPH NODE. WHILE DEFINITE EXTRACAPSULAR EXTENSION IS NOT IDENTIFIED IN THIS MATERIAL, IT COULD BE PRESENT IN UNSAMPLED LYMPH NODE   Sign Out Date: 6/15/2022  SUSAN Hernandez M.D.     ER: Positive (93%); [place]: Negative (0.0%); [id number]:  Negative (0)              Post-bx mammo: Biopsy clip is confirmed within the mass at the 10:00 position       6/16/22 Breast MRI  FINDINGS: The breasts demonstrate moderate glandularity and mild background enhancement.    Left 10:00 irregular heterogeneously enhancing malignant mass measures up to 3.3 x 2.6 x 3.0 cm. Extending anteriorly from the mass is about 1.2 cm of clumped and reticular nonmass enhancement. Overall the maximal dimension of this malignancy is 3.7 cm. Mild overlying skin thickening and edema are nonspecific and could be reactive to the biopsy unless there is clinical suspicion for malignancy involvement. No other evidence of suspicious enhancing mass, and no dominant or unique nonmass enhancement, to suggest additional malignancy in either breast.     Left axilla demonstrates 4 asymmetrically enlarged and dysmorphic lymph nodes, one of which underwent prior biopsy demonstrating metastasis. The largest measures up to 2.0 x 1.2 cm. No evidence of right axillary lymphadenopathy or internal mammary lymphadenopathy. Elsewhere, limited visualization of the partially included thorax and upper abdomen shows no acute abnormality. Impression:  1. Left 10:00 breast cancer measures up to 3.7 cm.   2. Left axillary metastatic lymphadenopathy. 3. No suspicious right breast finding.            6/22/22 PET/CT  HEAD/NECK: Symmetric uptake within the imaged brain parenchyma. Scattered areas of mild uptake within the occipital and cervical soft tissues without convincing CT correlate with additional areas of uptake, and along the bilateral paraspinals soft tissues. No discrete enlarged/hypermetabolic cervical lymph nodes. CHEST: FDG uptake with a known left breast malignancy with SUV of 9.0. Prominent FDG avid left axillary lymph nodes largest measuring 1.3 x 2.6 cm (SUV max 5.8, image number 66). Scattered areas of FDG uptake without corresponding CT correlate throughout the superior lateral and posterior medial chest wall. No enlarged or hypermetabolic mediastinal or hilar adenopathy. No suspicious pulmonary nodules or focal parenchymal FDG uptake. ABDOMEN/PELVIS: No enlarged or hypermetabolic adenopathy. No focal uptake within the visceral organs.    There is a 1.8 cm left hyperdense exophytic renal cyst without associated FDG uptake compatible with a hemorrhagic/proteinaceous cyst with additional punctate upper pole hyperdense cyst and indeterminate photopenic hypodense cyst.   Physiologic uptake within the gastrointestinal tract with normal excretion of radiotracer within the renal collecting system and urinary bladder. MUSCULOSKELETAL: Hypermetabolic lytic lesion involving the T10 vertebral body (SUV max 14.0, image number 103) with sclerotic hypermetabolic lesion centered within the sacrum (SUV max 13.0, image number 189). No abnormal focal uptake within the soft tissues. Impression:  1. Hypermetabolic soft tissue left breast mass with prominent FDG avid left axillary lymph nodes. 2. Hypermetabolic osseous metastatic disease involving the sacrum and T10 vertebral body. 3. Patchy areas of mild uptake throughout the cervical and paraspinal soft tissues, as well as, along the superior lateral and posterior medial chest wall favoring brown fat and less likely metastatic disease. 7/1/22 CT-guided T10 Bone biopsy  DIAGNOSIS   \"BONE LESION\":  METASTATIC CARCINOMA, CONSISTENT WITH BREAST ORIGIN. Sign Out Date: 7/7/2022  Toby Ulloa MD   Interpretation:  Immunohistochemical findings consistent with metastatic carcinoma of breast origin. Antibody/Test           Marker For                                Result   Cytokeratin 7             Lung, breast, upper GE, serous ovary         Positive   Cytokeratin 20          Colon, mucinous ovary, urothelium          Negative   MINO 3                Urothelial, breast carcinoma                        Positive   [id number]              Breast, salivary gland, skin, adnexa              Positive         10/12/22 PET/CT (restaging after neoadjuvant Rx)  Head and Neck: No enlarged or hypermetabolic cervical lymph nodes. No worrisome focal FDG uptake in the neck soft tissues.      Chest: No significant interval decrease in size and FDG uptake within the left breast mass. Left axillary lymph nodes are also smaller and elevated FDG uptake is resolved. No mediastinal, axillary, or internal mammary lymphadenopathy. Presumed posttreatment changes in the medial right lung base. Abdomen/Pelvis: The liver is severely steatotic. Hemorrhagic/proteinaceous cyst posteriorly in the left kidney. No enlarged or hypermetabolic lymph nodes of the abdomen or pelvis. Normal uterus. No adnexal mass. Bones and soft tissues: Newly sclerotic appearance and improved FDG uptake with lesions of the S1 and T10 vertebral bodies. No new bone lesions on the current study. Impression:  1. Interval treatment response evident in the primary left breast mass, ipsilateral axillary lymph nodes and bone lesions at T10 and S1. No new sites of disease. 2. Severe hepatic steatosis. 1/19/23 PET CT  Hx: ER+ left breast carcinoma  COMPARISON: PET/CT October 12, 2022     FINDINGS:     HEAD/NECK: Symmetric uptake within the imaged brain parenchyma. No enlarged or hypermetabolic cervical lymph nodes. No focal uptake within the neck soft tissues. CHEST: Decrease uptake intensity within ill-defined spiculated left breast soft tissue nodule with SUV max of 2.1. Subcm left greater than right axillary lymph nodes without significant FDG uptake. No enlarged or hypermetabolic adenopathy. No suspicious pulmonary nodules or focal parenchymal FDG uptake. ABDOMEN/PELVIS: No enlarged or hypermetabolic adenopathy. No focal uptake within the visceral organs. Physiologic uptake within the gastrointestinal tract with normal excretion of radiotracer within the renal collecting system and urinary bladder. MUSCULOSKELETAL: Sclerotic lesion of the S1 and T10 vertebral bodies no longer demonstrate any FDG uptake. No new suspicious osseous lesions or focal uptake.        Impression:  Findings of positive treatment response with resolution of previous uptake within sclerotic lesions of the S1 and T10 vertebral bodies. Continued decrease uptake within a left spiculated breast lesion without new soft tissue or adenopathy.        3/9/23 POD1 left breast lumpectomy/sent node excision; admitted withO2 desaturation from recovery room; RA sat normal this am; some BP instability; WBC 7.2k, CXR no infiltrate                Past Medical History:   Diagnosis Date    Cyst, kidney, acquired     found on previous scan    GERD (gastroesophageal reflux disease)     Hypertension     Kidney stone      Past Surgical History:   Procedure Laterality Date    APPENDECTOMY  1990    CT BIOPSY PERCUTANEOUS SUPERFICIAL BONE  07/01/2022    CT BIOPSY PERCUTANEOUS SUPERFICIAL BONE 7/1/2022 [facility] CT SCAN    US BREAST BIOPSY W LOC DEVICE 1ST LESION LEFT Left 06/14/2022    US BREAST NEEDLE BIOPSY LEFT 6/14/2022 Janae Dubois MD [facility]    US GUIDED NEEDLE LOC OF LEFT BREAST Left 3/8/2023    US GUIDED NEEDLE LOC OF LEFT BREAST 3/8/2023 Sri Zamudio MD [facility] LYMPH NODE BIOPSY  06/14/2022    US LYMPH NODE BIOPSY 6/14/2022 Janae Dubois MD [facility]    WISDOM TOOTH EXTRACTION       Current Facility-Administered Medications   Medication Dose Route Frequency    sodium chloride flush 0.9 % injection 5-40 mL  5-40 mL IntraVENous 2 times per day    sodium chloride flush 0.9 % injection 5-40 mL  5-40 mL IntraVENous PRN    0.9 % sodium chloride infusion   IntraVENous PRN    enoxaparin (LOVENOX) injection 40 mg  40 mg SubCUTAneous Q24H    polyethylene glycol (GLYCOLAX) packet 17 g  17 g Oral Daily PRN    albuterol (PROVENTIL) nebulizer solution 2.5 mg  2.5 mg Nebulization Q2H PRN    potassium chloride 20 mEq/50 mL IVPB (Central Line)  20 mEq IntraVENous PRN    Or    potassium chloride 10 mEq/100 mL IVPB (Peripheral Line)  10 mEq IntraVENous PRN    sodium phosphate 10 mmol in sodium chloride 0.9 % 250 mL IVPB  10 mmol IntraVENous PRN    Or    sodium phosphate 15 mmol in sodium chloride 0.9 % 250 mL IVPB  15 mmol IntraVENous PRN    Or    sodium phosphate 20 mmol in sodium chloride 0.9 % 500 mL IVPB  20 mmol IntraVENous PRN    magnesium sulfate 2000 mg in 50 mL IVPB premix  2,000 mg IntraVENous PRN     ALLERGIES:  Patient has no known allergies. Social History     Socioeconomic History    Marital status:      Spouse name: None    Number of children: None    Years of education: None    Highest education level: None   Tobacco Use    Smoking status: Never    Smokeless tobacco: Never   Vaping Use    Vaping Use: Never used   Substance and Sexual Activity    Alcohol use: Yes     Alcohol/week: 2.0 standard drinks    Drug use: Never     Social Determinants of Health     Financial Resource Strain: Low Risk     Difficulty of Paying Living Expenses: Not hard at all   Food Insecurity: Unknown    Worried About Running Out of Food in the Last Year: Never true   Transportation Needs: No Transportation Needs    Lack of Transportation (Medical): No    Lack of Transportation (Non-Medical): No   Physical Activity: Sufficiently Active    Days of Exercise per Week: 7 days    Minutes of Exercise per Session: 30 min   Stress: Stress Concern Present    Feeling of Stress :  To some extent   Social Connections: Unknown    Frequency of Communication with Friends and Family: More than three times a week    Frequency of Social Gatherings with Friends and Family: More than three times a week    Marital Status:    Intimate Partner Violence: Not At Risk    Fear of Current or Ex-Partner: No    Emotionally Abused: No    Physically Abused: No    Sexually Abused: No   Housing Stability: Low Risk     Unable to Pay for Housing in the Last Year: No    Number of [place] in the Last Year: 1    Unstable Housing in the Last Year: No     Social History     Tobacco Use   Smoking Status Never   Smokeless Tobacco Never     Family History Problem Relation Age of Onset    Hypertension Mother     Alcohol Abuse Brother     Alcohol Abuse Father     Dementia Father     Hypertension Brother     Diabetes Father      ROS: The patient has no difficulty with chest pain or shortness of breath. No fever or chills. Comprehensive review of systems was otherwise unremarkable except as noted above. Physical Exam:   /61   Pulse 68   Temp 98.1 °F (36.7 °C) (Oral)   Resp 11   Ht 5' 3.5\" (1.613 m)   Wt 193 lb 12.8 oz (87.9 kg)   SpO2 92%   BMI 33.79 kg/m²   Vitals:    03/09/23 0330 03/09/23 0400 03/09/23 0500 03/09/23 0515   BP:  113/66 100/61    Pulse: 93 88 67 68   Resp: 19 16 11 11   Temp:       TempSrc:       SpO2: 99% 97% 92% 92%   Weight:       Height:         [unfilled]  [unfilled]    Constitutional: Alert, oriented, cooperative patient in no acute distress; appears stated age    Eyes:Sclera are clear. EOMs intact  ENMT: no external lesions gross hearing normal; no obvious neck masses, no ear or lip lesions, nares normal  CV: RRR. Normal perfusion  Resp: No JVD. Breathing is  non-labored; no audible wheezing. Left breast and left axillary dressings dry and intact  Large pendulous breasts        GI: soft and non-distended     Musculoskeletal: unremarkable with normal function. No embolic signs or cyanosis. Neuro:  Oriented; moves all 4; no focal deficits  Psychiatric: normal affect and mood, no memory impairment    Recent vitals (if inpt):  @IPVITALS(24:)@    Amount and/or Complexity of Data Reviewed and Analyzed:  I reviewed and analyzed all of the unique labs and radiologic studies that are shown below as well as any that are in the HPI, and any that are in the expanded problem list below  *Each unique test, order, or document contributes to the combination of 2 or combination of 3 in Category 1 below. For this visit I also reviewed old records and prior notes.       Recent Labs     03/09/23  0333   WBC 7.2   HGB 11.0*       K 3.5      CO2 26   BUN 13     Review of most recent CBC  Lab Results   Component Value Date    WBC 7.2 03/09/2023    HGB 11.0 (L) 03/09/2023    HCT 31.4 (L) 03/09/2023    .0 (H) 03/09/2023     03/09/2023       Review of most recent BMP  Lab Results   Component Value Date/Time     03/09/2023 03:33 AM    K 3.5 03/09/2023 03:33 AM     03/09/2023 03:33 AM    CO2 26 03/09/2023 03:33 AM    BUN 13 03/09/2023 03:33 AM    CREATININE 1.01 03/09/2023 03:33 AM    GLUCOSE 174 03/09/2023 03:33 AM    CALCIUM 8.1 03/09/2023 03:33 AM        Review of most recent LFTs (and lipase if done)  Lab Results   Component Value Date    ALT 55 02/08/2023    AST 33 02/08/2023    ALKPHOS 73 02/08/2023    BILITOT 0.3 02/08/2023     No results found for: LIPASE    Lab Results   Component Value Date/Time    INR 0.9 06/27/2022 02:28 PM       Review of most recent HgbA1c  Hemoglobin A1C   Date Value Ref Range Status   03/09/2023 5.4 4.8 - 5.6 % Final       Nutritional assessment screen for wound healing issues:  Lab Results   Component Value Date    LABALBU 3.3 (L) 03/09/2023       @lastcovr@    Xray Result (most recent):  XR CHEST LIMITED ONE VIEW 03/08/2023    Narrative  XR CHEST 1 VIEW  3/8/2023 5:45 PM    History: ; Desaturation postoperatively, s/p left breast lumpectomy, rule out  pneumothorax, aspiration. Patient reports chronic cough for 6 months; Please  call [phone] with results    Comparison: None. Technique: Routine. Findings:  No infiltrate. No effusion. No edema. Cardiac silhouette unremarkable. Impression  Impression:  1. Normal exam.      Slot # 62    Thank you for the referral of this patient. This exam was interpreted by an  American Board of Radiology certified Diversified radiologist with subspecialty  fellowship in [place] [address]. If there are any questions regarding this exam  please feel free to contact a body radiologist directly at [phone].  Or you  can reach me personally at [email or web address].        Roselia Colon M.D., [facility]  3/8/2023 6:54:00 PM    CT Result (most recent):  CT CHEST PULMONARY EMBOLISM W CONTRAST 03/08/2023    Narrative  EXAMINATION:  CTA CHEST WITH IV CONTRAST, CT PULMONARY ANGIOGRAM    DATE OF EXAM: 3/8/2023 6:07 PM    HISTORY: hypoxia  postoperative    TECHNIQUE: CTA examination of the chest was performed following the intravenous  administration of iodinated contrast. Sagittal, coronal and 3-D reformatted  images were provided. CT dose lowering techniques were used, to include:  automated exposure control, adjustment for patient size, and or use of iterative  reconstruction. COMPARISON: None. FINDINGS:    Lungs: No dense consolidation. No overt edema. Hazy peribronchial vascular  central groundglass opacities. May represent mild vascular congestion. Atypical  pneumonia unlikely but not excluded. Pleura: No pneumothorax. No effusion. Mediastinum and Diane: Normal.    Pulmonary Arteries: Normal.    Cardiovascular: Normal.  No coronary artery calcification. Upper Abdomen: Severe hepatic steatosis. Exophytic hypodensity superior pole  left kidney measures 23 Hounsfield units and second exophytic hypodensity mid  left kidney measures 83 Hounsfield units. Most likely protein-containing cyst.  Solid lesion not excluded. Smaller slightly hyperdense exophytic focus mid left  kidney measures 6 mm. 2 small to characterize. Chest Wall: Large air and fluid-filled defect in the mid left breast. Left  axillary clips. . Musculoskeletal: Large sclerotic focus T10 vertebral body and smaller  sclerotic focus T9 vertebral body. Vertebral body height normal    Impression  1. No pulmonary embolus. 2.  Hazy peribronchial vascular centrilobular groundglass opacities probably  mild edema. Atypical pneumonia less likely but not excluded. 3.  Two sclerotic bony lesions. If patient has known breast cancer this could be  concerning for bony metastases. Consider further assessment with PET/CT or bone  scan. 4.  Indeterminate renal hypodensities left kidney. Likely protein-containing  cyst. Can't exclude solid lesions. 5.  Severe hepatic steatosis. 6.  Soft tissue defect left breast containing air and fluid consistent with  postoperative change. Thank you for the referral of this patient. This exam was interpreted by an  American Board of Radiology certified radiologist with subspecialty fellowship  in [place] [address]. If there are questions about this or other reports you can  contact a radiologist directly at [phone]   You can also reach me directly  at [email or web address]        Benitez Garcia M.D., [facility]  3/8/2023 6:59:00 PM    US Result (most recent):  US GUIDED NEEDLE LOC OF LEFT BREAST 03/08/2023    Narrative  LEFT BREAST WIRE LOCALIZATION WITH ULTRASOUND GUIDANCE,  LEFT DIAGNOSTIC DIGITAL MAMMOGRAPHY,  LEFT BREAST SURGICAL SPECIMEN DIGITAL RADIOGRAPHY TIMES THREE:    CLINICAL HISTORY:  Left breast IDC with lobular features status post neoadjuvant  chemotherapy for pre-operative localization. WIRE LOCALIZATION:  Written informed consent was obtained. Using standard  aseptic technique and 1% Xylocaine local anesthesia, a 20-gauge Kopans needle  was passed through the residual irregular mass with biopsy marker clip placed by  ultrasound on June 4, 2022. When the needle was removed, the hook wire tip lay  immediately distal to the mass. LEFT MAMMOGRAM:  Craniocaudal and straight lateral views demonstrate the hook  wire tip just distal to the mass. A copy of the localization images was  delivered with the patient to the operating suite. SPECIMEN RADIOGRAPHS:  Two magnification digital images of the surgical specimen  in orthogonal planes, as well as a third image with the specimen in a grid  demonstrate the wire tip, mass, and marker clip centrally in the specimen. Associated microcalcifications extending near the anterior margin. These  considerations were discussed by telephone contemporaneously with Dr. Bárbara Gonzalez in  the OR, who reported taking additional margins. A straight pin was placed at  specimen grid position E-9 for localization of the mass before the container was  delivered to the laboratory for histology. Impression  SUCCESSFUL WIRE LOCALIZATION AND EXCISION OF THE WIRE TIP, MASS, AND MARKER  CLIP, WITH MARGIN CONSIDERATIONS DISCUSSED ABOVE. Admission date (for inpatients): 3/8/2023   1 Day Post-Op  * No surgery found *        ASSESSMENT/PLAN:  [unfilled]  Principal Problem:    Malignant neoplasm of left breast in female, estrogen receptor positive ([address]  Active Problems:    Malignant neoplasm of upper-inner quadrant of left breast in female, estrogen receptor positive ([address]    Infiltrating ductal carcinoma of breast, left (HCC)    Acute respiratory failure with hypoxia ([address]  Resolved Problems:    * No resolved hospital problems. *     Patient Active Problem List    Diagnosis Date Noted    Malignant neoplasm of upper-inner quadrant of left breast in female, estrogen receptor positive ([address] 03/08/2023     Priority: Medium    Infiltrating ductal carcinoma of breast, left ([address] 03/08/2023     Priority: Medium    Acute respiratory failure with hypoxia ([address] 03/08/2023     Priority: Medium    Malignant neoplasm of left breast in female, estrogen receptor positive ([address] 07/11/2022     Priority: Medium     3/8/23 s/p left NL lumpectomy and sent node excision; Dr Bárbara Gonzalez      Metastasis to bone [facility]) 07/11/2022     Priority: Medium    Cyst, kidney, acquired      found on previous scan        Hypertension     Kidney stone           Number and Complexity of Problems addressed and   Risks of complications and/or morbidity of management        [id number] left breast IDC  She is s/p left breast NL Lumpectomy and sent node excision on 3/8/23    We discussed her case at multidisciplinary breast cancer conference on 10/27/22. She is clearly had an excellent response to neoadjuvant treatment with significant regression/resolution of the sacral and T10 lesions on PET. She also no longer has FDG uptake in the axillae on PET/CT imaging. The conference consensus was to offer her some local treatment with lumpectomy and sentinel node excision. We discussed that surgery would likely only be palliative to local disease involvement. Admitted with O2 desaturation by Hospitalists  Some BP instability overnight  RA O2 sat normal this am  WBC 7.2k  CXR showed no infiltrate    Care as per Hospitalists and pulmonology  Surgery will see he in office in approx 11 days               Level of MDM (2/3 elements below)  Number and Complexity of Problems Addressed Amount and/or Complexity of Data to be Reviewed and Analyzed  *Each unique test, order, or document contributes to the combination of 2 or combination of 3 in Category 1 below. Risk of Complications and/or Morbidity or Mortality of pt Management     37972  65900 SF Minimal  ?1self-limited or minor problem Minimal or none Minimal risk of morbidity from additional diagnostic testing or Rx   96127  73983 Low Low  ? 2or more self-limited or minor problems;    or  ? 1stable chronic illness;    or  ?1acute, uncomplicated illness or injury   Limited  (Must meet the requirements of at least 1 of the 2 categories)  Category 1: Tests and documents   ? Any combination of 2 from the following:  ?Review of prior external note(s) from each unique source*;  ?review of the result(s) of each unique test*;   ?ordering of each unique test*    or   Category 2: Assessment requiring an independent historian(s)  (For the categories of independent interpretation of tests and discussion of management or test interpretation, see moderate or high) Low risk of morbidity from additional diagnostic testing or treatment     27999  31747 Mod Moderate  ? 1or more chronic illnesses with exacerbation, progression, or side effects of treatment;    or  ?2or more stable chronic illnesses;    or  ?1undiagnosed new problem with uncertain prognosis;    or  ?1acute illness with systemic symptoms;    or  ?1acute complicated injury   Moderate  (Must meet the requirements of at least 1 out of 3 categories)  Category 1: Tests, documents, or independent historian(s)  ? Any combination of 3 from the following:   ?Review of prior external note(s) from each unique source*;  ?Review of the result(s) of each unique test*;  ?Ordering of each unique test*;  ?Assessment requiring an independent historian(s)    or  Category 2: Independent interpretation of tests   ? Independent interpretation of a test performed by another physician/other qualified health care professional (not separately reported);     or  Category 3: Discussion of management or test interpretation  ? Discussion of management or test interpretation with external physician/other qualified health care professional/appropriate source (not separately reported)   Moderate risk of morbidity from additional diagnostic testing or treatment  Examples only:  ?Prescription drug management   ? Decision regarding minor surgery with identified patient or procedure risk factors  ? Decision regarding elective major surgery without identified patient or procedure risk factors   ? Diagnosis or treatment significantly limited by social determinants of health       34273  92304 High High  ? 1or more chronic illnesses with severe exacerbation, progression, or side effects of treatment;    or  ?1 acute or chronic illness or injury that poses a threat to life or bodily function   Extensive  (Must meet the requirements of at least 2 out of 3 categories)  Category 1: Tests, documents, or independent historian(s)  ? Any combination of 3 from the following:   ?Review of prior external note(s) from each unique source*;  ?Review of the result(s) of each unique test*;   ?Ordering of each unique test*;   ?Assessment requiring an independent historian(s)    or   Category 2: Independent interpretation of tests   ? Independent interpretation of a test performed by another physician/other qualified health care professional (not separately reported);     or  Category 3: Discussion of management or test interpretation  ? Discussion of management or test interpretation with external physician/other qualified health care professional/appropriate source (not separately reported)   High risk of morbidity from additional diagnostic testing or treatment  Examples only:  ?Drug therapy requiring intensive monitoring for toxicity  ? Decision regarding elective major surgery with identified patient or procedure risk factors-    ? Decision regarding emergency major surgery  ? Decision regarding hospitalization  ? Decision not to resuscitate or to de-escalate care because of poor prognosis             I have personally performed a face-to-face diagnostic evaluation and management  service on this patient. I have independently seen the patient. I have independently obtained the above history from the patient/family. I have independently examined the patient with above findings. I have independently reviewed data/labs for this patient and developed the above plan of care (MDM).       Signed: Deedee Fishman MD  3/9/2023    6:10 AM

## 2023-03-09 NOTE — TELEPHONE ENCOUNTER
Discharge [address] 03/09-Lumpectomy please call 24-48    Patient scheduled with Dr Portia Junior on 03/16 @ 470 am

## 2023-03-09 NOTE — CARE COORDINATION
03/09/23 0822   Service Assessment   Patient Orientation Alert and Oriented   Cognition Alert   History Provided By Patient   Primary Caregiver Self   Support Systems Spouse/Significant Other   PCP Verified by CM Yes   Prior Functional Level Independent in ADLs/IADLs   Current Functional Level Independent in ADLs/IADLs   Can patient return to prior living arrangement Yes   Ability to make needs known: Good   Family able to assist with home care needs: Yes   Would you like for me to discuss the discharge plan with any other family members/significant others, and if so, who? Yes   Financial Resources Other (Comment)  (BCBS)   Community Resources None   Social/Functional History   Lives With Spouse   Type of Home House   Bathroom Shower/Tub None   Bathroom Equipment None   Home Equipment None   ADL Assistance Independent   Discharge Planning   Type of Residence House   Living Arrangements Spouse/Significant Other   Current Services Prior To Admission None   Potential Assistance Needed N/A   DME Ordered? No   Potential Assistance Purchasing Medications No   Type of Home Care Services None   [facility]   Transition of Care Consult (CM Consult) N/A   Services At/After Discharge None     RN CM and the attending MD met with the patient at the bedside. She lives with her spouse in a private residence and is independent of ADLs. She does not use DMEs or external services and plans on returning home. She is on room air. She confirmed she has health insurance and a PCP. RN CM encouraged her to ask questions. She verbalized understanding and agreement with the discharge plan. No case management needs were identified.

## 2023-03-09 NOTE — PROGRESS NOTES
Per optum: \"Hello,      Regarding patient     Name: Hira Sigala  : 1970  MD: Carlo Najera    The prescription for [facility] TAB 125MG  is ready to be set up for shipment with a co-pay of $0.   Please let me know if you have any questions/concerns.     Thanks,      ____________________    Swapna Temple \"

## 2023-03-10 ENCOUNTER — CARE COORDINATION (OUTPATIENT)
Dept: CARE COORDINATION | Facility: CLINIC | Age: 53
End: 2023-03-10

## 2023-03-10 NOTE — CARE COORDINATION
[facility] Care Transitions Initial Follow Up Call    Call within 2 business days of discharge: Yes    Patient Current Location:  Home: [address] [place] [address] Coordinator contacted the patient by telephone to perform post hospital discharge assessment. Verified name and  with patient as identifiers. Provided introduction to self, and explanation of the LPN Care Coordinator role. Patient: Michael Zamudio Patient : 1970   MRN: [id number]  Reason for Admission: partial mastectomy  Discharge Date: 3/9/23 RARS: No data recorded    Last Discharge [address]       Date Complaint Diagnosis Description Type Department Provider    3/8/23  Acute respiratory failure with hypoxia ([facility] [address] . .. Admission (Discharged) Aleks Russo MD            Was this an external facility discharge? No Discharge Facility: [facility]    Challenges to be reviewed by the provider   Additional needs identified to be addressed with provider: No  none               Method of communication with provider: none. LPN Care Coordinator reviewed discharge instructions, medical action plan, and red flags with patient who verbalized understanding. The patient was given an opportunity to ask questions and does not have any further questions or concerns at this time. Were discharge instructions available to patient? Yes. Reviewed appropriate site of care based on symptoms and resources available to patient including: PCP  Specialist  Urgent care clinics  When to call [address] The patient agrees to contact the PCP office for questions related to their healthcare. Advance Care Planning:   Does patient have an Advance Directive: not on file; education provided. Medication reconciliation was performed with patient, who verbalizes understanding of administration of home medications.  Medications reviewed, 1111F entered: N/A    Was patient discharged with a pulse oximeter? no    Non-face-to-face services provided:  Obtained and reviewed discharge summary and/or continuity of care documents  Education of patient/family/caregiver/guardian to support self-management-Catie Wang LPN  [phone]  All scheduled appointments.     Offered patient enrollment in the Remote Patient Monitoring (RPM) program for in-home monitoring: NA.    Care Transitions 24 Hour Call    Do you have a copy of your discharge instructions?: Yes  Do you have all of your prescriptions and are they filled?: Yes  Have you been contacted by a [facility] Pharmacist?: No  Have you scheduled your follow up appointment?: Yes  How are you going to get to your appointment?: Car - family or friend to transport  Do you have support at home?: Partner/Spouse/SO  Do you feel like you have everything you need to keep you well at home?: Yes  Are you an active caregiver in your home?: No  Care Transitions Interventions         Discussed follow-up appointments. If no appointment was previously scheduled, appointment scheduling offered: Yes.   Is follow up appointment scheduled within 7 days of discharge? No.    Follow Up  Future Appointments   Date Time Provider Department [place]   3/20/2023  2:15 PM CARMEN Monte - CNP [facility] AMB   3/27/2023  4:00 PM Danny Menezes MD [facility] AM[facility]   4/5/2023 12:10 PM [facility]   4/5/2023 12:30 PM JENNIFER Harrison [facility] AMB   4/5/2023  1:30 PM POD1B [facility]   5/3/2023 11:30 AM SS [facility]   5/31/2023 11:30 AM SS [facility]   6/28/2023 12:20 PM GCC OUTREACH INSURANCE [facility]   6/28/2023  1:00 PM Danny Menezes MD [facility] AMB   6/28/2023  1:30 PM [facility] provided contact information.  Plan for follow-up call in 5-7 days based on severity of symptoms and risk factors.  Plan for next call: self management-diet, hydration, exercise, follow up appointments.     Catie Wang LPN

## 2023-03-16 ENCOUNTER — CLINICAL DOCUMENTATION (OUTPATIENT)
Dept: CASE MANAGEMENT | Age: 53
End: 2023-03-16

## 2023-03-17 ENCOUNTER — PATIENT MESSAGE (OUTPATIENT)
Dept: FAMILY MEDICINE CLINIC | Facility: CLINIC | Age: 53
End: 2023-03-17

## 2023-03-17 ENCOUNTER — CARE COORDINATION (OUTPATIENT)
Dept: CARE COORDINATION | Facility: CLINIC | Age: 53
End: 2023-03-17

## 2023-03-17 DIAGNOSIS — N30.00 ACUTE CYSTITIS WITHOUT HEMATURIA: Primary | ICD-10-CM

## 2023-03-17 RX ORDER — NITROFURANTOIN 25; 75 MG/1; MG/1
100 CAPSULE ORAL 2 TIMES DAILY
Qty: 20 CAPSULE | Refills: 0 | Status: SHIPPED | OUTPATIENT
Start: 2023-03-17 | End: 2023-03-27

## 2023-03-17 NOTE — CARE COORDINATION
[facility] Care Transitions Follow Up Call    Patient Current Location:  Home: [address]    LPN Care Coordinator contacted the patient by telephone to follow up after admission on 2023. Verified name and  with patient as identifiers. Patient: Jennifer Iraheta  Patient : 1970   MRN: [id number]  Reason for Admission: partial mastectomy left breast.   Discharge Date: 3/9/23 RARS: No data recorded    Needs to be reviewed by the provider   Additional needs identified to be addressed with provider: No  none             Method of communication with provider: none. Addressed changes since last contact:  none  Discussed follow-up appointments. If no appointment was previously scheduled, appointment scheduling offered: Yes. Is follow up appointment scheduled within 7 days of discharge? No.    Follow Up  Future Appointments   Date Time Provider Malina Chance   3/20/2023  2:15 PM CARMEN Skaggs - HILARY [facility] AMB   3/27/2023  4:00 PM Tasha Reese MD [facility]   2023 12:10 PM Jassi [address] [place] [address]   2023 12:30 PM JENNIFER Dowling [facility] AMB   2023  1:30 PM POD1B [facility]   2023  8:40 AM Wilma Desai MD [facility] AMB   [facility]   2023 11:30 AM [facility]   2023 12:20 PM Jassi [address] [place] [address]   2023  1:00 PM Tasha Reese MD [facility] AM[facility]   2023  1:30 PM [facility]     [facility] follow up appointment(s): ashley    LPN Care Coordinator reviewed discharge instructions, medical action plan, and red flags with patient and discussed any barriers to care and/or understanding of plan of care after discharge. Discussed appropriate site of care based on symptoms and resources available to patient including: PCP  Specialist  Urgent care clinics  When to call [address]  The patient agrees to contact the PCP office for questions related to their healthcare. Advance Care Planning:   not on file; education provided. Patients top risk factors for readmission: medical condition-HTN, GERC, Kidney stone, Breast CA with bone metastasis. Interventions to address risk factors: Obtained and reviewed discharge summary and/or continuity of care documents, Education of patient/family/caregiver/guardian to support self-management-Catie Wang LPN -[phone], and all scheduled appointments. Offered patient enrollment in the Remote Patient Monitoring (RPM) program for in-home monitoring: NA.     Care Transitions Subsequent and Final Call    Subsequent and Final Calls  Do you have any ongoing symptoms?: No  Have your medications changed?: No  Do you have any questions related to your medications?: No  Do you currently have any active services?: No  Do you have any needs or concerns that I can assist you with?: No  Identified Barriers: None  Care Transitions Interventions  Other Interventions:             LPN Care Coordinator provided contact information for future needs. Plan for follow-up call in 7-10 days based on severity of symptoms and risk factors. Plan for next call: self management-diet, hydration, exercise, follow up appointments.      Dunia Dunlap LPN

## 2023-03-17 NOTE — TELEPHONE ENCOUNTER
Prescription(s) refilled  It (they) has been escribed to the pharmacy. Requested Prescriptions     Signed Prescriptions Disp Refills    nitrofurantoin, macrocrystal-monohydrate, (MACROBID) 100 MG capsule 20 capsule 0     Sig: Take 1 capsule by mouth 2 times daily for 10 days     Authorizing Provider: ANT MELENDEZ     No orders of the defined types were placed in this encounter. ICD-10-CM    1.  Acute cystitis without hematuria  N30.00

## 2023-03-17 NOTE — TELEPHONE ENCOUNTER
From: German Ryan  To: Dr. Kojo Kiran: 3/17/2023 3:08 PM EDT  Subject: UTI    Hi Dr. Arnold,  I was just wondering if I could get tested for a UTI. It burns when I pee. I am concerned because I am going out of town tomorrow. Is there anyway you could suggest a medication that would help or do I need to get an antibiotic?   Thank you   Az Ferreira

## 2023-03-20 ENCOUNTER — OFFICE VISIT (OUTPATIENT)
Dept: SURGERY | Age: 53
End: 2023-03-20

## 2023-03-20 DIAGNOSIS — Z17.0 MALIGNANT NEOPLASM OF LEFT BREAST IN FEMALE, ESTROGEN RECEPTOR POSITIVE, UNSPECIFIED SITE OF BREAST (HCC): Primary | ICD-10-CM

## 2023-03-20 DIAGNOSIS — C50.912 MALIGNANT NEOPLASM OF LEFT BREAST IN FEMALE, ESTROGEN RECEPTOR POSITIVE, UNSPECIFIED SITE OF BREAST (HCC): Primary | ICD-10-CM

## 2023-03-20 NOTE — PROGRESS NOTES
problem Minimal or none Minimal risk of morbidity from additional diagnostic testing or Rx   82304  98983 Low Low  ? 2or more self-limited or minor problems;    or  ? 1stable chronic illness;    or  ?1acute, uncomplicated illness or injury   Limited  (Must meet the requirements of at least 1 of the 2 categories)  Category 1: Tests and documents   ? Any combination of 2 from the following:  ?Review of prior external note(s) from each unique source*;  ?review of the result(s) of each unique test*;   ?ordering of each unique test*    or   Category 2: Assessment requiring an independent historian(s)  (For the categories of independent interpretation of tests and discussion of management or test interpretation, see moderate or high) Low risk of morbidity from additional diagnostic testing or treatment     64548  45727 Mod Moderate  ? 1or more chronic illnesses with exacerbation, progression, or side effects of treatment;    or  ?2or more stable chronic illnesses;    or  ?1undiagnosed new problem with uncertain prognosis;    or  ?1acute illness with systemic symptoms;    or  ?1acute complicated injury   Moderate  (Must meet the requirements of at least 1 out of 3 categories)  Category 1: Tests, documents, or independent historian(s)  ? Any combination of 3 from the following:   ?Review of prior external note(s) from each unique source*;  ?Review of the result(s) of each unique test*;  ?Ordering of each unique test*;  ?Assessment requiring an independent historian(s)    or  Category 2: Independent interpretation of tests   ? Independent interpretation of a test performed by another physician/other qualified health care professional (not separately reported);     or  Category 3: Discussion of management or test interpretation  ? Discussion of management or test interpretation with external physician/other qualified health care professional/appropriate source (not separately reported)   Moderate risk of morbidity from additional

## 2023-03-24 ENCOUNTER — CARE COORDINATION (OUTPATIENT)
Dept: CARE COORDINATION | Facility: CLINIC | Age: 53
End: 2023-03-24

## 2023-03-24 DIAGNOSIS — I10 PRIMARY HYPERTENSION: ICD-10-CM

## 2023-03-24 RX ORDER — OLMESARTAN MEDOXOMIL 40 MG/1
TABLET ORAL
Qty: 90 TABLET | Refills: 1 | Status: SHIPPED | OUTPATIENT
Start: 2023-03-24

## 2023-03-24 RX ORDER — CHLORTHALIDONE 25 MG/1
TABLET ORAL
Qty: 90 TABLET | Refills: 1 | Status: SHIPPED | OUTPATIENT
Start: 2023-03-24

## 2023-03-27 ENCOUNTER — OFFICE VISIT (OUTPATIENT)
Dept: SURGERY | Age: 53
End: 2023-03-27

## 2023-03-27 ENCOUNTER — OFFICE VISIT (OUTPATIENT)
Dept: ONCOLOGY | Age: 53
End: 2023-03-27
Payer: COMMERCIAL

## 2023-03-27 VITALS — WEIGHT: 193 LBS | HEIGHT: 64 IN | BODY MASS INDEX: 32.95 KG/M2

## 2023-03-27 VITALS
TEMPERATURE: 99.3 F | RESPIRATION RATE: 16 BRPM | HEIGHT: 64 IN | WEIGHT: 189.2 LBS | HEART RATE: 85 BPM | BODY MASS INDEX: 32.3 KG/M2 | OXYGEN SATURATION: 99 % | SYSTOLIC BLOOD PRESSURE: 127 MMHG | DIASTOLIC BLOOD PRESSURE: 75 MMHG

## 2023-03-27 DIAGNOSIS — C50.912 MALIGNANT NEOPLASM OF LEFT BREAST IN FEMALE, ESTROGEN RECEPTOR POSITIVE, UNSPECIFIED SITE OF BREAST (HCC): Primary | ICD-10-CM

## 2023-03-27 DIAGNOSIS — Z17.0 MALIGNANT NEOPLASM OF LEFT BREAST IN FEMALE, ESTROGEN RECEPTOR POSITIVE, UNSPECIFIED SITE OF BREAST (HCC): Primary | ICD-10-CM

## 2023-03-27 DIAGNOSIS — C79.51 METASTASIS TO BONE (HCC): ICD-10-CM

## 2023-03-27 PROCEDURE — 99214 OFFICE O/P EST MOD 30 MIN: CPT | Performed by: INTERNAL MEDICINE

## 2023-03-27 PROCEDURE — 3078F DIAST BP <80 MM HG: CPT | Performed by: INTERNAL MEDICINE

## 2023-03-27 PROCEDURE — 99024 POSTOP FOLLOW-UP VISIT: CPT | Performed by: SURGERY

## 2023-03-27 PROCEDURE — 3074F SYST BP LT 130 MM HG: CPT | Performed by: INTERNAL MEDICINE

## 2023-03-27 ASSESSMENT — PATIENT HEALTH QUESTIONNAIRE - PHQ9
SUM OF ALL RESPONSES TO PHQ9 QUESTIONS 1 & 2: 0
2. FEELING DOWN, DEPRESSED OR HOPELESS: 0
SUM OF ALL RESPONSES TO PHQ QUESTIONS 1-9: 0
SUM OF ALL RESPONSES TO PHQ QUESTIONS 1-9: 0
1. LITTLE INTEREST OR PLEASURE IN DOING THINGS: 0
SUM OF ALL RESPONSES TO PHQ QUESTIONS 1-9: 0
SUM OF ALL RESPONSES TO PHQ QUESTIONS 1-9: 0

## 2023-03-27 NOTE — PROGRESS NOTES
normal affect and mood, no memory impairment    Recent vitals (if inpt):  @IPVITALS(24:)@    Amount and/or Complexity of Data Reviewed and Analyzed:  I reviewed and analyzed all of the unique labs and radiologic studies that are shown below as well as any that are in the HPI, and any that are in the expanded problem list below  *Each unique test, order, or document contributes to the combination of 2 or combination of 3 in Category 1 below. For this visit I also reviewed old records and prior notes. No results for input(s): WBC, HGB, PLT, NA, K, CL, CO2, BUN, CREA, GLU, INR, APTT, ALT, AML, AML, LCAD, PCO2, PO2, HCO3 in the last 72 hours.     Invalid input(s): PTP, TBIL, TBILI, CBIL, SGOT, GPT, AP, LPSE, NH4, TROPT, TROIQ,  PH    Review of most recent CBC  Lab Results   Component Value Date    WBC 7.2 03/09/2023    HGB 11.0 (L) 03/09/2023    HCT 31.4 (L) 03/09/2023    .0 (H) 03/09/2023     03/09/2023       Review of most recent BMP  Lab Results   Component Value Date/Time     03/09/2023 03:33 AM    K 3.5 03/09/2023 03:33 AM     03/09/2023 03:33 AM    CO2 26 03/09/2023 03:33 AM    BUN 13 03/09/2023 03:33 AM    CREATININE 1.01 03/09/2023 03:33 AM    GLUCOSE 174 03/09/2023 03:33 AM    CALCIUM 8.1 03/09/2023 03:33 AM        Review of most recent LFTs (and lipase if done)  Lab Results   Component Value Date    ALT 55 02/08/2023    AST 33 02/08/2023    ALKPHOS 73 02/08/2023    BILITOT 0.3 02/08/2023     No results found for: LIPASE    Lab Results   Component Value Date/Time    INR 0.9 06/27/2022 02:28 PM       Review of most recent HgbA1c  Hemoglobin A1C   Date Value Ref Range Status   03/09/2023 5.4 4.8 - 5.6 % Final       Nutritional assessment screen for wound healing issues:  Lab Results   Component Value Date    LABALBU 3.3 (L) 03/09/2023       @lastcovr@    Xray Result (most recent):  XR CHEST LIMITED ONE VIEW 03/08/2023    Narrative  XR CHEST 1 VIEW  3/8/2023 5:45 PM    History: ;

## 2023-03-27 NOTE — PROGRESS NOTES
Absent   HER-2/ARYA:                         Negative (0)               Percentage   of Cells with Uniform        Intense Complete Membrane   Stainin%       DIAGNOSIS        \"BONE LESION\":  METASTATIC CARCINOMA, CONSISTENT WITH BREAST ORIGIN. Procedures/Addenda                     STF-IMMUNOHISTOCHEMISTRY                                                                                                                                         Status:  Signed Out    Charbel Ibanez MD on 2022                                 Interpretation                       Immunohistochemical Stain Panel:          Interpretation:  Immunohistochemical findings consistent with   metastatic carcinoma of breast origin. Antibody/Test               Marker For                              Result   Cytokeratin 7               Lung, breast, upper GE, serous ovary                 Positive   Cytokeratin 20               Colon, mucinous ovary, urothelium                  Negative   MINO 3               Urothelial, breast carcinoma                         Positive   GCDFP-15               Breast, salivary gland, skin, adnexa                 Positive           ASSESSMENT:   Diagnosis Orders   1. Malignant neoplasm of left breast in female, estrogen receptor positive, unspecified site of breast [facility])  Ambulatory referral to Radiation Oncology      2. Metastasis to bone [facility])  Ambulatory referral to Radiation Oncology                Patient Active Problem List   Diagnosis    Hypertension    Kidney stone    Cyst, kidney, acquired    Malignant neoplasm of left breast in female, estrogen receptor positive ([address]    Metastasis to bone ([address]    Malignant neoplasm of upper-inner quadrant of left breast in female, estrogen receptor positive ([address]    Infiltrating ductal carcinoma of breast, left (HCC)    Gastroesophageal reflux disease           PLAN:  Lab studies were personally reviewed.     Breast cancer: left breast, low

## 2023-03-27 NOTE — PATIENT INSTRUCTIONS
Patient Instructions from Today's Visit    Reason for Visit:  Follow up Breast Cancer     Diagnosis Information:  [email or web address]    Plan:  Lab results reviewed     Follow Up: Follow up in with labs prior    Recent Lab Results:  N/A    Treatment Summary has been discussed and given to patient: N/A    -------------------------------------------------------------------------------------------------------------------  Please call our office at [phone] if you have any  of the following symptoms:   Fever of 100.5 or greater  Chills  Shortness of breath  Swelling or pain in one leg    After office hours an answering service is available and will contact a provider for emergencies or if you are experiencing any of the above symptoms. Patient does express an interest in My Chart. My Chart log in information explained on the after visit summary printout at the [facility] [address] desk.     Tino Gonzalez RN

## 2023-03-31 ENCOUNTER — CARE COORDINATION (OUTPATIENT)
Dept: CARE COORDINATION | Facility: CLINIC | Age: 53
End: 2023-03-31

## 2023-04-05 ENCOUNTER — HOSPITAL ENCOUNTER (OUTPATIENT)
Dept: INFUSION THERAPY | Age: 53
Discharge: HOME | End: 2023-04-05
Payer: COMMERCIAL

## 2023-04-05 ENCOUNTER — HOSPITAL ENCOUNTER (OUTPATIENT)
Dept: LAB | Age: 53
Discharge: HOME | End: 2023-04-08

## 2023-04-05 VITALS
DIASTOLIC BLOOD PRESSURE: 75 MMHG | HEART RATE: 98 BPM | WEIGHT: 188.2 LBS | TEMPERATURE: 98.2 F | BODY MASS INDEX: 32.82 KG/M2 | SYSTOLIC BLOOD PRESSURE: 110 MMHG | OXYGEN SATURATION: 93 % | RESPIRATION RATE: 18 BRPM

## 2023-04-05 DIAGNOSIS — C50.912 MALIGNANT NEOPLASM OF LEFT BREAST IN FEMALE, ESTROGEN RECEPTOR POSITIVE (HCC): ICD-10-CM

## 2023-04-05 DIAGNOSIS — Z17.0 MALIGNANT NEOPLASM OF LEFT BREAST IN FEMALE, ESTROGEN RECEPTOR POSITIVE, UNSPECIFIED SITE OF BREAST (HCC): ICD-10-CM

## 2023-04-05 DIAGNOSIS — C50.912 MALIGNANT NEOPLASM OF LEFT BREAST IN FEMALE, ESTROGEN RECEPTOR POSITIVE, UNSPECIFIED SITE OF BREAST (HCC): ICD-10-CM

## 2023-04-05 DIAGNOSIS — Z17.0 MALIGNANT NEOPLASM OF LEFT BREAST IN FEMALE, ESTROGEN RECEPTOR POSITIVE (HCC): ICD-10-CM

## 2023-04-05 DIAGNOSIS — C79.51 METASTASIS TO BONE (HCC): Primary | ICD-10-CM

## 2023-04-05 LAB
ALBUMIN SERPL-MCNC: 4 G/DL (ref 3.5–5)
ALBUMIN/GLOB SERPL: 1 (ref 0.4–1.6)
ALP SERPL-CCNC: 72 U/L (ref 50–136)
ALT SERPL-CCNC: 50 U/L (ref 12–65)
ANION GAP SERPL CALC-SCNC: 7 MMOL/L (ref 2–11)
AST SERPL-CCNC: 36 U/L (ref 15–37)
BILIRUB SERPL-MCNC: 0.3 MG/DL (ref 0.2–1.1)
BUN SERPL-MCNC: 15 MG/DL (ref 6–23)
CALCIUM SERPL-MCNC: 9.5 MG/DL (ref 8.3–10.4)
CHLORIDE SERPL-SCNC: 107 MMOL/L (ref 101–110)
CO2 SERPL-SCNC: 26 MMOL/L (ref 21–32)
CREAT SERPL-MCNC: 1.1 MG/DL (ref 0.6–1)
GLOBULIN SER CALC-MCNC: 4.2 G/DL (ref 2.8–4.5)
GLUCOSE SERPL-MCNC: 119 MG/DL (ref 65–100)
MAGNESIUM SERPL-MCNC: 2.2 MG/DL (ref 1.8–2.4)
PHOSPHATE SERPL-MCNC: 4 MG/DL (ref 2.5–4.5)
POTASSIUM SERPL-SCNC: 3.4 MMOL/L (ref 3.5–5.1)
PROT SERPL-MCNC: 8.2 G/DL (ref 6.3–8.2)
SODIUM SERPL-SCNC: 140 MMOL/L (ref 133–143)

## 2023-04-05 PROCEDURE — 83735 ASSAY OF MAGNESIUM: CPT

## 2023-04-05 PROCEDURE — 6360000002 HC RX W HCPCS: Performed by: INTERNAL MEDICINE

## 2023-04-05 PROCEDURE — 96372 THER/PROPH/DIAG INJ SC/IM: CPT

## 2023-04-05 PROCEDURE — 84100 ASSAY OF PHOSPHORUS: CPT

## 2023-04-05 PROCEDURE — 36415 COLL VENOUS BLD VENIPUNCTURE: CPT

## 2023-04-05 PROCEDURE — 80053 COMPREHEN METABOLIC PANEL: CPT

## 2023-04-05 RX ORDER — ONDANSETRON 2 MG/ML
8 INJECTION INTRAMUSCULAR; INTRAVENOUS
OUTPATIENT
Start: 2023-05-03

## 2023-04-05 RX ORDER — ALBUTEROL SULFATE 90 UG/1
4 AEROSOL, METERED RESPIRATORY (INHALATION) PRN
OUTPATIENT
Start: 2023-05-03

## 2023-04-05 RX ORDER — EPINEPHRINE 1 MG/ML
0.3 INJECTION, SOLUTION, CONCENTRATE INTRAVENOUS PRN
OUTPATIENT
Start: 2023-05-03

## 2023-04-05 RX ORDER — ACETAMINOPHEN 325 MG/1
650 TABLET ORAL
OUTPATIENT
Start: 2023-05-03

## 2023-04-05 RX ORDER — DIPHENHYDRAMINE HYDROCHLORIDE 50 MG/ML
50 INJECTION INTRAMUSCULAR; INTRAVENOUS
OUTPATIENT
Start: 2023-05-03

## 2023-04-05 RX ORDER — SODIUM CHLORIDE 9 MG/ML
INJECTION, SOLUTION INTRAVENOUS CONTINUOUS
OUTPATIENT
Start: 2023-05-03

## 2023-04-05 RX ADMIN — DENOSUMAB 120 MG: 120 INJECTION SUBCUTANEOUS at 15:10

## 2023-04-05 NOTE — PROGRESS NOTES
Arrived to the [facility]. Xgeva injection completed. Provided education on Xgeva    Patient instructed to report any side affects to ordering provider. Patient tolerated well. Any issues or concerns during appointment: no  Patient aware of next infusion appointment on 5/3/2023 (date) at 36 (time). Discharged ambulatory.

## 2023-04-07 ENCOUNTER — CARE COORDINATION (OUTPATIENT)
Dept: CARE COORDINATION | Facility: CLINIC | Age: 53
End: 2023-04-07

## 2023-04-19 ENCOUNTER — OFFICE VISIT (OUTPATIENT)
Dept: PULMONOLOGY | Age: 53
End: 2023-04-19
Payer: COMMERCIAL

## 2023-04-19 VITALS
OXYGEN SATURATION: 98 % | HEIGHT: 64 IN | BODY MASS INDEX: 31.76 KG/M2 | RESPIRATION RATE: 20 BRPM | WEIGHT: 186 LBS | HEART RATE: 74 BPM | TEMPERATURE: 98 F | DIASTOLIC BLOOD PRESSURE: 80 MMHG | SYSTOLIC BLOOD PRESSURE: 140 MMHG

## 2023-04-19 DIAGNOSIS — Z98.890 POSTOPERATIVE HYPOXIA: ICD-10-CM

## 2023-04-19 DIAGNOSIS — R05.3 CHRONIC COUGH: Primary | ICD-10-CM

## 2023-04-19 DIAGNOSIS — R09.02 POSTOPERATIVE HYPOXIA: ICD-10-CM

## 2023-04-19 DIAGNOSIS — R06.09 DOE (DYSPNEA ON EXERTION): ICD-10-CM

## 2023-04-19 PROCEDURE — 3077F SYST BP >= 140 MM HG: CPT | Performed by: INTERNAL MEDICINE

## 2023-04-19 PROCEDURE — 99204 OFFICE O/P NEW MOD 45 MIN: CPT | Performed by: INTERNAL MEDICINE

## 2023-04-19 PROCEDURE — 3079F DIAST BP 80-89 MM HG: CPT | Performed by: INTERNAL MEDICINE

## 2023-04-19 NOTE — PROGRESS NOTES
metastatic bone disease. On palliative chemo with good response. She is a never smoker. She underwent partial mastectomy with axillary lymph node excision. Post op she desaturated even on 2L with sleep. She was given albuterol neb and reversal, IV solumedrol. Sats high 90's on RA. Then given pain medication and sats dropped again to 76%. CT PE was done 3/8/23 with no PE, mild edema. She states before she started ibrance for her breast cancer she had a slight cold and cough at that time (since July 2022). Associated with congestion. Worse at night. Used to have violent coughing fits. Having some pain on the left side when she lays down. She reports issues with GERD. Was just started on pepcid 20 mg bid, and now cough is better, fairly well controlled. Sometimes can wake up with sinus congestion. She has claritin but not taking on a regular basis. No history of asthma but states she was given an inhaler at one point. 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 Had issues with respiratory infections. That improved when out of that environment. Pet - dog. No known inhaled exposure.  describes snoring but no apneas. She reports fatigue/shortness of breath walking up a flight of stairs. REVIEW OF SYSTEMS: 10 point review of systems is negative except as reported in HPI. PHYSICAL EXAM: Body mass index is 32.43 kg/m². Vitals:    04/19/23 0830   BP: (!) 140/80   Pulse: 74   Resp: 20   Temp: 98 °F (36.7 °C)   TempSrc: Temporal   SpO2: 98%   Weight: 186 lb (84.4 kg)   Height: 5' 3.5\" (1.613 m)         General:   Alert, cooperative, no distress, appears stated age. Eyes:   Conjunctivae/corneas clear. PERRL        Mouth/Throat:  Lips, mucosa, and tongue normal. Teeth and gums normal.        Lungs:     CTA B, no w/r/r     Heart:   Regular rate and rhythm, S1, S2 normal, no murmur, click, rub or gallop. Abdomen:    Soft, non-tender.      Extremities:

## 2023-04-26 ENCOUNTER — NURSE ONLY (OUTPATIENT)
Dept: PULMONOLOGY | Age: 53
End: 2023-04-26

## 2023-04-26 DIAGNOSIS — R05.3 CHRONIC COUGH: Primary | ICD-10-CM

## 2023-04-26 LAB
FEV 1 , POC: 2.38 L
FEV1 % PRED, POC: 93 %
FEV1/FVC, POC: NORMAL
FVC % PRED, POC: 89 %
FVC, POC: NORMAL

## 2023-04-26 ASSESSMENT — PULMONARY FUNCTION TESTS
FEV1_PERCENT_PREDICTED_POC: 93
FVC_PERCENT_PREDICTED_POC: 89

## 2023-05-03 ENCOUNTER — HOSPITAL ENCOUNTER (OUTPATIENT)
Dept: RADIATION ONCOLOGY | Age: 53
Setting detail: RECURRING SERIES
Discharge: HOME | End: 2023-05-06
Payer: COMMERCIAL

## 2023-05-03 ENCOUNTER — HOSPITAL ENCOUNTER (OUTPATIENT)
Dept: LAB | Age: 53
Discharge: HOME | End: 2023-05-06

## 2023-05-03 ENCOUNTER — HOSPITAL ENCOUNTER (OUTPATIENT)
Dept: INFUSION THERAPY | Age: 53
Discharge: HOME | End: 2023-05-03
Payer: COMMERCIAL

## 2023-05-03 VITALS
BODY MASS INDEX: 32.9 KG/M2 | RESPIRATION RATE: 18 BRPM | OXYGEN SATURATION: 98 % | WEIGHT: 188.7 LBS | DIASTOLIC BLOOD PRESSURE: 91 MMHG | SYSTOLIC BLOOD PRESSURE: 128 MMHG | TEMPERATURE: 97.4 F | HEART RATE: 84 BPM

## 2023-05-03 VITALS
HEART RATE: 78 BPM | SYSTOLIC BLOOD PRESSURE: 125 MMHG | BODY MASS INDEX: 32.78 KG/M2 | RESPIRATION RATE: 18 BRPM | DIASTOLIC BLOOD PRESSURE: 78 MMHG | WEIGHT: 188 LBS | TEMPERATURE: 97.8 F

## 2023-05-03 DIAGNOSIS — C79.51 METASTASIS TO BONE (HCC): Primary | ICD-10-CM

## 2023-05-03 DIAGNOSIS — C50.912 MALIGNANT NEOPLASM OF LEFT BREAST IN FEMALE, ESTROGEN RECEPTOR POSITIVE, UNSPECIFIED SITE OF BREAST (HCC): ICD-10-CM

## 2023-05-03 DIAGNOSIS — Z17.0 MALIGNANT NEOPLASM OF LEFT BREAST IN FEMALE, ESTROGEN RECEPTOR POSITIVE, UNSPECIFIED SITE OF BREAST (HCC): ICD-10-CM

## 2023-05-03 LAB
ALBUMIN SERPL-MCNC: 3.9 G/DL (ref 3.5–5)
ALBUMIN/GLOB SERPL: 1 (ref 0.4–1.6)
ALP SERPL-CCNC: 64 U/L (ref 50–136)
ALT SERPL-CCNC: 73 U/L (ref 12–65)
ANION GAP SERPL CALC-SCNC: 9 MMOL/L (ref 2–11)
AST SERPL-CCNC: 48 U/L (ref 15–37)
BILIRUB SERPL-MCNC: 0.3 MG/DL (ref 0.2–1.1)
BUN SERPL-MCNC: 12 MG/DL (ref 6–23)
CALCIUM SERPL-MCNC: 9.1 MG/DL (ref 8.3–10.4)
CHLORIDE SERPL-SCNC: 107 MMOL/L (ref 101–110)
CO2 SERPL-SCNC: 26 MMOL/L (ref 21–32)
CREAT SERPL-MCNC: 1 MG/DL (ref 0.6–1)
GLOBULIN SER CALC-MCNC: 4.1 G/DL (ref 2.8–4.5)
GLUCOSE SERPL-MCNC: 111 MG/DL (ref 65–100)
MAGNESIUM SERPL-MCNC: 2 MG/DL (ref 1.8–2.4)
PHOSPHATE SERPL-MCNC: 2.5 MG/DL (ref 2.5–4.5)
POTASSIUM SERPL-SCNC: 3.2 MMOL/L (ref 3.5–5.1)
PROT SERPL-MCNC: 8 G/DL (ref 6.3–8.2)
SODIUM SERPL-SCNC: 142 MMOL/L (ref 133–143)

## 2023-05-03 PROCEDURE — 84100 ASSAY OF PHOSPHORUS: CPT

## 2023-05-03 PROCEDURE — 6360000002 HC RX W HCPCS: Performed by: INTERNAL MEDICINE

## 2023-05-03 PROCEDURE — 36415 COLL VENOUS BLD VENIPUNCTURE: CPT

## 2023-05-03 PROCEDURE — 83735 ASSAY OF MAGNESIUM: CPT

## 2023-05-03 PROCEDURE — 80053 COMPREHEN METABOLIC PANEL: CPT

## 2023-05-03 PROCEDURE — 96372 THER/PROPH/DIAG INJ SC/IM: CPT

## 2023-05-03 PROCEDURE — 99211 OFF/OP EST MAY X REQ PHY/QHP: CPT

## 2023-05-03 RX ORDER — ONDANSETRON 2 MG/ML
8 INJECTION INTRAMUSCULAR; INTRAVENOUS
OUTPATIENT
Start: 2023-05-31

## 2023-05-03 RX ORDER — SODIUM CHLORIDE 9 MG/ML
INJECTION, SOLUTION INTRAVENOUS CONTINUOUS
OUTPATIENT
Start: 2023-05-31

## 2023-05-03 RX ORDER — EPINEPHRINE 1 MG/ML
0.3 INJECTION, SOLUTION, CONCENTRATE INTRAVENOUS PRN
OUTPATIENT
Start: 2023-05-31

## 2023-05-03 RX ORDER — ACETAMINOPHEN 325 MG/1
650 TABLET ORAL
OUTPATIENT
Start: 2023-05-31

## 2023-05-03 RX ORDER — ALBUTEROL SULFATE 90 UG/1
4 AEROSOL, METERED RESPIRATORY (INHALATION) PRN
OUTPATIENT
Start: 2023-05-31

## 2023-05-03 RX ORDER — DIPHENHYDRAMINE HYDROCHLORIDE 50 MG/ML
50 INJECTION INTRAMUSCULAR; INTRAVENOUS
Status: DISCONTINUED | OUTPATIENT
Start: 2023-05-03 | End: 2023-05-04 | Stop reason: HOSPADM

## 2023-05-03 RX ORDER — DIPHENHYDRAMINE HYDROCHLORIDE 50 MG/ML
50 INJECTION INTRAMUSCULAR; INTRAVENOUS
OUTPATIENT
Start: 2023-05-31

## 2023-05-03 RX ADMIN — DENOSUMAB 120 MG: 120 INJECTION SUBCUTANEOUS at 12:13

## 2023-05-03 NOTE — PROGRESS NOTES
Patient: Crystal Han MRN: [id number]  SSN: [id number]    YOB: 1970  Age: 46 y.o. Sex: female      Other Providers:  Dr. Sherri Laughlin / Dr. Jn Solano: Breast cancer     DIAGNOSIS: Left breast cancer, cT2,N1,M1  S/P  Ibrance and letrozole     PREVIOUS RADIATION TREATMENT:  None    HISTORY OF PRESENT ILLNESS:  Crystal Han is a 46 y.o. female who I am seeing at the request of Sherri Laughlin. The patient was in her normal state of health when she presented for a screening mammogram on 6/2/2022. She was found to have a lesion in the left breast and she was scheduled for diagnostic mammography and ultrasound. She was found to have a lesion in the upper outer quadrant of the left breast as well as a suspicious node in the left axilla. The breast lesion as well as the node were biopsied under ultrasound guidance. The pathology demonstrated a well differentiated infiltrating ductal carcinoma with lobular features which was ER positive at 93 and OH and HER2 negative. The biopsy of the lymph node showed metastatic disease consistent with a breast primary. She underwent staging with a PET scan and unfortunately this demonstrated positivity in the T10 vertebral body as well as the sacrum along with the left breast and axilla. Subsequent biopsy of the T10 vertebral body demonstrated metastatic disease. With the diagnosis of her stage IV disease, she was started on Ibrance and letrozole. She had a good response noted on PET scans in October 2022 and January 2023. There was still some uptake in the breast and axilla but there was no uptake in the bone lesions. She was then referred to Dr. Shamika Calderon for consideration of surgery. A left partial mastectomy and sentinel node procedure was carried out on 3/8/2023. Pathology demonstrated a residual 1.9 x 1.9 x 1.8 cm mass which positive for a low-grade infiltrating ductal carcinoma.   There were changes consistent with prior biopsy site but no

## 2023-05-03 NOTE — PROGRESS NOTES
Consult for (L) Breast Cancer:      01/19/2023- PET CT, findings of positive treatment response with resolution of previous uptake within sclerotic lesions of the S1 and T10 vertebral bodies. 03/08/2023-Lumpectomy and sentinel Biopsy=IDC Low Grade,well diff. , with margins negative, lump and L axillary sentinel node removed. Patient arrives today accompanied by . No previous RT  No c/o pain but does state there is a small amount of soreness with movement. No difficulties with ROM right or left arm. No difficulties laying flat. Radiation Teaching Provided  Skin Care Worksheet Provided and explained  Frequently Asked Questions Sheet Provided  Opportunity for questions and clarification provided  Patient on Skylar [place], started cycle today May 3 cycle ends 5/24/23, will sim last week of taking med and proceed with tx 7-10 days after end of cycle.   CONSENTS SIGNED  SIM SCHEDULED

## 2023-05-03 NOTE — PROGRESS NOTES
Arrived to the [facility]. thomasva completed. Provided education on injection    Patient instructed to report any side affects to ordering provider. Patient tolerated well. Any issues or concerns during appointment: none. Patient aware of next infusion appointment on 5/31/2023 (date) at 36 (time). Discharged ambulatory.

## 2023-05-18 ENCOUNTER — HOSPITAL ENCOUNTER (OUTPATIENT)
Dept: RADIATION ONCOLOGY | Age: 53
Setting detail: RECURRING SERIES
Discharge: HOME | End: 2023-05-21
Payer: COMMERCIAL

## 2023-05-18 PROCEDURE — 77332 RADIATION TREATMENT AID(S): CPT

## 2023-05-18 PROCEDURE — 77290 THER RAD SIMULAJ FIELD CPLX: CPT

## 2023-05-23 ENCOUNTER — HOSPITAL ENCOUNTER (OUTPATIENT)
Dept: RADIATION ONCOLOGY | Age: 53
Setting detail: RECURRING SERIES
Discharge: HOME | End: 2023-05-26
Payer: COMMERCIAL

## 2023-05-23 PROCEDURE — 77334 RADIATION TREATMENT AID(S): CPT

## 2023-05-23 PROCEDURE — 77295 3-D RADIOTHERAPY PLAN: CPT

## 2023-05-23 PROCEDURE — 77300 RADIATION THERAPY DOSE PLAN: CPT

## 2023-05-25 ENCOUNTER — APPOINTMENT (OUTPATIENT)
Dept: RADIATION ONCOLOGY | Age: 53
End: 2023-05-25
Payer: COMMERCIAL

## 2023-05-31 ENCOUNTER — HOSPITAL ENCOUNTER (OUTPATIENT)
Dept: LAB | Age: 53
Discharge: HOME | End: 2023-06-03
Payer: COMMERCIAL

## 2023-05-31 ENCOUNTER — HOSPITAL ENCOUNTER (OUTPATIENT)
Dept: RADIATION ONCOLOGY | Age: 53
Setting detail: RECURRING SERIES
Discharge: HOME | End: 2023-06-03
Payer: COMMERCIAL

## 2023-05-31 ENCOUNTER — HOSPITAL ENCOUNTER (OUTPATIENT)
Dept: INFUSION THERAPY | Age: 53
Discharge: HOME | End: 2023-05-31
Payer: COMMERCIAL

## 2023-05-31 VITALS
OXYGEN SATURATION: 100 % | SYSTOLIC BLOOD PRESSURE: 136 MMHG | DIASTOLIC BLOOD PRESSURE: 76 MMHG | TEMPERATURE: 98 F | RESPIRATION RATE: 18 BRPM | HEART RATE: 77 BPM

## 2023-05-31 DIAGNOSIS — Z17.0 MALIGNANT NEOPLASM OF LEFT BREAST IN FEMALE, ESTROGEN RECEPTOR POSITIVE, UNSPECIFIED SITE OF BREAST (HCC): ICD-10-CM

## 2023-05-31 DIAGNOSIS — C79.51 METASTASIS TO BONE (HCC): ICD-10-CM

## 2023-05-31 DIAGNOSIS — C50.912 MALIGNANT NEOPLASM OF LEFT BREAST IN FEMALE, ESTROGEN RECEPTOR POSITIVE, UNSPECIFIED SITE OF BREAST (HCC): ICD-10-CM

## 2023-05-31 DIAGNOSIS — C50.912 MALIGNANT NEOPLASM OF LEFT BREAST IN FEMALE, ESTROGEN RECEPTOR POSITIVE, UNSPECIFIED SITE OF BREAST (HCC): Primary | ICD-10-CM

## 2023-05-31 DIAGNOSIS — Z17.0 MALIGNANT NEOPLASM OF LEFT BREAST IN FEMALE, ESTROGEN RECEPTOR POSITIVE, UNSPECIFIED SITE OF BREAST (HCC): Primary | ICD-10-CM

## 2023-05-31 LAB
ALBUMIN SERPL-MCNC: 3.8 G/DL (ref 3.5–5)
ALBUMIN/GLOB SERPL: 1 (ref 0.4–1.6)
ALP SERPL-CCNC: 86 U/L (ref 50–136)
ALT SERPL-CCNC: 86 U/L (ref 12–65)
ANION GAP SERPL CALC-SCNC: 6 MMOL/L (ref 2–11)
AST SERPL-CCNC: 47 U/L (ref 15–37)
BILIRUB SERPL-MCNC: 0.4 MG/DL (ref 0.2–1.1)
BUN SERPL-MCNC: 17 MG/DL (ref 6–23)
CALCIUM SERPL-MCNC: 9 MG/DL (ref 8.3–10.4)
CHLORIDE SERPL-SCNC: 110 MMOL/L (ref 101–110)
CO2 SERPL-SCNC: 28 MMOL/L (ref 21–32)
CREAT SERPL-MCNC: 0.9 MG/DL (ref 0.6–1)
GLOBULIN SER CALC-MCNC: 3.9 G/DL (ref 2.8–4.5)
GLUCOSE SERPL-MCNC: 132 MG/DL (ref 65–100)
MAGNESIUM SERPL-MCNC: 2.1 MG/DL (ref 1.8–2.4)
PHOSPHATE SERPL-MCNC: 2.9 MG/DL (ref 2.5–4.5)
POTASSIUM SERPL-SCNC: 3.7 MMOL/L (ref 3.5–5.1)
PROT SERPL-MCNC: 7.7 G/DL (ref 6.3–8.2)
SODIUM SERPL-SCNC: 144 MMOL/L (ref 133–143)

## 2023-05-31 PROCEDURE — 36415 COLL VENOUS BLD VENIPUNCTURE: CPT

## 2023-05-31 PROCEDURE — 6360000002 HC RX W HCPCS: Performed by: INTERNAL MEDICINE

## 2023-05-31 PROCEDURE — 80053 COMPREHEN METABOLIC PANEL: CPT

## 2023-05-31 PROCEDURE — 77412 RADIATION TX DELIVERY LVL 3: CPT

## 2023-05-31 PROCEDURE — 84100 ASSAY OF PHOSPHORUS: CPT

## 2023-05-31 PROCEDURE — 96372 THER/PROPH/DIAG INJ SC/IM: CPT

## 2023-05-31 PROCEDURE — 77280 THER RAD SIMULAJ FIELD SMPL: CPT

## 2023-05-31 PROCEDURE — 83735 ASSAY OF MAGNESIUM: CPT

## 2023-05-31 RX ORDER — ACETAMINOPHEN 325 MG/1
650 TABLET ORAL
OUTPATIENT
Start: 2023-06-28

## 2023-05-31 RX ORDER — DIPHENHYDRAMINE HYDROCHLORIDE 50 MG/ML
50 INJECTION INTRAMUSCULAR; INTRAVENOUS
OUTPATIENT
Start: 2023-06-28

## 2023-05-31 RX ORDER — SODIUM CHLORIDE 9 MG/ML
INJECTION, SOLUTION INTRAVENOUS CONTINUOUS
OUTPATIENT
Start: 2023-06-28

## 2023-05-31 RX ORDER — ONDANSETRON 2 MG/ML
8 INJECTION INTRAMUSCULAR; INTRAVENOUS
OUTPATIENT
Start: 2023-06-28

## 2023-05-31 RX ORDER — ALBUTEROL SULFATE 90 UG/1
4 AEROSOL, METERED RESPIRATORY (INHALATION) PRN
OUTPATIENT
Start: 2023-06-28

## 2023-05-31 RX ORDER — EPINEPHRINE 1 MG/ML
0.3 INJECTION, SOLUTION, CONCENTRATE INTRAVENOUS PRN
OUTPATIENT
Start: 2023-06-28

## 2023-05-31 RX ADMIN — DENOSUMAB 120 MG: 120 INJECTION SUBCUTANEOUS at 11:21

## 2023-05-31 NOTE — PROGRESS NOTES
Arrived to the [facility]. Xgeva completed. Provided education on Xgeva    Patient instructed to report any side affects to ordering provider. Patient tolerated well. Any issues or concerns during appointment: none. Discharged ambulatory.

## 2023-06-01 ENCOUNTER — HOSPITAL ENCOUNTER (OUTPATIENT)
Dept: RADIATION ONCOLOGY | Age: 53
Setting detail: RECURRING SERIES
Discharge: HOME | End: 2023-06-04
Payer: COMMERCIAL

## 2023-06-01 PROCEDURE — 77412 RADIATION TX DELIVERY LVL 3: CPT

## 2023-06-02 ENCOUNTER — HOSPITAL ENCOUNTER (OUTPATIENT)
Dept: RADIATION ONCOLOGY | Age: 53
Setting detail: RECURRING SERIES
Discharge: HOME | End: 2023-06-05
Payer: COMMERCIAL

## 2023-06-02 PROCEDURE — 77412 RADIATION TX DELIVERY LVL 3: CPT

## 2023-06-05 ENCOUNTER — HOSPITAL ENCOUNTER (OUTPATIENT)
Dept: RADIATION ONCOLOGY | Age: 53
Setting detail: RECURRING SERIES
Discharge: HOME | End: 2023-06-08
Payer: COMMERCIAL

## 2023-06-05 PROCEDURE — 77412 RADIATION TX DELIVERY LVL 3: CPT

## 2023-06-06 ENCOUNTER — HOSPITAL ENCOUNTER (OUTPATIENT)
Dept: RADIATION ONCOLOGY | Age: 53
Setting detail: RECURRING SERIES
Discharge: HOME | End: 2023-06-09
Payer: COMMERCIAL

## 2023-06-06 PROCEDURE — 77412 RADIATION TX DELIVERY LVL 3: CPT

## 2023-06-06 PROCEDURE — 77336 RADIATION PHYSICS CONSULT: CPT

## 2023-06-07 ENCOUNTER — HOSPITAL ENCOUNTER (OUTPATIENT)
Dept: RADIATION ONCOLOGY | Age: 53
Setting detail: RECURRING SERIES
Discharge: HOME | End: 2023-06-10
Payer: COMMERCIAL

## 2023-06-07 PROCEDURE — 77412 RADIATION TX DELIVERY LVL 3: CPT

## 2023-06-07 PROCEDURE — 77417 THER RADIOLOGY PORT IMAGE(S): CPT

## 2023-06-12 ENCOUNTER — TELEPHONE (OUTPATIENT)
Dept: ONCOLOGY | Age: 53
End: 2023-06-12

## 2023-06-15 ENCOUNTER — HOSPITAL ENCOUNTER (OUTPATIENT)
Dept: RADIATION ONCOLOGY | Age: 53
Setting detail: RECURRING SERIES
Discharge: HOME | End: 2023-06-18
Payer: COMMERCIAL

## 2023-06-15 PROCEDURE — 77412 RADIATION TX DELIVERY LVL 3: CPT

## 2023-06-16 ENCOUNTER — HOSPITAL ENCOUNTER (OUTPATIENT)
Dept: RADIATION ONCOLOGY | Age: 53
Setting detail: RECURRING SERIES
Discharge: HOME | End: 2023-06-19
Payer: COMMERCIAL

## 2023-06-16 PROCEDURE — 77412 RADIATION TX DELIVERY LVL 3: CPT

## 2023-06-19 ENCOUNTER — HOSPITAL ENCOUNTER (OUTPATIENT)
Dept: RADIATION ONCOLOGY | Age: 53
Setting detail: RECURRING SERIES
Discharge: HOME | End: 2023-06-22
Payer: COMMERCIAL

## 2023-06-19 LAB
RAD ONC ARIA COURSE FIRST TREATMENT DATE: NORMAL
RAD ONC ARIA COURSE ID: NORMAL
RAD ONC ARIA COURSE INTENT: NORMAL
RAD ONC ARIA COURSE LAST TREATMENT DATE: NORMAL
RAD ONC ARIA COURSE SESSION NUMBER: 14
RAD ONC ARIA COURSE START DATE: NORMAL
RAD ONC ARIA COURSE TREATMENT ELAPSED DAYS: 19
RAD ONC ARIA PLAN FRACTIONS TREATED TO DATE: 14
RAD ONC ARIA PLAN FRACTIONS TREATED TO DATE: 14
RAD ONC ARIA PLAN ID: NORMAL
RAD ONC ARIA PLAN ID: NORMAL
RAD ONC ARIA PLAN PRESCRIBED DOSE PER FRACTION: 2 GY
RAD ONC ARIA PLAN PRESCRIBED DOSE PER FRACTION: 2 GY
RAD ONC ARIA PLAN PRIMARY REFERENCE POINT: NORMAL
RAD ONC ARIA PLAN PRIMARY REFERENCE POINT: NORMAL
RAD ONC ARIA PLAN TOTAL FRACTIONS PRESCRIBED: 25
RAD ONC ARIA PLAN TOTAL FRACTIONS PRESCRIBED: 25
RAD ONC ARIA PLAN TOTAL PRESCRIBED DOSE: 5000 CGY
RAD ONC ARIA PLAN TOTAL PRESCRIBED DOSE: 5000 CGY
RAD ONC ARIA REFERENCE POINT DOSAGE GIVEN TO DATE: 26.86 GY
RAD ONC ARIA REFERENCE POINT DOSAGE GIVEN TO DATE: 28 GY
RAD ONC ARIA REFERENCE POINT DOSAGE GIVEN TO DATE: 28 GY
RAD ONC ARIA REFERENCE POINT DOSAGE GIVEN TO DATE: 28.04 GY
RAD ONC ARIA REFERENCE POINT ID: NORMAL
RAD ONC ARIA REFERENCE POINT SESSION DOSAGE GIVEN: 1.92 GY
RAD ONC ARIA REFERENCE POINT SESSION DOSAGE GIVEN: 2 GY

## 2023-06-19 PROCEDURE — 77412 RADIATION TX DELIVERY LVL 3: CPT

## 2023-06-20 ENCOUNTER — HOSPITAL ENCOUNTER (OUTPATIENT)
Dept: RADIATION ONCOLOGY | Age: 53
Setting detail: RECURRING SERIES
Discharge: HOME | End: 2023-06-23
Payer: COMMERCIAL

## 2023-06-20 LAB
RAD ONC ARIA COURSE FIRST TREATMENT DATE: NORMAL
RAD ONC ARIA COURSE ID: NORMAL
RAD ONC ARIA COURSE INTENT: NORMAL
RAD ONC ARIA COURSE LAST TREATMENT DATE: NORMAL
RAD ONC ARIA COURSE SESSION NUMBER: 15
RAD ONC ARIA COURSE START DATE: NORMAL
RAD ONC ARIA COURSE TREATMENT ELAPSED DAYS: 20
RAD ONC ARIA PLAN FRACTIONS TREATED TO DATE: 15
RAD ONC ARIA PLAN FRACTIONS TREATED TO DATE: 15
RAD ONC ARIA PLAN ID: NORMAL
RAD ONC ARIA PLAN ID: NORMAL
RAD ONC ARIA PLAN PRESCRIBED DOSE PER FRACTION: 2 GY
RAD ONC ARIA PLAN PRESCRIBED DOSE PER FRACTION: 2 GY
RAD ONC ARIA PLAN PRIMARY REFERENCE POINT: NORMAL
RAD ONC ARIA PLAN PRIMARY REFERENCE POINT: NORMAL
RAD ONC ARIA PLAN TOTAL FRACTIONS PRESCRIBED: 25
RAD ONC ARIA PLAN TOTAL FRACTIONS PRESCRIBED: 25
RAD ONC ARIA PLAN TOTAL PRESCRIBED DOSE: 5000 CGY
RAD ONC ARIA PLAN TOTAL PRESCRIBED DOSE: 5000 CGY
RAD ONC ARIA REFERENCE POINT DOSAGE GIVEN TO DATE: 28.78 GY
RAD ONC ARIA REFERENCE POINT DOSAGE GIVEN TO DATE: 30 GY
RAD ONC ARIA REFERENCE POINT DOSAGE GIVEN TO DATE: 30 GY
RAD ONC ARIA REFERENCE POINT DOSAGE GIVEN TO DATE: 30.05 GY
RAD ONC ARIA REFERENCE POINT ID: NORMAL
RAD ONC ARIA REFERENCE POINT SESSION DOSAGE GIVEN: 1.92 GY
RAD ONC ARIA REFERENCE POINT SESSION DOSAGE GIVEN: 2 GY

## 2023-06-20 PROCEDURE — 77336 RADIATION PHYSICS CONSULT: CPT

## 2023-06-20 PROCEDURE — 77412 RADIATION TX DELIVERY LVL 3: CPT

## 2023-06-21 ENCOUNTER — HOSPITAL ENCOUNTER (OUTPATIENT)
Dept: RADIATION ONCOLOGY | Age: 53
Setting detail: RECURRING SERIES
Discharge: HOME | End: 2023-06-24
Payer: COMMERCIAL

## 2023-06-21 LAB
RAD ONC ARIA COURSE FIRST TREATMENT DATE: NORMAL
RAD ONC ARIA COURSE ID: NORMAL
RAD ONC ARIA COURSE INTENT: NORMAL
RAD ONC ARIA COURSE LAST TREATMENT DATE: NORMAL
RAD ONC ARIA COURSE SESSION NUMBER: 16
RAD ONC ARIA COURSE START DATE: NORMAL
RAD ONC ARIA COURSE TREATMENT ELAPSED DAYS: 21
RAD ONC ARIA PLAN FRACTIONS TREATED TO DATE: 16
RAD ONC ARIA PLAN FRACTIONS TREATED TO DATE: 16
RAD ONC ARIA PLAN ID: NORMAL
RAD ONC ARIA PLAN ID: NORMAL
RAD ONC ARIA PLAN PRESCRIBED DOSE PER FRACTION: 2 GY
RAD ONC ARIA PLAN PRESCRIBED DOSE PER FRACTION: 2 GY
RAD ONC ARIA PLAN PRIMARY REFERENCE POINT: NORMAL
RAD ONC ARIA PLAN PRIMARY REFERENCE POINT: NORMAL
RAD ONC ARIA PLAN TOTAL FRACTIONS PRESCRIBED: 25
RAD ONC ARIA PLAN TOTAL FRACTIONS PRESCRIBED: 25
RAD ONC ARIA PLAN TOTAL PRESCRIBED DOSE: 5000 CGY
RAD ONC ARIA PLAN TOTAL PRESCRIBED DOSE: 5000 CGY
RAD ONC ARIA REFERENCE POINT DOSAGE GIVEN TO DATE: 30.7 GY
RAD ONC ARIA REFERENCE POINT DOSAGE GIVEN TO DATE: 32 GY
RAD ONC ARIA REFERENCE POINT DOSAGE GIVEN TO DATE: 32 GY
RAD ONC ARIA REFERENCE POINT DOSAGE GIVEN TO DATE: 32.05 GY
RAD ONC ARIA REFERENCE POINT ID: NORMAL
RAD ONC ARIA REFERENCE POINT SESSION DOSAGE GIVEN: 1.92 GY
RAD ONC ARIA REFERENCE POINT SESSION DOSAGE GIVEN: 2 GY

## 2023-06-21 PROCEDURE — 77417 THER RADIOLOGY PORT IMAGE(S): CPT

## 2023-06-21 PROCEDURE — 77412 RADIATION TX DELIVERY LVL 3: CPT

## 2023-06-22 ENCOUNTER — HOSPITAL ENCOUNTER (OUTPATIENT)
Dept: RADIATION ONCOLOGY | Age: 53
Setting detail: RECURRING SERIES
End: 2023-06-22
Payer: COMMERCIAL

## 2023-06-22 LAB
RAD ONC ARIA COURSE FIRST TREATMENT DATE: NORMAL
RAD ONC ARIA COURSE ID: NORMAL
RAD ONC ARIA COURSE INTENT: NORMAL
RAD ONC ARIA COURSE LAST TREATMENT DATE: NORMAL
RAD ONC ARIA COURSE SESSION NUMBER: 17
RAD ONC ARIA COURSE START DATE: NORMAL
RAD ONC ARIA COURSE TREATMENT ELAPSED DAYS: 22
RAD ONC ARIA PLAN FRACTIONS TREATED TO DATE: 17
RAD ONC ARIA PLAN FRACTIONS TREATED TO DATE: 17
RAD ONC ARIA PLAN ID: NORMAL
RAD ONC ARIA PLAN ID: NORMAL
RAD ONC ARIA PLAN PRESCRIBED DOSE PER FRACTION: 2 GY
RAD ONC ARIA PLAN PRESCRIBED DOSE PER FRACTION: 2 GY
RAD ONC ARIA PLAN PRIMARY REFERENCE POINT: NORMAL
RAD ONC ARIA PLAN PRIMARY REFERENCE POINT: NORMAL
RAD ONC ARIA PLAN TOTAL FRACTIONS PRESCRIBED: 25
RAD ONC ARIA PLAN TOTAL FRACTIONS PRESCRIBED: 25
RAD ONC ARIA PLAN TOTAL PRESCRIBED DOSE: 5000 CGY
RAD ONC ARIA PLAN TOTAL PRESCRIBED DOSE: 5000 CGY
RAD ONC ARIA REFERENCE POINT DOSAGE GIVEN TO DATE: 32.62 GY
RAD ONC ARIA REFERENCE POINT DOSAGE GIVEN TO DATE: 34 GY
RAD ONC ARIA REFERENCE POINT DOSAGE GIVEN TO DATE: 34 GY
RAD ONC ARIA REFERENCE POINT DOSAGE GIVEN TO DATE: 34.05 GY
RAD ONC ARIA REFERENCE POINT ID: NORMAL
RAD ONC ARIA REFERENCE POINT SESSION DOSAGE GIVEN: 1.92 GY
RAD ONC ARIA REFERENCE POINT SESSION DOSAGE GIVEN: 2 GY

## 2023-06-22 PROCEDURE — 77412 RADIATION TX DELIVERY LVL 3: CPT

## 2023-06-23 ENCOUNTER — HOSPITAL ENCOUNTER (OUTPATIENT)
Dept: RADIATION ONCOLOGY | Age: 53
Setting detail: RECURRING SERIES
End: 2023-06-23
Payer: COMMERCIAL

## 2023-06-23 LAB
RAD ONC ARIA COURSE FIRST TREATMENT DATE: NORMAL
RAD ONC ARIA COURSE ID: NORMAL
RAD ONC ARIA COURSE INTENT: NORMAL
RAD ONC ARIA COURSE LAST TREATMENT DATE: NORMAL
RAD ONC ARIA COURSE SESSION NUMBER: 18
RAD ONC ARIA COURSE START DATE: NORMAL
RAD ONC ARIA COURSE TREATMENT ELAPSED DAYS: 23
RAD ONC ARIA PLAN FRACTIONS TREATED TO DATE: 18
RAD ONC ARIA PLAN FRACTIONS TREATED TO DATE: 18
RAD ONC ARIA PLAN ID: NORMAL
RAD ONC ARIA PLAN ID: NORMAL
RAD ONC ARIA PLAN PRESCRIBED DOSE PER FRACTION: 2 GY
RAD ONC ARIA PLAN PRESCRIBED DOSE PER FRACTION: 2 GY
RAD ONC ARIA PLAN PRIMARY REFERENCE POINT: NORMAL
RAD ONC ARIA PLAN PRIMARY REFERENCE POINT: NORMAL
RAD ONC ARIA PLAN TOTAL FRACTIONS PRESCRIBED: 25
RAD ONC ARIA PLAN TOTAL FRACTIONS PRESCRIBED: 25
RAD ONC ARIA PLAN TOTAL PRESCRIBED DOSE: 5000 CGY
RAD ONC ARIA PLAN TOTAL PRESCRIBED DOSE: 5000 CGY
RAD ONC ARIA REFERENCE POINT DOSAGE GIVEN TO DATE: 34.54 GY
RAD ONC ARIA REFERENCE POINT DOSAGE GIVEN TO DATE: 36 GY
RAD ONC ARIA REFERENCE POINT DOSAGE GIVEN TO DATE: 36 GY
RAD ONC ARIA REFERENCE POINT DOSAGE GIVEN TO DATE: 36.06 GY
RAD ONC ARIA REFERENCE POINT ID: NORMAL
RAD ONC ARIA REFERENCE POINT SESSION DOSAGE GIVEN: 1.92 GY
RAD ONC ARIA REFERENCE POINT SESSION DOSAGE GIVEN: 2 GY

## 2023-06-23 PROCEDURE — 77412 RADIATION TX DELIVERY LVL 3: CPT

## 2023-06-23 NOTE — PROGRESS NOTES
RADIATION ONCOLOGY ON-TREATMENT VISIT  06/23/23    Diagnosis:  Cancer Staging   Malignant neoplasm of left breast in female, estrogen receptor positive ([facility] [address]  Staging form: Breast, AJCC 8th Edition  - Clinical stage from 8/23/2022: Stage IV (cT2, cN1, pM1, G1, ER+, DC-, HER2-) - Signed by Brenda Schaeffer MD on 8/23/2022      This is a 46 y.o. female who is currently receiving external beam radiation therapy to the left breast and lymph node bearing areas. She originally was diagnosed with stage IV disease but she had neoadjuvant chemotherapy with an excellent response and then she proceeded with breast conservation surgery. We are treating post surgery to 5000 cGy in 25 fractions. She is currently at 3600 cGy in 18 fractions. No concurrent systemic therapy    Subjective: Ms. Светлана Euceda has some skin irritation and she has minimal pain which she rates as about 1/10. She is using Aquaphor for her skin. Objective: There were no vitals filed for this visit. Pain 1/10    General: Alert and conversant, in NAD  Skin: Exam shows some dry desquamation in the lower supraclavicular area and she does have small areas of moist desquamation in the left inframammary fold. Assessment:  Patient is tolerating radiation therapy well with the expected skin reaction for which she is using Aquaphor. Plan: Once she completes her radiation therapy she will start back on Ibrance. We will consider SBRT to the original areas of bone metastases during her next break from [place]. -He would current skin care  -Continue RT as planned. -Treatment images reviewed. -The patient has a documented plan of care to address pain. Pain is controlled on current regimen.       Alena Haynes MD  06/23/23

## 2023-06-26 ENCOUNTER — OFFICE VISIT (OUTPATIENT)
Dept: SURGERY | Age: 53
End: 2023-06-26
Payer: COMMERCIAL

## 2023-06-26 ENCOUNTER — APPOINTMENT (OUTPATIENT)
Dept: RADIATION ONCOLOGY | Age: 53
End: 2023-06-26
Payer: COMMERCIAL

## 2023-06-26 DIAGNOSIS — C50.912 MALIGNANT NEOPLASM OF LEFT BREAST IN FEMALE, ESTROGEN RECEPTOR POSITIVE, UNSPECIFIED SITE OF BREAST (HCC): Primary | ICD-10-CM

## 2023-06-26 DIAGNOSIS — C50.212 MALIGNANT NEOPLASM OF UPPER-INNER QUADRANT OF LEFT BREAST IN FEMALE, ESTROGEN RECEPTOR POSITIVE (HCC): ICD-10-CM

## 2023-06-26 DIAGNOSIS — C50.912 INFILTRATING DUCTAL CARCINOMA OF BREAST, LEFT (HCC): ICD-10-CM

## 2023-06-26 DIAGNOSIS — Z17.0 MALIGNANT NEOPLASM OF UPPER-INNER QUADRANT OF LEFT BREAST IN FEMALE, ESTROGEN RECEPTOR POSITIVE (HCC): ICD-10-CM

## 2023-06-26 DIAGNOSIS — Z17.0 MALIGNANT NEOPLASM OF LEFT BREAST IN FEMALE, ESTROGEN RECEPTOR POSITIVE, UNSPECIFIED SITE OF BREAST (HCC): Primary | ICD-10-CM

## 2023-06-26 LAB
RAD ONC ARIA COURSE FIRST TREATMENT DATE: NORMAL
RAD ONC ARIA COURSE ID: NORMAL
RAD ONC ARIA COURSE INTENT: NORMAL
RAD ONC ARIA COURSE LAST TREATMENT DATE: NORMAL
RAD ONC ARIA COURSE SESSION NUMBER: 19
RAD ONC ARIA COURSE START DATE: NORMAL
RAD ONC ARIA COURSE TREATMENT ELAPSED DAYS: 26
RAD ONC ARIA PLAN FRACTIONS TREATED TO DATE: 19
RAD ONC ARIA PLAN FRACTIONS TREATED TO DATE: 19
RAD ONC ARIA PLAN ID: NORMAL
RAD ONC ARIA PLAN ID: NORMAL
RAD ONC ARIA PLAN PRESCRIBED DOSE PER FRACTION: 2 GY
RAD ONC ARIA PLAN PRESCRIBED DOSE PER FRACTION: 2 GY
RAD ONC ARIA PLAN PRIMARY REFERENCE POINT: NORMAL
RAD ONC ARIA PLAN PRIMARY REFERENCE POINT: NORMAL
RAD ONC ARIA PLAN TOTAL FRACTIONS PRESCRIBED: 25
RAD ONC ARIA PLAN TOTAL FRACTIONS PRESCRIBED: 25
RAD ONC ARIA PLAN TOTAL PRESCRIBED DOSE: 5000 CGY
RAD ONC ARIA PLAN TOTAL PRESCRIBED DOSE: 5000 CGY
RAD ONC ARIA REFERENCE POINT DOSAGE GIVEN TO DATE: 36.46 GY
RAD ONC ARIA REFERENCE POINT DOSAGE GIVEN TO DATE: 38 GY
RAD ONC ARIA REFERENCE POINT DOSAGE GIVEN TO DATE: 38 GY
RAD ONC ARIA REFERENCE POINT DOSAGE GIVEN TO DATE: 38.06 GY
RAD ONC ARIA REFERENCE POINT ID: NORMAL
RAD ONC ARIA REFERENCE POINT SESSION DOSAGE GIVEN: 1.92 GY
RAD ONC ARIA REFERENCE POINT SESSION DOSAGE GIVEN: 2 GY

## 2023-06-26 PROCEDURE — 77412 RADIATION TX DELIVERY LVL 3: CPT

## 2023-06-26 PROCEDURE — 99214 OFFICE O/P EST MOD 30 MIN: CPT | Performed by: SURGERY

## 2023-06-27 ENCOUNTER — APPOINTMENT (OUTPATIENT)
Dept: RADIATION ONCOLOGY | Age: 53
End: 2023-06-27
Payer: COMMERCIAL

## 2023-06-27 DIAGNOSIS — C79.51 METASTASIS TO BONE (HCC): ICD-10-CM

## 2023-06-27 DIAGNOSIS — C50.912 MALIGNANT NEOPLASM OF LEFT BREAST IN FEMALE, ESTROGEN RECEPTOR POSITIVE, UNSPECIFIED SITE OF BREAST (HCC): Primary | ICD-10-CM

## 2023-06-27 DIAGNOSIS — Z17.0 MALIGNANT NEOPLASM OF LEFT BREAST IN FEMALE, ESTROGEN RECEPTOR POSITIVE, UNSPECIFIED SITE OF BREAST (HCC): Primary | ICD-10-CM

## 2023-06-27 DIAGNOSIS — C50.912 BREAST CANCER METASTASIZED TO AXILLARY LYMPH NODE, LEFT (HCC): ICD-10-CM

## 2023-06-27 DIAGNOSIS — C77.3 BREAST CANCER METASTASIZED TO AXILLARY LYMPH NODE, LEFT (HCC): ICD-10-CM

## 2023-06-27 DIAGNOSIS — Z79.899 HIGH RISK MEDICATIONS (NOT ANTICOAGULANTS) LONG-TERM USE: ICD-10-CM

## 2023-06-27 LAB
RAD ONC ARIA COURSE FIRST TREATMENT DATE: NORMAL
RAD ONC ARIA COURSE ID: NORMAL
RAD ONC ARIA COURSE INTENT: NORMAL
RAD ONC ARIA COURSE LAST TREATMENT DATE: NORMAL
RAD ONC ARIA COURSE SESSION NUMBER: 20
RAD ONC ARIA COURSE START DATE: NORMAL
RAD ONC ARIA COURSE TREATMENT ELAPSED DAYS: 27
RAD ONC ARIA PLAN FRACTIONS TREATED TO DATE: 20
RAD ONC ARIA PLAN FRACTIONS TREATED TO DATE: 20
RAD ONC ARIA PLAN ID: NORMAL
RAD ONC ARIA PLAN ID: NORMAL
RAD ONC ARIA PLAN PRESCRIBED DOSE PER FRACTION: 2 GY
RAD ONC ARIA PLAN PRESCRIBED DOSE PER FRACTION: 2 GY
RAD ONC ARIA PLAN PRIMARY REFERENCE POINT: NORMAL
RAD ONC ARIA PLAN PRIMARY REFERENCE POINT: NORMAL
RAD ONC ARIA PLAN TOTAL FRACTIONS PRESCRIBED: 25
RAD ONC ARIA PLAN TOTAL FRACTIONS PRESCRIBED: 25
RAD ONC ARIA PLAN TOTAL PRESCRIBED DOSE: 5000 CGY
RAD ONC ARIA PLAN TOTAL PRESCRIBED DOSE: 5000 CGY
RAD ONC ARIA REFERENCE POINT DOSAGE GIVEN TO DATE: 38.37 GY
RAD ONC ARIA REFERENCE POINT DOSAGE GIVEN TO DATE: 40 GY
RAD ONC ARIA REFERENCE POINT DOSAGE GIVEN TO DATE: 40 GY
RAD ONC ARIA REFERENCE POINT DOSAGE GIVEN TO DATE: 40.06 GY
RAD ONC ARIA REFERENCE POINT ID: NORMAL
RAD ONC ARIA REFERENCE POINT SESSION DOSAGE GIVEN: 1.92 GY
RAD ONC ARIA REFERENCE POINT SESSION DOSAGE GIVEN: 2 GY

## 2023-06-27 PROCEDURE — 77336 RADIATION PHYSICS CONSULT: CPT

## 2023-06-27 PROCEDURE — 77412 RADIATION TX DELIVERY LVL 3: CPT

## 2023-06-28 ENCOUNTER — OFFICE VISIT (OUTPATIENT)
Dept: ONCOLOGY | Age: 53
End: 2023-06-28
Payer: COMMERCIAL

## 2023-06-28 ENCOUNTER — HOSPITAL ENCOUNTER (OUTPATIENT)
Dept: INFUSION THERAPY | Age: 53
Discharge: HOME | End: 2023-06-28
Payer: COMMERCIAL

## 2023-06-28 ENCOUNTER — APPOINTMENT (OUTPATIENT)
Dept: RADIATION ONCOLOGY | Age: 53
End: 2023-06-28
Payer: COMMERCIAL

## 2023-06-28 ENCOUNTER — HOSPITAL ENCOUNTER (OUTPATIENT)
Dept: LAB | Age: 53
Discharge: HOME | End: 2023-07-01
Payer: COMMERCIAL

## 2023-06-28 VITALS
HEART RATE: 99 BPM | OXYGEN SATURATION: 96 % | BODY MASS INDEX: 32.95 KG/M2 | WEIGHT: 189 LBS | SYSTOLIC BLOOD PRESSURE: 122 MMHG | TEMPERATURE: 98.5 F | DIASTOLIC BLOOD PRESSURE: 75 MMHG | RESPIRATION RATE: 16 BRPM

## 2023-06-28 DIAGNOSIS — C50.912 MALIGNANT NEOPLASM OF LEFT BREAST IN FEMALE, ESTROGEN RECEPTOR POSITIVE, UNSPECIFIED SITE OF BREAST (HCC): Primary | ICD-10-CM

## 2023-06-28 DIAGNOSIS — C50.912 BREAST CANCER METASTASIZED TO AXILLARY LYMPH NODE, LEFT (HCC): ICD-10-CM

## 2023-06-28 DIAGNOSIS — Z17.0 MALIGNANT NEOPLASM OF LEFT BREAST IN FEMALE, ESTROGEN RECEPTOR POSITIVE, UNSPECIFIED SITE OF BREAST (HCC): Primary | ICD-10-CM

## 2023-06-28 DIAGNOSIS — C77.3 BREAST CANCER METASTASIZED TO AXILLARY LYMPH NODE, LEFT (HCC): ICD-10-CM

## 2023-06-28 DIAGNOSIS — C79.51 METASTASIS TO BONE (HCC): ICD-10-CM

## 2023-06-28 DIAGNOSIS — C50.912 MALIGNANT NEOPLASM OF LEFT BREAST IN FEMALE, ESTROGEN RECEPTOR POSITIVE, UNSPECIFIED SITE OF BREAST (HCC): ICD-10-CM

## 2023-06-28 DIAGNOSIS — Z17.0 MALIGNANT NEOPLASM OF LEFT BREAST IN FEMALE, ESTROGEN RECEPTOR POSITIVE, UNSPECIFIED SITE OF BREAST (HCC): ICD-10-CM

## 2023-06-28 DIAGNOSIS — Z79.899 HIGH RISK MEDICATIONS (NOT ANTICOAGULANTS) LONG-TERM USE: ICD-10-CM

## 2023-06-28 LAB
ALBUMIN SERPL-MCNC: 4.1 G/DL (ref 3.5–5)
ALBUMIN/GLOB SERPL: 1 (ref 0.4–1.6)
ALP SERPL-CCNC: 82 U/L (ref 50–136)
ALT SERPL-CCNC: 72 U/L (ref 12–65)
ANION GAP SERPL CALC-SCNC: 6 MMOL/L (ref 2–11)
AST SERPL-CCNC: 33 U/L (ref 15–37)
BASOPHILS # BLD: 0.1 K/UL (ref 0–0.2)
BASOPHILS NFR BLD: 1 % (ref 0–2)
BILIRUB SERPL-MCNC: 0.4 MG/DL (ref 0.2–1.1)
BUN SERPL-MCNC: 18 MG/DL (ref 6–23)
CALCIUM SERPL-MCNC: 9.6 MG/DL (ref 8.3–10.4)
CHLORIDE SERPL-SCNC: 109 MMOL/L (ref 101–110)
CO2 SERPL-SCNC: 28 MMOL/L (ref 21–32)
CREAT SERPL-MCNC: 0.9 MG/DL (ref 0.6–1)
DIFFERENTIAL METHOD BLD: ABNORMAL
EOSINOPHIL # BLD: 0.2 K/UL (ref 0–0.8)
EOSINOPHIL NFR BLD: 3 % (ref 0.5–7.8)
ERYTHROCYTE [DISTWIDTH] IN BLOOD BY AUTOMATED COUNT: 12.7 % (ref 11.9–14.6)
GLOBULIN SER CALC-MCNC: 4.2 G/DL (ref 2.8–4.5)
GLUCOSE SERPL-MCNC: 112 MG/DL (ref 65–100)
HCT VFR BLD AUTO: 40.3 % (ref 35.8–46.3)
HGB BLD-MCNC: 14.2 G/DL (ref 11.7–15.4)
IMM GRANULOCYTES # BLD AUTO: 0.1 K/UL (ref 0–0.5)
IMM GRANULOCYTES NFR BLD AUTO: 1 % (ref 0–5)
LYMPHOCYTES # BLD: 1 K/UL (ref 0.5–4.6)
LYMPHOCYTES NFR BLD: 14 % (ref 13–44)
MAGNESIUM SERPL-MCNC: 1.9 MG/DL (ref 1.8–2.4)
MCH RBC QN AUTO: 35.4 PG (ref 26.1–32.9)
MCHC RBC AUTO-ENTMCNC: 35.2 G/DL (ref 31.4–35)
MCV RBC AUTO: 100.5 FL (ref 82–102)
MONOCYTES # BLD: 0.8 K/UL (ref 0.1–1.3)
MONOCYTES NFR BLD: 12 % (ref 4–12)
NEUTS SEG # BLD: 4.7 K/UL (ref 1.7–8.2)
NEUTS SEG NFR BLD: 69 % (ref 43–78)
NRBC # BLD: 0 K/UL (ref 0–0.2)
PHOSPHATE SERPL-MCNC: 3.8 MG/DL (ref 2.5–4.5)
PLATELET # BLD AUTO: 246 K/UL (ref 150–450)
PMV BLD AUTO: 10.1 FL (ref 9.4–12.3)
POTASSIUM SERPL-SCNC: 3.3 MMOL/L (ref 3.5–5.1)
PROT SERPL-MCNC: 8.3 G/DL (ref 6.3–8.2)
RAD ONC ARIA COURSE FIRST TREATMENT DATE: NORMAL
RAD ONC ARIA COURSE ID: NORMAL
RAD ONC ARIA COURSE INTENT: NORMAL
RAD ONC ARIA COURSE LAST TREATMENT DATE: NORMAL
RAD ONC ARIA COURSE SESSION NUMBER: 21
RAD ONC ARIA COURSE START DATE: NORMAL
RAD ONC ARIA COURSE TREATMENT ELAPSED DAYS: 28
RAD ONC ARIA PLAN FRACTIONS TREATED TO DATE: 21
RAD ONC ARIA PLAN FRACTIONS TREATED TO DATE: 21
RAD ONC ARIA PLAN ID: NORMAL
RAD ONC ARIA PLAN ID: NORMAL
RAD ONC ARIA PLAN PRESCRIBED DOSE PER FRACTION: 2 GY
RAD ONC ARIA PLAN PRESCRIBED DOSE PER FRACTION: 2 GY
RAD ONC ARIA PLAN PRIMARY REFERENCE POINT: NORMAL
RAD ONC ARIA PLAN PRIMARY REFERENCE POINT: NORMAL
RAD ONC ARIA PLAN TOTAL FRACTIONS PRESCRIBED: 25
RAD ONC ARIA PLAN TOTAL FRACTIONS PRESCRIBED: 25
RAD ONC ARIA PLAN TOTAL PRESCRIBED DOSE: 5000 CGY
RAD ONC ARIA PLAN TOTAL PRESCRIBED DOSE: 5000 CGY
RAD ONC ARIA REFERENCE POINT DOSAGE GIVEN TO DATE: 40.29 GY
RAD ONC ARIA REFERENCE POINT DOSAGE GIVEN TO DATE: 42 GY
RAD ONC ARIA REFERENCE POINT DOSAGE GIVEN TO DATE: 42 GY
RAD ONC ARIA REFERENCE POINT DOSAGE GIVEN TO DATE: 42.07 GY
RAD ONC ARIA REFERENCE POINT ID: NORMAL
RAD ONC ARIA REFERENCE POINT SESSION DOSAGE GIVEN: 1.92 GY
RAD ONC ARIA REFERENCE POINT SESSION DOSAGE GIVEN: 2 GY
RBC # BLD AUTO: 4.01 M/UL (ref 4.05–5.2)
SODIUM SERPL-SCNC: 143 MMOL/L (ref 133–143)
WBC # BLD AUTO: 6.7 K/UL (ref 4.3–11.1)

## 2023-06-28 PROCEDURE — 96372 THER/PROPH/DIAG INJ SC/IM: CPT

## 2023-06-28 PROCEDURE — 77417 THER RADIOLOGY PORT IMAGE(S): CPT

## 2023-06-28 PROCEDURE — 84100 ASSAY OF PHOSPHORUS: CPT

## 2023-06-28 PROCEDURE — 77412 RADIATION TX DELIVERY LVL 3: CPT

## 2023-06-28 PROCEDURE — 3074F SYST BP LT 130 MM HG: CPT | Performed by: INTERNAL MEDICINE

## 2023-06-28 PROCEDURE — 3078F DIAST BP <80 MM HG: CPT | Performed by: INTERNAL MEDICINE

## 2023-06-28 PROCEDURE — 36415 COLL VENOUS BLD VENIPUNCTURE: CPT

## 2023-06-28 PROCEDURE — 99214 OFFICE O/P EST MOD 30 MIN: CPT | Performed by: INTERNAL MEDICINE

## 2023-06-28 PROCEDURE — 83735 ASSAY OF MAGNESIUM: CPT

## 2023-06-28 PROCEDURE — 6360000002 HC RX W HCPCS: Performed by: INTERNAL MEDICINE

## 2023-06-28 PROCEDURE — 85025 COMPLETE CBC W/AUTO DIFF WBC: CPT

## 2023-06-28 PROCEDURE — 80053 COMPREHEN METABOLIC PANEL: CPT

## 2023-06-28 RX ORDER — ONDANSETRON 2 MG/ML
8 INJECTION INTRAMUSCULAR; INTRAVENOUS
OUTPATIENT
Start: 2023-07-26

## 2023-06-28 RX ORDER — EPINEPHRINE 1 MG/ML
0.3 INJECTION, SOLUTION, CONCENTRATE INTRAVENOUS PRN
OUTPATIENT
Start: 2023-07-26

## 2023-06-28 RX ORDER — ACETAMINOPHEN 325 MG/1
650 TABLET ORAL
OUTPATIENT
Start: 2023-07-26

## 2023-06-28 RX ORDER — ALBUTEROL SULFATE 90 UG/1
4 AEROSOL, METERED RESPIRATORY (INHALATION) PRN
OUTPATIENT
Start: 2023-07-26

## 2023-06-28 RX ORDER — SODIUM CHLORIDE 9 MG/ML
INJECTION, SOLUTION INTRAVENOUS CONTINUOUS
OUTPATIENT
Start: 2023-07-26

## 2023-06-28 RX ORDER — DIPHENHYDRAMINE HYDROCHLORIDE 50 MG/ML
50 INJECTION INTRAMUSCULAR; INTRAVENOUS
OUTPATIENT
Start: 2023-07-26

## 2023-06-28 RX ADMIN — DENOSUMAB 120 MG: 120 INJECTION SUBCUTANEOUS at 13:47

## 2023-06-28 ASSESSMENT — ANXIETY QUESTIONNAIRES
6. BECOMING EASILY ANNOYED OR IRRITABLE: 3
IF YOU CHECKED OFF ANY PROBLEMS ON THIS QUESTIONNAIRE, HOW DIFFICULT HAVE THESE PROBLEMS MADE IT FOR YOU TO DO YOUR WORK, TAKE CARE OF THINGS AT HOME, OR GET ALONG WITH OTHER PEOPLE: VERY DIFFICULT
2. NOT BEING ABLE TO STOP OR CONTROL WORRYING: 0
GAD7 TOTAL SCORE: 3
5. BEING SO RESTLESS THAT IT IS HARD TO SIT STILL: 0
7. FEELING AFRAID AS IF SOMETHING AWFUL MIGHT HAPPEN: 0
1. FEELING NERVOUS, ANXIOUS, OR ON EDGE: 0
3. WORRYING TOO MUCH ABOUT DIFFERENT THINGS: 0
4. TROUBLE RELAXING: 0

## 2023-06-28 ASSESSMENT — PATIENT HEALTH QUESTIONNAIRE - PHQ9
SUM OF ALL RESPONSES TO PHQ QUESTIONS 1-9: 0
SUM OF ALL RESPONSES TO PHQ QUESTIONS 1-9: 0
10. IF YOU CHECKED OFF ANY PROBLEMS, HOW DIFFICULT HAVE THESE PROBLEMS MADE IT FOR YOU TO DO YOUR WORK, TAKE CARE OF THINGS AT HOME, OR GET ALONG WITH OTHER PEOPLE: 0
1. LITTLE INTEREST OR PLEASURE IN DOING THINGS: 0
6. FEELING BAD ABOUT YOURSELF - OR THAT YOU ARE A FAILURE OR HAVE LET YOURSELF OR YOUR FAMILY DOWN: 0
SUM OF ALL RESPONSES TO PHQ9 QUESTIONS 1 & 2: 0
3. TROUBLE FALLING OR STAYING ASLEEP: 0
SUM OF ALL RESPONSES TO PHQ QUESTIONS 1-9: 0
4. FEELING TIRED OR HAVING LITTLE ENERGY: 0
8. MOVING OR SPEAKING SO SLOWLY THAT OTHER PEOPLE COULD HAVE NOTICED. OR THE OPPOSITE, BEING SO FIGETY OR RESTLESS THAT YOU HAVE BEEN MOVING AROUND A LOT MORE THAN USUAL: 0
5. POOR APPETITE OR OVEREATING: 0
7. TROUBLE CONCENTRATING ON THINGS, SUCH AS READING THE NEWSPAPER OR WATCHING TELEVISION: 0
9. THOUGHTS THAT YOU WOULD BE BETTER OFF DEAD, OR OF HURTING YOURSELF: 0
2. FEELING DOWN, DEPRESSED OR HOPELESS: 0
SUM OF ALL RESPONSES TO PHQ QUESTIONS 1-9: 0

## 2023-06-29 ENCOUNTER — APPOINTMENT (OUTPATIENT)
Dept: RADIATION ONCOLOGY | Age: 53
End: 2023-06-29
Payer: COMMERCIAL

## 2023-06-29 LAB
RAD ONC ARIA COURSE FIRST TREATMENT DATE: NORMAL
RAD ONC ARIA COURSE ID: NORMAL
RAD ONC ARIA COURSE INTENT: NORMAL
RAD ONC ARIA COURSE LAST TREATMENT DATE: NORMAL
RAD ONC ARIA COURSE SESSION NUMBER: 22
RAD ONC ARIA COURSE START DATE: NORMAL
RAD ONC ARIA COURSE TREATMENT ELAPSED DAYS: 29
RAD ONC ARIA PLAN FRACTIONS TREATED TO DATE: 22
RAD ONC ARIA PLAN FRACTIONS TREATED TO DATE: 22
RAD ONC ARIA PLAN ID: NORMAL
RAD ONC ARIA PLAN ID: NORMAL
RAD ONC ARIA PLAN PRESCRIBED DOSE PER FRACTION: 2 GY
RAD ONC ARIA PLAN PRESCRIBED DOSE PER FRACTION: 2 GY
RAD ONC ARIA PLAN PRIMARY REFERENCE POINT: NORMAL
RAD ONC ARIA PLAN PRIMARY REFERENCE POINT: NORMAL
RAD ONC ARIA PLAN TOTAL FRACTIONS PRESCRIBED: 25
RAD ONC ARIA PLAN TOTAL FRACTIONS PRESCRIBED: 25
RAD ONC ARIA PLAN TOTAL PRESCRIBED DOSE: 5000 CGY
RAD ONC ARIA PLAN TOTAL PRESCRIBED DOSE: 5000 CGY
RAD ONC ARIA REFERENCE POINT DOSAGE GIVEN TO DATE: 42.21 GY
RAD ONC ARIA REFERENCE POINT DOSAGE GIVEN TO DATE: 44 GY
RAD ONC ARIA REFERENCE POINT DOSAGE GIVEN TO DATE: 44 GY
RAD ONC ARIA REFERENCE POINT DOSAGE GIVEN TO DATE: 44.07 GY
RAD ONC ARIA REFERENCE POINT ID: NORMAL
RAD ONC ARIA REFERENCE POINT SESSION DOSAGE GIVEN: 1.92 GY
RAD ONC ARIA REFERENCE POINT SESSION DOSAGE GIVEN: 2 GY

## 2023-06-29 PROCEDURE — 77412 RADIATION TX DELIVERY LVL 3: CPT

## 2023-06-30 ENCOUNTER — APPOINTMENT (OUTPATIENT)
Dept: RADIATION ONCOLOGY | Age: 53
End: 2023-06-30
Payer: COMMERCIAL

## 2023-06-30 LAB
RAD ONC ARIA COURSE FIRST TREATMENT DATE: NORMAL
RAD ONC ARIA COURSE ID: NORMAL
RAD ONC ARIA COURSE INTENT: NORMAL
RAD ONC ARIA COURSE LAST TREATMENT DATE: NORMAL
RAD ONC ARIA COURSE SESSION NUMBER: 23
RAD ONC ARIA COURSE START DATE: NORMAL
RAD ONC ARIA COURSE TREATMENT ELAPSED DAYS: 30
RAD ONC ARIA PLAN FRACTIONS TREATED TO DATE: 23
RAD ONC ARIA PLAN FRACTIONS TREATED TO DATE: 23
RAD ONC ARIA PLAN ID: NORMAL
RAD ONC ARIA PLAN ID: NORMAL
RAD ONC ARIA PLAN PRESCRIBED DOSE PER FRACTION: 2 GY
RAD ONC ARIA PLAN PRESCRIBED DOSE PER FRACTION: 2 GY
RAD ONC ARIA PLAN PRIMARY REFERENCE POINT: NORMAL
RAD ONC ARIA PLAN PRIMARY REFERENCE POINT: NORMAL
RAD ONC ARIA PLAN TOTAL FRACTIONS PRESCRIBED: 25
RAD ONC ARIA PLAN TOTAL FRACTIONS PRESCRIBED: 25
RAD ONC ARIA PLAN TOTAL PRESCRIBED DOSE: 5000 CGY
RAD ONC ARIA PLAN TOTAL PRESCRIBED DOSE: 5000 CGY
RAD ONC ARIA REFERENCE POINT DOSAGE GIVEN TO DATE: 44.13 GY
RAD ONC ARIA REFERENCE POINT DOSAGE GIVEN TO DATE: 46 GY
RAD ONC ARIA REFERENCE POINT DOSAGE GIVEN TO DATE: 46 GY
RAD ONC ARIA REFERENCE POINT DOSAGE GIVEN TO DATE: 46.07 GY
RAD ONC ARIA REFERENCE POINT ID: NORMAL
RAD ONC ARIA REFERENCE POINT SESSION DOSAGE GIVEN: 1.92 GY
RAD ONC ARIA REFERENCE POINT SESSION DOSAGE GIVEN: 2 GY

## 2023-06-30 PROCEDURE — 77387 GUIDANCE FOR RADJ TX DLVR: CPT

## 2023-06-30 PROCEDURE — 77412 RADIATION TX DELIVERY LVL 3: CPT

## 2023-07-03 ENCOUNTER — APPOINTMENT (OUTPATIENT)
Dept: RADIATION ONCOLOGY | Age: 53
End: 2023-07-03
Payer: COMMERCIAL

## 2023-07-05 ENCOUNTER — HOSPITAL ENCOUNTER (OUTPATIENT)
Dept: RADIATION ONCOLOGY | Age: 53
Setting detail: RECURRING SERIES
Discharge: HOME | End: 2023-07-08
Payer: COMMERCIAL

## 2023-07-05 ENCOUNTER — APPOINTMENT (OUTPATIENT)
Dept: RADIATION ONCOLOGY | Age: 53
End: 2023-07-05
Payer: COMMERCIAL

## 2023-07-05 LAB
RAD ONC ARIA COURSE FIRST TREATMENT DATE: NORMAL
RAD ONC ARIA COURSE ID: NORMAL
RAD ONC ARIA COURSE INTENT: NORMAL
RAD ONC ARIA COURSE LAST TREATMENT DATE: NORMAL
RAD ONC ARIA COURSE SESSION NUMBER: 24
RAD ONC ARIA COURSE START DATE: NORMAL
RAD ONC ARIA COURSE TREATMENT ELAPSED DAYS: 35
RAD ONC ARIA PLAN FRACTIONS TREATED TO DATE: 24
RAD ONC ARIA PLAN ID: NORMAL
RAD ONC ARIA PLAN PRESCRIBED DOSE PER FRACTION: 2 GY
RAD ONC ARIA PLAN PRIMARY REFERENCE POINT: NORMAL
RAD ONC ARIA PLAN TOTAL FRACTIONS PRESCRIBED: 25
RAD ONC ARIA PLAN TOTAL PRESCRIBED DOSE: 5000 CGY
RAD ONC ARIA REFERENCE POINT DOSAGE GIVEN TO DATE: 48 GY
RAD ONC ARIA REFERENCE POINT DOSAGE GIVEN TO DATE: 48.07 GY
RAD ONC ARIA REFERENCE POINT ID: NORMAL
RAD ONC ARIA REFERENCE POINT ID: NORMAL
RAD ONC ARIA REFERENCE POINT SESSION DOSAGE GIVEN: 2 GY
RAD ONC ARIA REFERENCE POINT SESSION DOSAGE GIVEN: 2 GY

## 2023-07-05 PROCEDURE — 77412 RADIATION TX DELIVERY LVL 3: CPT

## 2023-07-06 ENCOUNTER — HOSPITAL ENCOUNTER (OUTPATIENT)
Dept: RADIATION ONCOLOGY | Age: 53
Setting detail: RECURRING SERIES
Discharge: HOME | End: 2023-07-09
Payer: COMMERCIAL

## 2023-07-06 LAB
RAD ONC ARIA COURSE FIRST TREATMENT DATE: NORMAL
RAD ONC ARIA COURSE ID: NORMAL
RAD ONC ARIA COURSE INTENT: NORMAL
RAD ONC ARIA COURSE LAST TREATMENT DATE: NORMAL
RAD ONC ARIA COURSE SESSION NUMBER: 25
RAD ONC ARIA COURSE START DATE: NORMAL
RAD ONC ARIA COURSE TREATMENT ELAPSED DAYS: 36
RAD ONC ARIA PLAN FRACTIONS TREATED TO DATE: 25
RAD ONC ARIA PLAN ID: NORMAL
RAD ONC ARIA PLAN PRESCRIBED DOSE PER FRACTION: 2 GY
RAD ONC ARIA PLAN PRIMARY REFERENCE POINT: NORMAL
RAD ONC ARIA PLAN TOTAL FRACTIONS PRESCRIBED: 25
RAD ONC ARIA PLAN TOTAL PRESCRIBED DOSE: 5000 CGY
RAD ONC ARIA REFERENCE POINT DOSAGE GIVEN TO DATE: 50 GY
RAD ONC ARIA REFERENCE POINT DOSAGE GIVEN TO DATE: 50.08 GY
RAD ONC ARIA REFERENCE POINT ID: NORMAL
RAD ONC ARIA REFERENCE POINT ID: NORMAL
RAD ONC ARIA REFERENCE POINT SESSION DOSAGE GIVEN: 2 GY
RAD ONC ARIA REFERENCE POINT SESSION DOSAGE GIVEN: 2 GY

## 2023-07-06 PROCEDURE — 77336 RADIATION PHYSICS CONSULT: CPT

## 2023-07-06 PROCEDURE — 77412 RADIATION TX DELIVERY LVL 3: CPT

## 2023-07-11 ENCOUNTER — TELEPHONE (OUTPATIENT)
Dept: RADIATION ONCOLOGY | Age: 53
End: 2023-07-11

## 2023-07-11 NOTE — TELEPHONE ENCOUNTER
Per Radiation guidelines pt no longer needs to follow with Rad onc, Pt will continue to follow with Med Onc.

## 2023-08-22 DIAGNOSIS — Z17.0 MALIGNANT NEOPLASM OF LEFT BREAST IN FEMALE, ESTROGEN RECEPTOR POSITIVE, UNSPECIFIED SITE OF BREAST (HCC): Primary | ICD-10-CM

## 2023-08-22 DIAGNOSIS — C50.912 MALIGNANT NEOPLASM OF LEFT BREAST IN FEMALE, ESTROGEN RECEPTOR POSITIVE, UNSPECIFIED SITE OF BREAST (HCC): Primary | ICD-10-CM

## 2023-08-23 ENCOUNTER — CLINICAL DOCUMENTATION (OUTPATIENT)
Dept: ONCOLOGY | Age: 53
End: 2023-08-23

## 2023-08-23 ENCOUNTER — HOSPITAL ENCOUNTER (OUTPATIENT)
Dept: INFUSION THERAPY | Age: 53
Discharge: HOME | End: 2023-08-23
Payer: COMMERCIAL

## 2023-08-23 ENCOUNTER — HOSPITAL ENCOUNTER (OUTPATIENT)
Dept: LAB | Age: 53
Discharge: HOME | End: 2023-08-26
Payer: COMMERCIAL

## 2023-08-23 ENCOUNTER — OFFICE VISIT (OUTPATIENT)
Dept: ONCOLOGY | Age: 53
End: 2023-08-23
Payer: COMMERCIAL

## 2023-08-23 VITALS
BODY MASS INDEX: 31.62 KG/M2 | WEIGHT: 185.2 LBS | HEART RATE: 85 BPM | SYSTOLIC BLOOD PRESSURE: 116 MMHG | TEMPERATURE: 98.2 F | RESPIRATION RATE: 12 BRPM | DIASTOLIC BLOOD PRESSURE: 75 MMHG | HEIGHT: 64 IN | OXYGEN SATURATION: 98 %

## 2023-08-23 DIAGNOSIS — R11.0 CHEMOTHERAPY-INDUCED NAUSEA: ICD-10-CM

## 2023-08-23 DIAGNOSIS — C79.51 METASTASIS TO BONE (HCC): ICD-10-CM

## 2023-08-23 DIAGNOSIS — C50.912 MALIGNANT NEOPLASM OF LEFT BREAST IN FEMALE, ESTROGEN RECEPTOR POSITIVE, UNSPECIFIED SITE OF BREAST (HCC): Primary | ICD-10-CM

## 2023-08-23 DIAGNOSIS — Z17.0 MALIGNANT NEOPLASM OF LEFT BREAST IN FEMALE, ESTROGEN RECEPTOR POSITIVE, UNSPECIFIED SITE OF BREAST (HCC): Primary | ICD-10-CM

## 2023-08-23 DIAGNOSIS — K52.1 CHEMOTHERAPY INDUCED DIARRHEA: ICD-10-CM

## 2023-08-23 DIAGNOSIS — T45.1X5A CHEMOTHERAPY INDUCED DIARRHEA: ICD-10-CM

## 2023-08-23 DIAGNOSIS — C50.912 MALIGNANT NEOPLASM OF LEFT BREAST IN FEMALE, ESTROGEN RECEPTOR POSITIVE, UNSPECIFIED SITE OF BREAST (HCC): ICD-10-CM

## 2023-08-23 DIAGNOSIS — T45.1X5A CHEMOTHERAPY-INDUCED NAUSEA: ICD-10-CM

## 2023-08-23 DIAGNOSIS — Z17.0 MALIGNANT NEOPLASM OF LEFT BREAST IN FEMALE, ESTROGEN RECEPTOR POSITIVE, UNSPECIFIED SITE OF BREAST (HCC): ICD-10-CM

## 2023-08-23 LAB
ALBUMIN SERPL-MCNC: 3.8 G/DL (ref 3.5–5)
ALBUMIN/GLOB SERPL: 0.9 (ref 0.4–1.6)
ALP SERPL-CCNC: 63 U/L (ref 50–136)
ALT SERPL-CCNC: 31 U/L (ref 12–65)
ANION GAP SERPL CALC-SCNC: 3 MMOL/L (ref 2–11)
AST SERPL-CCNC: 20 U/L (ref 15–37)
BASOPHILS # BLD: 0 K/UL (ref 0–0.2)
BASOPHILS NFR BLD: 2 % (ref 0–2)
BILIRUB SERPL-MCNC: 0.5 MG/DL (ref 0.2–1.1)
BUN SERPL-MCNC: 17 MG/DL (ref 6–23)
CALCIUM SERPL-MCNC: 9.5 MG/DL (ref 8.3–10.4)
CHLORIDE SERPL-SCNC: 106 MMOL/L (ref 101–110)
CO2 SERPL-SCNC: 29 MMOL/L (ref 21–32)
CREAT SERPL-MCNC: 1 MG/DL (ref 0.6–1)
DIFFERENTIAL METHOD BLD: ABNORMAL
EOSINOPHIL # BLD: 0 K/UL (ref 0–0.8)
EOSINOPHIL NFR BLD: 2 % (ref 0.5–7.8)
ERYTHROCYTE [DISTWIDTH] IN BLOOD BY AUTOMATED COUNT: 13.8 % (ref 11.9–14.6)
GLOBULIN SER CALC-MCNC: 4.3 G/DL (ref 2.8–4.5)
GLUCOSE SERPL-MCNC: 108 MG/DL (ref 65–100)
HCT VFR BLD AUTO: 32.8 % (ref 35.8–46.3)
HGB BLD-MCNC: 11.5 G/DL (ref 11.7–15.4)
IMM GRANULOCYTES # BLD AUTO: 0 K/UL (ref 0–0.5)
IMM GRANULOCYTES NFR BLD AUTO: 1 % (ref 0–5)
LYMPHOCYTES # BLD: 0.6 K/UL (ref 0.5–4.6)
LYMPHOCYTES NFR BLD: 32 % (ref 13–44)
MAGNESIUM SERPL-MCNC: 2.2 MG/DL (ref 1.8–2.4)
MCH RBC QN AUTO: 34.6 PG (ref 26.1–32.9)
MCHC RBC AUTO-ENTMCNC: 35.1 G/DL (ref 31.4–35)
MCV RBC AUTO: 98.8 FL (ref 82–102)
MONOCYTES # BLD: 0.2 K/UL (ref 0.1–1.3)
MONOCYTES NFR BLD: 10 % (ref 4–12)
NEUTS SEG # BLD: 0.9 K/UL (ref 1.7–8.2)
NEUTS SEG NFR BLD: 53 % (ref 43–78)
NRBC # BLD: 0 K/UL (ref 0–0.2)
PHOSPHATE SERPL-MCNC: 3.7 MG/DL (ref 2.5–4.5)
PLATELET # BLD AUTO: 210 K/UL (ref 150–450)
PMV BLD AUTO: 9.2 FL (ref 9.4–12.3)
POTASSIUM SERPL-SCNC: 3.2 MMOL/L (ref 3.5–5.1)
PROT SERPL-MCNC: 8.1 G/DL (ref 6.3–8.2)
RBC # BLD AUTO: 3.32 M/UL (ref 4.05–5.2)
SODIUM SERPL-SCNC: 138 MMOL/L (ref 133–143)
WBC # BLD AUTO: 1.7 K/UL (ref 4.3–11.1)

## 2023-08-23 PROCEDURE — 3074F SYST BP LT 130 MM HG: CPT | Performed by: NURSE PRACTITIONER

## 2023-08-23 PROCEDURE — 85025 COMPLETE CBC W/AUTO DIFF WBC: CPT

## 2023-08-23 PROCEDURE — 80053 COMPREHEN METABOLIC PANEL: CPT

## 2023-08-23 PROCEDURE — 6360000002 HC RX W HCPCS: Performed by: NURSE PRACTITIONER

## 2023-08-23 PROCEDURE — 99214 OFFICE O/P EST MOD 30 MIN: CPT | Performed by: NURSE PRACTITIONER

## 2023-08-23 PROCEDURE — 84100 ASSAY OF PHOSPHORUS: CPT

## 2023-08-23 PROCEDURE — 96372 THER/PROPH/DIAG INJ SC/IM: CPT

## 2023-08-23 PROCEDURE — 36415 COLL VENOUS BLD VENIPUNCTURE: CPT

## 2023-08-23 PROCEDURE — 3078F DIAST BP <80 MM HG: CPT | Performed by: NURSE PRACTITIONER

## 2023-08-23 PROCEDURE — 83735 ASSAY OF MAGNESIUM: CPT

## 2023-08-23 RX ORDER — ACETAMINOPHEN 325 MG/1
650 TABLET ORAL
OUTPATIENT
Start: 2023-09-17

## 2023-08-23 RX ORDER — SODIUM CHLORIDE 9 MG/ML
INJECTION, SOLUTION INTRAVENOUS CONTINUOUS
OUTPATIENT
Start: 2023-09-17

## 2023-08-23 RX ORDER — DIPHENHYDRAMINE HYDROCHLORIDE 50 MG/ML
50 INJECTION INTRAMUSCULAR; INTRAVENOUS
OUTPATIENT
Start: 2023-08-23

## 2023-08-23 RX ORDER — SODIUM CHLORIDE 9 MG/ML
INJECTION, SOLUTION INTRAVENOUS CONTINUOUS
OUTPATIENT
Start: 2023-08-23

## 2023-08-23 RX ORDER — ACETAMINOPHEN 325 MG/1
650 TABLET ORAL
OUTPATIENT
Start: 2023-08-23

## 2023-08-23 RX ORDER — EPINEPHRINE 1 MG/ML
0.3 INJECTION, SOLUTION, CONCENTRATE INTRAVENOUS PRN
OUTPATIENT
Start: 2023-08-23

## 2023-08-23 RX ORDER — ONDANSETRON 2 MG/ML
8 INJECTION INTRAMUSCULAR; INTRAVENOUS
OUTPATIENT
Start: 2023-08-23

## 2023-08-23 RX ORDER — DIPHENHYDRAMINE HYDROCHLORIDE 50 MG/ML
50 INJECTION INTRAMUSCULAR; INTRAVENOUS
OUTPATIENT
Start: 2023-09-17

## 2023-08-23 RX ORDER — ALBUTEROL SULFATE 90 UG/1
4 AEROSOL, METERED RESPIRATORY (INHALATION) PRN
OUTPATIENT
Start: 2023-08-23

## 2023-08-23 RX ORDER — ALBUTEROL SULFATE 90 UG/1
4 AEROSOL, METERED RESPIRATORY (INHALATION) PRN
OUTPATIENT
Start: 2023-09-17

## 2023-08-23 RX ORDER — EPINEPHRINE 1 MG/ML
0.3 INJECTION, SOLUTION, CONCENTRATE INTRAVENOUS PRN
OUTPATIENT
Start: 2023-09-17

## 2023-08-23 RX ORDER — ONDANSETRON 2 MG/ML
8 INJECTION INTRAMUSCULAR; INTRAVENOUS
OUTPATIENT
Start: 2023-09-17

## 2023-08-23 RX ORDER — FAMOTIDINE 10 MG/ML
20 INJECTION, SOLUTION INTRAVENOUS
OUTPATIENT
Start: 2023-08-23

## 2023-08-23 RX ADMIN — DENOSUMAB 120 MG: 120 INJECTION SUBCUTANEOUS at 11:25

## 2023-08-23 ASSESSMENT — PATIENT HEALTH QUESTIONNAIRE - PHQ9
SUM OF ALL RESPONSES TO PHQ QUESTIONS 1-9: 0
SUM OF ALL RESPONSES TO PHQ QUESTIONS 1-9: 0
SUM OF ALL RESPONSES TO PHQ9 QUESTIONS 1 & 2: 0
SUM OF ALL RESPONSES TO PHQ QUESTIONS 1-9: 0
SUM OF ALL RESPONSES TO PHQ QUESTIONS 1-9: 0
2. FEELING DOWN, DEPRESSED OR HOPELESS: 0
1. LITTLE INTEREST OR PLEASURE IN DOING THINGS: 0

## 2023-08-23 NOTE — PROGRESS NOTES
Arrived to the [address] [place] [address]. Xgeva injection completed. Provided education on same    Patient instructed to report any side affects to ordering provider. Patient tolerated well. Any issues or concerns during appointment: none. Patient aware of next infusion appointment on 09/20/2023 (date) at 1330 (time). Discharged ambulatory.

## 2023-08-23 NOTE — PROGRESS NOTES
Colon, mucinous ovary, urothelium                  Negative   MINO 3               Urothelial, breast carcinoma                         Positive   [id number]               Breast, salivary gland, skin, adnexa                 Positive           ASSESSMENT:   Diagnosis Orders   1. Malignant neoplasm of left breast in female, estrogen receptor positive, unspecified site of breast ([address])  PET CT SKULL BASE TO MID THIGH      2. Metastasis to bone ([address])        3. Chemotherapy-induced nausea        4. Chemotherapy induced diarrhea                    Patient Active Problem List   Diagnosis    Hypertension    Kidney stone    Cyst, kidney, acquired    Malignant neoplasm of left breast in female, estrogen receptor positive ([address])    Metastasis to bone ([address])    Malignant neoplasm of upper-inner quadrant of left breast in female, estrogen receptor positive ([address])    Infiltrating ductal carcinoma of breast, left (HCC)    Gastroesophageal reflux disease           PLAN:  Lab studies were personally reviewed. Breast cancer: left breast, low grade IDC with lobular features, 3.7 cm with biopsy proven axillary lymphadenopathy, two osseous lesions on PET (T10 and sacrum) worrisome for metastatic disease, T10 now biopsy proven metastatic carcinoma consistent with breast origin. ER positive, AK negative, HER2 0. I discussed the results with Ms. Alcocer. Unfortunately, she will not be a candidate for curative therapy and should start palliative systemic therapy, I would recommend letrozole and ribociclib. She is postmenopausal so she will not need ovarian function suppression. She will also require denosumab or zoledronic acid depending on insurance approval.  We attempted to perform NGS on the metastatic specimen to screen for biomarker expression including PI3KCA, BRCA, PDL-1 and others, but there was insufficient tissue.   PET/CT in January 2023 showed complete response with resolution of the uptake in the bone lesions, so we

## 2023-08-23 NOTE — PROGRESS NOTES
Pred POC 89 %    FEV1/FVC, POC 85%     No results found for this or any previous visit. FeNO: No results found for this or any previous visit. FeNO and Likelihood of Eosinophilic Asthma   Unlikely Intermediate Likely   <25 ppb 25-50 ppb >50ppb     Exercise Oximetry:    Echo: No results found for this or any previous visit from the past 3650 days. [facility] Reference Info:                                                                                                                  Immunization History   Administered Date(s) Administered    COVID-19, MODERNA BLUE border, Primary or Immunocompromised, (age 12y+), IM, 100 mcg/0.5mL 08/20/2021, 09/18/2021     Past Medical History:   Diagnosis Date    Acute respiratory failure with hypoxia ([address]) 3/8/2023    Cyst, kidney, acquired     found on previous scan    GERD (gastroesophageal reflux disease)     Hypertension     Kidney stone         Tobacco Use      Smoking status: Never      Smokeless tobacco: Never    No Known Allergies  Current Outpatient Medications   Medication Instructions    chlorthalidone (HYGROTON) 25 MG tablet TAKE ONE TABLET BY MOUTH ONE TIME DAILY IN THE MORNING FOR BLOOD PRESSURE    docusate sodium (COLACE) 100 mg, Oral, 2 TIMES DAILY    famotidine (PEPCID) 20 mg, Oral, 2 TIMES DAILY    Ibrance 125 mg, Oral, DAILY, 28 day cycle.   21 days on/7 days off    letrozole (FEMARA) 2.5 mg, Oral, DAILY    loratadine (CLARITIN) 10 mg, Oral, DAILY    Magic Mouthwash (MIRACLE MOUTHWASH) 5 mLs, Swish & Spit, 4 TIMES DAILY PRN    olmesartan (BENICAR) 40 MG tablet TAKE ONE TABLET BY MOUTH ONE TIME DAILY IN THE MORNING FOR BLOOD PRESSURE    omeprazole (PRILOSEC) 40 mg, Oral, DAILY    ondansetron (ZOFRAN) 8 mg, Oral, EVERY 8 HOURS PRN    potassium chloride (KLOR-CON M) 20 MEQ extended release tablet 20 mEq, Oral, 2 TIMES DAILY, Take 1 tablet twice a day

## 2023-08-24 ENCOUNTER — OFFICE VISIT (OUTPATIENT)
Dept: PULMONOLOGY | Age: 53
End: 2023-08-24
Payer: COMMERCIAL

## 2023-08-24 VITALS
DIASTOLIC BLOOD PRESSURE: 70 MMHG | OXYGEN SATURATION: 99 % | HEART RATE: 73 BPM | TEMPERATURE: 97 F | WEIGHT: 186 LBS | HEIGHT: 63 IN | BODY MASS INDEX: 32.96 KG/M2 | RESPIRATION RATE: 17 BRPM | SYSTOLIC BLOOD PRESSURE: 116 MMHG

## 2023-08-24 DIAGNOSIS — Z79.899 LONG-TERM USE OF HIGH-RISK MEDICATION: ICD-10-CM

## 2023-08-24 DIAGNOSIS — R06.09 DYSPNEA ON EXERTION: ICD-10-CM

## 2023-08-24 DIAGNOSIS — K21.9 GASTROESOPHAGEAL REFLUX DISEASE WITHOUT ESOPHAGITIS: ICD-10-CM

## 2023-08-24 DIAGNOSIS — R09.02 HYPOXEMIA: ICD-10-CM

## 2023-08-24 DIAGNOSIS — R05.3 CHRONIC COUGH: Primary | ICD-10-CM

## 2023-08-24 DIAGNOSIS — J98.4 RESTRICTIVE LUNG DISEASE: ICD-10-CM

## 2023-08-24 PROCEDURE — 3074F SYST BP LT 130 MM HG: CPT | Performed by: NURSE PRACTITIONER

## 2023-08-24 PROCEDURE — 3078F DIAST BP <80 MM HG: CPT | Performed by: NURSE PRACTITIONER

## 2023-08-24 PROCEDURE — 99215 OFFICE O/P EST HI 40 MIN: CPT | Performed by: NURSE PRACTITIONER

## 2023-08-24 RX ORDER — OMEPRAZOLE 40 MG/1
40 CAPSULE, DELAYED RELEASE ORAL DAILY
Qty: 30 CAPSULE | Refills: 3 | Status: SHIPPED | OUTPATIENT
Start: 2023-08-24

## 2023-09-12 DIAGNOSIS — I10 PRIMARY HYPERTENSION: ICD-10-CM

## 2023-09-12 RX ORDER — CHLORTHALIDONE 25 MG/1
TABLET ORAL
Qty: 90 TABLET | Refills: 1 | Status: SHIPPED | OUTPATIENT
Start: 2023-09-12

## 2023-09-12 RX ORDER — OLMESARTAN MEDOXOMIL 40 MG/1
TABLET ORAL
Qty: 90 TABLET | Refills: 1 | Status: SHIPPED | OUTPATIENT
Start: 2023-09-12

## 2023-09-20 ENCOUNTER — HOSPITAL ENCOUNTER (OUTPATIENT)
Dept: INFUSION THERAPY | Age: 53
Discharge: HOME | End: 2023-09-20
Payer: COMMERCIAL

## 2023-09-20 ENCOUNTER — HOSPITAL ENCOUNTER (OUTPATIENT)
Dept: LAB | Age: 53
Discharge: HOME | End: 2023-09-23
Payer: COMMERCIAL

## 2023-09-20 VITALS
RESPIRATION RATE: 16 BRPM | OXYGEN SATURATION: 98 % | HEART RATE: 80 BPM | SYSTOLIC BLOOD PRESSURE: 124 MMHG | DIASTOLIC BLOOD PRESSURE: 72 MMHG | TEMPERATURE: 98.3 F

## 2023-09-20 DIAGNOSIS — C50.912 MALIGNANT NEOPLASM OF LEFT BREAST IN FEMALE, ESTROGEN RECEPTOR POSITIVE, UNSPECIFIED SITE OF BREAST (HCC): ICD-10-CM

## 2023-09-20 DIAGNOSIS — C50.912 MALIGNANT NEOPLASM OF LEFT BREAST IN FEMALE, ESTROGEN RECEPTOR POSITIVE, UNSPECIFIED SITE OF BREAST (HCC): Primary | ICD-10-CM

## 2023-09-20 DIAGNOSIS — Z17.0 MALIGNANT NEOPLASM OF LEFT BREAST IN FEMALE, ESTROGEN RECEPTOR POSITIVE, UNSPECIFIED SITE OF BREAST (HCC): Primary | ICD-10-CM

## 2023-09-20 DIAGNOSIS — Z17.0 MALIGNANT NEOPLASM OF LEFT BREAST IN FEMALE, ESTROGEN RECEPTOR POSITIVE, UNSPECIFIED SITE OF BREAST (HCC): ICD-10-CM

## 2023-09-20 DIAGNOSIS — C79.51 METASTASIS TO BONE (HCC): ICD-10-CM

## 2023-09-20 LAB
ALBUMIN SERPL-MCNC: 3.8 G/DL (ref 3.5–5)
ALBUMIN/GLOB SERPL: 0.9 (ref 0.4–1.6)
ALP SERPL-CCNC: 76 U/L (ref 50–136)
ALT SERPL-CCNC: 33 U/L (ref 12–65)
ANION GAP SERPL CALC-SCNC: 6 MMOL/L (ref 2–11)
AST SERPL-CCNC: 22 U/L (ref 15–37)
BILIRUB SERPL-MCNC: 0.3 MG/DL (ref 0.2–1.1)
BUN SERPL-MCNC: 14 MG/DL (ref 6–23)
CALCIUM SERPL-MCNC: 9.4 MG/DL (ref 8.3–10.4)
CHLORIDE SERPL-SCNC: 106 MMOL/L (ref 101–110)
CO2 SERPL-SCNC: 28 MMOL/L (ref 21–32)
CREAT SERPL-MCNC: 1 MG/DL (ref 0.6–1)
GLOBULIN SER CALC-MCNC: 4.3 G/DL (ref 2.8–4.5)
GLUCOSE SERPL-MCNC: 85 MG/DL (ref 65–100)
MAGNESIUM SERPL-MCNC: 2.1 MG/DL (ref 1.8–2.4)
PHOSPHATE SERPL-MCNC: 3.1 MG/DL (ref 2.5–4.5)
POTASSIUM SERPL-SCNC: 3.2 MMOL/L (ref 3.5–5.1)
PROT SERPL-MCNC: 8.1 G/DL (ref 6.3–8.2)
SODIUM SERPL-SCNC: 140 MMOL/L (ref 133–143)

## 2023-09-20 PROCEDURE — 83735 ASSAY OF MAGNESIUM: CPT

## 2023-09-20 PROCEDURE — 96372 THER/PROPH/DIAG INJ SC/IM: CPT

## 2023-09-20 PROCEDURE — 84100 ASSAY OF PHOSPHORUS: CPT

## 2023-09-20 PROCEDURE — 6360000002 HC RX W HCPCS: Performed by: NURSE PRACTITIONER

## 2023-09-20 PROCEDURE — 36415 COLL VENOUS BLD VENIPUNCTURE: CPT

## 2023-09-20 PROCEDURE — 80053 COMPREHEN METABOLIC PANEL: CPT

## 2023-09-20 RX ORDER — SODIUM CHLORIDE 9 MG/ML
INJECTION, SOLUTION INTRAVENOUS CONTINUOUS
OUTPATIENT
Start: 2023-10-18

## 2023-09-20 RX ORDER — ONDANSETRON 2 MG/ML
8 INJECTION INTRAMUSCULAR; INTRAVENOUS
OUTPATIENT
Start: 2023-10-18

## 2023-09-20 RX ORDER — DIPHENHYDRAMINE HYDROCHLORIDE 50 MG/ML
50 INJECTION INTRAMUSCULAR; INTRAVENOUS
OUTPATIENT
Start: 2023-10-18

## 2023-09-20 RX ORDER — ACETAMINOPHEN 325 MG/1
650 TABLET ORAL
OUTPATIENT
Start: 2023-10-18

## 2023-09-20 RX ORDER — ALBUTEROL SULFATE 90 UG/1
4 AEROSOL, METERED RESPIRATORY (INHALATION) PRN
OUTPATIENT
Start: 2023-10-18

## 2023-09-20 RX ORDER — EPINEPHRINE 1 MG/ML
0.3 INJECTION, SOLUTION, CONCENTRATE INTRAVENOUS PRN
OUTPATIENT
Start: 2023-10-18

## 2023-09-20 RX ADMIN — DENOSUMAB 120 MG: 120 INJECTION SUBCUTANEOUS at 13:38

## 2023-09-20 NOTE — PROGRESS NOTES
Arrived to the [address] [place] [address]. [facility]. Xgeva injection completed. Provided education on SE to report to MD    Patient instructed to report any side affects to ordering provider. Patient tolerated well. Any issues or concerns during appointment: none. Patient aware of next infusion appointment on 10/18/23 (date) at [phone] (time). Discharged ambulatory.

## 2023-09-27 ENCOUNTER — OFFICE VISIT (OUTPATIENT)
Dept: SURGERY | Age: 53
End: 2023-09-27
Payer: COMMERCIAL

## 2023-09-27 VITALS — BODY MASS INDEX: 32.95 KG/M2 | HEIGHT: 63 IN

## 2023-09-27 DIAGNOSIS — C50.212 MALIGNANT NEOPLASM OF UPPER-INNER QUADRANT OF LEFT BREAST IN FEMALE, ESTROGEN RECEPTOR POSITIVE (HCC): ICD-10-CM

## 2023-09-27 DIAGNOSIS — Z17.0 MALIGNANT NEOPLASM OF LEFT BREAST IN FEMALE, ESTROGEN RECEPTOR POSITIVE, UNSPECIFIED SITE OF BREAST (HCC): Primary | ICD-10-CM

## 2023-09-27 DIAGNOSIS — C50.912 MALIGNANT NEOPLASM OF LEFT BREAST IN FEMALE, ESTROGEN RECEPTOR POSITIVE, UNSPECIFIED SITE OF BREAST (HCC): Primary | ICD-10-CM

## 2023-09-27 DIAGNOSIS — Z17.0 MALIGNANT NEOPLASM OF UPPER-INNER QUADRANT OF LEFT BREAST IN FEMALE, ESTROGEN RECEPTOR POSITIVE (HCC): ICD-10-CM

## 2023-09-27 PROCEDURE — 99214 OFFICE O/P EST MOD 30 MIN: CPT | Performed by: SURGERY

## 2023-09-27 RX ORDER — FLUCONAZOLE 150 MG/1
150 TABLET ORAL DAILY
Qty: 3 TABLET | Refills: 0 | Status: SHIPPED | OUTPATIENT
Start: 2023-09-27 | End: 2023-09-30

## 2023-09-27 NOTE — PROGRESS NOTES
problems;    or  ? 1stable chronic illness;    or  ?1acute, uncomplicated illness or injury   Limited  (Must meet the requirements of at least 1 of the 2 categories)  Category 1: Tests and documents   ? Any combination of 2 from the following:  ?Review of prior external note(s) from each unique source*;  ?review of the result(s) of each unique test*;   ?ordering of each unique test*    or   Category 2: Assessment requiring an independent historian(s)  (For the categories of independent interpretation of tests and discussion of management or test interpretation, see moderate or high) Low risk of morbidity from additional diagnostic testing or treatment     [id number]  [id number] Mod Moderate  ? 1or more chronic illnesses with exacerbation, progression, or side effects of treatment;    or  ?2or more stable chronic illnesses;    or  ?1undiagnosed new problem with uncertain prognosis;    or  ?1acute illness with systemic symptoms;    or  ?1acute complicated injury   Moderate  (Must meet the requirements of at least 1 out of 3 categories)  Category 1: Tests, documents, or independent historian(s)  ? Any combination of 3 from the following:   ?Review of prior external note(s) from each unique source*;  ?Review of the result(s) of each unique test*;  ?Ordering of each unique test*;  ?Assessment requiring an independent historian(s)    or  Category 2: Independent interpretation of tests   ? Independent interpretation of a test performed by another physician/other qualified health care professional (not separately reported);     or  Category 3: Discussion of management or test interpretation  ? Discussion of management or test interpretation with external physician/other qualified health care professional/appropriate source (not separately reported)   Moderate risk of morbidity from additional diagnostic testing or treatment  Examples only:  ?Prescription drug management   ? Decision regarding minor surgery with identified patient or

## 2023-10-17 DIAGNOSIS — C50.912 MALIGNANT NEOPLASM OF LEFT BREAST IN FEMALE, ESTROGEN RECEPTOR POSITIVE, UNSPECIFIED SITE OF BREAST (HCC): Primary | ICD-10-CM

## 2023-10-17 DIAGNOSIS — Z17.0 MALIGNANT NEOPLASM OF LEFT BREAST IN FEMALE, ESTROGEN RECEPTOR POSITIVE, UNSPECIFIED SITE OF BREAST (HCC): Primary | ICD-10-CM

## 2023-10-17 DIAGNOSIS — C79.51 METASTASIS TO BONE (HCC): ICD-10-CM

## 2023-10-18 ENCOUNTER — HOSPITAL ENCOUNTER (OUTPATIENT)
Dept: INFUSION THERAPY | Age: 53
Discharge: HOME | End: 2023-10-18
Payer: COMMERCIAL

## 2023-10-18 ENCOUNTER — HOSPITAL ENCOUNTER (OUTPATIENT)
Dept: LAB | Age: 53
Discharge: HOME | End: 2023-10-21
Payer: COMMERCIAL

## 2023-10-18 ENCOUNTER — HOSPITAL ENCOUNTER (OUTPATIENT)
Dept: PET IMAGING | Age: 53
Discharge: HOME | End: 2023-10-21
Payer: COMMERCIAL

## 2023-10-18 ENCOUNTER — OFFICE VISIT (OUTPATIENT)
Dept: ONCOLOGY | Age: 53
End: 2023-10-18
Payer: COMMERCIAL

## 2023-10-18 VITALS
DIASTOLIC BLOOD PRESSURE: 78 MMHG | WEIGHT: 187.7 LBS | RESPIRATION RATE: 18 BRPM | BODY MASS INDEX: 33.25 KG/M2 | OXYGEN SATURATION: 96 % | TEMPERATURE: 98.2 F | HEART RATE: 78 BPM | SYSTOLIC BLOOD PRESSURE: 124 MMHG

## 2023-10-18 DIAGNOSIS — C50.912 MALIGNANT NEOPLASM OF LEFT BREAST IN FEMALE, ESTROGEN RECEPTOR POSITIVE, UNSPECIFIED SITE OF BREAST (HCC): Primary | ICD-10-CM

## 2023-10-18 DIAGNOSIS — Z17.0 MALIGNANT NEOPLASM OF LEFT BREAST IN FEMALE, ESTROGEN RECEPTOR POSITIVE, UNSPECIFIED SITE OF BREAST (HCC): ICD-10-CM

## 2023-10-18 DIAGNOSIS — Z17.0 MALIGNANT NEOPLASM OF LEFT BREAST IN FEMALE, ESTROGEN RECEPTOR POSITIVE, UNSPECIFIED SITE OF BREAST (HCC): Primary | ICD-10-CM

## 2023-10-18 DIAGNOSIS — C50.912 MALIGNANT NEOPLASM OF LEFT BREAST IN FEMALE, ESTROGEN RECEPTOR POSITIVE, UNSPECIFIED SITE OF BREAST (HCC): ICD-10-CM

## 2023-10-18 DIAGNOSIS — C79.51 METASTASIS TO BONE (HCC): ICD-10-CM

## 2023-10-18 LAB
ALBUMIN SERPL-MCNC: 3.9 G/DL (ref 3.5–5)
ALBUMIN/GLOB SERPL: 1 (ref 0.4–1.6)
ALP SERPL-CCNC: 76 U/L (ref 50–136)
ALT SERPL-CCNC: 35 U/L (ref 12–65)
ANION GAP SERPL CALC-SCNC: 4 MMOL/L (ref 2–11)
AST SERPL-CCNC: 25 U/L (ref 15–37)
BASOPHILS # BLD: 0 K/UL (ref 0–0.2)
BASOPHILS NFR BLD: 2 % (ref 0–2)
BILIRUB SERPL-MCNC: 0.4 MG/DL (ref 0.2–1.1)
BUN SERPL-MCNC: 19 MG/DL (ref 6–23)
CALCIUM SERPL-MCNC: 9.2 MG/DL (ref 8.3–10.4)
CHLORIDE SERPL-SCNC: 107 MMOL/L (ref 101–110)
CO2 SERPL-SCNC: 28 MMOL/L (ref 21–32)
CREAT SERPL-MCNC: 1 MG/DL (ref 0.6–1)
DIFFERENTIAL METHOD BLD: ABNORMAL
EOSINOPHIL # BLD: 0.1 K/UL (ref 0–0.8)
EOSINOPHIL NFR BLD: 3 % (ref 0.5–7.8)
ERYTHROCYTE [DISTWIDTH] IN BLOOD BY AUTOMATED COUNT: 14.8 % (ref 11.9–14.6)
GLOBULIN SER CALC-MCNC: 3.8 G/DL (ref 2.8–4.5)
GLUCOSE BLD STRIP.AUTO-MCNC: 112 MG/DL (ref 65–100)
GLUCOSE SERPL-MCNC: 81 MG/DL (ref 65–100)
HCT VFR BLD AUTO: 34.4 % (ref 35.8–46.3)
HGB BLD-MCNC: 12.1 G/DL (ref 11.7–15.4)
IMM GRANULOCYTES # BLD AUTO: 0 K/UL (ref 0–0.5)
IMM GRANULOCYTES NFR BLD AUTO: 0 % (ref 0–5)
LYMPHOCYTES # BLD: 0.8 K/UL (ref 0.5–4.6)
LYMPHOCYTES NFR BLD: 34 % (ref 13–44)
MAGNESIUM SERPL-MCNC: 2.2 MG/DL (ref 1.8–2.4)
MCH RBC QN AUTO: 35.1 PG (ref 26.1–32.9)
MCHC RBC AUTO-ENTMCNC: 35.2 G/DL (ref 31.4–35)
MCV RBC AUTO: 99.7 FL (ref 82–102)
MONOCYTES # BLD: 0.2 K/UL (ref 0.1–1.3)
MONOCYTES NFR BLD: 9 % (ref 4–12)
NEUTS SEG # BLD: 1.3 K/UL (ref 1.7–8.2)
NEUTS SEG NFR BLD: 52 % (ref 43–78)
NRBC # BLD: 0 K/UL (ref 0–0.2)
PHOSPHATE SERPL-MCNC: 3.1 MG/DL (ref 2.5–4.5)
PLATELET # BLD AUTO: 188 K/UL (ref 150–450)
PLATELET COMMENT: ADEQUATE
PMV BLD AUTO: 9.6 FL (ref 9.4–12.3)
POTASSIUM SERPL-SCNC: 3.3 MMOL/L (ref 3.5–5.1)
PROT SERPL-MCNC: 7.7 G/DL (ref 6.3–8.2)
RBC # BLD AUTO: 3.45 M/UL (ref 4.05–5.2)
RBC MORPH BLD: ABNORMAL
RBC MORPH BLD: ABNORMAL
SERVICE CMNT-IMP: ABNORMAL
SODIUM SERPL-SCNC: 139 MMOL/L (ref 133–143)
WBC # BLD AUTO: 2.4 K/UL (ref 4.3–11.1)
WBC MORPH BLD: ABNORMAL

## 2023-10-18 PROCEDURE — 99214 OFFICE O/P EST MOD 30 MIN: CPT | Performed by: INTERNAL MEDICINE

## 2023-10-18 PROCEDURE — 6360000002 HC RX W HCPCS: Performed by: NURSE PRACTITIONER

## 2023-10-18 PROCEDURE — 83735 ASSAY OF MAGNESIUM: CPT

## 2023-10-18 PROCEDURE — 2580000003 HC RX 258: Performed by: NURSE PRACTITIONER

## 2023-10-18 PROCEDURE — 80053 COMPREHEN METABOLIC PANEL: CPT

## 2023-10-18 PROCEDURE — 96372 THER/PROPH/DIAG INJ SC/IM: CPT

## 2023-10-18 PROCEDURE — 6360000004 HC RX CONTRAST MEDICATION: Performed by: NURSE PRACTITIONER

## 2023-10-18 PROCEDURE — 78815 PET IMAGE W/CT SKULL-THIGH: CPT

## 2023-10-18 PROCEDURE — 85025 COMPLETE CBC W/AUTO DIFF WBC: CPT

## 2023-10-18 PROCEDURE — 3430000000 HC RX DIAGNOSTIC RADIOPHARMACEUTICAL: Performed by: NURSE PRACTITIONER

## 2023-10-18 PROCEDURE — A9552 F18 FDG: HCPCS | Performed by: NURSE PRACTITIONER

## 2023-10-18 PROCEDURE — 3074F SYST BP LT 130 MM HG: CPT | Performed by: INTERNAL MEDICINE

## 2023-10-18 PROCEDURE — 82962 GLUCOSE BLOOD TEST: CPT

## 2023-10-18 PROCEDURE — 36415 COLL VENOUS BLD VENIPUNCTURE: CPT

## 2023-10-18 PROCEDURE — 3078F DIAST BP <80 MM HG: CPT | Performed by: INTERNAL MEDICINE

## 2023-10-18 PROCEDURE — 84100 ASSAY OF PHOSPHORUS: CPT

## 2023-10-18 RX ORDER — ACETAMINOPHEN 325 MG/1
650 TABLET ORAL
Status: DISCONTINUED | OUTPATIENT
Start: 2023-10-18 | End: 2023-10-19 | Stop reason: HOSPADM

## 2023-10-18 RX ORDER — ONDANSETRON 2 MG/ML
8 INJECTION INTRAMUSCULAR; INTRAVENOUS
Status: DISCONTINUED | OUTPATIENT
Start: 2023-10-18 | End: 2023-10-19 | Stop reason: HOSPADM

## 2023-10-18 RX ORDER — SODIUM CHLORIDE 0.9 % (FLUSH) 0.9 %
10 SYRINGE (ML) INJECTION ONCE AS NEEDED
Status: COMPLETED | OUTPATIENT
Start: 2023-10-18 | End: 2023-10-18

## 2023-10-18 RX ORDER — EPINEPHRINE 1 MG/ML
0.3 INJECTION, SOLUTION, CONCENTRATE INTRAVENOUS PRN
Status: DISCONTINUED | OUTPATIENT
Start: 2023-10-18 | End: 2023-10-19 | Stop reason: HOSPADM

## 2023-10-18 RX ORDER — ONDANSETRON 2 MG/ML
8 INJECTION INTRAMUSCULAR; INTRAVENOUS
OUTPATIENT
Start: 2023-11-15

## 2023-10-18 RX ORDER — ALBUTEROL SULFATE 90 UG/1
4 AEROSOL, METERED RESPIRATORY (INHALATION) PRN
OUTPATIENT
Start: 2023-11-15

## 2023-10-18 RX ORDER — ALBUTEROL SULFATE 90 UG/1
4 AEROSOL, METERED RESPIRATORY (INHALATION) PRN
Status: DISCONTINUED | OUTPATIENT
Start: 2023-10-18 | End: 2023-10-19 | Stop reason: HOSPADM

## 2023-10-18 RX ORDER — SODIUM CHLORIDE 9 MG/ML
INJECTION, SOLUTION INTRAVENOUS CONTINUOUS
OUTPATIENT
Start: 2023-11-15

## 2023-10-18 RX ORDER — FLUDEOXYGLUCOSE F 18 200 MCI/ML
13.05 INJECTION, SOLUTION INTRAVENOUS
Status: COMPLETED | OUTPATIENT
Start: 2023-10-18 | End: 2023-10-18

## 2023-10-18 RX ORDER — EPINEPHRINE 1 MG/ML
0.3 INJECTION, SOLUTION, CONCENTRATE INTRAVENOUS PRN
OUTPATIENT
Start: 2023-11-15

## 2023-10-18 RX ORDER — DIPHENHYDRAMINE HYDROCHLORIDE 50 MG/ML
50 INJECTION INTRAMUSCULAR; INTRAVENOUS
Status: DISCONTINUED | OUTPATIENT
Start: 2023-10-18 | End: 2023-10-19 | Stop reason: HOSPADM

## 2023-10-18 RX ORDER — ACETAMINOPHEN 325 MG/1
650 TABLET ORAL
OUTPATIENT
Start: 2023-11-15

## 2023-10-18 RX ORDER — DIPHENHYDRAMINE HYDROCHLORIDE 50 MG/ML
50 INJECTION INTRAMUSCULAR; INTRAVENOUS
OUTPATIENT
Start: 2023-11-15

## 2023-10-18 RX ADMIN — DIATRIZOATE MEGLUMINE AND DIATRIZOATE SODIUM 10 ML: 660; 100 LIQUID ORAL; RECTAL at 07:50

## 2023-10-18 RX ADMIN — SODIUM CHLORIDE, PRESERVATIVE FREE 10 ML: 5 INJECTION INTRAVENOUS at 07:50

## 2023-10-18 RX ADMIN — DENOSUMAB 120 MG: 120 INJECTION SUBCUTANEOUS at 14:39

## 2023-10-18 RX ADMIN — FLUDEOXYGLUCOSE F 18 13.05 MILLICURIE: 200 INJECTION, SOLUTION INTRAVENOUS at 07:50

## 2023-10-18 ASSESSMENT — PATIENT HEALTH QUESTIONNAIRE - PHQ9
SUM OF ALL RESPONSES TO PHQ QUESTIONS 1-9: 0
1. LITTLE INTEREST OR PLEASURE IN DOING THINGS: 0
SUM OF ALL RESPONSES TO PHQ9 QUESTIONS 1 & 2: 0
2. FEELING DOWN, DEPRESSED OR HOPELESS: 0
SUM OF ALL RESPONSES TO PHQ QUESTIONS 1-9: 0

## 2023-10-18 NOTE — PROGRESS NOTES
Patient arrived to [address] [facility] for [facility]. Assessment completed. No needs voiced at this time. Patient tolerated injection well and is aware of next appointment  on 11/15/2023 @1400. Patient discharged ambulatory.

## 2023-10-18 NOTE — PROGRESS NOTES
[facility] On[facility]: Office Visit Established Patient    Chief Complaint:    Chief Complaint   Patient presents with    Follow-up         History of Present Illness:  Ms. Nora Galvez is a 46 y.o. female who presents today for follow-up regarding metastatic breast cancer. On 6/2/22, Ms. Alcocer presented for her routine screening mammogram. The imaging reported a left breast mass with associated calcifications, worrisome for malignancy. On 6/14/22 she underwent a left breast diagnostic mammogram and ultrasound with biopsy of the breast mass and a dysmorphic left axillary lymph node. The left breast and axillary node pathology showed infiltrating ductal carcinoma with lobular features, low grade (well differentiated), ER 93%, AZ 0%, and HER-2 0. On 6/16/22 she had a bilateral breast MRI that reported the known left breast cancer measuring up to 3.7 cm, with associated left axillary lymphadenopathy however no suspicious findings in the right breast. On 6/22/22 she had a PET/CT scan that showed the breast and axillary node, but also showed hypermetabolic osseous metastatic disease in the sacrum and T10 vertebral body. We recommended biopsy of a suspicious osseous lesion which confirmed mBC. Unfortunately, she will not be a candidate for curative therapy and should start palliative systemic therapy, I would recommend letrozole and ribociclib. She is postmenopausal so she will not need ovarian function suppression. She will also require denosumab or zoledronic acid depending on insurance approval.  We attempted to perform NGS on the metastatic specimen to screen for biomarker expression including PI3KCA, BRCA, PDL-1 and others, but there was insufficient tissue. PET/CT in January 2023 showed complete response with resolution of the uptake in the bone lesions, so we recommended consultation with surgery for her breast/axillary disease. She completed lumpectomy and sentinel lymph node biopsy on 3/8/23.

## 2023-10-18 NOTE — PATIENT INSTRUCTIONS
Patient Instructions from Today's Visit    Reason for Visit:  Follow up Breast Cancer     Diagnosis Information:  [email or web address]    Plan:  Lab results review     Follow Up: Follow up     Recent Lab Results:  Hospital Outpatient Visit on 10/18/2023   Component Date Value Ref Range Status    WBC 10/18/2023 2.4 (L)  4.3 - 11.1 K/uL Final    Comment: RESULTS CHECKED X 2  PERIPHERAL REVIEW TO FOLLOW      RBC 10/18/2023 3.45 (L)  4.05 - 5.2 M/uL Final    Hemoglobin 10/18/2023 12.1  11.7 - 15.4 g/dL Final    Hematocrit 10/18/2023 34.4 (L)  35.8 - 46.3 % Final    MCV 10/18/2023 99.7  82.0 - 102.0 FL Final    MCH 10/18/2023 35.1 (H)  26.1 - 32.9 PG Final    MCHC 10/18/2023 35.2 (H)  31.4 - 35.0 g/dL Final    RDW 10/18/2023 14.8 (H)  11.9 - 14.6 % Final    Platelets 92/38/3668 188  150 - 450 K/uL Final    MPV 10/18/2023 9.6  9.4 - 12.3 FL Final    nRBC 10/18/2023 0.00  0.0 - 0.2 K/uL Final    **Note: Absolute NRBC parameter is now reported with Hemogram**    Differential Type 10/18/2023 PENDING   Incomplete   Hospital Outpatient Visit on 10/18/2023   Component Date Value Ref Range Status    POC Glucose 10/18/2023 112 (H)  65 - 100 mg/dL Final    Comment: 47 - 60 mg/dl Consistent with, but not fully diagnostic of hypoglycemia.   101 - 125 mg/dl Impaired fasting glucose/consistent with pre-diabetes mellitus  > 126 mg/dl Fasting glucose consistent with overt diabetes mellitus      Performed by: 10/18/2023 GilliamEmmaGraceNucMed/Pet   Final     Treatment Summary has been discussed and given to patient:   N/A    -------------------------------------------------------------------------------------------------------------------  Please call our office at [phone] if you have any  of the following symptoms:   Fever of 100.5 or greater  Chills  Shortness of breath  Swelling or pain in one leg    After office hours an answering service is

## 2023-11-01 ENCOUNTER — OFFICE VISIT (OUTPATIENT)
Dept: SURGERY | Age: 53
End: 2023-11-01
Payer: COMMERCIAL

## 2023-11-01 DIAGNOSIS — C50.912 INFILTRATING DUCTAL CARCINOMA OF BREAST, LEFT (HCC): ICD-10-CM

## 2023-11-01 DIAGNOSIS — C50.212 MALIGNANT NEOPLASM OF UPPER-INNER QUADRANT OF LEFT BREAST IN FEMALE, ESTROGEN RECEPTOR POSITIVE (HCC): ICD-10-CM

## 2023-11-01 DIAGNOSIS — Z17.0 MALIGNANT NEOPLASM OF LEFT BREAST IN FEMALE, ESTROGEN RECEPTOR POSITIVE, UNSPECIFIED SITE OF BREAST (HCC): Primary | ICD-10-CM

## 2023-11-01 DIAGNOSIS — C50.912 MALIGNANT NEOPLASM OF LEFT BREAST IN FEMALE, ESTROGEN RECEPTOR POSITIVE, UNSPECIFIED SITE OF BREAST (HCC): Primary | ICD-10-CM

## 2023-11-01 DIAGNOSIS — Z17.0 MALIGNANT NEOPLASM OF UPPER-INNER QUADRANT OF LEFT BREAST IN FEMALE, ESTROGEN RECEPTOR POSITIVE (HCC): ICD-10-CM

## 2023-11-01 PROCEDURE — 99214 OFFICE O/P EST MOD 30 MIN: CPT | Performed by: SURGERY

## 2023-11-01 NOTE — PROGRESS NOTES
Mod Moderate  ? 1or more chronic illnesses with exacerbation, progression, or side effects of treatment;    or  ?2or more stable chronic illnesses;    or  ?1undiagnosed new problem with uncertain prognosis;    or  ?1acute illness with systemic symptoms;    or  ?1acute complicated injury   Moderate  (Must meet the requirements of at least 1 out of 3 categories)  Category 1: Tests, documents, or independent historian(s)  ? Any combination of 3 from the following:   ?Review of prior external note(s) from each unique source*;  ?Review of the result(s) of each unique test*;  ?Ordering of each unique test*;  ?Assessment requiring an independent historian(s)    or  Category 2: Independent interpretation of tests   ? Independent interpretation of a test performed by another physician/other qualified health care professional (not separately reported);     or  Category 3: Discussion of management or test interpretation  ? Discussion of management or test interpretation with external physician/other qualified health care professional/appropriate source (not separately reported)   Moderate risk of morbidity from additional diagnostic testing or treatment  Examples only:  ?Prescription drug management   ? Decision regarding minor surgery with identified patient or procedure risk factors  ? Decision regarding elective major surgery without identified patient or procedure risk factors   ? Diagnosis or treatment significantly limited by social determinants of health       [id number] High High  ? 1or more chronic illnesses with severe exacerbation, progression, or side effects of treatment;    or  ?1 acute or chronic illness or injury that poses a threat to life or bodily function   Extensive  (Must meet the requirements of at least 2 out of 3 categories)  Category 1: Tests, documents, or independent historian(s)  ? Any combination of 3 from the following:   ?Review of prior external note(s) from each unique source*;  ?Review of the

## 2023-11-06 ENCOUNTER — TELEPHONE (OUTPATIENT)
Dept: RADIATION ONCOLOGY | Age: 53
End: 2023-11-06

## 2023-11-06 NOTE — TELEPHONE ENCOUNTER
Contacted patient to get PET scan rescheduled to before her OV and let patient know that her Feb appt has been scheduled for her Rafaela Serum.

## 2023-11-06 NOTE — TELEPHONE ENCOUNTER
Pt states she needs to jacque April apt w/Dr Griselda Bane to after the pet scan and also she does not see any apt in Feb which she should have.

## 2023-11-10 RX ORDER — LETROZOLE 2.5 MG/1
TABLET, FILM COATED ORAL
Qty: 30 TABLET | Refills: 3 | OUTPATIENT
Start: 2023-11-10

## 2023-11-13 RX ORDER — PALBOCICLIB 125 MG/1
TABLET, FILM COATED ORAL
Qty: 21 TABLET | OUTPATIENT
Start: 2023-11-13

## 2023-11-13 RX ORDER — POTASSIUM CHLORIDE 20 MEQ/1
TABLET, EXTENDED RELEASE ORAL
Qty: 60 TABLET | Refills: 1 | OUTPATIENT
Start: 2023-11-13

## 2023-11-13 RX ORDER — LETROZOLE 2.5 MG/1
TABLET, FILM COATED ORAL
Qty: 30 TABLET | Refills: 3 | OUTPATIENT
Start: 2023-11-13

## 2023-11-13 RX ORDER — POTASSIUM CHLORIDE 20 MEQ/1
20 TABLET, EXTENDED RELEASE ORAL 2 TIMES DAILY
Qty: 60 TABLET | Refills: 1 | OUTPATIENT
Start: 2023-11-13

## 2023-11-15 ENCOUNTER — HOSPITAL ENCOUNTER (OUTPATIENT)
Dept: INFUSION THERAPY | Age: 53
Discharge: HOME | End: 2023-11-15
Payer: COMMERCIAL

## 2023-11-15 ENCOUNTER — HOSPITAL ENCOUNTER (OUTPATIENT)
Dept: LAB | Age: 53
Discharge: HOME | End: 2023-11-18

## 2023-11-15 DIAGNOSIS — C50.912 MALIGNANT NEOPLASM OF LEFT BREAST IN FEMALE, ESTROGEN RECEPTOR POSITIVE, UNSPECIFIED SITE OF BREAST (HCC): ICD-10-CM

## 2023-11-15 DIAGNOSIS — C79.51 METASTASIS TO BONE (HCC): Primary | ICD-10-CM

## 2023-11-15 DIAGNOSIS — Z17.0 MALIGNANT NEOPLASM OF LEFT BREAST IN FEMALE, ESTROGEN RECEPTOR POSITIVE, UNSPECIFIED SITE OF BREAST (HCC): ICD-10-CM

## 2023-11-15 LAB
ALBUMIN SERPL-MCNC: 4.1 G/DL (ref 3.5–5)
ALBUMIN/GLOB SERPL: 1 (ref 0.4–1.6)
ALP SERPL-CCNC: 76 U/L (ref 50–136)
ALT SERPL-CCNC: 37 U/L (ref 12–65)
ANION GAP SERPL CALC-SCNC: 5 MMOL/L (ref 2–11)
AST SERPL-CCNC: 24 U/L (ref 15–37)
BILIRUB SERPL-MCNC: 0.4 MG/DL (ref 0.2–1.1)
BUN SERPL-MCNC: 18 MG/DL (ref 6–23)
CALCIUM SERPL-MCNC: 9.3 MG/DL (ref 8.3–10.4)
CHLORIDE SERPL-SCNC: 108 MMOL/L (ref 101–110)
CO2 SERPL-SCNC: 29 MMOL/L (ref 21–32)
CREAT SERPL-MCNC: 1.1 MG/DL (ref 0.6–1)
GLOBULIN SER CALC-MCNC: 4.1 G/DL (ref 2.8–4.5)
GLUCOSE SERPL-MCNC: 85 MG/DL (ref 65–100)
MAGNESIUM SERPL-MCNC: 2.3 MG/DL (ref 1.8–2.4)
PHOSPHATE SERPL-MCNC: 3.2 MG/DL (ref 2.5–4.5)
POTASSIUM SERPL-SCNC: 3.5 MMOL/L (ref 3.5–5.1)
PROT SERPL-MCNC: 8.2 G/DL (ref 6.3–8.2)
SODIUM SERPL-SCNC: 142 MMOL/L (ref 133–143)

## 2023-11-15 PROCEDURE — 80053 COMPREHEN METABOLIC PANEL: CPT

## 2023-11-15 PROCEDURE — 83735 ASSAY OF MAGNESIUM: CPT

## 2023-11-15 PROCEDURE — 6360000002 HC RX W HCPCS: Performed by: NURSE PRACTITIONER

## 2023-11-15 PROCEDURE — 84100 ASSAY OF PHOSPHORUS: CPT

## 2023-11-15 PROCEDURE — 36415 COLL VENOUS BLD VENIPUNCTURE: CPT

## 2023-11-15 PROCEDURE — 96372 THER/PROPH/DIAG INJ SC/IM: CPT

## 2023-11-15 RX ORDER — ACETAMINOPHEN 325 MG/1
650 TABLET ORAL
OUTPATIENT
Start: 2023-12-13

## 2023-11-15 RX ORDER — SODIUM CHLORIDE 9 MG/ML
INJECTION, SOLUTION INTRAVENOUS CONTINUOUS
OUTPATIENT
Start: 2023-12-13

## 2023-11-15 RX ORDER — ONDANSETRON 2 MG/ML
8 INJECTION INTRAMUSCULAR; INTRAVENOUS
OUTPATIENT
Start: 2023-12-13

## 2023-11-15 RX ORDER — EPINEPHRINE 1 MG/ML
0.3 INJECTION, SOLUTION, CONCENTRATE INTRAVENOUS PRN
OUTPATIENT
Start: 2023-12-13

## 2023-11-15 RX ORDER — ALBUTEROL SULFATE 90 UG/1
4 AEROSOL, METERED RESPIRATORY (INHALATION) PRN
OUTPATIENT
Start: 2023-12-13

## 2023-11-15 RX ORDER — DIPHENHYDRAMINE HYDROCHLORIDE 50 MG/ML
50 INJECTION INTRAMUSCULAR; INTRAVENOUS
OUTPATIENT
Start: 2023-12-13

## 2023-11-15 RX ADMIN — DENOSUMAB 120 MG: 120 INJECTION SUBCUTANEOUS at 14:30

## 2023-11-15 NOTE — PROGRESS NOTES
Arrived to the [facility]. Xgeva injection completed. Provided education on Xgeva    Patient instructed to report any side affects to ordering provider. Patient tolerated well. Any issues or concerns during appointment: no.  Patient aware of next infusion appointment on 12/13/2023 (date) at 1400 (time). Discharged ambulatory.

## 2023-11-22 RX ORDER — POTASSIUM CHLORIDE 20 MEQ/1
TABLET, EXTENDED RELEASE ORAL
Qty: 30 TABLET | Refills: 2 | OUTPATIENT
Start: 2023-11-22

## 2023-12-13 ENCOUNTER — HOSPITAL ENCOUNTER (OUTPATIENT)
Dept: LAB | Age: 53
Discharge: HOME | End: 2023-12-16

## 2023-12-13 ENCOUNTER — HOSPITAL ENCOUNTER (OUTPATIENT)
Dept: INFUSION THERAPY | Age: 53
Discharge: HOME | End: 2023-12-13
Payer: COMMERCIAL

## 2023-12-13 VITALS
OXYGEN SATURATION: 100 % | RESPIRATION RATE: 16 BRPM | TEMPERATURE: 97.7 F | BODY MASS INDEX: 32.49 KG/M2 | DIASTOLIC BLOOD PRESSURE: 77 MMHG | SYSTOLIC BLOOD PRESSURE: 130 MMHG | WEIGHT: 183.4 LBS | HEART RATE: 77 BPM

## 2023-12-13 DIAGNOSIS — Z17.0 MALIGNANT NEOPLASM OF LEFT BREAST IN FEMALE, ESTROGEN RECEPTOR POSITIVE, UNSPECIFIED SITE OF BREAST (HCC): Primary | ICD-10-CM

## 2023-12-13 DIAGNOSIS — C79.51 METASTASIS TO BONE (HCC): ICD-10-CM

## 2023-12-13 DIAGNOSIS — C50.912 MALIGNANT NEOPLASM OF LEFT BREAST IN FEMALE, ESTROGEN RECEPTOR POSITIVE, UNSPECIFIED SITE OF BREAST (HCC): Primary | ICD-10-CM

## 2023-12-13 LAB
ALBUMIN SERPL-MCNC: 4.3 G/DL (ref 3.5–5)
ALBUMIN/GLOB SERPL: 1 (ref 0.4–1.6)
ALP SERPL-CCNC: 72 U/L (ref 50–136)
ALT SERPL-CCNC: 40 U/L (ref 12–65)
ANION GAP SERPL CALC-SCNC: 6 MMOL/L (ref 2–11)
AST SERPL-CCNC: 27 U/L (ref 15–37)
BILIRUB SERPL-MCNC: 0.4 MG/DL (ref 0.2–1.1)
BUN SERPL-MCNC: 12 MG/DL (ref 6–23)
CALCIUM SERPL-MCNC: 9.4 MG/DL (ref 8.3–10.4)
CHLORIDE SERPL-SCNC: 104 MMOL/L (ref 103–113)
CO2 SERPL-SCNC: 27 MMOL/L (ref 21–32)
CREAT SERPL-MCNC: 1 MG/DL (ref 0.6–1)
GLOBULIN SER CALC-MCNC: 4.2 G/DL (ref 2.8–4.5)
GLUCOSE SERPL-MCNC: 88 MG/DL (ref 65–100)
MAGNESIUM SERPL-MCNC: 2.2 MG/DL (ref 1.8–2.4)
PHOSPHATE SERPL-MCNC: 2.9 MG/DL (ref 2.5–4.5)
POTASSIUM SERPL-SCNC: 3.3 MMOL/L (ref 3.5–5.1)
PROT SERPL-MCNC: 8.5 G/DL (ref 6.3–8.2)
SODIUM SERPL-SCNC: 137 MMOL/L (ref 136–146)

## 2023-12-13 PROCEDURE — 83735 ASSAY OF MAGNESIUM: CPT

## 2023-12-13 PROCEDURE — 36415 COLL VENOUS BLD VENIPUNCTURE: CPT

## 2023-12-13 PROCEDURE — 80053 COMPREHEN METABOLIC PANEL: CPT

## 2023-12-13 PROCEDURE — 84100 ASSAY OF PHOSPHORUS: CPT

## 2023-12-13 PROCEDURE — 6360000002 HC RX W HCPCS: Performed by: NURSE PRACTITIONER

## 2023-12-13 PROCEDURE — 96372 THER/PROPH/DIAG INJ SC/IM: CPT

## 2023-12-13 RX ORDER — DIPHENHYDRAMINE HYDROCHLORIDE 50 MG/ML
50 INJECTION INTRAMUSCULAR; INTRAVENOUS
OUTPATIENT
Start: 2024-01-10

## 2023-12-13 RX ORDER — SODIUM CHLORIDE 9 MG/ML
INJECTION, SOLUTION INTRAVENOUS CONTINUOUS
OUTPATIENT
Start: 2024-01-10

## 2023-12-13 RX ORDER — ONDANSETRON 2 MG/ML
8 INJECTION INTRAMUSCULAR; INTRAVENOUS
OUTPATIENT
Start: 2024-01-10

## 2023-12-13 RX ORDER — ALBUTEROL SULFATE 90 UG/1
4 AEROSOL, METERED RESPIRATORY (INHALATION) PRN
OUTPATIENT
Start: 2024-01-10

## 2023-12-13 RX ORDER — ACETAMINOPHEN 325 MG/1
650 TABLET ORAL
OUTPATIENT
Start: 2024-01-10

## 2023-12-13 RX ORDER — EPINEPHRINE 1 MG/ML
0.3 INJECTION, SOLUTION, CONCENTRATE INTRAVENOUS PRN
OUTPATIENT
Start: 2024-01-10

## 2023-12-13 RX ADMIN — DENOSUMAB 120 MG: 120 INJECTION SUBCUTANEOUS at 14:42

## 2023-12-13 NOTE — PROGRESS NOTES
Arrived to the [address] [place] [address]. Xgeva completed. Provided education on Xgeva    Patient instructed to report any side affects to ordering provider. Patient tolerated well. Any issues or concerns during appointment: none. Patient aware of next infusion appointment on 1/10/24 (date) at  (time). Discharged ambulatory.

## 2023-12-29 DIAGNOSIS — Z17.0 MALIGNANT NEOPLASM OF LEFT BREAST IN FEMALE, ESTROGEN RECEPTOR POSITIVE, UNSPECIFIED SITE OF BREAST (HCC): ICD-10-CM

## 2023-12-29 DIAGNOSIS — C50.912 MALIGNANT NEOPLASM OF LEFT FEMALE BREAST, UNSPECIFIED ESTROGEN RECEPTOR STATUS, UNSPECIFIED SITE OF BREAST (HCC): ICD-10-CM

## 2023-12-29 DIAGNOSIS — C50.912 MALIGNANT NEOPLASM OF LEFT BREAST IN FEMALE, ESTROGEN RECEPTOR POSITIVE, UNSPECIFIED SITE OF BREAST (HCC): ICD-10-CM

## 2023-12-29 DIAGNOSIS — C79.51 METASTASIS TO BONE (HCC): ICD-10-CM

## 2023-12-29 DIAGNOSIS — C50.912 BREAST CANCER METASTASIZED TO AXILLARY LYMPH NODE, LEFT (HCC): ICD-10-CM

## 2023-12-29 DIAGNOSIS — C77.3 BREAST CANCER METASTASIZED TO AXILLARY LYMPH NODE, LEFT (HCC): ICD-10-CM

## 2024-01-04 DIAGNOSIS — K21.9 GASTROESOPHAGEAL REFLUX DISEASE WITHOUT ESOPHAGITIS: ICD-10-CM

## 2024-01-04 RX ORDER — OMEPRAZOLE 40 MG/1
40 CAPSULE, DELAYED RELEASE ORAL DAILY
Qty: 30 CAPSULE | Refills: 5 | Status: SHIPPED | OUTPATIENT
Start: 2024-01-04

## 2024-01-04 RX ORDER — PALBOCICLIB 125 MG/1
125 TABLET, FILM COATED ORAL DAILY
Qty: 21 TABLET | Refills: 13 | Status: SHIPPED | OUTPATIENT
Start: 2024-01-04

## 2024-01-04 RX ORDER — LETROZOLE 2.5 MG/1
2.5 TABLET, FILM COATED ORAL DAILY
Qty: 30 TABLET | Refills: 11 | Status: SHIPPED | OUTPATIENT
Start: 2024-01-04

## 2024-01-04 RX ORDER — POTASSIUM CHLORIDE 20 MEQ/1
20 TABLET, EXTENDED RELEASE ORAL 2 TIMES DAILY
Qty: 60 TABLET | Refills: 11 | Status: SHIPPED | OUTPATIENT
Start: 2024-01-04

## 2024-01-09 DIAGNOSIS — Z17.0 MALIGNANT NEOPLASM OF LEFT BREAST IN FEMALE, ESTROGEN RECEPTOR POSITIVE, UNSPECIFIED SITE OF BREAST (HCC): Primary | ICD-10-CM

## 2024-01-09 DIAGNOSIS — C50.912 MALIGNANT NEOPLASM OF LEFT BREAST IN FEMALE, ESTROGEN RECEPTOR POSITIVE, UNSPECIFIED SITE OF BREAST (HCC): Primary | ICD-10-CM

## 2024-01-10 ENCOUNTER — HOSPITAL ENCOUNTER (OUTPATIENT)
Dept: INFUSION THERAPY | Age: 54
Discharge: HOME | End: 2024-01-10
Payer: COMMERCIAL

## 2024-01-10 ENCOUNTER — OFFICE VISIT (OUTPATIENT)
Dept: ONCOLOGY | Age: 54
End: 2024-01-10
Payer: COMMERCIAL

## 2024-01-10 ENCOUNTER — HOSPITAL ENCOUNTER (OUTPATIENT)
Dept: LAB | Age: 54
Discharge: HOME | End: 2024-01-13
Payer: COMMERCIAL

## 2024-01-10 VITALS
HEIGHT: 64 IN | BODY MASS INDEX: 31.34 KG/M2 | WEIGHT: 183.6 LBS | HEART RATE: 72 BPM | OXYGEN SATURATION: 100 % | SYSTOLIC BLOOD PRESSURE: 130 MMHG | DIASTOLIC BLOOD PRESSURE: 83 MMHG | TEMPERATURE: 98.1 F | RESPIRATION RATE: 16 BRPM

## 2024-01-10 DIAGNOSIS — C50.912 MALIGNANT NEOPLASM OF LEFT BREAST IN FEMALE, ESTROGEN RECEPTOR POSITIVE, UNSPECIFIED SITE OF BREAST (HCC): ICD-10-CM

## 2024-01-10 DIAGNOSIS — C50.912 MALIGNANT NEOPLASM OF LEFT BREAST IN FEMALE, ESTROGEN RECEPTOR POSITIVE, UNSPECIFIED SITE OF BREAST (HCC): Primary | ICD-10-CM

## 2024-01-10 DIAGNOSIS — C79.51 METASTASIS TO BONE (HCC): ICD-10-CM

## 2024-01-10 DIAGNOSIS — C77.3 BREAST CANCER METASTASIZED TO AXILLARY LYMPH NODE, LEFT (HCC): ICD-10-CM

## 2024-01-10 DIAGNOSIS — Z17.0 MALIGNANT NEOPLASM OF LEFT BREAST IN FEMALE, ESTROGEN RECEPTOR POSITIVE, UNSPECIFIED SITE OF BREAST (HCC): ICD-10-CM

## 2024-01-10 DIAGNOSIS — C50.912 BREAST CANCER METASTASIZED TO AXILLARY LYMPH NODE, LEFT (HCC): ICD-10-CM

## 2024-01-10 DIAGNOSIS — Z17.0 MALIGNANT NEOPLASM OF LEFT BREAST IN FEMALE, ESTROGEN RECEPTOR POSITIVE, UNSPECIFIED SITE OF BREAST (HCC): Primary | ICD-10-CM

## 2024-01-10 LAB
ALBUMIN SERPL-MCNC: 4.2 G/DL (ref 3.5–5)
ALBUMIN/GLOB SERPL: 1 (ref 0.4–1.6)
ALP SERPL-CCNC: 80 U/L (ref 50–136)
ALT SERPL-CCNC: 42 U/L (ref 12–65)
ANION GAP SERPL CALC-SCNC: 5 MMOL/L (ref 2–11)
AST SERPL-CCNC: 23 U/L (ref 15–37)
BASOPHILS # BLD: 0.1 K/UL (ref 0–0.2)
BASOPHILS NFR BLD: 2 % (ref 0–2)
BILIRUB SERPL-MCNC: 0.3 MG/DL (ref 0.2–1.1)
BUN SERPL-MCNC: 17 MG/DL (ref 6–23)
CALCIUM SERPL-MCNC: 9.3 MG/DL (ref 8.3–10.4)
CHLORIDE SERPL-SCNC: 107 MMOL/L (ref 103–113)
CO2 SERPL-SCNC: 26 MMOL/L (ref 21–32)
CREAT SERPL-MCNC: 1 MG/DL (ref 0.6–1)
DIFFERENTIAL METHOD BLD: ABNORMAL
EOSINOPHIL # BLD: 0.1 K/UL (ref 0–0.8)
EOSINOPHIL NFR BLD: 2 % (ref 0.5–7.8)
ERYTHROCYTE [DISTWIDTH] IN BLOOD BY AUTOMATED COUNT: 13 % (ref 11.9–14.6)
GLOBULIN SER CALC-MCNC: 4.2 G/DL (ref 2.8–4.5)
GLUCOSE SERPL-MCNC: 102 MG/DL (ref 65–100)
HCT VFR BLD AUTO: 37 % (ref 35.8–46.3)
HGB BLD-MCNC: 13.2 G/DL (ref 11.7–15.4)
IMM GRANULOCYTES # BLD AUTO: 0 K/UL (ref 0–0.5)
IMM GRANULOCYTES NFR BLD AUTO: 0 % (ref 0–5)
LYMPHOCYTES # BLD: 1 K/UL (ref 0.5–4.6)
LYMPHOCYTES NFR BLD: 38 % (ref 13–44)
MAGNESIUM SERPL-MCNC: 2.1 MG/DL (ref 1.8–2.4)
MCH RBC QN AUTO: 36.3 PG (ref 26.1–32.9)
MCHC RBC AUTO-ENTMCNC: 35.7 G/DL (ref 31.4–35)
MCV RBC AUTO: 101.6 FL (ref 82–102)
MONOCYTES # BLD: 0.2 K/UL (ref 0.1–1.3)
MONOCYTES NFR BLD: 9 % (ref 4–12)
NEUTS SEG # BLD: 1.1 K/UL (ref 1.7–8.2)
NEUTS SEG NFR BLD: 49 % (ref 43–78)
NRBC # BLD: 0 K/UL (ref 0–0.2)
PHOSPHATE SERPL-MCNC: 3.2 MG/DL (ref 2.5–4.5)
PLATELET # BLD AUTO: 193 K/UL (ref 150–450)
PLATELET COMMENT: ADEQUATE
PMV BLD AUTO: 9.6 FL (ref 9.4–12.3)
POTASSIUM SERPL-SCNC: 3.6 MMOL/L (ref 3.5–5.1)
PROT SERPL-MCNC: 8.4 G/DL (ref 6.3–8.2)
RBC # BLD AUTO: 3.64 M/UL (ref 4.05–5.2)
RBC MORPH BLD: ABNORMAL
SODIUM SERPL-SCNC: 138 MMOL/L (ref 136–146)
WBC # BLD AUTO: 2.5 K/UL (ref 4.3–11.1)
WBC MORPH BLD: ABNORMAL

## 2024-01-10 PROCEDURE — 80053 COMPREHEN METABOLIC PANEL: CPT

## 2024-01-10 PROCEDURE — 85025 COMPLETE CBC W/AUTO DIFF WBC: CPT

## 2024-01-10 PROCEDURE — 6360000002 HC RX W HCPCS: Performed by: NURSE PRACTITIONER

## 2024-01-10 PROCEDURE — 83735 ASSAY OF MAGNESIUM: CPT

## 2024-01-10 PROCEDURE — 36415 COLL VENOUS BLD VENIPUNCTURE: CPT

## 2024-01-10 PROCEDURE — 3075F SYST BP GE 130 - 139MM HG: CPT

## 2024-01-10 PROCEDURE — 3079F DIAST BP 80-89 MM HG: CPT

## 2024-01-10 PROCEDURE — 84100 ASSAY OF PHOSPHORUS: CPT

## 2024-01-10 PROCEDURE — 99214 OFFICE O/P EST MOD 30 MIN: CPT

## 2024-01-10 PROCEDURE — 96372 THER/PROPH/DIAG INJ SC/IM: CPT

## 2024-01-10 RX ORDER — EPINEPHRINE 1 MG/ML
0.3 INJECTION, SOLUTION, CONCENTRATE INTRAVENOUS PRN
OUTPATIENT
Start: 2024-02-07

## 2024-01-10 RX ORDER — ALBUTEROL SULFATE 90 UG/1
4 AEROSOL, METERED RESPIRATORY (INHALATION) PRN
OUTPATIENT
Start: 2024-02-07

## 2024-01-10 RX ORDER — ONDANSETRON 2 MG/ML
8 INJECTION INTRAMUSCULAR; INTRAVENOUS
OUTPATIENT
Start: 2024-02-07

## 2024-01-10 RX ORDER — ACETAMINOPHEN 325 MG/1
650 TABLET ORAL
OUTPATIENT
Start: 2024-02-07

## 2024-01-10 RX ORDER — SODIUM CHLORIDE 9 MG/ML
INJECTION, SOLUTION INTRAVENOUS CONTINUOUS
OUTPATIENT
Start: 2024-02-07

## 2024-01-10 RX ORDER — DIPHENHYDRAMINE HYDROCHLORIDE 50 MG/ML
50 INJECTION INTRAMUSCULAR; INTRAVENOUS
OUTPATIENT
Start: 2024-02-07

## 2024-01-10 RX ADMIN — DENOSUMAB 120 MG: 120 INJECTION SUBCUTANEOUS at 15:23

## 2024-01-10 ASSESSMENT — PATIENT HEALTH QUESTIONNAIRE - PHQ9
SUM OF ALL RESPONSES TO PHQ QUESTIONS 1-9: 0
1. LITTLE INTEREST OR PLEASURE IN DOING THINGS: 0
SUM OF ALL RESPONSES TO PHQ QUESTIONS 1-9: 0
SUM OF ALL RESPONSES TO PHQ9 QUESTIONS 1 & 2: 0
SUM OF ALL RESPONSES TO PHQ QUESTIONS 1-9: 0
SUM OF ALL RESPONSES TO PHQ QUESTIONS 1-9: 0
2. FEELING DOWN, DEPRESSED OR HOPELESS: 0

## 2024-01-10 NOTE — PROGRESS NOTES
[facility] Hematology and Oncology: Office Visit Established Patient    Chief Complaint:    No chief complaint on file.        History of Present Illness:  Ms. Alcocer is a 53 y.o. female who presents today for follow-up regarding metastatic breast cancer.  On 6/2/22, Ms. Alcocer presented for her routine screening mammogram. The imaging reported a left breast mass with associated calcifications, worrisome for malignancy.  On 6/14/22 she underwent a left breast diagnostic mammogram and ultrasound with biopsy of the breast mass and a dysmorphic left axillary lymph node. The left breast and axillary node pathology showed infiltrating ductal carcinoma with lobular features, low grade (well differentiated), ER 93%, FL 0%, and HER-2 0. On 6/16/22 she had a bilateral breast MRI that reported the known left breast cancer measuring up to 3.7 cm, with associated left axillary lymphadenopathy however no suspicious findings in the right breast. On 6/22/22 she had a PET/CT scan that showed the breast and axillary node, but also showed hypermetabolic osseous metastatic disease in the sacrum and T10 vertebral body.  We recommended biopsy of a suspicious osseous lesion which confirmed mBC.  Unfortunately, she will not be a candidate for curative therapy and should start palliative systemic therapy, I would recommend letrozole and ribociclib.  She is postmenopausal so she will not need ovarian function suppression.  She will also require denosumab or zoledronic acid depending on insurance approval.  We attempted to perform NGS on the metastatic specimen to screen for biomarker expression including PI3KCA, BRCA, PDL-1 and others, but there was insufficient tissue.  PET/CT in January 2023 showed complete response with resolution of the uptake in the bone lesions, so we recommended consultation with surgery for her breast/axillary disease.  She completed lumpectomy and sentinel lymph node biopsy on 3/8/23.  Path reviewed, she had

## 2024-01-10 NOTE — PROGRESS NOTES
Arrived to the [facility] ambulatory.  Xgeva injection completed.   Patient has received this medication before and has no questions.    Patient instructed to report any side affects to ordering provider.  Patient tolerated well.   Any issues or concerns during appointment: none.  Patient aware of next infusion appointment on 2/7/2024 (date) at 1400 (time).  Discharged home ambulatory.

## 2024-01-11 SDOH — ECONOMIC STABILITY: INCOME INSECURITY: HOW HARD IS IT FOR YOU TO PAY FOR THE VERY BASICS LIKE FOOD, HOUSING, MEDICAL CARE, AND HEATING?: NOT VERY HARD

## 2024-01-11 SDOH — ECONOMIC STABILITY: FOOD INSECURITY: WITHIN THE PAST 12 MONTHS, THE FOOD YOU BOUGHT JUST DIDN'T LAST AND YOU DIDN'T HAVE MONEY TO GET MORE.: NEVER TRUE

## 2024-01-11 SDOH — ECONOMIC STABILITY: FOOD INSECURITY: WITHIN THE PAST 12 MONTHS, YOU WORRIED THAT YOUR FOOD WOULD RUN OUT BEFORE YOU GOT MONEY TO BUY MORE.: NEVER TRUE

## 2024-01-12 ENCOUNTER — OFFICE VISIT (OUTPATIENT)
Dept: FAMILY MEDICINE CLINIC | Facility: CLINIC | Age: 54
End: 2024-01-12
Payer: COMMERCIAL

## 2024-01-12 VITALS
BODY MASS INDEX: 31.41 KG/M2 | SYSTOLIC BLOOD PRESSURE: 131 MMHG | HEIGHT: 64 IN | OXYGEN SATURATION: 100 % | DIASTOLIC BLOOD PRESSURE: 82 MMHG | TEMPERATURE: 97.9 F | RESPIRATION RATE: 16 BRPM | WEIGHT: 184 LBS | HEART RATE: 76 BPM

## 2024-01-12 DIAGNOSIS — Z82.49 FAMILY HISTORY OF CORONARY ARTERY DISEASE: ICD-10-CM

## 2024-01-12 DIAGNOSIS — J30.1 NON-SEASONAL ALLERGIC RHINITIS DUE TO POLLEN: ICD-10-CM

## 2024-01-12 DIAGNOSIS — C79.51 METASTASIS TO BONE (HCC): ICD-10-CM

## 2024-01-12 DIAGNOSIS — K21.9 GASTROESOPHAGEAL REFLUX DISEASE WITHOUT ESOPHAGITIS: ICD-10-CM

## 2024-01-12 DIAGNOSIS — E78.5 HYPERLIPIDEMIA, UNSPECIFIED HYPERLIPIDEMIA TYPE: ICD-10-CM

## 2024-01-12 DIAGNOSIS — C50.912 MALIGNANT NEOPLASM OF LEFT FEMALE BREAST, UNSPECIFIED ESTROGEN RECEPTOR STATUS, UNSPECIFIED SITE OF BREAST (HCC): ICD-10-CM

## 2024-01-12 DIAGNOSIS — C77.3 BREAST CANCER METASTASIZED TO AXILLARY LYMPH NODE, LEFT (HCC): ICD-10-CM

## 2024-01-12 DIAGNOSIS — R07.9 CHEST PAIN, UNSPECIFIED TYPE: Primary | ICD-10-CM

## 2024-01-12 DIAGNOSIS — I10 PRIMARY HYPERTENSION: ICD-10-CM

## 2024-01-12 DIAGNOSIS — C50.912 MALIGNANT NEOPLASM OF LEFT BREAST IN FEMALE, ESTROGEN RECEPTOR POSITIVE, UNSPECIFIED SITE OF BREAST (HCC): ICD-10-CM

## 2024-01-12 DIAGNOSIS — R73.09 OTHER ABNORMAL GLUCOSE: ICD-10-CM

## 2024-01-12 DIAGNOSIS — C50.912 BREAST CANCER METASTASIZED TO AXILLARY LYMPH NODE, LEFT (HCC): ICD-10-CM

## 2024-01-12 DIAGNOSIS — Z17.0 MALIGNANT NEOPLASM OF LEFT BREAST IN FEMALE, ESTROGEN RECEPTOR POSITIVE, UNSPECIFIED SITE OF BREAST (HCC): ICD-10-CM

## 2024-01-12 PROCEDURE — 99214 OFFICE O/P EST MOD 30 MIN: CPT | Performed by: FAMILY MEDICINE

## 2024-01-12 PROCEDURE — 3079F DIAST BP 80-89 MM HG: CPT | Performed by: FAMILY MEDICINE

## 2024-01-12 PROCEDURE — 3075F SYST BP GE 130 - 139MM HG: CPT | Performed by: FAMILY MEDICINE

## 2024-01-12 PROCEDURE — 93000 ELECTROCARDIOGRAM COMPLETE: CPT | Performed by: FAMILY MEDICINE

## 2024-01-12 RX ORDER — ONDANSETRON HYDROCHLORIDE 8 MG/1
8 TABLET, FILM COATED ORAL EVERY 8 HOURS PRN
Qty: 90 TABLET | Refills: 2 | Status: SHIPPED | OUTPATIENT
Start: 2024-01-12

## 2024-01-12 RX ORDER — OLMESARTAN MEDOXOMIL 40 MG/1
TABLET ORAL
Qty: 90 TABLET | Refills: 1 | Status: SHIPPED | OUTPATIENT
Start: 2024-01-12

## 2024-01-12 RX ORDER — LORATADINE 10 MG/1
10 TABLET ORAL DAILY
Qty: 90 TABLET | Refills: 3 | Status: SHIPPED | OUTPATIENT
Start: 2024-01-12

## 2024-01-12 RX ORDER — FAMOTIDINE 20 MG/1
20 TABLET, FILM COATED ORAL 2 TIMES DAILY
Qty: 180 TABLET | Refills: 3 | Status: SHIPPED | OUTPATIENT
Start: 2024-01-12

## 2024-01-12 ASSESSMENT — PATIENT HEALTH QUESTIONNAIRE - PHQ9
SUM OF ALL RESPONSES TO PHQ QUESTIONS 1-9: 0
1. LITTLE INTEREST OR PLEASURE IN DOING THINGS: 0
SUM OF ALL RESPONSES TO PHQ QUESTIONS 1-9: 0
2. FEELING DOWN, DEPRESSED OR HOPELESS: 0
SUM OF ALL RESPONSES TO PHQ QUESTIONS 1-9: 0
SUM OF ALL RESPONSES TO PHQ9 QUESTIONS 1 & 2: 0
SUM OF ALL RESPONSES TO PHQ QUESTIONS 1-9: 0

## 2024-01-12 NOTE — PROGRESS NOTES
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     Now on DENOSUMAB Xgeva monthly, letrozole, ibrance     occasionally chest pain.  Surgery said it may be due to the radiation.  Not heaviness.  Usually not real bad.       Has recently had episodes of major heartburn that was pretty painful for a few sec on the left side.  Different than the twinge that she usually has.  This is not but not a heaviness.  Usually in the left chest.  Had radiation all in the left side.  Not assiciated with exercise.  Walking with dog for exercise.     Some memory issues.  Dad had vascular dementia.    Walking quickly up the hill to get to the house.  It was in the cold and it felt tight.  Usually not doing cardio like she used to.  Family history of heart disease - they were all smokers    Letrozole - does have a risk of some cardiac issues  Mom just had a MI major at 79.    Hyperlipidemia  Patient presents for follow up evaluation of hyperlipidemia.  Diet and Lifestyle was discussed.  Pertinent ROS: no TIA's, no swelling of ankles.  Risk factors for vascular disease consist of hyperlipidemia.  Key CAD CHF Meds            olmesartan (BENICAR) 40 MG tablet (Taking)    Sig: TAKE ONE TABLET BY MOUTH ONE TIME DAILY IN THE MORNING FOR BLOOD PRESSURE    chlorthalidone (HYGROTON) 25 MG tablet (Taking)    Sig: TAKE ONE TABLET BY MOUTH ONE TIME DAILY IN THE MORNING FOR BLOOD PRESSURE           Lab Results   Component Value Date    CHOL 193 03/09/2023    CHOL 261 (H) 06/24/2021     Lab Results   Component Value Date    TRIG 114 03/09/2023    TRIG 217 (H) 06/24/2021     Lab Results   Component Value Date    HDL 45 03/09/2023    HDL 47 06/24/2021     Lab Results   Component Value Date    LDLCALC 125.2 (H) 03/09/2023

## 2024-01-13 DIAGNOSIS — C50.912 MALIGNANT NEOPLASM OF LEFT BREAST IN FEMALE, ESTROGEN RECEPTOR POSITIVE, UNSPECIFIED SITE OF BREAST (HCC): ICD-10-CM

## 2024-01-13 DIAGNOSIS — C77.3 BREAST CANCER METASTASIZED TO AXILLARY LYMPH NODE, LEFT (HCC): ICD-10-CM

## 2024-01-13 DIAGNOSIS — C79.51 METASTASIS TO BONE (HCC): ICD-10-CM

## 2024-01-13 DIAGNOSIS — C50.912 MALIGNANT NEOPLASM OF LEFT FEMALE BREAST, UNSPECIFIED ESTROGEN RECEPTOR STATUS, UNSPECIFIED SITE OF BREAST (HCC): ICD-10-CM

## 2024-01-13 DIAGNOSIS — Z17.0 MALIGNANT NEOPLASM OF LEFT BREAST IN FEMALE, ESTROGEN RECEPTOR POSITIVE, UNSPECIFIED SITE OF BREAST (HCC): ICD-10-CM

## 2024-01-13 DIAGNOSIS — C50.912 BREAST CANCER METASTASIZED TO AXILLARY LYMPH NODE, LEFT (HCC): ICD-10-CM

## 2024-01-17 DIAGNOSIS — C50.912 MALIGNANT NEOPLASM OF LEFT FEMALE BREAST, UNSPECIFIED ESTROGEN RECEPTOR STATUS, UNSPECIFIED SITE OF BREAST (HCC): ICD-10-CM

## 2024-01-17 DIAGNOSIS — C77.3 BREAST CANCER METASTASIZED TO AXILLARY LYMPH NODE, LEFT (HCC): ICD-10-CM

## 2024-01-17 DIAGNOSIS — C79.51 METASTASIS TO BONE (HCC): ICD-10-CM

## 2024-01-17 DIAGNOSIS — C50.912 BREAST CANCER METASTASIZED TO AXILLARY LYMPH NODE, LEFT (HCC): ICD-10-CM

## 2024-01-17 DIAGNOSIS — Z17.0 MALIGNANT NEOPLASM OF LEFT BREAST IN FEMALE, ESTROGEN RECEPTOR POSITIVE, UNSPECIFIED SITE OF BREAST (HCC): ICD-10-CM

## 2024-01-17 DIAGNOSIS — C50.912 MALIGNANT NEOPLASM OF LEFT BREAST IN FEMALE, ESTROGEN RECEPTOR POSITIVE, UNSPECIFIED SITE OF BREAST (HCC): ICD-10-CM

## 2024-01-18 ASSESSMENT — ENCOUNTER SYMPTOMS
WHEEZING: 0
COUGH: 0
NAUSEA: 0
BLURRED VISION: 0
ABDOMINAL PAIN: 0
RHINORRHEA: 0
ORTHOPNEA: 0
DIARRHEA: 0
CONSTIPATION: 0
SHORTNESS OF BREATH: 0
VOMITING: 0

## 2024-01-22 RX ORDER — LETROZOLE 2.5 MG/1
2.5 TABLET, FILM COATED ORAL DAILY
Qty: 30 TABLET | Refills: 11 | OUTPATIENT
Start: 2024-01-22

## 2024-01-22 RX ORDER — PALBOCICLIB 125 MG/1
TABLET, FILM COATED ORAL
Qty: 21 TABLET | Refills: 13 | OUTPATIENT
Start: 2024-01-22

## 2024-01-22 RX ORDER — POTASSIUM CHLORIDE 20 MEQ/1
20 TABLET, EXTENDED RELEASE ORAL 2 TIMES DAILY
Qty: 60 TABLET | Refills: 11 | OUTPATIENT
Start: 2024-01-22

## 2024-02-07 ENCOUNTER — HOSPITAL ENCOUNTER (OUTPATIENT)
Dept: LAB | Age: 54
Discharge: HOME | End: 2024-02-10
Payer: COMMERCIAL

## 2024-02-07 ENCOUNTER — HOSPITAL ENCOUNTER (OUTPATIENT)
Dept: INFUSION THERAPY | Age: 54
Discharge: HOME | End: 2024-02-07
Payer: COMMERCIAL

## 2024-02-07 VITALS
HEART RATE: 78 BPM | OXYGEN SATURATION: 97 % | DIASTOLIC BLOOD PRESSURE: 78 MMHG | RESPIRATION RATE: 16 BRPM | SYSTOLIC BLOOD PRESSURE: 114 MMHG | TEMPERATURE: 98.2 F

## 2024-02-07 DIAGNOSIS — C79.51 METASTASIS TO BONE (HCC): ICD-10-CM

## 2024-02-07 DIAGNOSIS — Z17.0 MALIGNANT NEOPLASM OF LEFT BREAST IN FEMALE, ESTROGEN RECEPTOR POSITIVE, UNSPECIFIED SITE OF BREAST (HCC): Primary | ICD-10-CM

## 2024-02-07 DIAGNOSIS — C50.912 MALIGNANT NEOPLASM OF LEFT BREAST IN FEMALE, ESTROGEN RECEPTOR POSITIVE, UNSPECIFIED SITE OF BREAST (HCC): Primary | ICD-10-CM

## 2024-02-07 DIAGNOSIS — Z17.0 MALIGNANT NEOPLASM OF LEFT BREAST IN FEMALE, ESTROGEN RECEPTOR POSITIVE, UNSPECIFIED SITE OF BREAST (HCC): ICD-10-CM

## 2024-02-07 DIAGNOSIS — C50.912 MALIGNANT NEOPLASM OF LEFT BREAST IN FEMALE, ESTROGEN RECEPTOR POSITIVE, UNSPECIFIED SITE OF BREAST (HCC): ICD-10-CM

## 2024-02-07 LAB
ALBUMIN SERPL-MCNC: 4.2 G/DL (ref 3.5–5)
ALBUMIN/GLOB SERPL: 1.1 (ref 0.4–1.6)
ALP SERPL-CCNC: 79 U/L (ref 50–136)
ALT SERPL-CCNC: 38 U/L (ref 12–65)
ANION GAP SERPL CALC-SCNC: 6 MMOL/L (ref 2–11)
AST SERPL-CCNC: 22 U/L (ref 15–37)
BILIRUB SERPL-MCNC: 0.5 MG/DL (ref 0.2–1.1)
BUN SERPL-MCNC: 13 MG/DL (ref 6–23)
CALCIUM SERPL-MCNC: 9.9 MG/DL (ref 8.3–10.4)
CHLORIDE SERPL-SCNC: 104 MMOL/L (ref 103–113)
CO2 SERPL-SCNC: 27 MMOL/L (ref 21–32)
CREAT SERPL-MCNC: 1 MG/DL (ref 0.6–1)
GLOBULIN SER CALC-MCNC: 4 G/DL (ref 2.8–4.5)
GLUCOSE SERPL-MCNC: 84 MG/DL (ref 65–100)
MAGNESIUM SERPL-MCNC: 1.9 MG/DL (ref 1.8–2.4)
PHOSPHATE SERPL-MCNC: 3.9 MG/DL (ref 2.5–4.5)
POTASSIUM SERPL-SCNC: 3.5 MMOL/L (ref 3.5–5.1)
PROT SERPL-MCNC: 8.2 G/DL (ref 6.3–8.2)
SODIUM SERPL-SCNC: 137 MMOL/L (ref 136–146)

## 2024-02-07 PROCEDURE — 84100 ASSAY OF PHOSPHORUS: CPT

## 2024-02-07 PROCEDURE — 36415 COLL VENOUS BLD VENIPUNCTURE: CPT

## 2024-02-07 PROCEDURE — 83735 ASSAY OF MAGNESIUM: CPT

## 2024-02-07 PROCEDURE — 6360000002 HC RX W HCPCS: Performed by: NURSE PRACTITIONER

## 2024-02-07 PROCEDURE — 80053 COMPREHEN METABOLIC PANEL: CPT

## 2024-02-07 PROCEDURE — 96372 THER/PROPH/DIAG INJ SC/IM: CPT

## 2024-02-07 RX ORDER — EPINEPHRINE 1 MG/ML
0.3 INJECTION, SOLUTION, CONCENTRATE INTRAVENOUS PRN
OUTPATIENT
Start: 2024-03-06

## 2024-02-07 RX ORDER — ALBUTEROL SULFATE 90 UG/1
4 AEROSOL, METERED RESPIRATORY (INHALATION) PRN
OUTPATIENT
Start: 2024-03-06

## 2024-02-07 RX ORDER — ONDANSETRON 2 MG/ML
8 INJECTION INTRAMUSCULAR; INTRAVENOUS
OUTPATIENT
Start: 2024-03-06

## 2024-02-07 RX ORDER — DIPHENHYDRAMINE HYDROCHLORIDE 50 MG/ML
50 INJECTION INTRAMUSCULAR; INTRAVENOUS
OUTPATIENT
Start: 2024-03-06

## 2024-02-07 RX ORDER — SODIUM CHLORIDE 9 MG/ML
INJECTION, SOLUTION INTRAVENOUS CONTINUOUS
OUTPATIENT
Start: 2024-03-06

## 2024-02-07 RX ORDER — ACETAMINOPHEN 325 MG/1
650 TABLET ORAL
OUTPATIENT
Start: 2024-03-06

## 2024-02-07 RX ADMIN — DENOSUMAB 120 MG: 120 INJECTION SUBCUTANEOUS at 14:28

## 2024-02-07 NOTE — PROGRESS NOTES
Arrived to the [facility]fusion [facility] ambulatory.  Xgeva injection completed.   Patient has received this medication before and has no questions.    Patient instructed to report any side affects to ordering provider.  Patient tolerated well.   Any issues or concerns during appointment: none.  Patient aware of next infusion appointment on 3/6/2024 (date) at 1400 (time).  Discharged home ambulatory.

## 2024-02-20 ENCOUNTER — INITIAL CONSULT (OUTPATIENT)
Age: 54
End: 2024-02-20
Payer: COMMERCIAL

## 2024-02-20 VITALS
DIASTOLIC BLOOD PRESSURE: 82 MMHG | HEART RATE: 71 BPM | BODY MASS INDEX: 31.89 KG/M2 | HEIGHT: 63 IN | WEIGHT: 180 LBS | SYSTOLIC BLOOD PRESSURE: 140 MMHG

## 2024-02-20 DIAGNOSIS — R07.9 CHEST PAIN, UNSPECIFIED TYPE: Primary | ICD-10-CM

## 2024-02-20 DIAGNOSIS — C79.51 METASTASIS TO BONE (HCC): ICD-10-CM

## 2024-02-20 DIAGNOSIS — Z17.0 MALIGNANT NEOPLASM OF LEFT BREAST IN FEMALE, ESTROGEN RECEPTOR POSITIVE, UNSPECIFIED SITE OF BREAST (HCC): ICD-10-CM

## 2024-02-20 DIAGNOSIS — C50.912 MALIGNANT NEOPLASM OF LEFT BREAST IN FEMALE, ESTROGEN RECEPTOR POSITIVE, UNSPECIFIED SITE OF BREAST (HCC): ICD-10-CM

## 2024-02-20 DIAGNOSIS — I10 PRIMARY HYPERTENSION: ICD-10-CM

## 2024-02-20 PROCEDURE — 93000 ELECTROCARDIOGRAM COMPLETE: CPT | Performed by: INTERNAL MEDICINE

## 2024-02-20 PROCEDURE — 3079F DIAST BP 80-89 MM HG: CPT | Performed by: INTERNAL MEDICINE

## 2024-02-20 PROCEDURE — 99203 OFFICE O/P NEW LOW 30 MIN: CPT | Performed by: INTERNAL MEDICINE

## 2024-02-20 PROCEDURE — 3077F SYST BP >= 140 MM HG: CPT | Performed by: INTERNAL MEDICINE

## 2024-02-20 ASSESSMENT — ENCOUNTER SYMPTOMS
BOWEL INCONTINENCE: 0
VOMITING: 0
HEMATOCHEZIA: 0
ORTHOPNEA: 0
COUGH: 0
DIARRHEA: 0
HOARSE VOICE: 0
COLOR CHANGE: 0
SHORTNESS OF BREATH: 0
BLURRED VISION: 0
NAUSEA: 0
HEMATEMESIS: 0
BACK PAIN: 0
SPUTUM PRODUCTION: 0
ABDOMINAL PAIN: 0
WHEEZING: 0

## 2024-02-20 NOTE — PROGRESS NOTES
[facility]  [address] [place] [address], [address]  [place] [address]  PHONE: [phone]        24    NAME:  Barbara Alcocer  : 1970  MRN: [id number]       SUBJECTIVE:   Barbara Alcocer is a 53 y.o. female seen for a consultation visit regarding the following: The patient is referred by Dr Singh for evaluation of chest pain.    Chief Complaint   Patient presents with    Hypertension    Consultation    Chest Pain            HPI:  Consultation is requested by [unfilled] for evaluation of Hypertension, Consultation, and Chest Pain   .    Chest Pain   This is a recurrent problem. Episode onset: 2023. The problem occurs intermittently. The problem has been unchanged. Pain location: left chest . The pain is at a severity of 1/10. The pain is mild. Quality: Stinging left sided chest pain that lasts several seconds. The pain does not radiate. Pertinent negatives include no abdominal pain, back pain, claudication, cough, diaphoresis, dizziness, exertional chest pressure, fever, headaches, irregular heartbeat, leg pain, lower extremity edema, malaise/fatigue, nausea, near-syncope, numbness, orthopnea, palpitations, PND, shortness of breath, sputum production, syncope, vomiting or weakness. Associated with: Started after radition therapy for metastatic breast cancer. She has tried nothing for the symptoms.           Past Medical History, Past Surgical History, Family history, Social History, and Medications were all reviewed with the patient today and updated as necessary.       No Known Allergies    Current Outpatient Medications:     ondansetron (ZOFRAN) 8 MG tablet, Take 1 tablet by mouth every 8 hours as needed for Nausea or Vomiting, Disp: 90 tablet, Rfl: 2    olmesartan (BENICAR) 40 MG tablet, TAKE ONE TABLET BY MOUTH ONE TIME DAILY IN THE MORNING FOR BLOOD PRESSURE, Disp: 90 tablet, Rfl: 1    loratadine (CLARITIN) 10 MG tablet, Take 1 tablet by mouth daily, Disp: 90 tablet, Rfl: 3    famotidine

## 2024-03-04 DIAGNOSIS — K21.9 GASTROESOPHAGEAL REFLUX DISEASE WITHOUT ESOPHAGITIS: ICD-10-CM

## 2024-03-05 RX ORDER — FAMOTIDINE 20 MG/1
20 TABLET, FILM COATED ORAL 2 TIMES DAILY
Qty: 180 TABLET | Refills: 3 | OUTPATIENT
Start: 2024-03-05

## 2024-03-06 ENCOUNTER — HOSPITAL ENCOUNTER (OUTPATIENT)
Dept: INFUSION THERAPY | Age: 54
Discharge: HOME | End: 2024-03-06
Payer: COMMERCIAL

## 2024-03-06 ENCOUNTER — HOSPITAL ENCOUNTER (OUTPATIENT)
Dept: LAB | Age: 54
Discharge: HOME | End: 2024-03-09

## 2024-03-06 VITALS
OXYGEN SATURATION: 96 % | DIASTOLIC BLOOD PRESSURE: 69 MMHG | RESPIRATION RATE: 16 BRPM | TEMPERATURE: 98.8 F | BODY MASS INDEX: 31.85 KG/M2 | SYSTOLIC BLOOD PRESSURE: 113 MMHG | HEART RATE: 70 BPM | WEIGHT: 179.8 LBS

## 2024-03-06 DIAGNOSIS — C79.51 METASTASIS TO BONE (HCC): ICD-10-CM

## 2024-03-06 DIAGNOSIS — C50.912 MALIGNANT NEOPLASM OF LEFT BREAST IN FEMALE, ESTROGEN RECEPTOR POSITIVE, UNSPECIFIED SITE OF BREAST (HCC): Primary | ICD-10-CM

## 2024-03-06 DIAGNOSIS — Z17.0 MALIGNANT NEOPLASM OF LEFT BREAST IN FEMALE, ESTROGEN RECEPTOR POSITIVE, UNSPECIFIED SITE OF BREAST (HCC): Primary | ICD-10-CM

## 2024-03-06 LAB
ALBUMIN SERPL-MCNC: 4.3 G/DL (ref 3.5–5)
ALBUMIN/GLOB SERPL: 1 (ref 0.4–1.6)
ALP SERPL-CCNC: 86 U/L (ref 50–136)
ALT SERPL-CCNC: 38 U/L (ref 12–65)
ANION GAP SERPL CALC-SCNC: 5 MMOL/L (ref 2–11)
AST SERPL-CCNC: 21 U/L (ref 15–37)
BILIRUB SERPL-MCNC: 0.4 MG/DL (ref 0.2–1.1)
BUN SERPL-MCNC: 15 MG/DL (ref 6–23)
CALCIUM SERPL-MCNC: 9.4 MG/DL (ref 8.3–10.4)
CHLORIDE SERPL-SCNC: 106 MMOL/L (ref 103–113)
CO2 SERPL-SCNC: 28 MMOL/L (ref 21–32)
CREAT SERPL-MCNC: 1 MG/DL (ref 0.6–1)
GLOBULIN SER CALC-MCNC: 4.1 G/DL (ref 2.8–4.5)
GLUCOSE SERPL-MCNC: 89 MG/DL (ref 65–100)
MAGNESIUM SERPL-MCNC: 2.1 MG/DL (ref 1.8–2.4)
PHOSPHATE SERPL-MCNC: 3 MG/DL (ref 2.5–4.5)
POTASSIUM SERPL-SCNC: 3.3 MMOL/L (ref 3.5–5.1)
PROT SERPL-MCNC: 8.4 G/DL (ref 6.3–8.2)
SODIUM SERPL-SCNC: 139 MMOL/L (ref 136–146)

## 2024-03-06 PROCEDURE — 84100 ASSAY OF PHOSPHORUS: CPT

## 2024-03-06 PROCEDURE — 83735 ASSAY OF MAGNESIUM: CPT

## 2024-03-06 PROCEDURE — 96372 THER/PROPH/DIAG INJ SC/IM: CPT

## 2024-03-06 PROCEDURE — 6360000002 HC RX W HCPCS: Performed by: NURSE PRACTITIONER

## 2024-03-06 PROCEDURE — 80053 COMPREHEN METABOLIC PANEL: CPT

## 2024-03-06 PROCEDURE — 36415 COLL VENOUS BLD VENIPUNCTURE: CPT

## 2024-03-06 RX ORDER — SODIUM CHLORIDE 9 MG/ML
INJECTION, SOLUTION INTRAVENOUS CONTINUOUS
OUTPATIENT
Start: 2024-04-03

## 2024-03-06 RX ORDER — ACETAMINOPHEN 325 MG/1
650 TABLET ORAL
OUTPATIENT
Start: 2024-04-03

## 2024-03-06 RX ORDER — EPINEPHRINE 1 MG/ML
0.3 INJECTION, SOLUTION, CONCENTRATE INTRAVENOUS PRN
OUTPATIENT
Start: 2024-04-03

## 2024-03-06 RX ORDER — ONDANSETRON 2 MG/ML
8 INJECTION INTRAMUSCULAR; INTRAVENOUS
OUTPATIENT
Start: 2024-04-03

## 2024-03-06 RX ORDER — ALBUTEROL SULFATE 90 UG/1
4 AEROSOL, METERED RESPIRATORY (INHALATION) PRN
OUTPATIENT
Start: 2024-04-03

## 2024-03-06 RX ORDER — DIPHENHYDRAMINE HYDROCHLORIDE 50 MG/ML
50 INJECTION INTRAMUSCULAR; INTRAVENOUS
OUTPATIENT
Start: 2024-04-03

## 2024-03-06 RX ADMIN — DENOSUMAB 120 MG: 120 INJECTION SUBCUTANEOUS at 14:39

## 2024-03-06 NOTE — PROGRESS NOTES
Arrived to the Infusion Center.  Xgeva injection completed.   Provided education on same    Patient instructed to report any side affects to ordering provider.  Patient tolerated well.   Any issues or concerns during appointment: none.  Patient aware of next infusion appointment on 04/09/2024 (date) at 1600 (time).  Discharged ambulatory.

## 2024-03-08 DIAGNOSIS — I10 PRIMARY HYPERTENSION: ICD-10-CM

## 2024-03-11 RX ORDER — CHLORTHALIDONE 25 MG/1
TABLET ORAL
Qty: 90 TABLET | Refills: 1 | Status: SHIPPED | OUTPATIENT
Start: 2024-03-11

## 2024-03-11 RX ORDER — OLMESARTAN MEDOXOMIL 40 MG/1
TABLET ORAL
Qty: 90 TABLET | Refills: 1 | Status: SHIPPED | OUTPATIENT
Start: 2024-03-11

## 2024-03-28 DIAGNOSIS — C50.912 MALIGNANT NEOPLASM OF LEFT BREAST IN FEMALE, ESTROGEN RECEPTOR POSITIVE, UNSPECIFIED SITE OF BREAST (HCC): Primary | ICD-10-CM

## 2024-03-28 DIAGNOSIS — Z17.0 MALIGNANT NEOPLASM OF LEFT BREAST IN FEMALE, ESTROGEN RECEPTOR POSITIVE, UNSPECIFIED SITE OF BREAST (HCC): Primary | ICD-10-CM

## 2024-04-02 ENCOUNTER — OFFICE VISIT (OUTPATIENT)
Age: 54
End: 2024-04-02

## 2024-04-02 VITALS
HEIGHT: 63 IN | BODY MASS INDEX: 31.82 KG/M2 | HEART RATE: 56 BPM | WEIGHT: 179.6 LBS | SYSTOLIC BLOOD PRESSURE: 140 MMHG | DIASTOLIC BLOOD PRESSURE: 100 MMHG

## 2024-04-02 DIAGNOSIS — I10 PRIMARY HYPERTENSION: ICD-10-CM

## 2024-04-02 DIAGNOSIS — R07.9 CHEST PAIN, UNSPECIFIED TYPE: Primary | ICD-10-CM

## 2024-04-02 ASSESSMENT — ENCOUNTER SYMPTOMS
HEMATOCHEZIA: 0
SHORTNESS OF BREATH: 0
COLOR CHANGE: 0
COUGH: 0
NAUSEA: 0
BLURRED VISION: 0
HEMOPTYSIS: 0
BACK PAIN: 0
HEMATEMESIS: 0
WHEEZING: 0
SPUTUM PRODUCTION: 0
ABDOMINAL PAIN: 0
DIARRHEA: 0

## 2024-04-02 NOTE — PROGRESS NOTES
[facility]  [address] [place] [address], [address]  [place] [address]  PHONE: [phone]        24        NAME:  Barbara Alcocer  : 1970  MRN: [id number]       SUBJECTIVE:   Barbara Alcocer is a 53 y.o. female seen for a follow up visit regarding the following: Chest pain.Recently,the patient was referred by her PCP for chest pain evaluation.She had a normal follow up Lexiscan and unremarkable echo.She returns to discuss test results.I discussed test results with the patient.She reports resolution of chest pain.    Chief Complaint   Patient presents with    Chest Pain    Results     Clite, echo       HPI:    Chest Pain   The problem has been resolved. Pertinent negatives include no abdominal pain, back pain, claudication, cough, diaphoresis, dizziness, exertional chest pressure, fever, headaches, hemoptysis, irregular heartbeat, leg pain, lower extremity edema, malaise/fatigue, nausea, near-syncope, numbness, orthopnea, palpitations, PND, shortness of breath, sputum production, syncope or weakness.       Past Medical History, Past Surgical History, Family history, Social History, and Medications were all reviewed with the patient today and updated as necessary.         Current Outpatient Medications:     chlorthalidone (HYGROTON) 25 MG tablet, TAKE ONE TABLET BY MOUTH EVERY MORNING FOR FOR BLOOD PRESSURE, Disp: 90 tablet, Rfl: 1    olmesartan (BENICAR) 40 MG tablet, TAKE ONE TABLET BY MOUTH EVERY MORNING FOR FOR BLOOD PRESSURE, Disp: 90 tablet, Rfl: 1    ondansetron (ZOFRAN) 8 MG tablet, Take 1 tablet by mouth every 8 hours as needed for Nausea or Vomiting, Disp: 90 tablet, Rfl: 2    loratadine (CLARITIN) 10 MG tablet, Take 1 tablet by mouth daily (Patient taking differently: Take 1 tablet by mouth as needed), Disp: 90 tablet, Rfl: 3    famotidine (PEPCID) 20 MG tablet, Take 1 tablet by mouth 2 times daily, Disp: 180 tablet, Rfl: 3    letrozole (FEMARA) 2.5 MG tablet, Take 1 tablet by mouth

## 2024-04-04 ENCOUNTER — HOSPITAL ENCOUNTER (OUTPATIENT)
Dept: PET IMAGING | Age: 54
Discharge: HOME | End: 2024-04-04
Payer: COMMERCIAL

## 2024-04-04 DIAGNOSIS — C79.51 METASTASIS TO BONE (HCC): ICD-10-CM

## 2024-04-04 DIAGNOSIS — C50.912 MALIGNANT NEOPLASM OF LEFT BREAST IN FEMALE, ESTROGEN RECEPTOR POSITIVE, UNSPECIFIED SITE OF BREAST (HCC): ICD-10-CM

## 2024-04-04 DIAGNOSIS — Z17.0 MALIGNANT NEOPLASM OF LEFT BREAST IN FEMALE, ESTROGEN RECEPTOR POSITIVE, UNSPECIFIED SITE OF BREAST (HCC): ICD-10-CM

## 2024-04-04 LAB
GLUCOSE BLD STRIP.AUTO-MCNC: 109 MG/DL (ref 65–100)
SERVICE CMNT-IMP: ABNORMAL

## 2024-04-04 PROCEDURE — A9609 HC RX DIAGNOSTIC RADIOPHARMACEUTICAL: HCPCS | Performed by: INTERNAL MEDICINE

## 2024-04-04 PROCEDURE — 78815 PET IMAGE W/CT SKULL-THIGH: CPT

## 2024-04-04 PROCEDURE — 82962 GLUCOSE BLOOD TEST: CPT

## 2024-04-04 PROCEDURE — 6360000004 HC RX CONTRAST MEDICATION: Performed by: INTERNAL MEDICINE

## 2024-04-04 PROCEDURE — 2580000003 HC RX 258: Performed by: INTERNAL MEDICINE

## 2024-04-04 PROCEDURE — 3430000000 HC RX DIAGNOSTIC RADIOPHARMACEUTICAL: Performed by: INTERNAL MEDICINE

## 2024-04-04 RX ORDER — FLUDEOXYGLUCOSE F 18 200 MCI/ML
12.82 INJECTION, SOLUTION INTRAVENOUS
Status: COMPLETED | OUTPATIENT
Start: 2024-04-04 | End: 2024-04-04

## 2024-04-04 RX ORDER — SODIUM CHLORIDE 0.9 % (FLUSH) 0.9 %
20 SYRINGE (ML) INJECTION AS NEEDED
Status: ACTIVE | OUTPATIENT
Start: 2024-04-04

## 2024-04-04 RX ADMIN — SODIUM CHLORIDE, PRESERVATIVE FREE 20 ML: 5 INJECTION INTRAVENOUS at 09:50

## 2024-04-04 RX ADMIN — DIATRIZOATE MEGLUMINE AND DIATRIZOATE SODIUM 10 ML: 660; 100 LIQUID ORAL; RECTAL at 09:50

## 2024-04-04 RX ADMIN — FLUDEOXYGLUCOSE F 18 12.82 MILLICURIE: 200 INJECTION, SOLUTION INTRAVENOUS at 09:50

## 2024-04-09 ENCOUNTER — OFFICE VISIT (OUTPATIENT)
Dept: ONCOLOGY | Age: 54
End: 2024-04-09
Payer: COMMERCIAL

## 2024-04-09 ENCOUNTER — HOSPITAL ENCOUNTER (OUTPATIENT)
Dept: LAB | Age: 54
Discharge: HOME | End: 2024-04-12
Payer: COMMERCIAL

## 2024-04-09 ENCOUNTER — HOSPITAL ENCOUNTER (OUTPATIENT)
Dept: INFUSION THERAPY | Age: 54
Setting detail: INFUSION SERIES
Discharge: HOME | End: 2024-04-09
Payer: COMMERCIAL

## 2024-04-09 VITALS
WEIGHT: 183 LBS | RESPIRATION RATE: 20 BRPM | HEART RATE: 99 BPM | TEMPERATURE: 98 F | BODY MASS INDEX: 31.24 KG/M2 | DIASTOLIC BLOOD PRESSURE: 75 MMHG | OXYGEN SATURATION: 96 % | HEIGHT: 64 IN | SYSTOLIC BLOOD PRESSURE: 136 MMHG

## 2024-04-09 DIAGNOSIS — C50.912 MALIGNANT NEOPLASM OF LEFT BREAST IN FEMALE, ESTROGEN RECEPTOR POSITIVE, UNSPECIFIED SITE OF BREAST (HCC): ICD-10-CM

## 2024-04-09 DIAGNOSIS — Z17.0 MALIGNANT NEOPLASM OF LEFT BREAST IN FEMALE, ESTROGEN RECEPTOR POSITIVE, UNSPECIFIED SITE OF BREAST (HCC): ICD-10-CM

## 2024-04-09 DIAGNOSIS — C50.912 MALIGNANT NEOPLASM OF LEFT BREAST IN FEMALE, ESTROGEN RECEPTOR POSITIVE, UNSPECIFIED SITE OF BREAST (HCC): Primary | ICD-10-CM

## 2024-04-09 DIAGNOSIS — R94.2 ABNORMAL PET OF RIGHT LUNG: Primary | ICD-10-CM

## 2024-04-09 DIAGNOSIS — Z17.0 MALIGNANT NEOPLASM OF LEFT BREAST IN FEMALE, ESTROGEN RECEPTOR POSITIVE, UNSPECIFIED SITE OF BREAST (HCC): Primary | ICD-10-CM

## 2024-04-09 DIAGNOSIS — C79.51 METASTASIS TO BONE (HCC): ICD-10-CM

## 2024-04-09 LAB
ALBUMIN SERPL-MCNC: 4 G/DL (ref 3.5–5)
ALBUMIN/GLOB SERPL: 1.1 (ref 0.4–1.6)
ALP SERPL-CCNC: 74 U/L (ref 50–136)
ALT SERPL-CCNC: 40 U/L (ref 12–65)
ANION GAP SERPL CALC-SCNC: 5 MMOL/L (ref 2–11)
AST SERPL-CCNC: 24 U/L (ref 15–37)
BASOPHILS # BLD: 0 K/UL (ref 0–0.2)
BASOPHILS NFR BLD: 2 % (ref 0–2)
BILIRUB SERPL-MCNC: 0.4 MG/DL (ref 0.2–1.1)
BUN SERPL-MCNC: 16 MG/DL (ref 6–23)
CALCIUM SERPL-MCNC: 9.3 MG/DL (ref 8.3–10.4)
CANCER AG15-3 SERPL-ACNC: 44.1 U/ML (ref 1–35)
CHLORIDE SERPL-SCNC: 107 MMOL/L (ref 103–113)
CO2 SERPL-SCNC: 28 MMOL/L (ref 21–32)
CREAT SERPL-MCNC: 1.3 MG/DL (ref 0.6–1)
DIFFERENTIAL METHOD BLD: ABNORMAL
EOSINOPHIL # BLD: 0 K/UL (ref 0–0.8)
EOSINOPHIL NFR BLD: 1 % (ref 0.5–7.8)
ERYTHROCYTE [DISTWIDTH] IN BLOOD BY AUTOMATED COUNT: 13.5 % (ref 11.9–14.6)
GLOBULIN SER CALC-MCNC: 3.8 G/DL (ref 2.8–4.5)
GLUCOSE SERPL-MCNC: 100 MG/DL (ref 65–100)
HCT VFR BLD AUTO: 35.7 % (ref 35.8–46.3)
HGB BLD-MCNC: 12.8 G/DL (ref 11.7–15.4)
IMM GRANULOCYTES # BLD AUTO: 0 K/UL (ref 0–0.5)
IMM GRANULOCYTES NFR BLD AUTO: 0 % (ref 0–5)
LYMPHOCYTES # BLD: 0.6 K/UL (ref 0.5–4.6)
LYMPHOCYTES NFR BLD: 29 % (ref 13–44)
MAGNESIUM SERPL-MCNC: 1.9 MG/DL (ref 1.8–2.4)
MCH RBC QN AUTO: 37 PG (ref 26.1–32.9)
MCHC RBC AUTO-ENTMCNC: 35.9 G/DL (ref 31.4–35)
MCV RBC AUTO: 103.2 FL (ref 82–102)
MONOCYTES # BLD: 0.2 K/UL (ref 0.1–1.3)
MONOCYTES NFR BLD: 9 % (ref 4–12)
NEUTS SEG # BLD: 1.4 K/UL (ref 1.7–8.2)
NEUTS SEG NFR BLD: 59 % (ref 43–78)
NRBC # BLD: 0 K/UL (ref 0–0.2)
PHOSPHATE SERPL-MCNC: 4 MG/DL (ref 2.5–4.5)
PLATELET # BLD AUTO: 191 K/UL (ref 150–450)
PLATELET COMMENT: ADEQUATE
PMV BLD AUTO: 9.7 FL (ref 9.4–12.3)
POTASSIUM SERPL-SCNC: 4 MMOL/L (ref 3.5–5.1)
PROT SERPL-MCNC: 7.8 G/DL (ref 6.3–8.2)
RBC # BLD AUTO: 3.46 M/UL (ref 4.05–5.2)
RBC MORPH BLD: ABNORMAL
RBC MORPH BLD: ABNORMAL
SODIUM SERPL-SCNC: 140 MMOL/L (ref 136–146)
WBC # BLD AUTO: 2.2 K/UL (ref 4.3–11.1)
WBC MORPH BLD: ABNORMAL

## 2024-04-09 PROCEDURE — 80053 COMPREHEN METABOLIC PANEL: CPT

## 2024-04-09 PROCEDURE — 86300 IMMUNOASSAY TUMOR CA 15-3: CPT

## 2024-04-09 PROCEDURE — 99214 OFFICE O/P EST MOD 30 MIN: CPT | Performed by: INTERNAL MEDICINE

## 2024-04-09 PROCEDURE — 3078F DIAST BP <80 MM HG: CPT | Performed by: INTERNAL MEDICINE

## 2024-04-09 PROCEDURE — 3075F SYST BP GE 130 - 139MM HG: CPT | Performed by: INTERNAL MEDICINE

## 2024-04-09 PROCEDURE — 96372 THER/PROPH/DIAG INJ SC/IM: CPT

## 2024-04-09 PROCEDURE — 83735 ASSAY OF MAGNESIUM: CPT

## 2024-04-09 PROCEDURE — 6360000002 HC RX W HCPCS: Performed by: NURSE PRACTITIONER

## 2024-04-09 PROCEDURE — 36415 COLL VENOUS BLD VENIPUNCTURE: CPT

## 2024-04-09 PROCEDURE — 84100 ASSAY OF PHOSPHORUS: CPT

## 2024-04-09 PROCEDURE — 85025 COMPLETE CBC W/AUTO DIFF WBC: CPT

## 2024-04-09 RX ORDER — DIPHENHYDRAMINE HYDROCHLORIDE 50 MG/ML
50 INJECTION INTRAMUSCULAR; INTRAVENOUS
OUTPATIENT
Start: 2024-04-28

## 2024-04-09 RX ORDER — ALBUTEROL SULFATE 90 UG/1
4 AEROSOL, METERED RESPIRATORY (INHALATION) PRN
OUTPATIENT
Start: 2024-04-28

## 2024-04-09 RX ORDER — EPINEPHRINE 1 MG/ML
0.3 INJECTION, SOLUTION, CONCENTRATE INTRAVENOUS PRN
OUTPATIENT
Start: 2024-04-28

## 2024-04-09 RX ORDER — ACETAMINOPHEN 325 MG/1
650 TABLET ORAL
OUTPATIENT
Start: 2024-04-28

## 2024-04-09 RX ORDER — ONDANSETRON 2 MG/ML
8 INJECTION INTRAMUSCULAR; INTRAVENOUS
OUTPATIENT
Start: 2024-04-28

## 2024-04-09 RX ORDER — SODIUM CHLORIDE 9 MG/ML
INJECTION, SOLUTION INTRAVENOUS CONTINUOUS
OUTPATIENT
Start: 2024-04-28

## 2024-04-09 RX ADMIN — DENOSUMAB 120 MG: 120 INJECTION SUBCUTANEOUS at 16:01

## 2024-04-09 ASSESSMENT — PATIENT HEALTH QUESTIONNAIRE - PHQ9
SUM OF ALL RESPONSES TO PHQ QUESTIONS 1-9: 0
SUM OF ALL RESPONSES TO PHQ9 QUESTIONS 1 & 2: 0
SUM OF ALL RESPONSES TO PHQ QUESTIONS 1-9: 0
1. LITTLE INTEREST OR PLEASURE IN DOING THINGS: NOT AT ALL
2. FEELING DOWN, DEPRESSED OR HOPELESS: NOT AT ALL

## 2024-04-09 NOTE — PROGRESS NOTES
appearance.     Echo (TTE) complete (PRN contrast/bubble/strain/3D)    Result Date: 4/1/2024    Left Ventricle: Normal left ventricular systolic function with a visually estimated EF of 60 - 65%. Left ventricle size is normal. Normal wall thickness. Normal wall motion. GL Strain -17% Normal diastolic function.   Image quality is technically difficult.     Nuclear stress test with myocardial perfusion    Result Date: 3/14/2024    Stress Combined Conclusion: Normal pharmacological myocardial perfusion study. Findings suggest a low risk of cardiac events.   Stress Function: Left ventricular function post-stress is normal. Post-stress ejection fraction is 82%.   Perfusion Comments: LV perfusion is normal. There is no evidence of inducible ischemia.   ECG: Resting ECG demonstrates sinus bradycardia.   ECG: The ECG was not diagnostic due to pharmacologic protocol.   Stress Test: A pharmacological stress test was performed using lexiscan. 50mg mg of aminophylline given as a reversal agent.           PET CT SKULL BASE TO MID THIGH    Result Date: 10/18/2023  PET/CT  Indication: Left breast cancer restaging status post lumpectomy and radiation therapy Radiopharmaceutical: 13.05 mCi F18-FDG, intravenously. Technique: Positron emission tomography (PET) with concurrently acquired computed tomography (CT) for attenuation correction and anatomical localization imaging performed from the skull base to mid thigh. Imaging was performed approximately 60 minutes post injection. Oral contrast was administered. Radiation dose reduction techniques were used for this study:  Our CT scanners use one or all of the following: Automated exposure control, adjustment of the mA and/or kVp according to patient's size, iterative reconstruction. Serum glucose: 112 mg/dL prior to injection. Comparison studies: Chest CT 3/8/2023, PET/CT 1/19/2023 Findings: Head and Neck: No enlarged or hypermetabolic cervical lymph nodes. No osseous skull base

## 2024-04-09 NOTE — PROGRESS NOTES
Arrived to the Infusion Center.  xgeva completed.   Patient instructed to report any side effects to ordering provider.  Patient tolerated well.   Patient aware of next infusion appointment on 7/9/2024 (date) at 1445 (time).  Discharged ambulatory.

## 2024-04-09 NOTE — PATIENT INSTRUCTIONS
Patient Information from Today's Visit    Labs and symptoms reviewed. We will get a CT scan of your chest in 6-8 weeks per the PET scan recommendations. Please call Radiology Scheduling at [phone] to schedule.    Treatment Summary has been discussed and given to patient:N/A  Follow Up: 3 months    Please refer to After Visit Summary or [employer] for upcoming appointment information. If you have any questions regarding your upcoming schedule please reach out to your care team through [employer] or call [phone].    -------------------------------------------------------------------------------------------------------------------  Please call our office at [phone] if you have any  of the following symptoms:   Fever of 100.5 or greater  Chills  Shortness of breath  Swelling or pain in one leg    After office hours an answering service is available and will contact a provider for emergencies or if you are experiencing any of the above symptoms.    Patient did express an interest in My Chart.  My Chart log in information explained on the after visit summary printout at the check-out desk.    ALEJANDRA ARGUETA RN

## 2024-04-15 NOTE — PROGRESS NOTES
Psychosocial Assessment    Name: Yesika Hernandez  : 1970  MRN: [id number]  Date of Service: 2022  Location of Service: [place] for both Provider and Patient        Yesika Hernandez, was evaluated through a synchronous (real-time) audio-video encounter. The patient (or guardian if applicable) is aware that this is a billable service, which includes applicable co-pays. This Virtual Visit was conducted with patient's (and/or legal guardian's) consent. The visit was conducted pursuant to the emergency declaration under the [facility], [address] [place] [address] authority and the [employer] General Act. Writer read and reviewed patient's chart as part of this assessment. Precipitating Factors and Chief Complaint: Anxiety due to  cancer diagnosis    HPI:  Yesika Hernandez is a 46 y.o. female , supportive . Moved from the [facility] to [address] [place] [address] are one year and half ago. Lives close to in-laws at the [facility]. Past Psychiatric/Psychological History: Denied    History of Substance Use:  Denied  __________________________________________________     History of Suicidality  Ideations: No,  If yes, Describe: Intents: No,  If yes, Describe:   Plans: No,  If yes, Describe:   Previous attempt  No, If yes, When:     History of Homicidally  Ideations:No,  If yes, Describe: Intents: No,  If yes, Describe:   Plans: No  If yes, Describe:  Previous attempt  No, If yes, When:   __________________________________________________    History of Self-Injury Behaviors: Insert Picture if possible  No,  If yes, Type and Frequency:   __________________________________________________    History Assessment of Abuse  In the past or currently has the patient been physically abused? No ,  If yes, Describe: In the past or currently has the patient been emotionally abused? No,  If yes, Describe:      In the past or currently has the patient been sexually abused? No,  If yes, Describe:   __________________________________________________    Sexual Orientation  What is the patient's sexual orientation? Heterosexual         __________________________________________________    Family/Social History  Born/raised in [place]. Siblings: Brother  Describe marriages or significant relationships: Second  for 20 years on July 12. Number of children:  has two sons in their 29's from previous marriage and patient has a daughter from previous marriage, 21years old, in college. Current living situation: Patient and her    history/type of discharge: None  Support/social network: Family   Significant life events: Cancer  _________________________________________________    Family History of Mental Illness:  None    Family history of suicide? 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 female. Patient was dressed properly. No abnormal psychomotor movements observed.  Intellectual functioning appeared to be intact. Patient was cooperative during assessment. Thought process was coherent. Insight was adequate. Judgment was adequate. Patient did not report suicidal ideations, intent or plans. Speech was normal, clear. Patient did not report homicidal ideations, intent or plans. Patient was oriented to the four spheres:self, place, time and situation   __________________________________________________    Session scheduled for: 9:00 pm/am, patient was on time     Session started: 9:00    Session ended: 10:00  _________________________________________________    Patient Diagnosis:     __________________________________________________    Frequency: Weekly or bi-weekly appointments as schedule permits    Patient's Primary Goal: Decrease anxiety   __________________________________________________    Plans:   Patient has conflicted schedule; will call to schedule follow up. See patient, weekly or bi-weekly appointments as schedule permits  Address patient's primary goal   Utilize Cognitive Behavioral Approach  Patient was instructed, in case of emergency, to call 911 or Crisisline [phone] in [place], [place].   __________________________________________________    I have reviewed the notes, assessments, and/or procedures with her/his documentation of Candie Mukherjee. Discharge planning issues

## 2024-04-29 ENCOUNTER — OFFICE VISIT (OUTPATIENT)
Dept: SURGERY | Age: 54
End: 2024-04-29
Payer: COMMERCIAL

## 2024-04-29 VITALS
DIASTOLIC BLOOD PRESSURE: 65 MMHG | WEIGHT: 183 LBS | HEART RATE: 75 BPM | BODY MASS INDEX: 32.43 KG/M2 | SYSTOLIC BLOOD PRESSURE: 137 MMHG | HEIGHT: 63 IN

## 2024-04-29 DIAGNOSIS — Z17.0 MALIGNANT NEOPLASM OF LEFT BREAST IN FEMALE, ESTROGEN RECEPTOR POSITIVE, UNSPECIFIED SITE OF BREAST (HCC): Primary | ICD-10-CM

## 2024-04-29 DIAGNOSIS — C77.3 BREAST CANCER METASTASIZED TO AXILLARY LYMPH NODE, LEFT (HCC): ICD-10-CM

## 2024-04-29 DIAGNOSIS — C50.912 MALIGNANT NEOPLASM OF LEFT BREAST IN FEMALE, ESTROGEN RECEPTOR POSITIVE, UNSPECIFIED SITE OF BREAST (HCC): Primary | ICD-10-CM

## 2024-04-29 DIAGNOSIS — C50.912 BREAST CANCER METASTASIZED TO AXILLARY LYMPH NODE, LEFT (HCC): ICD-10-CM

## 2024-04-29 PROCEDURE — 3075F SYST BP GE 130 - 139MM HG: CPT | Performed by: SURGERY

## 2024-04-29 PROCEDURE — 99214 OFFICE O/P EST MOD 30 MIN: CPT | Performed by: SURGERY

## 2024-04-29 PROCEDURE — 3078F DIAST BP <80 MM HG: CPT | Performed by: SURGERY

## 2024-04-29 NOTE — PROGRESS NOTES
H&P/Consult Note/Progress Note/Office Note:   Barbara Alcocer  MRN: [id number]  :1970  Age:53 y.o.    HPI: Barbara Alcocer is a 53 y.o. female who is s/p left breast NL Lumpectomy and sent node excision on 3/8/23    Invasive carcinoma was found in the breast with negative margins.    Her sentinel node was positive for metastatic disease.  This is shown below and expanded problem list.    She is receiving adjuvant XRT (5000 cGy) with Dr Hogan   She is also being treated with adjuvant Letrozole and Ibrance with Dr Menezes          She originally presented with an abnormal left breast screening mammogram on 22 which led to diagnostic imaging and US.    She had a large mass in the upper inner left breast with left axillary adenopathy.  Percutaneous biopsies identified infiltrating ductal carcinoma with lobular features which was ER 93% CA 0% HER2 negative.  Breast MRI was performed next which confirmed a 3.7 cm left breast malignancy with 4 dysmorphic and enlarged axillary nodes.    PET/CT imaging identified suspicious bony lesions of T10 and the sacrum.  CT-guided biopsy of T10 confirmed metastatic breast carcinoma.    She was treated with neoadjuvant endocrine therapy with Dr. Menezes and had Letrozole and ribociclib   and had repeat PET/CT imaging on 10/12/22 and was presented at tumor board to discuss the possibility of surgery for local treatment      22 Bilat breast screening mammo with filomena and CAD  The breast parenchyma is heterogeneously dense, which may limit detection of small masses.       Left posterior upper inner quadrant demonstrates an irregular mass with architectural distortion.   There are associated calcifications within and anterior to the mass.   Overall this area of concern measures at least 3 cm but is not well defined.     No additional suspicious mass or calcification is seen in either breast.    There is no normal skin thickening or nipple retraction     Impression:  Left mass and

## 2024-05-14 NOTE — PROGRESS NOTES
Arrived to the [facility]. Xgeva completed. Provided education on Xgeva    Patient instructed to report any side affects to ordering provider. Patient tolerated without complications. Any issues or concerns during appointment: NO.  Patient aware of next infusion appointment on 1/11/23 at 1500. Discharged ambulatory.
none

## 2024-05-22 ENCOUNTER — HOSPITAL ENCOUNTER (OUTPATIENT)
Dept: CT IMAGING | Age: 54
Discharge: HOME | End: 2024-05-25
Attending: INTERNAL MEDICINE
Payer: COMMERCIAL

## 2024-05-22 DIAGNOSIS — Z17.0 MALIGNANT NEOPLASM OF LEFT BREAST IN FEMALE, ESTROGEN RECEPTOR POSITIVE, UNSPECIFIED SITE OF BREAST (HCC): ICD-10-CM

## 2024-05-22 DIAGNOSIS — R94.2 ABNORMAL PET OF RIGHT LUNG: ICD-10-CM

## 2024-05-22 DIAGNOSIS — C79.51 METASTASIS TO BONE (HCC): ICD-10-CM

## 2024-05-22 DIAGNOSIS — C50.912 MALIGNANT NEOPLASM OF LEFT BREAST IN FEMALE, ESTROGEN RECEPTOR POSITIVE, UNSPECIFIED SITE OF BREAST (HCC): ICD-10-CM

## 2024-05-22 PROCEDURE — 71250 CT THORAX DX C-: CPT

## 2024-06-06 DIAGNOSIS — K21.9 GASTROESOPHAGEAL REFLUX DISEASE WITHOUT ESOPHAGITIS: ICD-10-CM

## 2024-06-10 RX ORDER — OMEPRAZOLE 40 MG/1
40 CAPSULE, DELAYED RELEASE ORAL DAILY
Qty: 30 CAPSULE | Refills: 5 | OUTPATIENT
Start: 2024-06-10

## 2024-07-09 ENCOUNTER — OFFICE VISIT (OUTPATIENT)
Dept: ONCOLOGY | Age: 54
End: 2024-07-09
Payer: COMMERCIAL

## 2024-07-09 ENCOUNTER — HOSPITAL ENCOUNTER (OUTPATIENT)
Dept: INFUSION THERAPY | Age: 54
Setting detail: INFUSION SERIES
Discharge: HOME | End: 2024-07-09
Payer: COMMERCIAL

## 2024-07-09 ENCOUNTER — HOSPITAL ENCOUNTER (OUTPATIENT)
Dept: LAB | Age: 54
Discharge: HOME | End: 2024-07-12
Payer: COMMERCIAL

## 2024-07-09 VITALS
TEMPERATURE: 98.7 F | WEIGHT: 184 LBS | HEART RATE: 77 BPM | BODY MASS INDEX: 31.41 KG/M2 | OXYGEN SATURATION: 97 % | DIASTOLIC BLOOD PRESSURE: 84 MMHG | RESPIRATION RATE: 12 BRPM | HEIGHT: 64 IN | SYSTOLIC BLOOD PRESSURE: 116 MMHG

## 2024-07-09 DIAGNOSIS — C79.51 METASTASIS TO BONE (HCC): ICD-10-CM

## 2024-07-09 DIAGNOSIS — D70.1 CHEMOTHERAPY INDUCED NEUTROPENIA (HCC): ICD-10-CM

## 2024-07-09 DIAGNOSIS — R23.2 HOT FLASHES RELATED TO AROMATASE INHIBITOR THERAPY: ICD-10-CM

## 2024-07-09 DIAGNOSIS — R79.89 ELEVATED LFTS: ICD-10-CM

## 2024-07-09 DIAGNOSIS — Z17.0 MALIGNANT NEOPLASM OF LEFT BREAST IN FEMALE, ESTROGEN RECEPTOR POSITIVE, UNSPECIFIED SITE OF BREAST (HCC): Primary | ICD-10-CM

## 2024-07-09 DIAGNOSIS — T45.1X5A HOT FLASHES RELATED TO AROMATASE INHIBITOR THERAPY: ICD-10-CM

## 2024-07-09 DIAGNOSIS — C50.912 MALIGNANT NEOPLASM OF LEFT BREAST IN FEMALE, ESTROGEN RECEPTOR POSITIVE, UNSPECIFIED SITE OF BREAST (HCC): Primary | ICD-10-CM

## 2024-07-09 DIAGNOSIS — C50.912 MALIGNANT NEOPLASM OF LEFT BREAST IN FEMALE, ESTROGEN RECEPTOR POSITIVE, UNSPECIFIED SITE OF BREAST (HCC): ICD-10-CM

## 2024-07-09 DIAGNOSIS — T45.1X5A CHEMOTHERAPY INDUCED NEUTROPENIA (HCC): ICD-10-CM

## 2024-07-09 DIAGNOSIS — Z17.0 MALIGNANT NEOPLASM OF LEFT BREAST IN FEMALE, ESTROGEN RECEPTOR POSITIVE, UNSPECIFIED SITE OF BREAST (HCC): ICD-10-CM

## 2024-07-09 LAB
ALBUMIN SERPL-MCNC: 3.9 G/DL (ref 3.5–5)
ALBUMIN/GLOB SERPL: 1.1 (ref 1–1.9)
ALP SERPL-CCNC: 68 U/L (ref 35–104)
ALT SERPL-CCNC: 108 U/L (ref 12–65)
ANION GAP SERPL CALC-SCNC: 12 MMOL/L (ref 9–18)
AST SERPL-CCNC: 81 U/L (ref 15–37)
BASOPHILS # BLD: 0.1 K/UL (ref 0–0.2)
BASOPHILS NFR BLD: 2 % (ref 0–2)
BILIRUB SERPL-MCNC: 0.4 MG/DL (ref 0–1.2)
BUN SERPL-MCNC: 12 MG/DL (ref 6–23)
CALCIUM SERPL-MCNC: 9.3 MG/DL (ref 8.8–10.2)
CANCER AG15-3 SERPL-ACNC: 45 U/ML (ref 0–25)
CHLORIDE SERPL-SCNC: 102 MMOL/L (ref 98–107)
CO2 SERPL-SCNC: 25 MMOL/L (ref 20–28)
CREAT SERPL-MCNC: 0.84 MG/DL (ref 0.6–1.1)
DIFFERENTIAL METHOD BLD: ABNORMAL
EOSINOPHIL # BLD: 0 K/UL (ref 0–0.8)
EOSINOPHIL NFR BLD: 1 % (ref 0.5–7.8)
ERYTHROCYTE [DISTWIDTH] IN BLOOD BY AUTOMATED COUNT: 13.2 % (ref 11.9–14.6)
GLOBULIN SER CALC-MCNC: 3.4 G/DL (ref 2.3–3.5)
GLUCOSE SERPL-MCNC: 105 MG/DL (ref 70–99)
HCT VFR BLD AUTO: 34.9 % (ref 35.8–46.3)
HGB BLD-MCNC: 12.6 G/DL (ref 11.7–15.4)
IMM GRANULOCYTES # BLD AUTO: 0 K/UL (ref 0–0.5)
IMM GRANULOCYTES NFR BLD AUTO: 1 % (ref 0–5)
LYMPHOCYTES # BLD: 1 K/UL (ref 0.5–4.6)
LYMPHOCYTES NFR BLD: 33 % (ref 13–44)
MAGNESIUM SERPL-MCNC: 1.9 MG/DL (ref 1.8–2.4)
MCH RBC QN AUTO: 36.3 PG (ref 26.1–32.9)
MCHC RBC AUTO-ENTMCNC: 36.1 G/DL (ref 31.4–35)
MCV RBC AUTO: 100.6 FL (ref 82–102)
MONOCYTES # BLD: 0.5 K/UL (ref 0.1–1.3)
MONOCYTES NFR BLD: 16 % (ref 4–12)
NEUTS SEG # BLD: 1.5 K/UL (ref 1.7–8.2)
NEUTS SEG NFR BLD: 47 % (ref 43–78)
NRBC # BLD: 0.03 K/UL (ref 0–0.2)
PHOSPHATE SERPL-MCNC: 3.1 MG/DL (ref 2.5–4.5)
PLATELET # BLD AUTO: 203 K/UL (ref 150–450)
PMV BLD AUTO: 9.7 FL (ref 9.4–12.3)
POTASSIUM SERPL-SCNC: 3.3 MMOL/L (ref 3.5–5.1)
PROT SERPL-MCNC: 7.3 G/DL (ref 6.3–8.2)
RBC # BLD AUTO: 3.47 M/UL (ref 4.05–5.2)
SODIUM SERPL-SCNC: 139 MMOL/L (ref 136–145)
WBC # BLD AUTO: 3.2 K/UL (ref 4.3–11.1)

## 2024-07-09 PROCEDURE — 36415 COLL VENOUS BLD VENIPUNCTURE: CPT

## 2024-07-09 PROCEDURE — 3074F SYST BP LT 130 MM HG: CPT | Performed by: NURSE PRACTITIONER

## 2024-07-09 PROCEDURE — 96372 THER/PROPH/DIAG INJ SC/IM: CPT

## 2024-07-09 PROCEDURE — 6360000002 HC RX W HCPCS: Performed by: NURSE PRACTITIONER

## 2024-07-09 PROCEDURE — 85025 COMPLETE CBC W/AUTO DIFF WBC: CPT

## 2024-07-09 PROCEDURE — 83735 ASSAY OF MAGNESIUM: CPT

## 2024-07-09 PROCEDURE — 3078F DIAST BP <80 MM HG: CPT | Performed by: NURSE PRACTITIONER

## 2024-07-09 PROCEDURE — 80053 COMPREHEN METABOLIC PANEL: CPT

## 2024-07-09 PROCEDURE — 86300 IMMUNOASSAY TUMOR CA 15-3: CPT

## 2024-07-09 PROCEDURE — 84100 ASSAY OF PHOSPHORUS: CPT

## 2024-07-09 PROCEDURE — 99214 OFFICE O/P EST MOD 30 MIN: CPT | Performed by: NURSE PRACTITIONER

## 2024-07-09 RX ORDER — SODIUM CHLORIDE 9 MG/ML
INJECTION, SOLUTION INTRAVENOUS CONTINUOUS
OUTPATIENT
Start: 2024-07-21

## 2024-07-09 RX ORDER — ONDANSETRON 2 MG/ML
8 INJECTION INTRAMUSCULAR; INTRAVENOUS
OUTPATIENT
Start: 2024-07-21

## 2024-07-09 RX ORDER — DIPHENHYDRAMINE HYDROCHLORIDE 50 MG/ML
50 INJECTION INTRAMUSCULAR; INTRAVENOUS
OUTPATIENT
Start: 2024-07-21

## 2024-07-09 RX ORDER — ACETAMINOPHEN 325 MG/1
650 TABLET ORAL
OUTPATIENT
Start: 2024-07-21

## 2024-07-09 RX ORDER — EPINEPHRINE 1 MG/ML
0.3 INJECTION, SOLUTION, CONCENTRATE INTRAVENOUS PRN
OUTPATIENT
Start: 2024-07-21

## 2024-07-09 RX ORDER — ALBUTEROL SULFATE 90 UG/1
4 AEROSOL, METERED RESPIRATORY (INHALATION) PRN
OUTPATIENT
Start: 2024-07-21

## 2024-07-09 RX ADMIN — DENOSUMAB 120 MG: 120 INJECTION SUBCUTANEOUS at 13:56

## 2024-07-09 ASSESSMENT — PATIENT HEALTH QUESTIONNAIRE - PHQ9
SUM OF ALL RESPONSES TO PHQ9 QUESTIONS 1 & 2: 0
1. LITTLE INTEREST OR PLEASURE IN DOING THINGS: NOT AT ALL
2. FEELING DOWN, DEPRESSED OR HOPELESS: NOT AT ALL
SUM OF ALL RESPONSES TO PHQ QUESTIONS 1-9: 0

## 2024-07-09 NOTE — PROGRESS NOTES
Arrived to the Infusion Center.  Xgeva injection completed.   Patient instructed to report any side affects to ordering provider.  Patient tolerated well.   Any issues or concerns during appointment: none.  Patient aware of next infusion appointment on 10/09/2024 (date) at 1445 (time).  Discharged ambulatory.

## 2024-07-09 NOTE — PROGRESS NOTES
Status:  Signed Out    Alonzo Cook MD on 2022                                 Interpretation                       [facility] IHC Quantitative Breast Panel     TEST NAME:                         RESULTS:               INTERNAL   CONTROLS:     ESTROGEN RECEPTOR:               Positive (93%)               Absent   PROGESTERONE RECEPTOR:          Negative (0.0%)          Absent   HER-2/ARYA:                         Negative (0)               Percentage   of Cells with Uniform        Intense Complete Membrane   Stainin%       DIAGNOSIS        \"BONE LESION\":  METASTATIC CARCINOMA, CONSISTENT WITH BREAST ORIGIN.           Procedures/Addenda                     STF-IMMUNOHISTOCHEMISTRY                                                                                                                                         Status:  Signed Out    Charbel Arias III, MD on 2022                                 Interpretation                       Immunohistochemical Stain Panel:          Interpretation:  Immunohistochemical findings consistent with   metastatic carcinoma of breast origin.     Antibody/Test               Marker For                              Result   Cytokeratin 7               Lung, breast, upper GE, serous ovary                 Positive   Cytokeratin 20               Colon, mucinous ovary, urothelium                  Negative   MINO 3               Urothelial, breast carcinoma                         Positive   GCDFP-15               Breast, salivary gland, skin, adnexa                 Positive           ASSESSMENT:   Diagnosis Orders   1. Malignant neoplasm of left breast in female, estrogen receptor positive, unspecified site of breast (HCC)  PET CT SKULL BASE TO MID THIGH      2. Metastasis to bone (HCC)  PET CT SKULL BASE TO MID THIGH      3. Hot flashes related to aromatase inhibitor therapy

## 2024-09-04 DIAGNOSIS — I10 PRIMARY HYPERTENSION: ICD-10-CM

## 2024-09-04 DIAGNOSIS — K21.9 GASTROESOPHAGEAL REFLUX DISEASE WITHOUT ESOPHAGITIS: ICD-10-CM

## 2024-09-05 RX ORDER — CHLORTHALIDONE 25 MG/1
TABLET ORAL
Qty: 90 TABLET | Refills: 1 | Status: SHIPPED | OUTPATIENT
Start: 2024-09-05

## 2024-09-05 RX ORDER — OMEPRAZOLE 40 MG/1
40 CAPSULE, DELAYED RELEASE ORAL DAILY
Qty: 30 CAPSULE | Refills: 5 | OUTPATIENT
Start: 2024-09-05

## 2024-09-05 RX ORDER — OLMESARTAN MEDOXOMIL 40 MG/1
TABLET ORAL
Qty: 90 TABLET | Refills: 1 | Status: SHIPPED | OUTPATIENT
Start: 2024-09-05

## 2024-10-04 ENCOUNTER — HOSPITAL ENCOUNTER (OUTPATIENT)
Dept: PET IMAGING | Age: 54
Discharge: HOME | End: 2024-10-07
Payer: COMMERCIAL

## 2024-10-04 DIAGNOSIS — C50.912 MALIGNANT NEOPLASM OF LEFT BREAST IN FEMALE, ESTROGEN RECEPTOR POSITIVE, UNSPECIFIED SITE OF BREAST (HCC): ICD-10-CM

## 2024-10-04 DIAGNOSIS — C79.51 METASTASIS TO BONE (HCC): ICD-10-CM

## 2024-10-04 DIAGNOSIS — Z17.0 MALIGNANT NEOPLASM OF LEFT BREAST IN FEMALE, ESTROGEN RECEPTOR POSITIVE, UNSPECIFIED SITE OF BREAST (HCC): ICD-10-CM

## 2024-10-04 LAB
GLUCOSE BLD STRIP.AUTO-MCNC: 95 MG/DL (ref 65–100)
SERVICE CMNT-IMP: NORMAL

## 2024-10-04 PROCEDURE — 78815 PET IMAGE W/CT SKULL-THIGH: CPT

## 2024-10-04 PROCEDURE — 3430000000 HC RX DIAGNOSTIC RADIOPHARMACEUTICAL: Performed by: NURSE PRACTITIONER

## 2024-10-04 PROCEDURE — A9609 HC RX DIAGNOSTIC RADIOPHARMACEUTICAL: HCPCS | Performed by: NURSE PRACTITIONER

## 2024-10-04 PROCEDURE — 82962 GLUCOSE BLOOD TEST: CPT

## 2024-10-04 PROCEDURE — 6360000004 HC RX CONTRAST MEDICATION: Performed by: NURSE PRACTITIONER

## 2024-10-04 PROCEDURE — 2580000003 HC RX 258: Performed by: NURSE PRACTITIONER

## 2024-10-04 RX ORDER — FLUDEOXYGLUCOSE F 18 200 MCI/ML
13.58 INJECTION, SOLUTION INTRAVENOUS
Status: COMPLETED | OUTPATIENT
Start: 2024-10-04 | End: 2024-10-04

## 2024-10-04 RX ORDER — DIATRIZOATE MEGLUMINE AND DIATRIZOATE SODIUM 660; 100 MG/ML; MG/ML
10 SOLUTION ORAL; RECTAL
Status: DISCONTINUED | OUTPATIENT
Start: 2024-10-04 | End: 2024-10-08 | Stop reason: HOSPADM

## 2024-10-04 RX ORDER — SODIUM CHLORIDE 0.9 % (FLUSH) 0.9 %
20 SYRINGE (ML) INJECTION AS NEEDED
Status: DISCONTINUED | OUTPATIENT
Start: 2024-10-04 | End: 2024-10-08 | Stop reason: HOSPADM

## 2024-10-04 RX ADMIN — SODIUM CHLORIDE, PRESERVATIVE FREE 20 ML: 5 INJECTION INTRAVENOUS at 12:50

## 2024-10-04 RX ADMIN — DIATRIZOATE MEGLUMINE AND DIATRIZOATE SODIUM 10 ML: 660; 100 LIQUID ORAL; RECTAL at 12:50

## 2024-10-04 RX ADMIN — FLUDEOXYGLUCOSE F 18 13.58 MILLICURIE: 200 INJECTION, SOLUTION INTRAVENOUS at 12:50

## 2024-10-09 ENCOUNTER — HOSPITAL ENCOUNTER (OUTPATIENT)
Dept: LAB | Age: 54
Discharge: HOME | End: 2024-10-09
Payer: COMMERCIAL

## 2024-10-09 ENCOUNTER — HOSPITAL ENCOUNTER (OUTPATIENT)
Dept: INFUSION THERAPY | Age: 54
Setting detail: INFUSION SERIES
Discharge: HOME | End: 2024-10-09
Payer: COMMERCIAL

## 2024-10-09 ENCOUNTER — OFFICE VISIT (OUTPATIENT)
Dept: ONCOLOGY | Age: 54
End: 2024-10-09
Payer: COMMERCIAL

## 2024-10-09 VITALS
DIASTOLIC BLOOD PRESSURE: 75 MMHG | WEIGHT: 190.6 LBS | HEART RATE: 76 BPM | TEMPERATURE: 97.8 F | RESPIRATION RATE: 12 BRPM | OXYGEN SATURATION: 100 % | BODY MASS INDEX: 32.54 KG/M2 | SYSTOLIC BLOOD PRESSURE: 118 MMHG | HEIGHT: 64 IN

## 2024-10-09 DIAGNOSIS — C50.912 MALIGNANT NEOPLASM OF LEFT BREAST IN FEMALE, ESTROGEN RECEPTOR POSITIVE, UNSPECIFIED SITE OF BREAST (HCC): Primary | ICD-10-CM

## 2024-10-09 DIAGNOSIS — C79.51 METASTASIS TO BONE (HCC): ICD-10-CM

## 2024-10-09 DIAGNOSIS — Z17.0 MALIGNANT NEOPLASM OF LEFT BREAST IN FEMALE, ESTROGEN RECEPTOR POSITIVE, UNSPECIFIED SITE OF BREAST (HCC): ICD-10-CM

## 2024-10-09 DIAGNOSIS — Z17.0 MALIGNANT NEOPLASM OF LEFT BREAST IN FEMALE, ESTROGEN RECEPTOR POSITIVE, UNSPECIFIED SITE OF BREAST (HCC): Primary | ICD-10-CM

## 2024-10-09 DIAGNOSIS — C50.912 MALIGNANT NEOPLASM OF LEFT BREAST IN FEMALE, ESTROGEN RECEPTOR POSITIVE, UNSPECIFIED SITE OF BREAST (HCC): ICD-10-CM

## 2024-10-09 DIAGNOSIS — C50.912 BREAST CANCER METASTASIZED TO AXILLARY LYMPH NODE, LEFT (HCC): ICD-10-CM

## 2024-10-09 DIAGNOSIS — C77.3 BREAST CANCER METASTASIZED TO AXILLARY LYMPH NODE, LEFT (HCC): ICD-10-CM

## 2024-10-09 LAB
ALBUMIN SERPL-MCNC: 3.8 G/DL (ref 3.5–5)
ALBUMIN/GLOB SERPL: 1.1 (ref 1–1.9)
ALP SERPL-CCNC: 73 U/L (ref 35–104)
ALT SERPL-CCNC: 42 U/L (ref 8–45)
ANION GAP SERPL CALC-SCNC: 14 MMOL/L (ref 9–18)
AST SERPL-CCNC: 33 U/L (ref 15–37)
BASOPHILS # BLD: 0.1 K/UL (ref 0–0.2)
BASOPHILS NFR BLD: 3 % (ref 0–2)
BILIRUB SERPL-MCNC: 0.4 MG/DL (ref 0–1.2)
BUN SERPL-MCNC: 15 MG/DL (ref 6–23)
CALCIUM SERPL-MCNC: 9.1 MG/DL (ref 8.8–10.2)
CANCER AG15-3 SERPL-ACNC: 41 U/ML (ref 0–25)
CHLORIDE SERPL-SCNC: 102 MMOL/L (ref 98–107)
CO2 SERPL-SCNC: 23 MMOL/L (ref 20–28)
CREAT SERPL-MCNC: 1.05 MG/DL (ref 0.6–1.1)
DIFFERENTIAL METHOD BLD: ABNORMAL
EOSINOPHIL # BLD: 0 K/UL (ref 0–0.8)
EOSINOPHIL NFR BLD: 0 % (ref 0.5–7.8)
ERYTHROCYTE [DISTWIDTH] IN BLOOD BY AUTOMATED COUNT: 12.7 % (ref 11.9–14.6)
GLOBULIN SER CALC-MCNC: 3.5 G/DL (ref 2.3–3.5)
GLUCOSE SERPL-MCNC: 121 MG/DL (ref 70–99)
HCT VFR BLD AUTO: 35.1 % (ref 35.8–46.3)
HGB BLD-MCNC: 12.6 G/DL (ref 11.7–15.4)
IMM GRANULOCYTES # BLD AUTO: 0 K/UL (ref 0–0.5)
IMM GRANULOCYTES NFR BLD AUTO: 1 % (ref 0–5)
LYMPHOCYTES # BLD: 0.8 K/UL (ref 0.5–4.6)
LYMPHOCYTES NFR BLD: 32 % (ref 13–44)
MAGNESIUM SERPL-MCNC: 2 MG/DL (ref 1.8–2.4)
MCH RBC QN AUTO: 36.1 PG (ref 26.1–32.9)
MCHC RBC AUTO-ENTMCNC: 35.9 G/DL (ref 31.4–35)
MCV RBC AUTO: 100.6 FL (ref 82–102)
MONOCYTES # BLD: 0.2 K/UL (ref 0.1–1.3)
MONOCYTES NFR BLD: 7 % (ref 4–12)
NEUTS SEG # BLD: 1.3 K/UL (ref 1.7–8.2)
NEUTS SEG NFR BLD: 57 % (ref 43–78)
NRBC # BLD: 0 K/UL (ref 0–0.2)
PHOSPHATE SERPL-MCNC: 3.8 MG/DL (ref 2.5–4.5)
PLATELET # BLD AUTO: 300 K/UL (ref 150–450)
PMV BLD AUTO: 9.6 FL (ref 9.4–12.3)
POTASSIUM SERPL-SCNC: 3.6 MMOL/L (ref 3.5–5.1)
PROT SERPL-MCNC: 7.3 G/DL (ref 6.3–8.2)
RBC # BLD AUTO: 3.49 M/UL (ref 4.05–5.2)
SODIUM SERPL-SCNC: 139 MMOL/L (ref 136–145)
WBC # BLD AUTO: 2.3 K/UL (ref 4.3–11.1)

## 2024-10-09 PROCEDURE — 6360000002 HC RX W HCPCS: Performed by: INTERNAL MEDICINE

## 2024-10-09 PROCEDURE — 3078F DIAST BP <80 MM HG: CPT | Performed by: INTERNAL MEDICINE

## 2024-10-09 PROCEDURE — 80053 COMPREHEN METABOLIC PANEL: CPT

## 2024-10-09 PROCEDURE — 96372 THER/PROPH/DIAG INJ SC/IM: CPT

## 2024-10-09 PROCEDURE — 3074F SYST BP LT 130 MM HG: CPT | Performed by: INTERNAL MEDICINE

## 2024-10-09 PROCEDURE — 99214 OFFICE O/P EST MOD 30 MIN: CPT | Performed by: INTERNAL MEDICINE

## 2024-10-09 PROCEDURE — 84100 ASSAY OF PHOSPHORUS: CPT

## 2024-10-09 PROCEDURE — 36415 COLL VENOUS BLD VENIPUNCTURE: CPT

## 2024-10-09 PROCEDURE — 83735 ASSAY OF MAGNESIUM: CPT

## 2024-10-09 PROCEDURE — 86300 IMMUNOASSAY TUMOR CA 15-3: CPT

## 2024-10-09 PROCEDURE — 85025 COMPLETE CBC W/AUTO DIFF WBC: CPT

## 2024-10-09 RX ORDER — ACETAMINOPHEN 325 MG/1
650 TABLET ORAL
OUTPATIENT
Start: 2024-10-13

## 2024-10-09 RX ORDER — ALBUTEROL SULFATE 90 UG/1
4 INHALANT RESPIRATORY (INHALATION) PRN
Status: CANCELLED | OUTPATIENT
Start: 2024-10-09

## 2024-10-09 RX ORDER — PALBOCICLIB 125 MG/1
125 TABLET, FILM COATED ORAL DAILY
Qty: 21 TABLET | Refills: 13 | Status: ACTIVE | OUTPATIENT
Start: 2024-10-09

## 2024-10-09 RX ORDER — DIPHENHYDRAMINE HYDROCHLORIDE 50 MG/ML
50 INJECTION INTRAMUSCULAR; INTRAVENOUS
Status: CANCELLED | OUTPATIENT
Start: 2024-10-09

## 2024-10-09 RX ORDER — EPINEPHRINE 1 MG/ML
0.3 INJECTION, SOLUTION, CONCENTRATE INTRAVENOUS PRN
Status: CANCELLED | OUTPATIENT
Start: 2024-10-09

## 2024-10-09 RX ORDER — LETROZOLE 2.5 MG/1
2.5 TABLET, FILM COATED ORAL DAILY
Qty: 30 TABLET | Refills: 11 | Status: SHIPPED | OUTPATIENT
Start: 2024-10-09

## 2024-10-09 RX ORDER — SODIUM CHLORIDE 9 MG/ML
INJECTION, SOLUTION INTRAVENOUS CONTINUOUS
OUTPATIENT
Start: 2024-10-13

## 2024-10-09 RX ORDER — ACETAMINOPHEN 325 MG/1
650 TABLET ORAL
Status: CANCELLED | OUTPATIENT
Start: 2024-10-09

## 2024-10-09 RX ORDER — ONDANSETRON 2 MG/ML
8 INJECTION INTRAMUSCULAR; INTRAVENOUS
Status: CANCELLED | OUTPATIENT
Start: 2024-10-09

## 2024-10-09 RX ORDER — DIPHENHYDRAMINE HYDROCHLORIDE 50 MG/ML
50 INJECTION INTRAMUSCULAR; INTRAVENOUS
OUTPATIENT
Start: 2024-10-13

## 2024-10-09 RX ORDER — FAMOTIDINE 10 MG/ML
20 INJECTION, SOLUTION INTRAVENOUS
Status: CANCELLED | OUTPATIENT
Start: 2024-10-09

## 2024-10-09 RX ORDER — ALBUTEROL SULFATE 90 UG/1
4 INHALANT RESPIRATORY (INHALATION) PRN
OUTPATIENT
Start: 2024-10-13

## 2024-10-09 RX ORDER — ONDANSETRON 2 MG/ML
8 INJECTION INTRAMUSCULAR; INTRAVENOUS
OUTPATIENT
Start: 2024-10-13

## 2024-10-09 RX ORDER — EPINEPHRINE 1 MG/ML
0.3 INJECTION, SOLUTION, CONCENTRATE INTRAVENOUS PRN
OUTPATIENT
Start: 2024-10-13

## 2024-10-09 RX ORDER — SODIUM CHLORIDE 9 MG/ML
INJECTION, SOLUTION INTRAVENOUS CONTINUOUS
Status: CANCELLED | OUTPATIENT
Start: 2024-10-09

## 2024-10-09 RX ADMIN — DENOSUMAB 120 MG: 120 INJECTION SUBCUTANEOUS at 14:21

## 2024-10-09 ASSESSMENT — PATIENT HEALTH QUESTIONNAIRE - PHQ9
SUM OF ALL RESPONSES TO PHQ QUESTIONS 1-9: 0
SUM OF ALL RESPONSES TO PHQ QUESTIONS 1-9: 0
1. LITTLE INTEREST OR PLEASURE IN DOING THINGS: NOT AT ALL
SUM OF ALL RESPONSES TO PHQ QUESTIONS 1-9: 0
SUM OF ALL RESPONSES TO PHQ QUESTIONS 1-9: 0
2. FEELING DOWN, DEPRESSED OR HOPELESS: NOT AT ALL
SUM OF ALL RESPONSES TO PHQ9 QUESTIONS 1 & 2: 0

## 2024-10-09 NOTE — PATIENT INSTRUCTIONS
Patient Information from Today's Visit    The members of your [facility] are listed below:    Physician Provider: Danny Menezes, Medical Oncologist  Advanced Practice Clinician: Rosemary Mandujano NP  Registered Nurse: Titus BANDA RN  Nurse Navigator: Sejal MANUEL RN and Tere DUPONT RN  Medical Assistant: Hue JOLLEY CMA  :Kasie JARA  Supportive Care Services: Humza MARIE LMSW    Diagnosis: Breast Cancer    Follow Up Instructions: 3 months      Treatment Summary has been discussed and given to patient:No      Current Labs:   Hospital Outpatient Visit on 10/09/2024   Component Date Value Ref Range Status    CA 15-3 10/09/2024 41.00 (H)  0.00 - 25.00 U/mL Final    Comment: Roche ECLIA methodology  Patient's results of tumor marker testing may not be comparable to labs using different manufacturers/methods.      Phosphorus 10/09/2024 3.8  2.5 - 4.5 MG/DL Final    Magnesium 10/09/2024 2.0  1.8 - 2.4 mg/dL Final    Sodium 10/09/2024 139  136 - 145 mmol/L Final    Potassium 10/09/2024 3.6  3.5 - 5.1 mmol/L Final    Chloride 10/09/2024 102  98 - 107 mmol/L Final    CO2 10/09/2024 23  20 - 28 mmol/L Final    Anion Gap 10/09/2024 14  9 - 18 mmol/L Final    Glucose 10/09/2024 121 (H)  70 - 99 mg/dL Final    Comment: <70 mg/dL Consistent with, but not fully diagnostic of hypoglycemia.  100 - 125 mg/dL Impaired fasting glucose/consistent with pre-diabetes mellitus.  > 126 mg/dl Fasting glucose consistent with overt diabetes mellitus      BUN 10/09/2024 15  6 - 23 MG/DL Final    Creatinine 10/09/2024 1.05  0.60 - 1.10 MG/DL Final    Est, Glom Filt Rate 10/09/2024 64  >60 ml/min/1.73m2 Final    Comment:    Pediatric calculator link: https://www.kidney.org/professionals/kdoqi/gfr_calculatorped     These results are not intended for use in patients <18 years of age.     eGFR results are calculated without a race factor using  the 2021 CKD-EPI equation. Careful clinical correlation is recommended, particularly when comparing

## 2024-10-09 NOTE — PROGRESS NOTES
Patient arrived to infusion. Xgeva injection completed. Patient tolerated without difficulty. Discharged ambulatory. Patient aware of next infusion appt on 1/8.

## 2024-10-09 NOTE — PROGRESS NOTES
[facility] Hematology and Oncology: Office Visit Established Patient    Chief Complaint:    Chief Complaint   Patient presents with    Follow-up         History of Present Illness:  Ms. Alcocer is a 53 y.o. female who presents today for follow-up regarding metastatic breast cancer.  On 6/2/22, Ms. Alcocer presented for her routine screening mammogram. The imaging reported a left breast mass with associated calcifications, worrisome for malignancy.  On 6/14/22 she underwent a left breast diagnostic mammogram and ultrasound with biopsy of the breast mass and a dysmorphic left axillary lymph node. The left breast and axillary node pathology showed infiltrating ductal carcinoma with lobular features, low grade (well differentiated), ER 93%, OK 0%, and HER-2 0. On 6/16/22 she had a bilateral breast MRI that reported the known left breast cancer measuring up to 3.7 cm, with associated left axillary lymphadenopathy however no suspicious findings in the right breast. On 6/22/22 she had a PET/CT scan that showed the breast and axillary node, but also showed hypermetabolic osseous metastatic disease in the sacrum and T10 vertebral body.  We recommended biopsy of a suspicious osseous lesion which confirmed mBC.  Unfortunately, she will not be a candidate for curative therapy and should start palliative systemic therapy, I would recommend letrozole and ribociclib.  She is postmenopausal so she will not need ovarian function suppression.  She will also require denosumab or zoledronic acid depending on insurance approval.  We attempted to perform NGS on the metastatic specimen to screen for biomarker expression including PI3KCA, BRCA, PDL-1 and others, but there was insufficient tissue.  PET/CT in January 2023 showed complete response with resolution of the uptake in the bone lesions, so we recommended consultation with surgery for her breast/axillary disease.  She completed lumpectomy and sentinel lymph node biopsy on 3/8/23.

## 2024-12-03 DIAGNOSIS — C50.912 BREAST CANCER METASTASIZED TO AXILLARY LYMPH NODE, LEFT (HCC): ICD-10-CM

## 2024-12-03 DIAGNOSIS — C77.3 BREAST CANCER METASTASIZED TO AXILLARY LYMPH NODE, LEFT (HCC): ICD-10-CM

## 2024-12-03 DIAGNOSIS — C79.51 METASTASIS TO BONE (HCC): ICD-10-CM

## 2024-12-03 DIAGNOSIS — Z17.0 MALIGNANT NEOPLASM OF LEFT BREAST IN FEMALE, ESTROGEN RECEPTOR POSITIVE, UNSPECIFIED SITE OF BREAST (HCC): ICD-10-CM

## 2024-12-03 DIAGNOSIS — C50.912 MALIGNANT NEOPLASM OF LEFT BREAST IN FEMALE, ESTROGEN RECEPTOR POSITIVE, UNSPECIFIED SITE OF BREAST (HCC): ICD-10-CM

## 2025-01-08 ENCOUNTER — HOSPITAL ENCOUNTER (OUTPATIENT)
Dept: LAB | Age: 55
Discharge: HOME | End: 2025-01-08
Payer: COMMERCIAL

## 2025-01-08 ENCOUNTER — OFFICE VISIT (OUTPATIENT)
Dept: ONCOLOGY | Age: 55
End: 2025-01-08
Payer: COMMERCIAL

## 2025-01-08 ENCOUNTER — HOSPITAL ENCOUNTER (OUTPATIENT)
Dept: INFUSION THERAPY | Age: 55
Setting detail: INFUSION SERIES
Discharge: HOME | End: 2025-01-08
Payer: COMMERCIAL

## 2025-01-08 VITALS
DIASTOLIC BLOOD PRESSURE: 79 MMHG | TEMPERATURE: 98.2 F | HEART RATE: 77 BPM | SYSTOLIC BLOOD PRESSURE: 138 MMHG | OXYGEN SATURATION: 98 % | WEIGHT: 193 LBS | HEIGHT: 63 IN | RESPIRATION RATE: 14 BRPM | BODY MASS INDEX: 34.2 KG/M2

## 2025-01-08 DIAGNOSIS — Z17.0 MALIGNANT NEOPLASM OF LEFT BREAST IN FEMALE, ESTROGEN RECEPTOR POSITIVE, UNSPECIFIED SITE OF BREAST (HCC): Primary | ICD-10-CM

## 2025-01-08 DIAGNOSIS — C79.51 METASTASIS TO BONE (HCC): ICD-10-CM

## 2025-01-08 DIAGNOSIS — T45.1X5A HOT FLASHES RELATED TO AROMATASE INHIBITOR THERAPY: ICD-10-CM

## 2025-01-08 DIAGNOSIS — Z17.0 MALIGNANT NEOPLASM OF LEFT BREAST IN FEMALE, ESTROGEN RECEPTOR POSITIVE, UNSPECIFIED SITE OF BREAST (HCC): ICD-10-CM

## 2025-01-08 DIAGNOSIS — C77.3 BREAST CANCER METASTASIZED TO AXILLARY LYMPH NODE, LEFT (HCC): ICD-10-CM

## 2025-01-08 DIAGNOSIS — C50.912 BREAST CANCER METASTASIZED TO AXILLARY LYMPH NODE, LEFT (HCC): ICD-10-CM

## 2025-01-08 DIAGNOSIS — C50.912 MALIGNANT NEOPLASM OF LEFT BREAST IN FEMALE, ESTROGEN RECEPTOR POSITIVE, UNSPECIFIED SITE OF BREAST (HCC): Primary | ICD-10-CM

## 2025-01-08 DIAGNOSIS — R23.2 HOT FLASHES RELATED TO AROMATASE INHIBITOR THERAPY: ICD-10-CM

## 2025-01-08 DIAGNOSIS — T45.1X5A CHEMOTHERAPY INDUCED NEUTROPENIA (HCC): ICD-10-CM

## 2025-01-08 DIAGNOSIS — C50.912 MALIGNANT NEOPLASM OF LEFT BREAST IN FEMALE, ESTROGEN RECEPTOR POSITIVE, UNSPECIFIED SITE OF BREAST (HCC): ICD-10-CM

## 2025-01-08 DIAGNOSIS — D70.1 CHEMOTHERAPY INDUCED NEUTROPENIA (HCC): ICD-10-CM

## 2025-01-08 LAB
ALBUMIN SERPL-MCNC: 4 G/DL (ref 3.5–5)
ALBUMIN/GLOB SERPL: 1.1 (ref 1–1.9)
ALP SERPL-CCNC: 82 U/L (ref 35–104)
ALT SERPL-CCNC: 43 U/L (ref 8–45)
ANION GAP SERPL CALC-SCNC: 11 MMOL/L (ref 7–16)
AST SERPL-CCNC: 32 U/L (ref 15–37)
BASOPHILS # BLD: 0.04 K/UL (ref 0–0.2)
BASOPHILS NFR BLD: 1.8 % (ref 0–2)
BILIRUB SERPL-MCNC: 0.3 MG/DL (ref 0–1.2)
BUN SERPL-MCNC: 19 MG/DL (ref 6–23)
CALCIUM SERPL-MCNC: 9.7 MG/DL (ref 8.8–10.2)
CANCER AG15-3 SERPL-ACNC: 40.7 U/ML (ref 0–25)
CHLORIDE SERPL-SCNC: 104 MMOL/L (ref 98–107)
CO2 SERPL-SCNC: 26 MMOL/L (ref 20–29)
CREAT SERPL-MCNC: 0.84 MG/DL (ref 0.6–1.1)
DIFFERENTIAL METHOD BLD: ABNORMAL
EOSINOPHIL # BLD: 0.03 K/UL (ref 0–0.8)
EOSINOPHIL NFR BLD: 1.3 % (ref 0.5–7.8)
ERYTHROCYTE [DISTWIDTH] IN BLOOD BY AUTOMATED COUNT: 13.2 % (ref 11.9–14.6)
GLOBULIN SER CALC-MCNC: 3.8 G/DL (ref 2.3–3.5)
GLUCOSE SERPL-MCNC: 83 MG/DL (ref 70–99)
HCT VFR BLD AUTO: 37.3 % (ref 35.8–46.3)
HGB BLD-MCNC: 13.2 G/DL (ref 11.7–15.4)
IMM GRANULOCYTES # BLD AUTO: 0.01 K/UL (ref 0–0.5)
IMM GRANULOCYTES NFR BLD AUTO: 0.4 % (ref 0–5)
LYMPHOCYTES # BLD: 0.92 K/UL (ref 0.5–4.6)
LYMPHOCYTES NFR BLD: 40.9 % (ref 13–44)
MAGNESIUM SERPL-MCNC: 1.7 MG/DL (ref 1.8–2.4)
MCH RBC QN AUTO: 36 PG (ref 26.1–32.9)
MCHC RBC AUTO-ENTMCNC: 35.4 G/DL (ref 31.4–35)
MCV RBC AUTO: 101.6 FL (ref 82–102)
MONOCYTES # BLD: 0.2 K/UL (ref 0.1–1.3)
MONOCYTES NFR BLD: 8.9 % (ref 4–12)
NEUTS SEG # BLD: 1.05 K/UL (ref 1.7–8.2)
NEUTS SEG NFR BLD: 46.7 % (ref 43–78)
NRBC # BLD: 0 K/UL (ref 0–0.2)
PHOSPHATE SERPL-MCNC: 4.5 MG/DL (ref 2.5–4.5)
PLATELET # BLD AUTO: 226 K/UL (ref 150–450)
PMV BLD AUTO: 9.5 FL (ref 9.4–12.3)
POTASSIUM SERPL-SCNC: 3.7 MMOL/L (ref 3.5–5.1)
PROT SERPL-MCNC: 7.8 G/DL (ref 6.3–8.2)
RBC # BLD AUTO: 3.67 M/UL (ref 4.05–5.2)
SODIUM SERPL-SCNC: 141 MMOL/L (ref 136–145)
WBC # BLD AUTO: 2.3 K/UL (ref 4.3–11.1)

## 2025-01-08 PROCEDURE — 83735 ASSAY OF MAGNESIUM: CPT

## 2025-01-08 PROCEDURE — 80053 COMPREHEN METABOLIC PANEL: CPT

## 2025-01-08 PROCEDURE — 85025 COMPLETE CBC W/AUTO DIFF WBC: CPT

## 2025-01-08 PROCEDURE — 6360000002 HC RX W HCPCS: Performed by: INTERNAL MEDICINE

## 2025-01-08 PROCEDURE — 86300 IMMUNOASSAY TUMOR CA 15-3: CPT

## 2025-01-08 PROCEDURE — 99214 OFFICE O/P EST MOD 30 MIN: CPT | Performed by: NURSE PRACTITIONER

## 2025-01-08 PROCEDURE — 36415 COLL VENOUS BLD VENIPUNCTURE: CPT

## 2025-01-08 PROCEDURE — 3075F SYST BP GE 130 - 139MM HG: CPT | Performed by: NURSE PRACTITIONER

## 2025-01-08 PROCEDURE — 84100 ASSAY OF PHOSPHORUS: CPT

## 2025-01-08 PROCEDURE — 96372 THER/PROPH/DIAG INJ SC/IM: CPT

## 2025-01-08 PROCEDURE — 3078F DIAST BP <80 MM HG: CPT | Performed by: NURSE PRACTITIONER

## 2025-01-08 RX ORDER — ONDANSETRON 2 MG/ML
8 INJECTION INTRAMUSCULAR; INTRAVENOUS
Status: DISCONTINUED | OUTPATIENT
Start: 2025-01-08 | End: 2025-01-09 | Stop reason: HOSPADM

## 2025-01-08 RX ORDER — DIPHENHYDRAMINE HYDROCHLORIDE 50 MG/ML
50 INJECTION INTRAMUSCULAR; INTRAVENOUS
OUTPATIENT
Start: 2025-01-22

## 2025-01-08 RX ORDER — EPINEPHRINE 1 MG/ML
0.3 INJECTION, SOLUTION, CONCENTRATE INTRAVENOUS PRN
Status: DISCONTINUED | OUTPATIENT
Start: 2025-01-08 | End: 2025-01-09 | Stop reason: HOSPADM

## 2025-01-08 RX ORDER — ACETAMINOPHEN 325 MG/1
650 TABLET ORAL
OUTPATIENT
Start: 2025-01-22

## 2025-01-08 RX ORDER — DIPHENHYDRAMINE HYDROCHLORIDE 50 MG/ML
50 INJECTION INTRAMUSCULAR; INTRAVENOUS
Status: DISCONTINUED | OUTPATIENT
Start: 2025-01-08 | End: 2025-01-09 | Stop reason: HOSPADM

## 2025-01-08 RX ORDER — HYDROCORTISONE SODIUM SUCCINATE 100 MG/2ML
100 INJECTION INTRAMUSCULAR; INTRAVENOUS
Status: DISCONTINUED | OUTPATIENT
Start: 2025-01-08 | End: 2025-01-09 | Stop reason: HOSPADM

## 2025-01-08 RX ORDER — EPINEPHRINE 1 MG/ML
0.3 INJECTION, SOLUTION, CONCENTRATE INTRAVENOUS PRN
OUTPATIENT
Start: 2025-01-22

## 2025-01-08 RX ORDER — ALBUTEROL SULFATE 90 UG/1
4 INHALANT RESPIRATORY (INHALATION) PRN
OUTPATIENT
Start: 2025-01-22

## 2025-01-08 RX ORDER — HYDROCORTISONE SODIUM SUCCINATE 100 MG/2ML
100 INJECTION INTRAMUSCULAR; INTRAVENOUS
OUTPATIENT
Start: 2025-01-22

## 2025-01-08 RX ORDER — ACETAMINOPHEN 325 MG/1
650 TABLET ORAL
Status: DISCONTINUED | OUTPATIENT
Start: 2025-01-08 | End: 2025-01-09 | Stop reason: HOSPADM

## 2025-01-08 RX ORDER — ALBUTEROL SULFATE 90 UG/1
4 INHALANT RESPIRATORY (INHALATION) PRN
Status: DISCONTINUED | OUTPATIENT
Start: 2025-01-08 | End: 2025-01-09 | Stop reason: HOSPADM

## 2025-01-08 RX ORDER — ONDANSETRON 2 MG/ML
8 INJECTION INTRAMUSCULAR; INTRAVENOUS
OUTPATIENT
Start: 2025-01-22

## 2025-01-08 RX ORDER — SODIUM CHLORIDE 9 MG/ML
INJECTION, SOLUTION INTRAVENOUS CONTINUOUS
OUTPATIENT
Start: 2025-01-22

## 2025-01-08 RX ADMIN — DENOSUMAB 120 MG: 120 INJECTION SUBCUTANEOUS at 14:40

## 2025-01-08 ASSESSMENT — PATIENT HEALTH QUESTIONNAIRE - PHQ9
SUM OF ALL RESPONSES TO PHQ9 QUESTIONS 1 & 2: 0
SUM OF ALL RESPONSES TO PHQ QUESTIONS 1-9: 0
2. FEELING DOWN, DEPRESSED OR HOPELESS: NOT AT ALL
1. LITTLE INTEREST OR PLEASURE IN DOING THINGS: NOT AT ALL
SUM OF ALL RESPONSES TO PHQ QUESTIONS 1-9: 0

## 2025-01-08 NOTE — PROGRESS NOTES
Patient arrived to [facility]fusion [facility] for Xgeva. Assessment completed.  No needs voiced at this time. Patient tolerated injection well and is aware of next appointment on 4/9/25 @1240.  Patient discharged ambulatory.

## 2025-01-10 RX ORDER — LETROZOLE 2.5 MG/1
2.5 TABLET, FILM COATED ORAL DAILY
Qty: 30 TABLET | Refills: 11 | OUTPATIENT
Start: 2025-01-10

## 2025-01-25 DIAGNOSIS — K21.9 GASTROESOPHAGEAL REFLUX DISEASE WITHOUT ESOPHAGITIS: ICD-10-CM

## 2025-01-26 RX ORDER — FAMOTIDINE 20 MG/1
20 TABLET, FILM COATED ORAL 2 TIMES DAILY
Qty: 180 TABLET | Refills: 0 | Status: SHIPPED | OUTPATIENT
Start: 2025-01-26

## 2025-01-30 DIAGNOSIS — Z17.0 MALIGNANT NEOPLASM OF LEFT BREAST IN FEMALE, ESTROGEN RECEPTOR POSITIVE, UNSPECIFIED SITE OF BREAST (HCC): ICD-10-CM

## 2025-01-30 DIAGNOSIS — C79.51 METASTASIS TO BONE (HCC): ICD-10-CM

## 2025-01-30 DIAGNOSIS — C50.912 MALIGNANT NEOPLASM OF LEFT BREAST IN FEMALE, ESTROGEN RECEPTOR POSITIVE, UNSPECIFIED SITE OF BREAST (HCC): ICD-10-CM

## 2025-01-30 DIAGNOSIS — C50.912 BREAST CANCER METASTASIZED TO AXILLARY LYMPH NODE, LEFT (HCC): ICD-10-CM

## 2025-01-30 DIAGNOSIS — C77.3 BREAST CANCER METASTASIZED TO AXILLARY LYMPH NODE, LEFT (HCC): ICD-10-CM

## 2025-01-30 DIAGNOSIS — C50.912 MALIGNANT NEOPLASM OF LEFT FEMALE BREAST, UNSPECIFIED ESTROGEN RECEPTOR STATUS, UNSPECIFIED SITE OF BREAST (HCC): ICD-10-CM

## 2025-01-30 RX ORDER — POTASSIUM CHLORIDE 1500 MG/1
20 TABLET, EXTENDED RELEASE ORAL 2 TIMES DAILY
Qty: 60 TABLET | Refills: 11 | Status: SHIPPED | OUTPATIENT
Start: 2025-01-30

## 2025-01-30 NOTE — TELEPHONE ENCOUNTER
Medication Requested: Potassium    Date last prescribed: 1/4/24    Requested Pharmacy: [facility]    Action Taken: Chart reviewed, refill pended and routed for signature.

## 2025-03-02 SDOH — ECONOMIC STABILITY: FOOD INSECURITY: WITHIN THE PAST 12 MONTHS, THE FOOD YOU BOUGHT JUST DIDN'T LAST AND YOU DIDN'T HAVE MONEY TO GET MORE.: NEVER TRUE

## 2025-03-02 SDOH — ECONOMIC STABILITY: INCOME INSECURITY: IN THE LAST 12 MONTHS, WAS THERE A TIME WHEN YOU WERE NOT ABLE TO PAY THE MORTGAGE OR RENT ON TIME?: NO

## 2025-03-02 SDOH — ECONOMIC STABILITY: FOOD INSECURITY: WITHIN THE PAST 12 MONTHS, YOU WORRIED THAT YOUR FOOD WOULD RUN OUT BEFORE YOU GOT MONEY TO BUY MORE.: NEVER TRUE

## 2025-03-03 DIAGNOSIS — I10 PRIMARY HYPERTENSION: ICD-10-CM

## 2025-03-04 ENCOUNTER — OFFICE VISIT (OUTPATIENT)
Dept: FAMILY MEDICINE CLINIC | Facility: CLINIC | Age: 55
End: 2025-03-04
Payer: COMMERCIAL

## 2025-03-04 VITALS
WEIGHT: 190 LBS | HEIGHT: 64 IN | RESPIRATION RATE: 17 BRPM | HEART RATE: 68 BPM | SYSTOLIC BLOOD PRESSURE: 149 MMHG | DIASTOLIC BLOOD PRESSURE: 89 MMHG | BODY MASS INDEX: 32.44 KG/M2 | OXYGEN SATURATION: 99 % | TEMPERATURE: 97.5 F

## 2025-03-04 DIAGNOSIS — E78.5 DYSLIPIDEMIA: ICD-10-CM

## 2025-03-04 DIAGNOSIS — Z79.899 ENCOUNTER FOR LONG-TERM (CURRENT) USE OF MEDICATIONS: ICD-10-CM

## 2025-03-04 DIAGNOSIS — I10 PRIMARY HYPERTENSION: ICD-10-CM

## 2025-03-04 DIAGNOSIS — E55.9 VITAMIN D DEFICIENCY: ICD-10-CM

## 2025-03-04 DIAGNOSIS — R63.5 WEIGHT GAIN: Primary | ICD-10-CM

## 2025-03-04 DIAGNOSIS — K21.9 GASTROESOPHAGEAL REFLUX DISEASE WITHOUT ESOPHAGITIS: ICD-10-CM

## 2025-03-04 PROCEDURE — 3077F SYST BP >= 140 MM HG: CPT | Performed by: FAMILY MEDICINE

## 2025-03-04 PROCEDURE — 99214 OFFICE O/P EST MOD 30 MIN: CPT | Performed by: FAMILY MEDICINE

## 2025-03-04 PROCEDURE — 3079F DIAST BP 80-89 MM HG: CPT | Performed by: FAMILY MEDICINE

## 2025-03-04 RX ORDER — OLMESARTAN MEDOXOMIL 40 MG/1
TABLET ORAL
Qty: 90 TABLET | Refills: 2 | Status: SHIPPED | OUTPATIENT
Start: 2025-03-04

## 2025-03-04 RX ORDER — CHLORTHALIDONE 25 MG/1
TABLET ORAL
Qty: 90 TABLET | Refills: 2 | Status: SHIPPED | OUTPATIENT
Start: 2025-03-04

## 2025-03-04 RX ORDER — CHLORTHALIDONE 25 MG/1
TABLET ORAL
Qty: 90 TABLET | Refills: 1 | OUTPATIENT
Start: 2025-03-04

## 2025-03-04 NOTE — PROGRESS NOTES
HISTORY OF PRESENT ILLNESS  Chief Complaint   Patient presents with    Hypertension     NOTICE FOR THE PATIENT: This clinical note is not designed to be interpreted by patients.  We do not recommend reading it unless you have medical training. These notes may contain candid and (unintentionally) offensive descriptions, which are sometimes required for accurate documentation. If you would like more information about your healthcare, please obtain it directly by myself or my staff/colleagues - never solely from the notes. Thank you for your understanding and cooperation.       History of Present Illness  The patient presents for evaluation of hypertension, weight management, and low magnesium.    She has not been monitoring her blood pressure at home recently, despite having the necessary equipment. She reports that her blood pressure readings were significantly lower during her last visit to the [facility] compared to today's reading. She recalls a period when her blood pressure was consistently in the 190s systolic and 50s diastolic.    She reports a weight loss of 3 pounds since her last visit to the [facility], which she attributes to water weight rather than fat loss. She has been adhering to a low-carbohydrate diet for the past 1.5 months, motivated by its potential benefits for cancer patients and her previous success with this diet in achieving weight loss. Despite her dietary efforts, she has not observed significant weight loss. She engages in daily physical activity, walking her dog on their property, but does not participate in strenuous exercise. She does not perceive herself as overeating and has been consciously reducing her food intake. She typically consumes a late breakfast or early lunch and acknowledges the need to increase her water intake. She is considering the use of weight loss medications suitable for cancer patients.    She is not currently taking any magnesium supplements. She

## 2025-03-10 ASSESSMENT — ENCOUNTER SYMPTOMS
WHEEZING: 0
RHINORRHEA: 0
VOMITING: 0
COUGH: 0
NAUSEA: 0
CONSTIPATION: 0
ABDOMINAL PAIN: 0
DIARRHEA: 0

## 2025-03-11 DIAGNOSIS — I10 PRIMARY HYPERTENSION: ICD-10-CM

## 2025-03-11 RX ORDER — OLMESARTAN MEDOXOMIL 40 MG/1
TABLET ORAL
Qty: 90 TABLET | Refills: 1 | OUTPATIENT
Start: 2025-03-11

## 2025-04-02 ENCOUNTER — HOSPITAL ENCOUNTER (OUTPATIENT)
Dept: PET IMAGING | Age: 55
Discharge: HOME | End: 2025-04-05
Payer: COMMERCIAL

## 2025-04-02 DIAGNOSIS — C50.912 MALIGNANT NEOPLASM OF LEFT BREAST IN FEMALE, ESTROGEN RECEPTOR POSITIVE, UNSPECIFIED SITE OF BREAST: ICD-10-CM

## 2025-04-02 DIAGNOSIS — C79.51 METASTASIS TO BONE (HCC): ICD-10-CM

## 2025-04-02 DIAGNOSIS — Z17.0 MALIGNANT NEOPLASM OF LEFT BREAST IN FEMALE, ESTROGEN RECEPTOR POSITIVE, UNSPECIFIED SITE OF BREAST: ICD-10-CM

## 2025-04-02 LAB
GLUCOSE BLD STRIP.AUTO-MCNC: 131 MG/DL (ref 65–100)
SERVICE CMNT-IMP: ABNORMAL

## 2025-04-02 PROCEDURE — 3430000000 HC RX DIAGNOSTIC RADIOPHARMACEUTICAL: Performed by: NURSE PRACTITIONER

## 2025-04-02 PROCEDURE — 82962 GLUCOSE BLOOD TEST: CPT

## 2025-04-02 PROCEDURE — 6360000004 HC RX CONTRAST MEDICATION: Performed by: NURSE PRACTITIONER

## 2025-04-02 PROCEDURE — 2500000003 HC RX 250 WO HCPCS: Performed by: NURSE PRACTITIONER

## 2025-04-02 PROCEDURE — A9609 HC RX DIAGNOSTIC RADIOPHARMACEUTICAL: HCPCS | Performed by: NURSE PRACTITIONER

## 2025-04-02 PROCEDURE — 78815 PET IMAGE W/CT SKULL-THIGH: CPT

## 2025-04-02 RX ORDER — FLUDEOXYGLUCOSE F 18 200 MCI/ML
13 INJECTION, SOLUTION INTRAVENOUS
Status: COMPLETED | OUTPATIENT
Start: 2025-04-02 | End: 2025-04-02

## 2025-04-02 RX ORDER — DIATRIZOATE MEGLUMINE AND DIATRIZOATE SODIUM 660; 100 MG/ML; MG/ML
10 SOLUTION ORAL; RECTAL
Status: DISCONTINUED | OUTPATIENT
Start: 2025-04-02 | End: 2025-04-06 | Stop reason: HOSPADM

## 2025-04-02 RX ORDER — SODIUM CHLORIDE 0.9 % (FLUSH) 0.9 %
10 SYRINGE (ML) INJECTION ONCE AS NEEDED
Status: COMPLETED | OUTPATIENT
Start: 2025-04-02 | End: 2025-04-02

## 2025-04-02 RX ADMIN — SODIUM CHLORIDE, PRESERVATIVE FREE 10 ML: 5 INJECTION INTRAVENOUS at 09:46

## 2025-04-02 RX ADMIN — FLUDEOXYGLUCOSE F 18 13 MILLICURIE: 200 INJECTION, SOLUTION INTRAVENOUS at 09:46

## 2025-04-02 RX ADMIN — DIATRIZOATE MEGLUMINE AND DIATRIZOATE SODIUM 10 ML: 660; 100 LIQUID ORAL; RECTAL at 09:46

## 2025-04-07 ENCOUNTER — RESULTS FOLLOW-UP (OUTPATIENT)
Dept: ONCOLOGY | Age: 55
End: 2025-04-07

## 2025-04-09 ENCOUNTER — OFFICE VISIT (OUTPATIENT)
Dept: ONCOLOGY | Age: 55
End: 2025-04-09
Payer: COMMERCIAL

## 2025-04-09 ENCOUNTER — HOSPITAL ENCOUNTER (OUTPATIENT)
Dept: INFUSION THERAPY | Age: 55
Setting detail: INFUSION SERIES
Discharge: HOME | End: 2025-04-09
Payer: COMMERCIAL

## 2025-04-09 ENCOUNTER — HOSPITAL ENCOUNTER (OUTPATIENT)
Dept: LAB | Age: 55
Discharge: HOME | End: 2025-04-09
Payer: COMMERCIAL

## 2025-04-09 VITALS
DIASTOLIC BLOOD PRESSURE: 75 MMHG | HEIGHT: 63 IN | OXYGEN SATURATION: 98 % | SYSTOLIC BLOOD PRESSURE: 130 MMHG | BODY MASS INDEX: 33.31 KG/M2 | HEART RATE: 80 BPM | RESPIRATION RATE: 12 BRPM | WEIGHT: 188 LBS | TEMPERATURE: 97.5 F

## 2025-04-09 DIAGNOSIS — C50.912 MALIGNANT NEOPLASM OF LEFT BREAST IN FEMALE, ESTROGEN RECEPTOR POSITIVE, UNSPECIFIED SITE OF BREAST: ICD-10-CM

## 2025-04-09 DIAGNOSIS — C50.912 BREAST CANCER METASTASIZED TO AXILLARY LYMPH NODE, LEFT: ICD-10-CM

## 2025-04-09 DIAGNOSIS — C79.51 METASTASIS TO BONE (HCC): ICD-10-CM

## 2025-04-09 DIAGNOSIS — C50.912 MALIGNANT NEOPLASM OF LEFT BREAST IN FEMALE, ESTROGEN RECEPTOR POSITIVE, UNSPECIFIED SITE OF BREAST: Primary | ICD-10-CM

## 2025-04-09 DIAGNOSIS — C77.3 BREAST CANCER METASTASIZED TO AXILLARY LYMPH NODE, LEFT: ICD-10-CM

## 2025-04-09 DIAGNOSIS — Z17.0 MALIGNANT NEOPLASM OF LEFT BREAST IN FEMALE, ESTROGEN RECEPTOR POSITIVE, UNSPECIFIED SITE OF BREAST: ICD-10-CM

## 2025-04-09 DIAGNOSIS — Z17.0 MALIGNANT NEOPLASM OF LEFT BREAST IN FEMALE, ESTROGEN RECEPTOR POSITIVE, UNSPECIFIED SITE OF BREAST: Primary | ICD-10-CM

## 2025-04-09 LAB
ALBUMIN SERPL-MCNC: 4.1 G/DL (ref 3.5–5)
ALBUMIN/GLOB SERPL: 1.1 (ref 1–1.9)
ALP SERPL-CCNC: 66 U/L (ref 35–104)
ALT SERPL-CCNC: 53 U/L (ref 8–45)
ANION GAP SERPL CALC-SCNC: 11 MMOL/L (ref 7–16)
AST SERPL-CCNC: 40 U/L (ref 15–37)
BASOPHILS # BLD: 0.04 K/UL (ref 0–0.2)
BASOPHILS NFR BLD: 1.4 % (ref 0–2)
BILIRUB SERPL-MCNC: 0.4 MG/DL (ref 0–1.2)
BUN SERPL-MCNC: 17 MG/DL (ref 6–23)
CALCIUM SERPL-MCNC: 10.2 MG/DL (ref 8.8–10.2)
CANCER AG15-3 SERPL-ACNC: 40.6 U/ML (ref 0–25)
CHLORIDE SERPL-SCNC: 104 MMOL/L (ref 98–107)
CO2 SERPL-SCNC: 24 MMOL/L (ref 20–29)
CREAT SERPL-MCNC: 1.1 MG/DL (ref 0.6–1.1)
DIFFERENTIAL METHOD BLD: ABNORMAL
EOSINOPHIL # BLD: 0.03 K/UL (ref 0–0.8)
EOSINOPHIL NFR BLD: 1.1 % (ref 0.5–7.8)
ERYTHROCYTE [DISTWIDTH] IN BLOOD BY AUTOMATED COUNT: 12.8 % (ref 11.9–14.6)
GLOBULIN SER CALC-MCNC: 3.7 G/DL (ref 2.3–3.5)
GLUCOSE SERPL-MCNC: 85 MG/DL (ref 70–99)
HCT VFR BLD AUTO: 37.2 % (ref 35.8–46.3)
HGB BLD-MCNC: 13.4 G/DL (ref 11.7–15.4)
IMM GRANULOCYTES # BLD AUTO: 0.01 K/UL (ref 0–0.5)
IMM GRANULOCYTES NFR BLD AUTO: 0.4 % (ref 0–5)
LYMPHOCYTES # BLD: 0.88 K/UL (ref 0.5–4.6)
LYMPHOCYTES NFR BLD: 31.4 % (ref 13–44)
MAGNESIUM SERPL-MCNC: 1.8 MG/DL (ref 1.8–2.4)
MCH RBC QN AUTO: 36 PG (ref 26.1–32.9)
MCHC RBC AUTO-ENTMCNC: 36 G/DL (ref 31.4–35)
MCV RBC AUTO: 100 FL (ref 82–102)
MONOCYTES # BLD: 0.27 K/UL (ref 0.1–1.3)
MONOCYTES NFR BLD: 9.6 % (ref 4–12)
NEUTS SEG # BLD: 1.57 K/UL (ref 1.7–8.2)
NEUTS SEG NFR BLD: 56.1 % (ref 43–78)
NRBC # BLD: 0 K/UL (ref 0–0.2)
PHOSPHATE SERPL-MCNC: 3.9 MG/DL (ref 2.5–4.5)
PLATELET # BLD AUTO: 176 K/UL (ref 150–450)
PMV BLD AUTO: 9.8 FL (ref 9.4–12.3)
POTASSIUM SERPL-SCNC: 3.7 MMOL/L (ref 3.5–5.1)
PROT SERPL-MCNC: 7.8 G/DL (ref 6.3–8.2)
RBC # BLD AUTO: 3.72 M/UL (ref 4.05–5.2)
SODIUM SERPL-SCNC: 139 MMOL/L (ref 136–145)
WBC # BLD AUTO: 2.8 K/UL (ref 4.3–11.1)

## 2025-04-09 PROCEDURE — 96372 THER/PROPH/DIAG INJ SC/IM: CPT

## 2025-04-09 PROCEDURE — 83735 ASSAY OF MAGNESIUM: CPT

## 2025-04-09 PROCEDURE — 99214 OFFICE O/P EST MOD 30 MIN: CPT | Performed by: INTERNAL MEDICINE

## 2025-04-09 PROCEDURE — 6360000002 HC RX W HCPCS: Performed by: INTERNAL MEDICINE

## 2025-04-09 PROCEDURE — 3078F DIAST BP <80 MM HG: CPT | Performed by: INTERNAL MEDICINE

## 2025-04-09 PROCEDURE — 36415 COLL VENOUS BLD VENIPUNCTURE: CPT

## 2025-04-09 PROCEDURE — 86300 IMMUNOASSAY TUMOR CA 15-3: CPT

## 2025-04-09 PROCEDURE — 3075F SYST BP GE 130 - 139MM HG: CPT | Performed by: INTERNAL MEDICINE

## 2025-04-09 PROCEDURE — 80053 COMPREHEN METABOLIC PANEL: CPT

## 2025-04-09 PROCEDURE — 84100 ASSAY OF PHOSPHORUS: CPT

## 2025-04-09 PROCEDURE — 85025 COMPLETE CBC W/AUTO DIFF WBC: CPT

## 2025-04-09 RX ORDER — ONDANSETRON 2 MG/ML
8 INJECTION INTRAMUSCULAR; INTRAVENOUS
OUTPATIENT
Start: 2025-04-27

## 2025-04-09 RX ORDER — ONDANSETRON 2 MG/ML
8 INJECTION INTRAMUSCULAR; INTRAVENOUS
Status: DISCONTINUED | OUTPATIENT
Start: 2025-04-09 | End: 2025-04-10 | Stop reason: HOSPADM

## 2025-04-09 RX ORDER — EPINEPHRINE 1 MG/ML
0.3 INJECTION, SOLUTION, CONCENTRATE INTRAVENOUS PRN
Status: DISCONTINUED | OUTPATIENT
Start: 2025-04-09 | End: 2025-04-10 | Stop reason: HOSPADM

## 2025-04-09 RX ORDER — ACETAMINOPHEN 325 MG/1
650 TABLET ORAL
OUTPATIENT
Start: 2025-04-27

## 2025-04-09 RX ORDER — ALBUTEROL SULFATE 90 UG/1
4 INHALANT RESPIRATORY (INHALATION) PRN
Status: DISCONTINUED | OUTPATIENT
Start: 2025-04-09 | End: 2025-04-10 | Stop reason: HOSPADM

## 2025-04-09 RX ORDER — ACETAMINOPHEN 325 MG/1
650 TABLET ORAL
Status: DISCONTINUED | OUTPATIENT
Start: 2025-04-09 | End: 2025-04-10 | Stop reason: HOSPADM

## 2025-04-09 RX ORDER — HYDROCORTISONE SODIUM SUCCINATE 100 MG/2ML
100 INJECTION INTRAMUSCULAR; INTRAVENOUS
Status: DISCONTINUED | OUTPATIENT
Start: 2025-04-09 | End: 2025-04-10 | Stop reason: HOSPADM

## 2025-04-09 RX ORDER — ALBUTEROL SULFATE 90 UG/1
4 INHALANT RESPIRATORY (INHALATION) PRN
OUTPATIENT
Start: 2025-04-27

## 2025-04-09 RX ORDER — SODIUM CHLORIDE 9 MG/ML
INJECTION, SOLUTION INTRAVENOUS CONTINUOUS
OUTPATIENT
Start: 2025-04-27

## 2025-04-09 RX ORDER — EPINEPHRINE 1 MG/ML
0.3 INJECTION, SOLUTION, CONCENTRATE INTRAVENOUS PRN
OUTPATIENT
Start: 2025-04-27

## 2025-04-09 RX ORDER — DIPHENHYDRAMINE HYDROCHLORIDE 50 MG/ML
50 INJECTION, SOLUTION INTRAMUSCULAR; INTRAVENOUS
Status: DISCONTINUED | OUTPATIENT
Start: 2025-04-09 | End: 2025-04-10 | Stop reason: HOSPADM

## 2025-04-09 RX ORDER — DIPHENHYDRAMINE HYDROCHLORIDE 50 MG/ML
50 INJECTION, SOLUTION INTRAMUSCULAR; INTRAVENOUS
OUTPATIENT
Start: 2025-04-27

## 2025-04-09 RX ORDER — HYDROCORTISONE SODIUM SUCCINATE 100 MG/2ML
100 INJECTION INTRAMUSCULAR; INTRAVENOUS
OUTPATIENT
Start: 2025-04-27

## 2025-04-09 RX ADMIN — DENOSUMAB 120 MG: 120 INJECTION SUBCUTANEOUS at 14:40

## 2025-04-09 ASSESSMENT — PATIENT HEALTH QUESTIONNAIRE - PHQ9
SUM OF ALL RESPONSES TO PHQ QUESTIONS 1-9: 0
1. LITTLE INTEREST OR PLEASURE IN DOING THINGS: NOT AT ALL
SUM OF ALL RESPONSES TO PHQ QUESTIONS 1-9: 0
2. FEELING DOWN, DEPRESSED OR HOPELESS: NOT AT ALL

## 2025-04-09 NOTE — PROGRESS NOTES
10/13/2022  PET/CT  Indication: Breast cancer restaging Radiopharmaceutical: 14.0 mCi F18-FDG, intravenously. Technique: Positron emission tomography (PET) with concurrently acquired computed tomography (CT) for attenuation correction and anatomical localization imaging performed from the skull base to mid thigh. Imaging was performed approximately 60 minutes post injection. Oral contrast was administered. Radiation dose reduction techniques were used for this study:  Our CT scanners use one or all of the following: Automated exposure control, adjustment of the mA and/or kVp according to patient's size, iterative reconstruction. Serum glucose: 100 mg/dL prior to injection. Comparison studies: 6/22/2022 PET/CT Findings: Head and Neck: No enlarged or hypermetabolic cervical lymph nodes. No worrisome focal FDG uptake in the neck soft tissues. Chest: No significant interval decrease in size and FDG uptake within the left breast mass. Left axillary lymph nodes are also smaller and elevated FDG uptake is resolved. No mediastinal, axillary, or internal mammary lymphadenopathy. Presumed posttreatment changes in the medial right lung base. Abdomen/Pelvis: The liver is severely steatotic. Hemorrhagic/proteinaceous cyst posteriorly in the left kidney. No enlarged or hypermetabolic lymph nodes of the abdomen or pelvis. Normal uterus. No adnexal mass. Bones and soft tissues: Newly sclerotic appearance and improved FDG uptake with lesions of the S1 and T10 vertebral bodies. No new bone lesions on the current study.     1.  Interval treatment response evident in the primary left breast mass, ipsilateral axillary lymph nodes and bone lesions at T10 and S1. No new sites of disease. 2.  Severe hepatic steatosis.        MRI BREAST BILATERAL W WO CONTRAST    Result Date: 6/16/2022  MRI of the Breasts with and without contrast CLINICAL INDICATION:  New diagnosis of left 10:00 breast cancer and left axillary metastatic lymphadenopathy

## 2025-04-09 NOTE — PATIENT INSTRUCTIONS
0.20 K/UL Final    Immature Granulocytes Absolute 04/09/2025 0.01  0.00 - 0.50 K/UL Final    Differential Type 04/09/2025 AUTOMATED    Final                 Please refer to After Visit Summary or [facility] for upcoming appointment information. If you have any questions regarding your upcoming schedule please reach out to your care team through [employer] or call [phone].    Please notify your assigned Nurse Navigator of any unplanned hospital admissions or Emergency Room visits within 24 hours of discharge.    -------------------------------------------------------------------------------------------------------------------  Please call our office at [phone] if you have any  of the following symptoms:   Fever of 100.5 or greater  Chills  Shortness of breath  Swelling or pain in one leg    After office hours an answering service is available and will contact a provider for emergencies or if you are experiencing any of the above symptoms.        ALEJANDRA ARGUETA RN

## 2025-04-09 NOTE — PROGRESS NOTES
Arrived to the Infusion Center.  Xgeva completed.   Provided education on Xgeva. Patient has had this medication before without issue.    Patient instructed to report any side effects to ordering provider.  Patient tolerated well.   Any issues or concerns during appointment: none.  Patient aware of next infusion appointment not yet set up, pt said she just came from office visit so thinks it's not set up yet, but said she keeps an eye on MyChart and will call the office if she doesn't see anything scheduled in the next couple of days.  Discharged ambulatory.

## 2025-04-28 DIAGNOSIS — K21.9 GASTROESOPHAGEAL REFLUX DISEASE WITHOUT ESOPHAGITIS: ICD-10-CM

## 2025-04-29 RX ORDER — FAMOTIDINE 20 MG/1
20 TABLET, FILM COATED ORAL 2 TIMES DAILY
Qty: 180 TABLET | Refills: 2 | Status: SHIPPED | OUTPATIENT
Start: 2025-04-29

## 2025-07-09 ENCOUNTER — HOSPITAL ENCOUNTER (OUTPATIENT)
Dept: INFUSION THERAPY | Age: 55
Setting detail: INFUSION SERIES
Discharge: HOME | End: 2025-07-09
Payer: COMMERCIAL

## 2025-07-09 ENCOUNTER — HOSPITAL ENCOUNTER (OUTPATIENT)
Dept: LAB | Age: 55
Discharge: HOME | End: 2025-07-09
Payer: COMMERCIAL

## 2025-07-09 VITALS
DIASTOLIC BLOOD PRESSURE: 78 MMHG | RESPIRATION RATE: 16 BRPM | SYSTOLIC BLOOD PRESSURE: 129 MMHG | HEART RATE: 76 BPM | WEIGHT: 186.4 LBS | TEMPERATURE: 98.4 F | BODY MASS INDEX: 33.02 KG/M2 | OXYGEN SATURATION: 95 %

## 2025-07-09 DIAGNOSIS — C50.912 MALIGNANT NEOPLASM OF LEFT BREAST IN FEMALE, ESTROGEN RECEPTOR POSITIVE, UNSPECIFIED SITE OF BREAST (HCC): ICD-10-CM

## 2025-07-09 DIAGNOSIS — C50.912 MALIGNANT NEOPLASM OF LEFT BREAST IN FEMALE, ESTROGEN RECEPTOR POSITIVE, UNSPECIFIED SITE OF BREAST (HCC): Primary | ICD-10-CM

## 2025-07-09 DIAGNOSIS — Z17.0 MALIGNANT NEOPLASM OF LEFT BREAST IN FEMALE, ESTROGEN RECEPTOR POSITIVE, UNSPECIFIED SITE OF BREAST (HCC): Primary | ICD-10-CM

## 2025-07-09 DIAGNOSIS — Z17.0 MALIGNANT NEOPLASM OF LEFT BREAST IN FEMALE, ESTROGEN RECEPTOR POSITIVE, UNSPECIFIED SITE OF BREAST (HCC): ICD-10-CM

## 2025-07-09 DIAGNOSIS — C79.51 METASTASIS TO BONE (HCC): ICD-10-CM

## 2025-07-09 DIAGNOSIS — C77.3 BREAST CANCER METASTASIZED TO AXILLARY LYMPH NODE, LEFT (HCC): ICD-10-CM

## 2025-07-09 DIAGNOSIS — C50.912 BREAST CANCER METASTASIZED TO AXILLARY LYMPH NODE, LEFT (HCC): ICD-10-CM

## 2025-07-09 LAB
ALBUMIN SERPL-MCNC: 4 G/DL (ref 3.5–5)
ALBUMIN/GLOB SERPL: 1 (ref 1–1.9)
ALP SERPL-CCNC: 89 U/L (ref 35–104)
ALT SERPL-CCNC: 67 U/L (ref 8–45)
ANION GAP SERPL CALC-SCNC: 14 MMOL/L (ref 7–16)
AST SERPL-CCNC: 60 U/L (ref 15–37)
BASOPHILS # BLD: 0.05 K/UL (ref 0–0.2)
BASOPHILS NFR BLD: 1.2 % (ref 0–2)
BILIRUB SERPL-MCNC: 0.4 MG/DL (ref 0–1.2)
BUN SERPL-MCNC: 14 MG/DL (ref 6–23)
CALCIUM SERPL-MCNC: 10 MG/DL (ref 8.8–10.2)
CANCER AG15-3 SERPL-ACNC: 47.3 U/ML (ref 0–25)
CHLORIDE SERPL-SCNC: 102 MMOL/L (ref 98–107)
CO2 SERPL-SCNC: 24 MMOL/L (ref 20–29)
CREAT SERPL-MCNC: 0.97 MG/DL (ref 0.6–1.1)
DIFFERENTIAL METHOD BLD: ABNORMAL
EOSINOPHIL # BLD: 0.05 K/UL (ref 0–0.8)
EOSINOPHIL NFR BLD: 1.2 % (ref 0.5–7.8)
ERYTHROCYTE [DISTWIDTH] IN BLOOD BY AUTOMATED COUNT: 13.3 % (ref 11.9–14.6)
GLOBULIN SER CALC-MCNC: 4.1 G/DL (ref 2.3–3.5)
GLUCOSE SERPL-MCNC: 84 MG/DL (ref 70–99)
HCT VFR BLD AUTO: 39.7 % (ref 35.8–46.3)
HGB BLD-MCNC: 14 G/DL (ref 11.7–15.4)
IMM GRANULOCYTES # BLD AUTO: 0.02 K/UL (ref 0–0.5)
IMM GRANULOCYTES NFR BLD AUTO: 0.5 % (ref 0–5)
LYMPHOCYTES # BLD: 1.21 K/UL (ref 0.5–4.6)
LYMPHOCYTES NFR BLD: 29.7 % (ref 13–44)
MAGNESIUM SERPL-MCNC: 1.9 MG/DL (ref 1.8–2.4)
MCH RBC QN AUTO: 35.9 PG (ref 26.1–32.9)
MCHC RBC AUTO-ENTMCNC: 35.3 G/DL (ref 31.4–35)
MCV RBC AUTO: 101.8 FL (ref 82–102)
MONOCYTES # BLD: 0.57 K/UL (ref 0.1–1.3)
MONOCYTES NFR BLD: 14 % (ref 4–12)
NEUTS SEG # BLD: 2.17 K/UL (ref 1.7–8.2)
NEUTS SEG NFR BLD: 53.4 % (ref 43–78)
NRBC # BLD: 0 K/UL (ref 0–0.2)
PHOSPHATE SERPL-MCNC: 4 MG/DL (ref 2.5–4.5)
PLATELET # BLD AUTO: 234 K/UL (ref 150–450)
PMV BLD AUTO: 9.5 FL (ref 9.4–12.3)
POTASSIUM SERPL-SCNC: 3.3 MMOL/L (ref 3.5–5.1)
PROT SERPL-MCNC: 8.1 G/DL (ref 6.3–8.2)
RBC # BLD AUTO: 3.9 M/UL (ref 4.05–5.2)
SODIUM SERPL-SCNC: 140 MMOL/L (ref 136–145)
WBC # BLD AUTO: 4.1 K/UL (ref 4.3–11.1)

## 2025-07-09 PROCEDURE — 80053 COMPREHEN METABOLIC PANEL: CPT

## 2025-07-09 PROCEDURE — 85025 COMPLETE CBC W/AUTO DIFF WBC: CPT

## 2025-07-09 PROCEDURE — 86300 IMMUNOASSAY TUMOR CA 15-3: CPT

## 2025-07-09 PROCEDURE — 83735 ASSAY OF MAGNESIUM: CPT

## 2025-07-09 PROCEDURE — 96372 THER/PROPH/DIAG INJ SC/IM: CPT

## 2025-07-09 PROCEDURE — 36415 COLL VENOUS BLD VENIPUNCTURE: CPT

## 2025-07-09 PROCEDURE — 6360000002 HC RX W HCPCS: Performed by: INTERNAL MEDICINE

## 2025-07-09 PROCEDURE — 84100 ASSAY OF PHOSPHORUS: CPT

## 2025-07-09 RX ORDER — DIPHENHYDRAMINE HYDROCHLORIDE 50 MG/ML
50 INJECTION, SOLUTION INTRAMUSCULAR; INTRAVENOUS
OUTPATIENT
Start: 2025-07-20

## 2025-07-09 RX ORDER — ONDANSETRON 2 MG/ML
8 INJECTION INTRAMUSCULAR; INTRAVENOUS
OUTPATIENT
Start: 2025-07-20

## 2025-07-09 RX ORDER — SODIUM CHLORIDE 9 MG/ML
INJECTION, SOLUTION INTRAVENOUS CONTINUOUS
OUTPATIENT
Start: 2025-07-20

## 2025-07-09 RX ORDER — HYDROCORTISONE SODIUM SUCCINATE 100 MG/2ML
100 INJECTION INTRAMUSCULAR; INTRAVENOUS
OUTPATIENT
Start: 2025-07-20

## 2025-07-09 RX ORDER — ACETAMINOPHEN 325 MG/1
650 TABLET ORAL
OUTPATIENT
Start: 2025-07-20

## 2025-07-09 RX ORDER — EPINEPHRINE 1 MG/ML
0.3 INJECTION, SOLUTION, CONCENTRATE INTRAVENOUS PRN
OUTPATIENT
Start: 2025-07-20

## 2025-07-09 RX ORDER — ALBUTEROL SULFATE 90 UG/1
4 INHALANT RESPIRATORY (INHALATION) PRN
OUTPATIENT
Start: 2025-07-20

## 2025-07-09 RX ADMIN — DENOSUMAB 120 MG: 120 INJECTION SUBCUTANEOUS at 15:22

## 2025-07-09 NOTE — PROGRESS NOTES
Arrived to the Infusion Center.  Xgeva injection completed.   Provided education on same    Patient instructed to report any side affects to ordering provider.  Patient tolerated well.   Any issues or concerns during appointment: none.  Patient aware of next infusion appointment on 10/09/2025 (date) at 1515 (time).  Discharged ambulatory.

## 2025-07-10 ENCOUNTER — OFFICE VISIT (OUTPATIENT)
Dept: ONCOLOGY | Age: 55
End: 2025-07-10
Payer: COMMERCIAL

## 2025-07-10 VITALS
TEMPERATURE: 98.9 F | OXYGEN SATURATION: 99 % | BODY MASS INDEX: 32.73 KG/M2 | DIASTOLIC BLOOD PRESSURE: 64 MMHG | RESPIRATION RATE: 16 BRPM | SYSTOLIC BLOOD PRESSURE: 123 MMHG | HEIGHT: 63 IN | WEIGHT: 184.7 LBS | HEART RATE: 78 BPM

## 2025-07-10 DIAGNOSIS — R23.2 HOT FLASHES RELATED TO AROMATASE INHIBITOR THERAPY: ICD-10-CM

## 2025-07-10 DIAGNOSIS — C50.912 BREAST CANCER METASTASIZED TO AXILLARY LYMPH NODE, LEFT (HCC): ICD-10-CM

## 2025-07-10 DIAGNOSIS — T45.1X5A HOT FLASHES RELATED TO AROMATASE INHIBITOR THERAPY: ICD-10-CM

## 2025-07-10 DIAGNOSIS — C79.51 METASTASIS TO BONE (HCC): ICD-10-CM

## 2025-07-10 DIAGNOSIS — Z17.0 MALIGNANT NEOPLASM OF LEFT BREAST IN FEMALE, ESTROGEN RECEPTOR POSITIVE, UNSPECIFIED SITE OF BREAST (HCC): Primary | ICD-10-CM

## 2025-07-10 DIAGNOSIS — M25.50 AROMATASE INHIBITOR-ASSOCIATED ARTHRALGIA: ICD-10-CM

## 2025-07-10 DIAGNOSIS — C50.912 MALIGNANT NEOPLASM OF LEFT BREAST IN FEMALE, ESTROGEN RECEPTOR POSITIVE, UNSPECIFIED SITE OF BREAST (HCC): Primary | ICD-10-CM

## 2025-07-10 DIAGNOSIS — R79.89 ELEVATED LFTS: ICD-10-CM

## 2025-07-10 DIAGNOSIS — C77.3 BREAST CANCER METASTASIZED TO AXILLARY LYMPH NODE, LEFT (HCC): ICD-10-CM

## 2025-07-10 DIAGNOSIS — T45.1X5A AROMATASE INHIBITOR-ASSOCIATED ARTHRALGIA: ICD-10-CM

## 2025-07-10 PROCEDURE — 3074F SYST BP LT 130 MM HG: CPT | Performed by: NURSE PRACTITIONER

## 2025-07-10 PROCEDURE — 99214 OFFICE O/P EST MOD 30 MIN: CPT | Performed by: NURSE PRACTITIONER

## 2025-07-10 PROCEDURE — 3078F DIAST BP <80 MM HG: CPT | Performed by: NURSE PRACTITIONER

## 2025-07-10 ASSESSMENT — PATIENT HEALTH QUESTIONNAIRE - PHQ9
SUM OF ALL RESPONSES TO PHQ QUESTIONS 1-9: 0
1. LITTLE INTEREST OR PLEASURE IN DOING THINGS: NOT AT ALL
2. FEELING DOWN, DEPRESSED OR HOPELESS: NOT AT ALL

## 2025-07-10 NOTE — PROGRESS NOTES
correction and for fusion with emission PET images. A series of overlapping emission PET images were then obtained beginning 60 minutes after injection of FDG. The area imaged spanned the region from the skull base to the mid thighs. Blood Glucose level prior to FDG injection: 95 mg/dL. COMPARISON: PET/CT April 4, 2024 FINDINGS: Reference Liver SUV max:   2.0 HEAD/FACE: Physiologic FDG uptake is seen in the visualized regions of the brain, large salivary glands and oropharynx. NECK: Physiologic FDG uptake is seen in neck muscles. CHEST: Physiologic FDG avidity is seen in mediastinal blood pool, myocardium. LUNGS: No abnormal uptake. PLEURA/PERICARDIUM: No abnormal uptake. THORACIC NODES: No abnormal uptake. HEPATOBILIARY: No abnormal uptake. Hepatic steatosis. SPLEEN: No abnormal uptake. PANCREAS: No abnormal uptake. ADRENAL GLANDS: No abnormal uptake. KIDNEYS/URETERS/BLADDER: Excreted activity is seen. Stable left renal cysts. ABDOMINOPELVIC NODES: No abnormal uptake. VASCULATURE: Unremarkable. BOWEL/PERITONEUM/MESENTERY: No abnormal uptake. PELVIC ORGANS: No abnormal uptake. Retroverted uterus. BONES/SOFT TISSUES: No abnormal uptake. Stable not avid sclerotic metastases, for example in S1 vertebral body. OTHER FINDINGS: None.     No suspicious abnormal uptake. Electronically signed by Ruel Muller        PET CT SKULL BASE TO MID THIGH    Result Date: 4/5/2024  PET/CT INDICATION: Breast cancer restaging TECHNIQUE: After oral administration of gastroview and intravenous administration of 12.8 mCi of F18 FDG, noncontrast CT images were obtained for attenuation correction and for fusion with emission PET images. A series of overlapping emission PET images were then obtained beginning 60 minutes after injection of FDG. The area imaged spanned the region from the skull base to the mid thighs. COMPARISON: October 18, 2023 and March 8, 2023 FINDINGS: Head/Neck: There is no abnormal uptake in the neck.  There is no

## 2025-08-25 DIAGNOSIS — Z17.0 MALIGNANT NEOPLASM OF LEFT BREAST IN FEMALE, ESTROGEN RECEPTOR POSITIVE, UNSPECIFIED SITE OF BREAST (HCC): Primary | ICD-10-CM

## 2025-08-25 DIAGNOSIS — C50.912 MALIGNANT NEOPLASM OF LEFT BREAST IN FEMALE, ESTROGEN RECEPTOR POSITIVE, UNSPECIFIED SITE OF BREAST (HCC): Primary | ICD-10-CM

## 2025-08-25 DIAGNOSIS — C79.51 METASTASIS TO BONE (HCC): ICD-10-CM

## 2025-08-25 DIAGNOSIS — R35.0 URINARY FREQUENCY: ICD-10-CM

## 2025-08-25 DIAGNOSIS — R30.0 DYSURIA: ICD-10-CM

## 2025-08-25 DIAGNOSIS — C77.3 BREAST CANCER METASTASIZED TO AXILLARY LYMPH NODE, LEFT (HCC): ICD-10-CM

## 2025-08-25 DIAGNOSIS — C50.912 BREAST CANCER METASTASIZED TO AXILLARY LYMPH NODE, LEFT (HCC): ICD-10-CM

## 2025-08-26 ENCOUNTER — HOSPITAL ENCOUNTER (OUTPATIENT)
Dept: LAB | Age: 55
Discharge: HOME | End: 2025-08-26
Payer: COMMERCIAL

## 2025-08-26 ENCOUNTER — OFFICE VISIT (OUTPATIENT)
Dept: ONCOLOGY | Age: 55
End: 2025-08-26
Payer: COMMERCIAL

## 2025-08-26 VITALS
WEIGHT: 185.3 LBS | BODY MASS INDEX: 32.83 KG/M2 | TEMPERATURE: 99.8 F | SYSTOLIC BLOOD PRESSURE: 155 MMHG | HEIGHT: 63 IN | OXYGEN SATURATION: 98 % | HEART RATE: 90 BPM | RESPIRATION RATE: 18 BRPM | DIASTOLIC BLOOD PRESSURE: 85 MMHG

## 2025-08-26 DIAGNOSIS — R30.0 DYSURIA: ICD-10-CM

## 2025-08-26 DIAGNOSIS — C50.912 BREAST CANCER METASTASIZED TO AXILLARY LYMPH NODE, LEFT (HCC): ICD-10-CM

## 2025-08-26 DIAGNOSIS — R53.83 FATIGUE DUE TO TREATMENT: ICD-10-CM

## 2025-08-26 DIAGNOSIS — E87.6 HYPOKALEMIA: ICD-10-CM

## 2025-08-26 DIAGNOSIS — R35.0 URINARY FREQUENCY: ICD-10-CM

## 2025-08-26 DIAGNOSIS — T45.1X5A HOT FLASHES RELATED TO AROMATASE INHIBITOR THERAPY: ICD-10-CM

## 2025-08-26 DIAGNOSIS — R23.2 HOT FLASHES RELATED TO AROMATASE INHIBITOR THERAPY: ICD-10-CM

## 2025-08-26 DIAGNOSIS — R31.9 URINARY TRACT INFECTION WITH HEMATURIA, SITE UNSPECIFIED: ICD-10-CM

## 2025-08-26 DIAGNOSIS — C79.51 METASTASIS TO BONE (HCC): ICD-10-CM

## 2025-08-26 DIAGNOSIS — C77.3 BREAST CANCER METASTASIZED TO AXILLARY LYMPH NODE, LEFT (HCC): ICD-10-CM

## 2025-08-26 DIAGNOSIS — Z17.0 MALIGNANT NEOPLASM OF LEFT BREAST IN FEMALE, ESTROGEN RECEPTOR POSITIVE, UNSPECIFIED SITE OF BREAST (HCC): ICD-10-CM

## 2025-08-26 DIAGNOSIS — N39.0 URINARY TRACT INFECTION WITH HEMATURIA, SITE UNSPECIFIED: ICD-10-CM

## 2025-08-26 DIAGNOSIS — M25.50 AROMATASE INHIBITOR-ASSOCIATED ARTHRALGIA: ICD-10-CM

## 2025-08-26 DIAGNOSIS — Z17.0 MALIGNANT NEOPLASM OF LEFT BREAST IN FEMALE, ESTROGEN RECEPTOR POSITIVE, UNSPECIFIED SITE OF BREAST (HCC): Primary | ICD-10-CM

## 2025-08-26 DIAGNOSIS — R19.7 DIARRHEA, UNSPECIFIED TYPE: ICD-10-CM

## 2025-08-26 DIAGNOSIS — T45.1X5A AROMATASE INHIBITOR-ASSOCIATED ARTHRALGIA: ICD-10-CM

## 2025-08-26 DIAGNOSIS — C50.912 MALIGNANT NEOPLASM OF LEFT BREAST IN FEMALE, ESTROGEN RECEPTOR POSITIVE, UNSPECIFIED SITE OF BREAST (HCC): ICD-10-CM

## 2025-08-26 DIAGNOSIS — C50.912 MALIGNANT NEOPLASM OF LEFT BREAST IN FEMALE, ESTROGEN RECEPTOR POSITIVE, UNSPECIFIED SITE OF BREAST (HCC): Primary | ICD-10-CM

## 2025-08-26 LAB
ALBUMIN SERPL-MCNC: 3.2 G/DL (ref 3.5–5)
ALBUMIN/GLOB SERPL: 0.8 (ref 1–1.9)
ALP SERPL-CCNC: 64 U/L (ref 35–104)
ALT SERPL-CCNC: 34 U/L (ref 8–45)
AMORPH CRY URNS QL MICRO: ABNORMAL
ANION GAP SERPL CALC-SCNC: 13 MMOL/L (ref 7–16)
APPEARANCE UR: ABNORMAL
AST SERPL-CCNC: 35 U/L (ref 15–37)
BACTERIA URNS QL MICRO: ABNORMAL /HPF
BASOPHILS # BLD: 0.03 K/UL (ref 0–0.2)
BASOPHILS NFR BLD: 0.6 % (ref 0–2)
BILIRUB SERPL-MCNC: 0.6 MG/DL (ref 0–1.2)
BILIRUB UR QL: NEGATIVE
BUN SERPL-MCNC: 16 MG/DL (ref 6–23)
CALCIUM SERPL-MCNC: 8.7 MG/DL (ref 8.8–10.2)
CHLORIDE SERPL-SCNC: 95 MMOL/L (ref 98–107)
CO2 SERPL-SCNC: 23 MMOL/L (ref 20–29)
COLOR UR: YELLOW
CREAT SERPL-MCNC: 1.15 MG/DL (ref 0.6–1.1)
DIFFERENTIAL METHOD BLD: ABNORMAL
EOSINOPHIL # BLD: 0.01 K/UL (ref 0–0.8)
EOSINOPHIL NFR BLD: 0.2 % (ref 0.5–7.8)
EPI CELLS #/AREA URNS HPF: ABNORMAL /HPF
ERYTHROCYTE [DISTWIDTH] IN BLOOD BY AUTOMATED COUNT: 12.7 % (ref 11.9–14.6)
GLOBULIN SER CALC-MCNC: 4.1 G/DL (ref 2.3–3.5)
GLUCOSE SERPL-MCNC: 119 MG/DL (ref 70–99)
GLUCOSE UR STRIP.AUTO-MCNC: NEGATIVE MG/DL
HCT VFR BLD AUTO: 30.9 % (ref 35.8–46.3)
HGB BLD-MCNC: 11.2 G/DL (ref 11.7–15.4)
HGB UR QL STRIP: ABNORMAL
IMM GRANULOCYTES # BLD AUTO: 0.04 K/UL (ref 0–0.5)
IMM GRANULOCYTES NFR BLD AUTO: 0.8 % (ref 0–5)
KETONES UR QL STRIP.AUTO: NEGATIVE MG/DL
LEUKOCYTE ESTERASE UR QL STRIP.AUTO: ABNORMAL
LYMPHOCYTES # BLD: 0.8 K/UL (ref 0.5–4.6)
LYMPHOCYTES NFR BLD: 15.7 % (ref 13–44)
MCH RBC QN AUTO: 35.4 PG (ref 26.1–32.9)
MCHC RBC AUTO-ENTMCNC: 36.2 G/DL (ref 31.4–35)
MCV RBC AUTO: 97.8 FL (ref 82–102)
MONOCYTES # BLD: 0.42 K/UL (ref 0.1–1.3)
MONOCYTES NFR BLD: 8.2 % (ref 4–12)
MUCOUS THREADS URNS QL MICRO: 0 /LPF
NEUTS SEG # BLD: 3.8 K/UL (ref 1.7–8.2)
NEUTS SEG NFR BLD: 74.5 % (ref 43–78)
NITRITE UR QL STRIP.AUTO: NEGATIVE
NRBC # BLD: 0 K/UL (ref 0–0.2)
PH UR STRIP: 6 (ref 5–9)
PLATELET # BLD AUTO: 177 K/UL (ref 150–450)
PLATELET COMMENT: ADEQUATE
PMV BLD AUTO: 9.7 FL (ref 9.4–12.3)
POTASSIUM SERPL-SCNC: 3.2 MMOL/L (ref 3.5–5.1)
PROT SERPL-MCNC: 7.2 G/DL (ref 6.3–8.2)
PROT UR STRIP-MCNC: 30 MG/DL
RBC # BLD AUTO: 3.16 M/UL (ref 4.05–5.2)
RBC #/AREA URNS HPF: ABNORMAL /HPF
RBC MORPH BLD: ABNORMAL
SODIUM SERPL-SCNC: 131 MMOL/L (ref 136–145)
SP GR UR REFRACTOMETRY: 1.02 (ref 1–1.02)
UROBILINOGEN UR QL STRIP.AUTO: 0.2 EU/DL (ref 0.2–1)
WBC # BLD AUTO: 5.1 K/UL (ref 4.3–11.1)
WBC MORPH BLD: ABNORMAL
WBC URNS QL MICRO: ABNORMAL /HPF

## 2025-08-26 PROCEDURE — 87086 URINE CULTURE/COLONY COUNT: CPT

## 2025-08-26 PROCEDURE — 99214 OFFICE O/P EST MOD 30 MIN: CPT | Performed by: NURSE PRACTITIONER

## 2025-08-26 PROCEDURE — 87088 URINE BACTERIA CULTURE: CPT

## 2025-08-26 PROCEDURE — 3079F DIAST BP 80-89 MM HG: CPT | Performed by: NURSE PRACTITIONER

## 2025-08-26 PROCEDURE — 81001 URINALYSIS AUTO W/SCOPE: CPT

## 2025-08-26 PROCEDURE — 3077F SYST BP >= 140 MM HG: CPT | Performed by: NURSE PRACTITIONER

## 2025-08-26 PROCEDURE — 36415 COLL VENOUS BLD VENIPUNCTURE: CPT

## 2025-08-26 PROCEDURE — 85025 COMPLETE CBC W/AUTO DIFF WBC: CPT

## 2025-08-26 PROCEDURE — 87186 SC STD MICRODIL/AGAR DIL: CPT

## 2025-08-26 PROCEDURE — 80053 COMPREHEN METABOLIC PANEL: CPT

## 2025-08-26 RX ORDER — NITROFURANTOIN 25; 75 MG/1; MG/1
100 CAPSULE ORAL 2 TIMES DAILY
Qty: 20 CAPSULE | Refills: 0 | Status: SHIPPED | OUTPATIENT
Start: 2025-08-26 | End: 2025-09-05

## 2025-08-28 LAB
BACTERIA SPEC CULT: ABNORMAL
BACTERIA SPEC CULT: ABNORMAL
SERVICE CMNT-IMP: ABNORMAL

## (undated) DEVICE — GARMENT,MEDLINE,DVT,INT,CALF,MED, GEN2: Brand: MEDLINE

## (undated) DEVICE — COVER PRB L11.9CM TAPR L3.8X61CM TRNSPAR SFT PLIABLE

## (undated) DEVICE — 3M™ TEGADERM™ TRANSPARENT FILM DRESSING FRAME STYLE, 1626W, 4 IN X 4-3/4 IN (10 CM X 12 CM), 50/CT 4CT/CASE: Brand: 3M™ TEGADERM™

## (undated) DEVICE — SUREFIT, DUAL DISPERSIVE ELECTRODE, CONTACT QUALITY MONITOR: Brand: SUREFIT

## (undated) DEVICE — APPLIER CLP L9.38IN M LIG TI DISP STR RNG HNDL LIGACLP

## (undated) DEVICE — CANISTER, RIGID, 2000CC: Brand: MEDLINE INDUSTRIES, INC.

## (undated) DEVICE — Device

## (undated) DEVICE — GLOVE SURG SZ 65 CRM LTX FREE POLYISOPRENE POLYMER BEAD ANTI

## (undated) DEVICE — MINOR SPLIT GENERAL: Brand: MEDLINE INDUSTRIES, INC.

## (undated) DEVICE — STRIP,CLOSURE,WOUND,MEDI-STRIP,1/2X4: Brand: MEDLINE

## (undated) DEVICE — NEEDLE HYPO 21GA L1.5IN INTRAMUSCULAR S STL LATCH BVL UP

## (undated) DEVICE — MASTISOL ADHESIVE LIQ 2/3ML

## (undated) DEVICE — GLOVE SURG SZ 65 THK91MIL LTX FREE SYN POLYISOPRENE

## (undated) DEVICE — SUTURE VCRL SZ 3-0 L18IN ABSRB UD L26MM SH 1/2 CIR J864D

## (undated) DEVICE — CONTAINER,SPECIMEN,OR STERILE,4OZ: Brand: MEDLINE

## (undated) DEVICE — PAD,NON-ADHERENT,3X8,STERILE,LF,1/PK: Brand: MEDLINE

## (undated) DEVICE — SOLUTION IRRIG 1000ML 0.9% SOD CHL USP POUR PLAS BTL

## (undated) DEVICE — GLOVE SURG SZ 6.5 L12IN FNGR THK11.8MIL KHAKI LTX BEAD CUF

## (undated) DEVICE — GLOVE SURG SZ 7 L12IN FNGR THK79MIL GRN LTX FREE